# Patient Record
Sex: FEMALE | Race: WHITE | NOT HISPANIC OR LATINO | Employment: FULL TIME | URBAN - METROPOLITAN AREA
[De-identification: names, ages, dates, MRNs, and addresses within clinical notes are randomized per-mention and may not be internally consistent; named-entity substitution may affect disease eponyms.]

---

## 2020-01-01 ENCOUNTER — TELEPHONE (OUTPATIENT)
Dept: ONCOLOGY | Facility: MEDICAL CENTER | Age: 55
End: 2020-01-01

## 2020-01-01 ENCOUNTER — OUTPATIENT INFUSION SERVICES (OUTPATIENT)
Dept: ONCOLOGY | Facility: MEDICAL CENTER | Age: 55
End: 2020-01-01
Attending: INTERNAL MEDICINE
Payer: COMMERCIAL

## 2020-01-01 ENCOUNTER — APPOINTMENT (OUTPATIENT)
Dept: INFECTIOUS DISEASES | Facility: MEDICAL CENTER | Age: 55
End: 2020-01-01
Payer: COMMERCIAL

## 2020-01-01 ENCOUNTER — HOSPITAL ENCOUNTER (INPATIENT)
Facility: MEDICAL CENTER | Age: 55
LOS: 3 days | DRG: 205 | End: 2020-06-21
Attending: EMERGENCY MEDICINE | Admitting: HOSPITALIST
Payer: COMMERCIAL

## 2020-01-01 ENCOUNTER — APPOINTMENT (OUTPATIENT)
Dept: RADIOLOGY | Facility: MEDICAL CENTER | Age: 55
DRG: 841 | End: 2020-01-01
Attending: EMERGENCY MEDICINE
Payer: COMMERCIAL

## 2020-01-01 ENCOUNTER — APPOINTMENT (OUTPATIENT)
Dept: RADIOLOGY | Facility: MEDICAL CENTER | Age: 55
DRG: 871 | End: 2020-01-01
Attending: INTERNAL MEDICINE
Payer: COMMERCIAL

## 2020-01-01 ENCOUNTER — HOSPICE ADMISSION (OUTPATIENT)
Dept: HOSPICE | Facility: HOSPICE | Age: 55
End: 2020-01-01
Payer: COMMERCIAL

## 2020-01-01 ENCOUNTER — PATIENT MESSAGE (OUTPATIENT)
Dept: HEALTH INFORMATION MANAGEMENT | Facility: OTHER | Age: 55
End: 2020-01-01

## 2020-01-01 ENCOUNTER — OUTPATIENT INFUSION SERVICES (OUTPATIENT)
Dept: ONCOLOGY | Facility: MEDICAL CENTER | Age: 55
End: 2020-01-01
Attending: NURSE PRACTITIONER
Payer: COMMERCIAL

## 2020-01-01 ENCOUNTER — APPOINTMENT (OUTPATIENT)
Dept: RADIOLOGY | Facility: MEDICAL CENTER | Age: 55
DRG: 808 | End: 2020-01-01
Attending: EMERGENCY MEDICINE
Payer: COMMERCIAL

## 2020-01-01 ENCOUNTER — HOSPITAL ENCOUNTER (INPATIENT)
Facility: MEDICAL CENTER | Age: 55
LOS: 7 days | DRG: 841 | End: 2020-07-09
Attending: EMERGENCY MEDICINE | Admitting: HOSPITALIST
Payer: COMMERCIAL

## 2020-01-01 ENCOUNTER — PATIENT OUTREACH (OUTPATIENT)
Dept: OTHER | Facility: MEDICAL CENTER | Age: 55
End: 2020-01-01

## 2020-01-01 ENCOUNTER — APPOINTMENT (OUTPATIENT)
Dept: CARDIOLOGY | Facility: MEDICAL CENTER | Age: 55
DRG: 871 | End: 2020-01-01
Attending: INTERNAL MEDICINE
Payer: COMMERCIAL

## 2020-01-01 ENCOUNTER — HOSPITAL ENCOUNTER (INPATIENT)
Facility: MEDICAL CENTER | Age: 55
LOS: 6 days | DRG: 808 | End: 2020-05-23
Attending: EMERGENCY MEDICINE | Admitting: INTERNAL MEDICINE
Payer: COMMERCIAL

## 2020-01-01 ENCOUNTER — HOSPITAL ENCOUNTER (OUTPATIENT)
Dept: RADIOLOGY | Facility: MEDICAL CENTER | Age: 55
End: 2020-08-07
Attending: INTERNAL MEDICINE
Payer: COMMERCIAL

## 2020-01-01 ENCOUNTER — HOSPITAL ENCOUNTER (OUTPATIENT)
Dept: RADIOLOGY | Facility: MEDICAL CENTER | Age: 55
End: 2020-07-27
Attending: INTERNAL MEDICINE
Payer: COMMERCIAL

## 2020-01-01 ENCOUNTER — OFFICE VISIT (OUTPATIENT)
Dept: INFECTIOUS DISEASES | Facility: MEDICAL CENTER | Age: 55
End: 2020-01-01
Payer: COMMERCIAL

## 2020-01-01 ENCOUNTER — HOSPITAL ENCOUNTER (INPATIENT)
Facility: MEDICAL CENTER | Age: 55
LOS: 9 days | DRG: 871 | End: 2020-08-07
Attending: EMERGENCY MEDICINE | Admitting: HOSPITALIST
Payer: COMMERCIAL

## 2020-01-01 ENCOUNTER — DOCUMENTATION (OUTPATIENT)
Dept: NUTRITION | Facility: MEDICAL CENTER | Age: 55
End: 2020-01-01

## 2020-01-01 ENCOUNTER — APPOINTMENT (OUTPATIENT)
Dept: RADIOLOGY | Facility: MEDICAL CENTER | Age: 55
DRG: 205 | End: 2020-01-01
Attending: INTERNAL MEDICINE
Payer: COMMERCIAL

## 2020-01-01 ENCOUNTER — HOME CARE VISIT (OUTPATIENT)
Dept: HOSPICE | Facility: HOSPICE | Age: 55
End: 2020-01-01
Payer: COMMERCIAL

## 2020-01-01 ENCOUNTER — APPOINTMENT (OUTPATIENT)
Dept: RADIOLOGY | Facility: MEDICAL CENTER | Age: 55
DRG: 205 | End: 2020-01-01
Attending: EMERGENCY MEDICINE
Payer: COMMERCIAL

## 2020-01-01 VITALS
OXYGEN SATURATION: 100 % | SYSTOLIC BLOOD PRESSURE: 122 MMHG | HEART RATE: 82 BPM | WEIGHT: 95.9 LBS | BODY MASS INDEX: 19.33 KG/M2 | TEMPERATURE: 98.5 F | DIASTOLIC BLOOD PRESSURE: 54 MMHG | RESPIRATION RATE: 18 BRPM | HEIGHT: 59 IN

## 2020-01-01 VITALS
WEIGHT: 96.78 LBS | TEMPERATURE: 101.8 F | SYSTOLIC BLOOD PRESSURE: 165 MMHG | OXYGEN SATURATION: 100 % | HEIGHT: 59 IN | BODY MASS INDEX: 19.51 KG/M2 | DIASTOLIC BLOOD PRESSURE: 80 MMHG | HEART RATE: 145 BPM | RESPIRATION RATE: 26 BRPM

## 2020-01-01 VITALS
BODY MASS INDEX: 18.89 KG/M2 | RESPIRATION RATE: 17 BRPM | HEIGHT: 59 IN | WEIGHT: 93.7 LBS | DIASTOLIC BLOOD PRESSURE: 61 MMHG | TEMPERATURE: 97.8 F | HEART RATE: 93 BPM | OXYGEN SATURATION: 98 % | SYSTOLIC BLOOD PRESSURE: 132 MMHG

## 2020-01-01 VITALS
BODY MASS INDEX: 19.62 KG/M2 | DIASTOLIC BLOOD PRESSURE: 53 MMHG | OXYGEN SATURATION: 100 % | WEIGHT: 93.47 LBS | SYSTOLIC BLOOD PRESSURE: 135 MMHG | HEART RATE: 86 BPM | RESPIRATION RATE: 16 BRPM | TEMPERATURE: 98.9 F | HEIGHT: 58 IN

## 2020-01-01 VITALS — HEIGHT: 60 IN | WEIGHT: 109.79 LBS | TEMPERATURE: 99.7 F | BODY MASS INDEX: 21.55 KG/M2

## 2020-01-01 VITALS
SYSTOLIC BLOOD PRESSURE: 122 MMHG | WEIGHT: 91.27 LBS | TEMPERATURE: 101 F | HEIGHT: 59 IN | HEART RATE: 95 BPM | RESPIRATION RATE: 18 BRPM | OXYGEN SATURATION: 94 % | DIASTOLIC BLOOD PRESSURE: 40 MMHG | BODY MASS INDEX: 18.4 KG/M2

## 2020-01-01 VITALS
BODY MASS INDEX: 18.93 KG/M2 | WEIGHT: 93.92 LBS | TEMPERATURE: 98.4 F | RESPIRATION RATE: 18 BRPM | DIASTOLIC BLOOD PRESSURE: 44 MMHG | HEIGHT: 59 IN | HEART RATE: 92 BPM | OXYGEN SATURATION: 98 % | SYSTOLIC BLOOD PRESSURE: 131 MMHG

## 2020-01-01 VITALS
HEIGHT: 59 IN | DIASTOLIC BLOOD PRESSURE: 42 MMHG | SYSTOLIC BLOOD PRESSURE: 106 MMHG | RESPIRATION RATE: 18 BRPM | HEART RATE: 85 BPM | TEMPERATURE: 97.8 F | OXYGEN SATURATION: 95 % | WEIGHT: 91.49 LBS | BODY MASS INDEX: 18.44 KG/M2

## 2020-01-01 VITALS
DIASTOLIC BLOOD PRESSURE: 54 MMHG | HEART RATE: 81 BPM | TEMPERATURE: 98 F | BODY MASS INDEX: 19.6 KG/M2 | WEIGHT: 97.22 LBS | HEIGHT: 59 IN | RESPIRATION RATE: 18 BRPM | SYSTOLIC BLOOD PRESSURE: 150 MMHG | OXYGEN SATURATION: 100 %

## 2020-01-01 VITALS
TEMPERATURE: 97.4 F | HEART RATE: 74 BPM | DIASTOLIC BLOOD PRESSURE: 51 MMHG | RESPIRATION RATE: 18 BRPM | WEIGHT: 92.15 LBS | HEIGHT: 59 IN | BODY MASS INDEX: 18.58 KG/M2 | OXYGEN SATURATION: 100 % | SYSTOLIC BLOOD PRESSURE: 111 MMHG

## 2020-01-01 VITALS
BODY MASS INDEX: 19.07 KG/M2 | SYSTOLIC BLOOD PRESSURE: 145 MMHG | DIASTOLIC BLOOD PRESSURE: 70 MMHG | RESPIRATION RATE: 18 BRPM | HEART RATE: 100 BPM | OXYGEN SATURATION: 100 % | HEIGHT: 59 IN | WEIGHT: 94.58 LBS | TEMPERATURE: 97 F

## 2020-01-01 VITALS
OXYGEN SATURATION: 99 % | HEART RATE: 95 BPM | HEIGHT: 59 IN | RESPIRATION RATE: 16 BRPM | WEIGHT: 96.12 LBS | DIASTOLIC BLOOD PRESSURE: 35 MMHG | SYSTOLIC BLOOD PRESSURE: 142 MMHG | TEMPERATURE: 99.2 F | BODY MASS INDEX: 19.38 KG/M2

## 2020-01-01 VITALS
WEIGHT: 95.24 LBS | TEMPERATURE: 98.8 F | HEART RATE: 82 BPM | DIASTOLIC BLOOD PRESSURE: 43 MMHG | RESPIRATION RATE: 18 BRPM | BODY MASS INDEX: 19.2 KG/M2 | OXYGEN SATURATION: 100 % | HEIGHT: 59 IN | SYSTOLIC BLOOD PRESSURE: 113 MMHG

## 2020-01-01 VITALS
RESPIRATION RATE: 18 BRPM | HEART RATE: 90 BPM | HEIGHT: 59 IN | SYSTOLIC BLOOD PRESSURE: 159 MMHG | WEIGHT: 96.12 LBS | TEMPERATURE: 97.6 F | DIASTOLIC BLOOD PRESSURE: 54 MMHG | OXYGEN SATURATION: 100 % | BODY MASS INDEX: 19.38 KG/M2

## 2020-01-01 VITALS
SYSTOLIC BLOOD PRESSURE: 127 MMHG | HEART RATE: 86 BPM | RESPIRATION RATE: 18 BRPM | HEIGHT: 59 IN | TEMPERATURE: 99 F | OXYGEN SATURATION: 100 % | DIASTOLIC BLOOD PRESSURE: 51 MMHG | BODY MASS INDEX: 19.02 KG/M2 | WEIGHT: 94.36 LBS

## 2020-01-01 VITALS
DIASTOLIC BLOOD PRESSURE: 50 MMHG | RESPIRATION RATE: 18 BRPM | SYSTOLIC BLOOD PRESSURE: 119 MMHG | OXYGEN SATURATION: 100 % | HEIGHT: 59 IN | HEART RATE: 80 BPM | WEIGHT: 96.12 LBS | TEMPERATURE: 98.9 F | BODY MASS INDEX: 19.38 KG/M2

## 2020-01-01 VITALS
BODY MASS INDEX: 19.27 KG/M2 | DIASTOLIC BLOOD PRESSURE: 70 MMHG | HEIGHT: 59 IN | OXYGEN SATURATION: 99 % | TEMPERATURE: 99.1 F | WEIGHT: 95.6 LBS | SYSTOLIC BLOOD PRESSURE: 140 MMHG | HEART RATE: 86 BPM

## 2020-01-01 VITALS
RESPIRATION RATE: 18 BRPM | DIASTOLIC BLOOD PRESSURE: 60 MMHG | TEMPERATURE: 98.6 F | SYSTOLIC BLOOD PRESSURE: 139 MMHG | HEIGHT: 59 IN | BODY MASS INDEX: 20 KG/M2 | OXYGEN SATURATION: 100 % | WEIGHT: 99.21 LBS | HEART RATE: 76 BPM

## 2020-01-01 VITALS
DIASTOLIC BLOOD PRESSURE: 60 MMHG | RESPIRATION RATE: 18 BRPM | HEART RATE: 85 BPM | HEIGHT: 59 IN | WEIGHT: 92.59 LBS | TEMPERATURE: 98.5 F | BODY MASS INDEX: 18.67 KG/M2 | OXYGEN SATURATION: 95 % | SYSTOLIC BLOOD PRESSURE: 140 MMHG

## 2020-01-01 VITALS
HEIGHT: 59 IN | DIASTOLIC BLOOD PRESSURE: 67 MMHG | WEIGHT: 92.37 LBS | TEMPERATURE: 98.5 F | HEART RATE: 105 BPM | OXYGEN SATURATION: 100 % | RESPIRATION RATE: 18 BRPM | SYSTOLIC BLOOD PRESSURE: 147 MMHG | BODY MASS INDEX: 18.62 KG/M2

## 2020-01-01 VITALS
OXYGEN SATURATION: 100 % | HEART RATE: 76 BPM | HEIGHT: 59 IN | WEIGHT: 100.31 LBS | RESPIRATION RATE: 18 BRPM | DIASTOLIC BLOOD PRESSURE: 71 MMHG | SYSTOLIC BLOOD PRESSURE: 156 MMHG | TEMPERATURE: 98.7 F | BODY MASS INDEX: 20.22 KG/M2

## 2020-01-01 VITALS
BODY MASS INDEX: 19.42 KG/M2 | HEIGHT: 59 IN | RESPIRATION RATE: 16 BRPM | HEART RATE: 85 BPM | OXYGEN SATURATION: 100 % | TEMPERATURE: 98.9 F | SYSTOLIC BLOOD PRESSURE: 135 MMHG | DIASTOLIC BLOOD PRESSURE: 47 MMHG | WEIGHT: 96.34 LBS

## 2020-01-01 VITALS
WEIGHT: 91.49 LBS | RESPIRATION RATE: 18 BRPM | HEIGHT: 59 IN | BODY MASS INDEX: 18.44 KG/M2 | HEART RATE: 75 BPM | TEMPERATURE: 97.1 F | SYSTOLIC BLOOD PRESSURE: 115 MMHG | OXYGEN SATURATION: 98 % | DIASTOLIC BLOOD PRESSURE: 43 MMHG

## 2020-01-01 VITALS
RESPIRATION RATE: 18 BRPM | OXYGEN SATURATION: 98 % | TEMPERATURE: 97.9 F | HEIGHT: 59 IN | HEART RATE: 88 BPM | BODY MASS INDEX: 18.22 KG/M2 | DIASTOLIC BLOOD PRESSURE: 49 MMHG | SYSTOLIC BLOOD PRESSURE: 126 MMHG | WEIGHT: 90.39 LBS

## 2020-01-01 VITALS
HEART RATE: 92 BPM | DIASTOLIC BLOOD PRESSURE: 61 MMHG | RESPIRATION RATE: 18 BRPM | WEIGHT: 92.59 LBS | OXYGEN SATURATION: 98 % | SYSTOLIC BLOOD PRESSURE: 139 MMHG | TEMPERATURE: 98 F | HEIGHT: 59 IN | BODY MASS INDEX: 18.67 KG/M2

## 2020-01-01 VITALS
DIASTOLIC BLOOD PRESSURE: 53 MMHG | OXYGEN SATURATION: 99 % | WEIGHT: 99.43 LBS | SYSTOLIC BLOOD PRESSURE: 139 MMHG | HEIGHT: 59 IN | HEART RATE: 99 BPM | TEMPERATURE: 101.1 F | RESPIRATION RATE: 20 BRPM | BODY MASS INDEX: 20.04 KG/M2

## 2020-01-01 VITALS
WEIGHT: 91.49 LBS | RESPIRATION RATE: 18 BRPM | HEART RATE: 77 BPM | SYSTOLIC BLOOD PRESSURE: 121 MMHG | DIASTOLIC BLOOD PRESSURE: 56 MMHG | HEIGHT: 59 IN | TEMPERATURE: 98.3 F | BODY MASS INDEX: 18.44 KG/M2 | OXYGEN SATURATION: 100 %

## 2020-01-01 VITALS
RESPIRATION RATE: 16 BRPM | HEART RATE: 85 BPM | HEIGHT: 60 IN | BODY MASS INDEX: 18.18 KG/M2 | OXYGEN SATURATION: 99 % | WEIGHT: 92.59 LBS | SYSTOLIC BLOOD PRESSURE: 127 MMHG | DIASTOLIC BLOOD PRESSURE: 68 MMHG | TEMPERATURE: 98.8 F

## 2020-01-01 VITALS
WEIGHT: 96.12 LBS | TEMPERATURE: 99.4 F | BODY MASS INDEX: 19.38 KG/M2 | SYSTOLIC BLOOD PRESSURE: 161 MMHG | HEIGHT: 59 IN | HEART RATE: 91 BPM | OXYGEN SATURATION: 100 % | RESPIRATION RATE: 18 BRPM | DIASTOLIC BLOOD PRESSURE: 64 MMHG

## 2020-01-01 VITALS
RESPIRATION RATE: 18 BRPM | HEART RATE: 87 BPM | BODY MASS INDEX: 18.58 KG/M2 | SYSTOLIC BLOOD PRESSURE: 138 MMHG | DIASTOLIC BLOOD PRESSURE: 64 MMHG | OXYGEN SATURATION: 100 % | HEIGHT: 59 IN | TEMPERATURE: 98.5 F | WEIGHT: 92.15 LBS

## 2020-01-01 VITALS
HEIGHT: 59 IN | HEART RATE: 77 BPM | SYSTOLIC BLOOD PRESSURE: 173 MMHG | WEIGHT: 97.66 LBS | OXYGEN SATURATION: 100 % | DIASTOLIC BLOOD PRESSURE: 72 MMHG | TEMPERATURE: 98.6 F | RESPIRATION RATE: 18 BRPM | BODY MASS INDEX: 19.69 KG/M2

## 2020-01-01 VITALS
HEART RATE: 87 BPM | BODY MASS INDEX: 19.9 KG/M2 | RESPIRATION RATE: 16 BRPM | DIASTOLIC BLOOD PRESSURE: 45 MMHG | TEMPERATURE: 98.1 F | WEIGHT: 94.8 LBS | OXYGEN SATURATION: 100 % | SYSTOLIC BLOOD PRESSURE: 122 MMHG | HEIGHT: 58 IN

## 2020-01-01 VITALS
HEART RATE: 79 BPM | WEIGHT: 96.12 LBS | DIASTOLIC BLOOD PRESSURE: 50 MMHG | BODY MASS INDEX: 19.38 KG/M2 | TEMPERATURE: 98.3 F | RESPIRATION RATE: 16 BRPM | HEIGHT: 59 IN | SYSTOLIC BLOOD PRESSURE: 131 MMHG | OXYGEN SATURATION: 100 %

## 2020-01-01 VITALS
HEIGHT: 59 IN | HEART RATE: 85 BPM | TEMPERATURE: 98.4 F | BODY MASS INDEX: 20.18 KG/M2 | DIASTOLIC BLOOD PRESSURE: 44 MMHG | OXYGEN SATURATION: 95 % | SYSTOLIC BLOOD PRESSURE: 126 MMHG | WEIGHT: 100.09 LBS | RESPIRATION RATE: 16 BRPM

## 2020-01-01 VITALS
DIASTOLIC BLOOD PRESSURE: 43 MMHG | HEART RATE: 87 BPM | RESPIRATION RATE: 18 BRPM | BODY MASS INDEX: 19.42 KG/M2 | HEIGHT: 59 IN | WEIGHT: 96.34 LBS | OXYGEN SATURATION: 100 % | TEMPERATURE: 99.3 F | SYSTOLIC BLOOD PRESSURE: 135 MMHG

## 2020-01-01 VITALS
WEIGHT: 94.36 LBS | HEIGHT: 59 IN | RESPIRATION RATE: 18 BRPM | BODY MASS INDEX: 19.02 KG/M2 | OXYGEN SATURATION: 99 % | HEART RATE: 113 BPM | TEMPERATURE: 101.5 F | SYSTOLIC BLOOD PRESSURE: 167 MMHG | DIASTOLIC BLOOD PRESSURE: 58 MMHG

## 2020-01-01 DIAGNOSIS — D64.9 ANEMIA, UNSPECIFIED TYPE: ICD-10-CM

## 2020-01-01 DIAGNOSIS — D69.6 THROMBOCYTOPENIA (HCC): ICD-10-CM

## 2020-01-01 DIAGNOSIS — R65.21 SEPTIC SHOCK (HCC): ICD-10-CM

## 2020-01-01 DIAGNOSIS — R50.81 NEUTROPENIC FEVER (HCC): ICD-10-CM

## 2020-01-01 DIAGNOSIS — C91.10 CLL (CHRONIC LYMPHOCYTIC LEUKEMIA) (HCC): ICD-10-CM

## 2020-01-01 DIAGNOSIS — C91.10 CLL (CHRONIC LYMPHOCYTIC LEUKEMIA) (HCC): Chronic | ICD-10-CM

## 2020-01-01 DIAGNOSIS — J95.84 TRALI (TRANSFUSION RELATED ACUTE LUNG INJURY): ICD-10-CM

## 2020-01-01 DIAGNOSIS — D70.9 NEUTROPENIC FEVER (HCC): ICD-10-CM

## 2020-01-01 DIAGNOSIS — E87.6 HYPOKALEMIA: ICD-10-CM

## 2020-01-01 DIAGNOSIS — D70.9 NEUTROPENIA, UNSPECIFIED TYPE (HCC): ICD-10-CM

## 2020-01-01 DIAGNOSIS — I47.10 SVT (SUPRAVENTRICULAR TACHYCARDIA) (HCC): ICD-10-CM

## 2020-01-01 DIAGNOSIS — A41.9 SEPSIS, DUE TO UNSPECIFIED ORGANISM, UNSPECIFIED WHETHER ACUTE ORGAN DYSFUNCTION PRESENT (HCC): ICD-10-CM

## 2020-01-01 DIAGNOSIS — D61.818 PANCYTOPENIA (HCC): ICD-10-CM

## 2020-01-01 DIAGNOSIS — R50.9 FEVER OF UNKNOWN ORIGIN: ICD-10-CM

## 2020-01-01 DIAGNOSIS — K13.70 UNSPECIFIED LESIONS OF ORAL MUCOSA: ICD-10-CM

## 2020-01-01 DIAGNOSIS — E87.1 HYPONATREMIA: ICD-10-CM

## 2020-01-01 DIAGNOSIS — A41.9 SEPTIC SHOCK (HCC): ICD-10-CM

## 2020-01-01 DIAGNOSIS — T80.919A: ICD-10-CM

## 2020-01-01 DIAGNOSIS — J15.211 PNEUMONIA DUE TO METHICILLIN SUSCEPTIBLE STAPHYLOCOCCUS AUREUS, UNSPECIFIED LATERALITY, UNSPECIFIED PART OF LUNG: ICD-10-CM

## 2020-01-01 LAB
1 3 BETA D GLUCAN INTERP Q4483: POSITIVE
1 3 BETA D GLUCAN INTERP Q4483: POSITIVE
1,3 BETA GLUCAN SER-MCNC: 376 PG/ML
1,3 BETA GLUCAN SER-MCNC: >500 PG/ML
ABO + RH BLD: NORMAL
ABO GROUP BLD: ABNORMAL
ALBUMIN SERPL BCP-MCNC: 2.1 G/DL (ref 3.2–4.9)
ALBUMIN SERPL BCP-MCNC: 2.3 G/DL (ref 3.2–4.9)
ALBUMIN SERPL BCP-MCNC: 2.6 G/DL (ref 3.2–4.9)
ALBUMIN SERPL BCP-MCNC: 2.7 G/DL (ref 3.2–4.9)
ALBUMIN SERPL BCP-MCNC: 3.3 G/DL (ref 3.2–4.9)
ALBUMIN SERPL BCP-MCNC: 3.6 G/DL (ref 3.2–4.9)
ALBUMIN SERPL BCP-MCNC: 3.6 G/DL (ref 3.2–4.9)
ALBUMIN SERPL BCP-MCNC: 3.7 G/DL (ref 3.2–4.9)
ALBUMIN SERPL BCP-MCNC: 3.7 G/DL (ref 3.2–4.9)
ALBUMIN SERPL BCP-MCNC: 3.8 G/DL (ref 3.2–4.9)
ALBUMIN SERPL BCP-MCNC: 4.1 G/DL (ref 3.2–4.9)
ALBUMIN SERPL BCP-MCNC: 4.1 G/DL (ref 3.2–4.9)
ALBUMIN SERPL BCP-MCNC: 4.3 G/DL (ref 3.2–4.9)
ALBUMIN SERPL BCP-MCNC: 4.3 G/DL (ref 3.2–4.9)
ALBUMIN SERPL BCP-MCNC: 4.5 G/DL (ref 3.2–4.9)
ALBUMIN/GLOB SERPL: 0.7 G/DL
ALBUMIN/GLOB SERPL: 0.8 G/DL
ALBUMIN/GLOB SERPL: 0.9 G/DL
ALBUMIN/GLOB SERPL: 1.2 G/DL
ALBUMIN/GLOB SERPL: 1.3 G/DL
ALBUMIN/GLOB SERPL: 1.4 G/DL
ALBUMIN/GLOB SERPL: 1.5 G/DL
ALP SERPL-CCNC: 105 U/L (ref 30–99)
ALP SERPL-CCNC: 164 U/L (ref 30–99)
ALP SERPL-CCNC: 180 U/L (ref 30–99)
ALP SERPL-CCNC: 216 U/L (ref 30–99)
ALP SERPL-CCNC: 245 U/L (ref 30–99)
ALP SERPL-CCNC: 71 U/L (ref 30–99)
ALP SERPL-CCNC: 73 U/L (ref 30–99)
ALP SERPL-CCNC: 73 U/L (ref 30–99)
ALP SERPL-CCNC: 76 U/L (ref 30–99)
ALP SERPL-CCNC: 78 U/L (ref 30–99)
ALP SERPL-CCNC: 79 U/L (ref 30–99)
ALP SERPL-CCNC: 83 U/L (ref 30–99)
ALP SERPL-CCNC: 85 U/L (ref 30–99)
ALP SERPL-CCNC: 86 U/L (ref 30–99)
ALP SERPL-CCNC: 89 U/L (ref 30–99)
ALP SERPL-CCNC: 91 U/L (ref 30–99)
ALP SERPL-CCNC: 93 U/L (ref 30–99)
ALT SERPL-CCNC: 12 U/L (ref 2–50)
ALT SERPL-CCNC: 12 U/L (ref 2–50)
ALT SERPL-CCNC: 14 U/L (ref 2–50)
ALT SERPL-CCNC: 14 U/L (ref 2–50)
ALT SERPL-CCNC: 18 U/L (ref 2–50)
ALT SERPL-CCNC: 18 U/L (ref 2–50)
ALT SERPL-CCNC: 197 U/L (ref 2–50)
ALT SERPL-CCNC: 20 U/L (ref 2–50)
ALT SERPL-CCNC: 23 U/L (ref 2–50)
ALT SERPL-CCNC: 25 U/L (ref 2–50)
ALT SERPL-CCNC: 26 U/L (ref 2–50)
ALT SERPL-CCNC: 35 U/L (ref 2–50)
ALT SERPL-CCNC: 38 U/L (ref 2–50)
ALT SERPL-CCNC: 76 U/L (ref 2–50)
ALT SERPL-CCNC: 92 U/L (ref 2–50)
ANION GAP SERPL CALC-SCNC: 10 MMOL/L (ref 7–16)
ANION GAP SERPL CALC-SCNC: 11 MMOL/L (ref 7–16)
ANION GAP SERPL CALC-SCNC: 12 MMOL/L (ref 7–16)
ANION GAP SERPL CALC-SCNC: 13 MMOL/L (ref 7–16)
ANION GAP SERPL CALC-SCNC: 14 MMOL/L (ref 7–16)
ANION GAP SERPL CALC-SCNC: 15 MMOL/L (ref 7–16)
ANION GAP SERPL CALC-SCNC: 16 MMOL/L (ref 7–16)
ANION GAP SERPL CALC-SCNC: 17 MMOL/L (ref 7–16)
ANION GAP SERPL CALC-SCNC: 26 MMOL/L (ref 7–16)
ANION GAP SERPL CALC-SCNC: 7 MMOL/L (ref 7–16)
ANION GAP SERPL CALC-SCNC: 8 MMOL/L (ref 7–16)
ANION GAP SERPL CALC-SCNC: 9 MMOL/L (ref 7–16)
ANISOCYTOSIS BLD QL SMEAR: ABNORMAL
APPEARANCE UR: CLEAR
APTT PPP: 43.4 SEC (ref 24.7–36)
APTT PPP: 45.9 SEC (ref 24.7–36)
ASPERGILLUS AB SER QL ID: NORMAL
ASPERGILLUS AB TITR SER CF: NORMAL {TITER}
AST SERPL-CCNC: 10 U/L (ref 12–45)
AST SERPL-CCNC: 11 U/L (ref 12–45)
AST SERPL-CCNC: 14 U/L (ref 12–45)
AST SERPL-CCNC: 14 U/L (ref 12–45)
AST SERPL-CCNC: 15 U/L (ref 12–45)
AST SERPL-CCNC: 192 U/L (ref 12–45)
AST SERPL-CCNC: 22 U/L (ref 12–45)
AST SERPL-CCNC: 25 U/L (ref 12–45)
AST SERPL-CCNC: 30 U/L (ref 12–45)
AST SERPL-CCNC: 7 U/L (ref 12–45)
AST SERPL-CCNC: 8 U/L (ref 12–45)
AST SERPL-CCNC: 9 U/L (ref 12–45)
AST SERPL-CCNC: 93 U/L (ref 12–45)
BACTERIA #/AREA URNS HPF: ABNORMAL /HPF
BACTERIA #/AREA URNS HPF: ABNORMAL /HPF
BACTERIA #/AREA URNS HPF: NEGATIVE /HPF
BACTERIA #/AREA URNS HPF: NEGATIVE /HPF
BACTERIA BLD CULT: NORMAL
BACTERIA SPEC RESP CULT: ABNORMAL
BACTERIA SPEC RESP CULT: ABNORMAL
BACTERIA SPEC RESP CULT: NORMAL
BACTERIA SPEC RESP CULT: NORMAL
BACTERIA UR CULT: ABNORMAL
BACTERIA UR CULT: ABNORMAL
BACTERIA UR CULT: NORMAL
BACTERIA UR CULT: NORMAL
BARCODED ABORH UBTYP: 1700
BARCODED ABORH UBTYP: 2800
BARCODED ABORH UBTYP: 5100
BARCODED ABORH UBTYP: 600
BARCODED ABORH UBTYP: 6200
BARCODED ABORH UBTYP: 7300
BARCODED ABORH UBTYP: 7300
BARCODED ABORH UBTYP: 9500
BARCODED PRD CODE UBPRD: ABNORMAL
BARCODED PRD CODE UBPRD: NORMAL
BARCODED UNIT NUM UBUNT: ABNORMAL
BARCODED UNIT NUM UBUNT: NORMAL
BASOPHILS # BLD AUTO: 0 % (ref 0–1.8)
BASOPHILS # BLD AUTO: ABNORMAL % (ref 0–1.8)
BASOPHILS # BLD: 0 K/UL (ref 0–0.12)
BASOPHILS # BLD: ABNORMAL K/UL (ref 0–0.12)
BILIRUB CONJ SERPL-MCNC: 0.5 MG/DL (ref 0.1–0.5)
BILIRUB CONJ SERPL-MCNC: 0.9 MG/DL (ref 0.1–0.5)
BILIRUB CONJ SERPL-MCNC: 1.1 MG/DL (ref 0.1–0.5)
BILIRUB INDIRECT SERPL-MCNC: 0.7 MG/DL (ref 0–1)
BILIRUB SERPL-MCNC: 0.8 MG/DL (ref 0.1–1.5)
BILIRUB SERPL-MCNC: 1 MG/DL (ref 0.1–1.5)
BILIRUB SERPL-MCNC: 1.2 MG/DL (ref 0.1–1.5)
BILIRUB SERPL-MCNC: 1.2 MG/DL (ref 0.1–1.5)
BILIRUB SERPL-MCNC: 1.3 MG/DL (ref 0.1–1.5)
BILIRUB SERPL-MCNC: 1.6 MG/DL (ref 0.1–1.5)
BILIRUB SERPL-MCNC: 1.6 MG/DL (ref 0.1–1.5)
BILIRUB SERPL-MCNC: 1.7 MG/DL (ref 0.1–1.5)
BILIRUB SERPL-MCNC: 1.8 MG/DL (ref 0.1–1.5)
BILIRUB SERPL-MCNC: 1.8 MG/DL (ref 0.1–1.5)
BILIRUB SERPL-MCNC: 1.9 MG/DL (ref 0.1–1.5)
BILIRUB SERPL-MCNC: 1.9 MG/DL (ref 0.1–1.5)
BILIRUB SERPL-MCNC: 2.5 MG/DL (ref 0.1–1.5)
BILIRUB SERPL-MCNC: 3 MG/DL (ref 0.1–1.5)
BILIRUB SERPL-MCNC: 3.3 MG/DL (ref 0.1–1.5)
BILIRUB SERPL-MCNC: 5.8 MG/DL (ref 0.1–1.5)
BILIRUB SERPL-MCNC: 6.1 MG/DL (ref 0.1–1.5)
BILIRUB UR QL STRIP.AUTO: NEGATIVE
BLD GP AB SCN SERPL QL: ABNORMAL
BUN SERPL-MCNC: 10 MG/DL (ref 8–22)
BUN SERPL-MCNC: 10 MG/DL (ref 8–22)
BUN SERPL-MCNC: 11 MG/DL (ref 8–22)
BUN SERPL-MCNC: 12 MG/DL (ref 8–22)
BUN SERPL-MCNC: 12 MG/DL (ref 8–22)
BUN SERPL-MCNC: 13 MG/DL (ref 8–22)
BUN SERPL-MCNC: 14 MG/DL (ref 8–22)
BUN SERPL-MCNC: 17 MG/DL (ref 8–22)
BUN SERPL-MCNC: 5 MG/DL (ref 8–22)
BUN SERPL-MCNC: 6 MG/DL (ref 8–22)
BUN SERPL-MCNC: 6 MG/DL (ref 8–22)
BUN SERPL-MCNC: 7 MG/DL (ref 8–22)
BUN SERPL-MCNC: 8 MG/DL (ref 8–22)
BUN SERPL-MCNC: 9 MG/DL (ref 8–22)
C DIFF DNA SPEC QL NAA+PROBE: NEGATIVE
C DIFF TOX GENS STL QL NAA+PROBE: NEGATIVE
C IMMITIS IGM SPEC QL IA: 0.1 IV
C IMMITIS IGM SPEC QL IA: 0.1 IV
CA-I SERPL-SCNC: 1.1 MMOL/L (ref 1.1–1.3)
CALCIUM SERPL-MCNC: 7.9 MG/DL (ref 8.5–10.5)
CALCIUM SERPL-MCNC: 7.9 MG/DL (ref 8.5–10.5)
CALCIUM SERPL-MCNC: 8 MG/DL (ref 8.5–10.5)
CALCIUM SERPL-MCNC: 8 MG/DL (ref 8.5–10.5)
CALCIUM SERPL-MCNC: 8.1 MG/DL (ref 8.5–10.5)
CALCIUM SERPL-MCNC: 8.2 MG/DL (ref 8.5–10.5)
CALCIUM SERPL-MCNC: 8.2 MG/DL (ref 8.5–10.5)
CALCIUM SERPL-MCNC: 8.3 MG/DL (ref 8.5–10.5)
CALCIUM SERPL-MCNC: 8.4 MG/DL (ref 8.5–10.5)
CALCIUM SERPL-MCNC: 8.4 MG/DL (ref 8.5–10.5)
CALCIUM SERPL-MCNC: 8.6 MG/DL (ref 8.5–10.5)
CALCIUM SERPL-MCNC: 8.7 MG/DL (ref 8.5–10.5)
CALCIUM SERPL-MCNC: 8.8 MG/DL (ref 8.5–10.5)
CALCIUM SERPL-MCNC: 9 MG/DL (ref 8.5–10.5)
CALCIUM SERPL-MCNC: 9.1 MG/DL (ref 8.5–10.5)
CALCIUM SERPL-MCNC: 9.2 MG/DL (ref 8.5–10.5)
CALCIUM SERPL-MCNC: 9.3 MG/DL (ref 8.5–10.5)
CALCIUM SERPL-MCNC: 9.3 MG/DL (ref 8.5–10.5)
CALCIUM SERPL-MCNC: 9.4 MG/DL (ref 8.5–10.5)
CALCIUM SERPL-MCNC: 9.5 MG/DL (ref 8.5–10.5)
CALCIUM SERPL-MCNC: 9.7 MG/DL (ref 8.5–10.5)
CHLORIDE SERPL-SCNC: 100 MMOL/L (ref 96–112)
CHLORIDE SERPL-SCNC: 102 MMOL/L (ref 96–112)
CHLORIDE SERPL-SCNC: 103 MMOL/L (ref 96–112)
CHLORIDE SERPL-SCNC: 104 MMOL/L (ref 96–112)
CHLORIDE SERPL-SCNC: 105 MMOL/L (ref 96–112)
CHLORIDE SERPL-SCNC: 105 MMOL/L (ref 96–112)
CHLORIDE SERPL-SCNC: 107 MMOL/L (ref 96–112)
CHLORIDE SERPL-SCNC: 107 MMOL/L (ref 96–112)
CHLORIDE SERPL-SCNC: 108 MMOL/L (ref 96–112)
CHLORIDE SERPL-SCNC: 109 MMOL/L (ref 96–112)
CHLORIDE SERPL-SCNC: 91 MMOL/L (ref 96–112)
CHLORIDE SERPL-SCNC: 94 MMOL/L (ref 96–112)
CHLORIDE SERPL-SCNC: 95 MMOL/L (ref 96–112)
CHLORIDE SERPL-SCNC: 96 MMOL/L (ref 96–112)
CHLORIDE SERPL-SCNC: 97 MMOL/L (ref 96–112)
CHLORIDE SERPL-SCNC: 98 MMOL/L (ref 96–112)
CHLORIDE SERPL-SCNC: 99 MMOL/L (ref 96–112)
CHOLEST SERPL-MCNC: 101 MG/DL (ref 100–199)
CO2 SERPL-SCNC: 16 MMOL/L (ref 20–33)
CO2 SERPL-SCNC: 16 MMOL/L (ref 20–33)
CO2 SERPL-SCNC: 17 MMOL/L (ref 20–33)
CO2 SERPL-SCNC: 19 MMOL/L (ref 20–33)
CO2 SERPL-SCNC: 20 MMOL/L (ref 20–33)
CO2 SERPL-SCNC: 21 MMOL/L (ref 20–33)
CO2 SERPL-SCNC: 22 MMOL/L (ref 20–33)
CO2 SERPL-SCNC: 23 MMOL/L (ref 20–33)
CO2 SERPL-SCNC: 23 MMOL/L (ref 20–33)
CO2 SERPL-SCNC: 24 MMOL/L (ref 20–33)
CO2 SERPL-SCNC: 24 MMOL/L (ref 20–33)
CO2 SERPL-SCNC: 25 MMOL/L (ref 20–33)
COCCIDIOIDES AB SPEC QL ID: NORMAL
COCCIDIOIDES AB SPEC QL ID: NORMAL
COCCIDIOIDES AB TITR SER CF: NORMAL {TITER}
COCCIDIOIDES AB TITR SER CF: NORMAL {TITER}
COCCIDIOIDES IGG SPEC QL IA: 0.2 IV
COCCIDIOIDES IGG SPEC QL IA: 0.3 IV
COLOR UR: YELLOW
COMMENT 1642: NORMAL
COMPONENT P 8504P: NORMAL
COMPONENT R 8504R: ABNORMAL
COMPONENT R 8504R: NORMAL
COVID ORDER STATUS COVID19: NORMAL
CREAT SERPL-MCNC: 0.27 MG/DL (ref 0.5–1.4)
CREAT SERPL-MCNC: 0.32 MG/DL (ref 0.5–1.4)
CREAT SERPL-MCNC: 0.33 MG/DL (ref 0.5–1.4)
CREAT SERPL-MCNC: 0.36 MG/DL (ref 0.5–1.4)
CREAT SERPL-MCNC: 0.4 MG/DL (ref 0.5–1.4)
CREAT SERPL-MCNC: 0.41 MG/DL (ref 0.5–1.4)
CREAT SERPL-MCNC: 0.42 MG/DL (ref 0.5–1.4)
CREAT SERPL-MCNC: 0.44 MG/DL (ref 0.5–1.4)
CREAT SERPL-MCNC: 0.45 MG/DL (ref 0.5–1.4)
CREAT SERPL-MCNC: 0.47 MG/DL (ref 0.5–1.4)
CREAT SERPL-MCNC: 0.47 MG/DL (ref 0.5–1.4)
CREAT SERPL-MCNC: 0.48 MG/DL (ref 0.5–1.4)
CREAT SERPL-MCNC: 0.48 MG/DL (ref 0.5–1.4)
CREAT SERPL-MCNC: 0.49 MG/DL (ref 0.5–1.4)
CREAT SERPL-MCNC: 0.51 MG/DL (ref 0.5–1.4)
CREAT SERPL-MCNC: 0.52 MG/DL (ref 0.5–1.4)
CREAT SERPL-MCNC: 0.53 MG/DL (ref 0.5–1.4)
CREAT SERPL-MCNC: 0.55 MG/DL (ref 0.5–1.4)
CREAT SERPL-MCNC: 0.56 MG/DL (ref 0.5–1.4)
CREAT SERPL-MCNC: 0.56 MG/DL (ref 0.5–1.4)
CREAT SERPL-MCNC: 0.57 MG/DL (ref 0.5–1.4)
CREAT SERPL-MCNC: 0.59 MG/DL (ref 0.5–1.4)
CREAT SERPL-MCNC: 0.64 MG/DL (ref 0.5–1.4)
CREAT SERPL-MCNC: 0.64 MG/DL (ref 0.5–1.4)
CREAT SERPL-MCNC: 0.71 MG/DL (ref 0.5–1.4)
CRP SERPL HS-MCNC: 5.23 MG/DL (ref 0–0.75)
CRP SERPL HS-MCNC: 8.65 MG/DL (ref 0–0.75)
CYTOLOGY REG CYTOL: NORMAL
D DIMER PPP IA.FEU-MCNC: 2.53 UG/ML (FEU) (ref 0–0.5)
DAT C3D-SP REAG RBC QL: ABNORMAL
DAT C3D-SP REAG RBC QL: ABNORMAL
DAT IGG-SP REAG RBC QL: ABNORMAL
DAT IGG-SP REAG RBC QL: ABNORMAL
EKG IMPRESSION: NORMAL
EOSINOPHIL # BLD AUTO: 0 K/UL (ref 0–0.51)
EOSINOPHIL # BLD AUTO: 0.01 K/UL (ref 0–0.51)
EOSINOPHIL # BLD AUTO: ABNORMAL K/UL (ref 0–0.51)
EOSINOPHIL NFR BLD: 0 % (ref 0–6.9)
EOSINOPHIL NFR BLD: 0.4 % (ref 0–6.9)
EOSINOPHIL NFR BLD: 0.9 % (ref 0–6.9)
EOSINOPHIL NFR BLD: 1 % (ref 0–6.9)
EOSINOPHIL NFR BLD: ABNORMAL % (ref 0–6.9)
EPI CELLS #/AREA URNS HPF: ABNORMAL /HPF
EPI CELLS #/AREA URNS HPF: NEGATIVE /HPF
ERYTHROCYTE [DISTWIDTH] IN BLOOD BY AUTOMATED COUNT: 34.7 FL (ref 35.9–50)
ERYTHROCYTE [DISTWIDTH] IN BLOOD BY AUTOMATED COUNT: 34.8 FL (ref 35.9–50)
ERYTHROCYTE [DISTWIDTH] IN BLOOD BY AUTOMATED COUNT: 35.2 FL (ref 35.9–50)
ERYTHROCYTE [DISTWIDTH] IN BLOOD BY AUTOMATED COUNT: 35.5 FL (ref 35.9–50)
ERYTHROCYTE [DISTWIDTH] IN BLOOD BY AUTOMATED COUNT: 35.6 FL (ref 35.9–50)
ERYTHROCYTE [DISTWIDTH] IN BLOOD BY AUTOMATED COUNT: 35.7 FL (ref 35.9–50)
ERYTHROCYTE [DISTWIDTH] IN BLOOD BY AUTOMATED COUNT: 35.8 FL (ref 35.9–50)
ERYTHROCYTE [DISTWIDTH] IN BLOOD BY AUTOMATED COUNT: 35.8 FL (ref 35.9–50)
ERYTHROCYTE [DISTWIDTH] IN BLOOD BY AUTOMATED COUNT: 35.9 FL (ref 35.9–50)
ERYTHROCYTE [DISTWIDTH] IN BLOOD BY AUTOMATED COUNT: 35.9 FL (ref 35.9–50)
ERYTHROCYTE [DISTWIDTH] IN BLOOD BY AUTOMATED COUNT: 36.5 FL (ref 35.9–50)
ERYTHROCYTE [DISTWIDTH] IN BLOOD BY AUTOMATED COUNT: 36.6 FL (ref 35.9–50)
ERYTHROCYTE [DISTWIDTH] IN BLOOD BY AUTOMATED COUNT: 36.8 FL (ref 35.9–50)
ERYTHROCYTE [DISTWIDTH] IN BLOOD BY AUTOMATED COUNT: 36.9 FL (ref 35.9–50)
ERYTHROCYTE [DISTWIDTH] IN BLOOD BY AUTOMATED COUNT: 37 FL (ref 35.9–50)
ERYTHROCYTE [DISTWIDTH] IN BLOOD BY AUTOMATED COUNT: 37.2 FL (ref 35.9–50)
ERYTHROCYTE [DISTWIDTH] IN BLOOD BY AUTOMATED COUNT: 37.2 FL (ref 35.9–50)
ERYTHROCYTE [DISTWIDTH] IN BLOOD BY AUTOMATED COUNT: 37.4 FL (ref 35.9–50)
ERYTHROCYTE [DISTWIDTH] IN BLOOD BY AUTOMATED COUNT: 37.6 FL (ref 35.9–50)
ERYTHROCYTE [DISTWIDTH] IN BLOOD BY AUTOMATED COUNT: 37.8 FL (ref 35.9–50)
ERYTHROCYTE [DISTWIDTH] IN BLOOD BY AUTOMATED COUNT: 38 FL (ref 35.9–50)
ERYTHROCYTE [DISTWIDTH] IN BLOOD BY AUTOMATED COUNT: 38.2 FL (ref 35.9–50)
ERYTHROCYTE [DISTWIDTH] IN BLOOD BY AUTOMATED COUNT: 38.2 FL (ref 35.9–50)
ERYTHROCYTE [DISTWIDTH] IN BLOOD BY AUTOMATED COUNT: 38.3 FL (ref 35.9–50)
ERYTHROCYTE [DISTWIDTH] IN BLOOD BY AUTOMATED COUNT: 38.3 FL (ref 35.9–50)
ERYTHROCYTE [DISTWIDTH] IN BLOOD BY AUTOMATED COUNT: 38.4 FL (ref 35.9–50)
ERYTHROCYTE [DISTWIDTH] IN BLOOD BY AUTOMATED COUNT: 38.5 FL (ref 35.9–50)
ERYTHROCYTE [DISTWIDTH] IN BLOOD BY AUTOMATED COUNT: 38.5 FL (ref 35.9–50)
ERYTHROCYTE [DISTWIDTH] IN BLOOD BY AUTOMATED COUNT: 38.8 FL (ref 35.9–50)
ERYTHROCYTE [DISTWIDTH] IN BLOOD BY AUTOMATED COUNT: 38.9 FL (ref 35.9–50)
ERYTHROCYTE [DISTWIDTH] IN BLOOD BY AUTOMATED COUNT: 39 FL (ref 35.9–50)
ERYTHROCYTE [DISTWIDTH] IN BLOOD BY AUTOMATED COUNT: 39.1 FL (ref 35.9–50)
ERYTHROCYTE [DISTWIDTH] IN BLOOD BY AUTOMATED COUNT: 39.1 FL (ref 35.9–50)
ERYTHROCYTE [DISTWIDTH] IN BLOOD BY AUTOMATED COUNT: 39.5 FL (ref 35.9–50)
ERYTHROCYTE [DISTWIDTH] IN BLOOD BY AUTOMATED COUNT: 39.5 FL (ref 35.9–50)
ERYTHROCYTE [DISTWIDTH] IN BLOOD BY AUTOMATED COUNT: 39.7 FL (ref 35.9–50)
ERYTHROCYTE [DISTWIDTH] IN BLOOD BY AUTOMATED COUNT: 39.8 FL (ref 35.9–50)
ERYTHROCYTE [DISTWIDTH] IN BLOOD BY AUTOMATED COUNT: 39.9 FL (ref 35.9–50)
ERYTHROCYTE [DISTWIDTH] IN BLOOD BY AUTOMATED COUNT: 39.9 FL (ref 35.9–50)
ERYTHROCYTE [DISTWIDTH] IN BLOOD BY AUTOMATED COUNT: 40.5 FL (ref 35.9–50)
ERYTHROCYTE [DISTWIDTH] IN BLOOD BY AUTOMATED COUNT: 40.6 FL (ref 35.9–50)
ERYTHROCYTE [DISTWIDTH] IN BLOOD BY AUTOMATED COUNT: 40.7 FL (ref 35.9–50)
ERYTHROCYTE [DISTWIDTH] IN BLOOD BY AUTOMATED COUNT: 41.3 FL (ref 35.9–50)
ERYTHROCYTE [DISTWIDTH] IN BLOOD BY AUTOMATED COUNT: 41.4 FL (ref 35.9–50)
ERYTHROCYTE [DISTWIDTH] IN BLOOD BY AUTOMATED COUNT: 41.4 FL (ref 35.9–50)
ERYTHROCYTE [DISTWIDTH] IN BLOOD BY AUTOMATED COUNT: 41.5 FL (ref 35.9–50)
ERYTHROCYTE [DISTWIDTH] IN BLOOD BY AUTOMATED COUNT: 41.7 FL (ref 35.9–50)
ERYTHROCYTE [DISTWIDTH] IN BLOOD BY AUTOMATED COUNT: 41.9 FL (ref 35.9–50)
ERYTHROCYTE [DISTWIDTH] IN BLOOD BY AUTOMATED COUNT: 44.8 FL (ref 35.9–50)
FERRITIN SERPL-MCNC: ABNORMAL NG/ML (ref 10–291)
FIBRINOGEN PPP-MCNC: 610 MG/DL (ref 215–460)
FIBRINOGEN PPP-MCNC: 801 MG/DL (ref 215–460)
FOLATE SERPL-MCNC: 15.9 NG/ML
FOLATE SERPL-MCNC: 19.6 NG/ML
FUNGUS SPEC FUNGUS STN: NORMAL
GALACTOMANNAN AG SERPL QL IA: NEGATIVE
GALACTOMANNAN AG SERPL-ACNC: 0.11
GAMMA INTERFERON BACKGROUND BLD IA-ACNC: 0.02 IU/ML
GAMMA INTERFERON BACKGROUND BLD IA-ACNC: 0.04 IU/ML
GLOBULIN SER CALC-MCNC: 2.3 G/DL (ref 1.9–3.5)
GLOBULIN SER CALC-MCNC: 2.4 G/DL (ref 1.9–3.5)
GLOBULIN SER CALC-MCNC: 2.5 G/DL (ref 1.9–3.5)
GLOBULIN SER CALC-MCNC: 2.7 G/DL (ref 1.9–3.5)
GLOBULIN SER CALC-MCNC: 2.7 G/DL (ref 1.9–3.5)
GLOBULIN SER CALC-MCNC: 2.8 G/DL (ref 1.9–3.5)
GLOBULIN SER CALC-MCNC: 2.8 G/DL (ref 1.9–3.5)
GLOBULIN SER CALC-MCNC: 2.9 G/DL (ref 1.9–3.5)
GLOBULIN SER CALC-MCNC: 2.9 G/DL (ref 1.9–3.5)
GLOBULIN SER CALC-MCNC: 3 G/DL (ref 1.9–3.5)
GLOBULIN SER CALC-MCNC: 3.2 G/DL (ref 1.9–3.5)
GLOBULIN SER CALC-MCNC: 3.3 G/DL (ref 1.9–3.5)
GLOBULIN SER CALC-MCNC: 3.4 G/DL (ref 1.9–3.5)
GLUCOSE SERPL-MCNC: 100 MG/DL (ref 65–99)
GLUCOSE SERPL-MCNC: 101 MG/DL (ref 65–99)
GLUCOSE SERPL-MCNC: 101 MG/DL (ref 65–99)
GLUCOSE SERPL-MCNC: 103 MG/DL (ref 65–99)
GLUCOSE SERPL-MCNC: 106 MG/DL (ref 65–99)
GLUCOSE SERPL-MCNC: 106 MG/DL (ref 65–99)
GLUCOSE SERPL-MCNC: 107 MG/DL (ref 65–99)
GLUCOSE SERPL-MCNC: 109 MG/DL (ref 65–99)
GLUCOSE SERPL-MCNC: 111 MG/DL (ref 65–99)
GLUCOSE SERPL-MCNC: 111 MG/DL (ref 65–99)
GLUCOSE SERPL-MCNC: 112 MG/DL (ref 65–99)
GLUCOSE SERPL-MCNC: 113 MG/DL (ref 65–99)
GLUCOSE SERPL-MCNC: 114 MG/DL (ref 65–99)
GLUCOSE SERPL-MCNC: 114 MG/DL (ref 65–99)
GLUCOSE SERPL-MCNC: 118 MG/DL (ref 65–99)
GLUCOSE SERPL-MCNC: 121 MG/DL (ref 65–99)
GLUCOSE SERPL-MCNC: 122 MG/DL (ref 65–99)
GLUCOSE SERPL-MCNC: 122 MG/DL (ref 65–99)
GLUCOSE SERPL-MCNC: 123 MG/DL (ref 65–99)
GLUCOSE SERPL-MCNC: 124 MG/DL (ref 65–99)
GLUCOSE SERPL-MCNC: 126 MG/DL (ref 65–99)
GLUCOSE SERPL-MCNC: 127 MG/DL (ref 65–99)
GLUCOSE SERPL-MCNC: 127 MG/DL (ref 65–99)
GLUCOSE SERPL-MCNC: 140 MG/DL (ref 65–99)
GLUCOSE SERPL-MCNC: 145 MG/DL (ref 65–99)
GLUCOSE SERPL-MCNC: 157 MG/DL (ref 65–99)
GLUCOSE SERPL-MCNC: 169 MG/DL (ref 65–99)
GLUCOSE SERPL-MCNC: 177 MG/DL (ref 65–99)
GLUCOSE SERPL-MCNC: 97 MG/DL (ref 65–99)
GLUCOSE UR STRIP.AUTO-MCNC: NEGATIVE MG/DL
GRAM STN SPEC: ABNORMAL
GRAM STN SPEC: NORMAL
HAPTOGLOB SERPL-MCNC: 162 MG/DL (ref 30–200)
HAPTOGLOB SERPL-MCNC: 189 MG/DL (ref 30–200)
HAPTOGLOB SERPL-MCNC: 249 MG/DL (ref 30–200)
HAV IGM SERPL QL IA: NORMAL
HBV CORE IGM SER QL: NORMAL
HBV SURFACE AG SER QL: NORMAL
HCT VFR BLD AUTO: 16.6 % (ref 37–47)
HCT VFR BLD AUTO: 17.1 % (ref 37–47)
HCT VFR BLD AUTO: 17.2 % (ref 37–47)
HCT VFR BLD AUTO: 18.2 % (ref 37–47)
HCT VFR BLD AUTO: 18.4 % (ref 37–47)
HCT VFR BLD AUTO: 18.7 % (ref 37–47)
HCT VFR BLD AUTO: 18.8 % (ref 37–47)
HCT VFR BLD AUTO: 19.3 % (ref 37–47)
HCT VFR BLD AUTO: 19.6 % (ref 37–47)
HCT VFR BLD AUTO: 19.6 % (ref 37–47)
HCT VFR BLD AUTO: 20.1 % (ref 37–47)
HCT VFR BLD AUTO: 20.3 % (ref 37–47)
HCT VFR BLD AUTO: 20.4 % (ref 37–47)
HCT VFR BLD AUTO: 20.4 % (ref 37–47)
HCT VFR BLD AUTO: 20.5 % (ref 37–47)
HCT VFR BLD AUTO: 20.6 % (ref 37–47)
HCT VFR BLD AUTO: 20.8 % (ref 37–47)
HCT VFR BLD AUTO: 20.9 % (ref 37–47)
HCT VFR BLD AUTO: 21.1 % (ref 37–47)
HCT VFR BLD AUTO: 21.1 % (ref 37–47)
HCT VFR BLD AUTO: 21.3 % (ref 37–47)
HCT VFR BLD AUTO: 21.5 % (ref 37–47)
HCT VFR BLD AUTO: 21.8 % (ref 37–47)
HCT VFR BLD AUTO: 22 % (ref 37–47)
HCT VFR BLD AUTO: 22 % (ref 37–47)
HCT VFR BLD AUTO: 22.1 % (ref 37–47)
HCT VFR BLD AUTO: 22.2 % (ref 37–47)
HCT VFR BLD AUTO: 22.2 % (ref 37–47)
HCT VFR BLD AUTO: 22.4 % (ref 37–47)
HCT VFR BLD AUTO: 22.5 % (ref 37–47)
HCT VFR BLD AUTO: 22.7 % (ref 37–47)
HCT VFR BLD AUTO: 22.8 % (ref 37–47)
HCT VFR BLD AUTO: 23.1 % (ref 37–47)
HCT VFR BLD AUTO: 23.2 % (ref 37–47)
HCT VFR BLD AUTO: 23.4 % (ref 37–47)
HCT VFR BLD AUTO: 23.5 % (ref 37–47)
HCT VFR BLD AUTO: 23.6 % (ref 37–47)
HCT VFR BLD AUTO: 23.7 % (ref 37–47)
HCT VFR BLD AUTO: 23.8 % (ref 37–47)
HCT VFR BLD AUTO: 23.8 % (ref 37–47)
HCT VFR BLD AUTO: 23.9 % (ref 37–47)
HCT VFR BLD AUTO: 23.9 % (ref 37–47)
HCT VFR BLD AUTO: 24.1 % (ref 37–47)
HCT VFR BLD AUTO: 24.7 % (ref 37–47)
HCT VFR BLD AUTO: 24.9 % (ref 37–47)
HCT VFR BLD AUTO: 25.7 % (ref 37–47)
HCT VFR BLD AUTO: 26.2 % (ref 37–47)
HCT VFR BLD AUTO: 27.7 % (ref 37–47)
HCT VFR BLD AUTO: 28.5 % (ref 37–47)
HCT VFR BLD AUTO: 28.6 % (ref 37–47)
HCT VFR BLD AUTO: 28.9 % (ref 37–47)
HCT VFR BLD AUTO: 29.1 % (ref 37–47)
HCV AB SER QL: NORMAL
HDLC SERPL-MCNC: 41 MG/DL
HGB BLD-MCNC: 10 G/DL (ref 12–16)
HGB BLD-MCNC: 10.1 G/DL (ref 12–16)
HGB BLD-MCNC: 10.1 G/DL (ref 12–16)
HGB BLD-MCNC: 10.2 G/DL (ref 12–16)
HGB BLD-MCNC: 5.7 G/DL (ref 12–16)
HGB BLD-MCNC: 5.8 G/DL (ref 12–16)
HGB BLD-MCNC: 5.9 G/DL (ref 12–16)
HGB BLD-MCNC: 6.1 G/DL (ref 12–16)
HGB BLD-MCNC: 6.4 G/DL (ref 12–16)
HGB BLD-MCNC: 6.6 G/DL (ref 12–16)
HGB BLD-MCNC: 6.7 G/DL (ref 12–16)
HGB BLD-MCNC: 6.8 G/DL (ref 12–16)
HGB BLD-MCNC: 6.9 G/DL (ref 12–16)
HGB BLD-MCNC: 7 G/DL (ref 12–16)
HGB BLD-MCNC: 7 G/DL (ref 12–16)
HGB BLD-MCNC: 7.1 G/DL (ref 12–16)
HGB BLD-MCNC: 7.2 G/DL (ref 12–16)
HGB BLD-MCNC: 7.3 G/DL (ref 12–16)
HGB BLD-MCNC: 7.4 G/DL (ref 12–16)
HGB BLD-MCNC: 7.4 G/DL (ref 12–16)
HGB BLD-MCNC: 7.5 G/DL (ref 12–16)
HGB BLD-MCNC: 7.7 G/DL (ref 12–16)
HGB BLD-MCNC: 7.8 G/DL (ref 12–16)
HGB BLD-MCNC: 7.9 G/DL (ref 12–16)
HGB BLD-MCNC: 8 G/DL (ref 12–16)
HGB BLD-MCNC: 8 G/DL (ref 12–16)
HGB BLD-MCNC: 8.1 G/DL (ref 12–16)
HGB BLD-MCNC: 8.1 G/DL (ref 12–16)
HGB BLD-MCNC: 8.2 G/DL (ref 12–16)
HGB BLD-MCNC: 8.3 G/DL (ref 12–16)
HGB BLD-MCNC: 8.3 G/DL (ref 12–16)
HGB BLD-MCNC: 8.4 G/DL (ref 12–16)
HGB BLD-MCNC: 8.4 G/DL (ref 12–16)
HGB BLD-MCNC: 8.5 G/DL (ref 12–16)
HGB BLD-MCNC: 8.7 G/DL (ref 12–16)
HGB BLD-MCNC: 8.9 G/DL (ref 12–16)
HGB BLD-MCNC: 9.8 G/DL (ref 12–16)
HGB RETIC QN AUTO: 34.5 PG/CELL (ref 29–35)
HGB RETIC QN AUTO: 36.1 PG/CELL (ref 29–35)
HIV 1+2 AB+HIV1 P24 AG SERPL QL IA: NORMAL
HYALINE CASTS #/AREA URNS LPF: ABNORMAL /LPF
HYPOCHROMIA BLD QL SMEAR: ABNORMAL
IGG SERPL-MCNC: 875 MG/DL (ref 768–1632)
IMM GRANULOCYTES # BLD AUTO: 0 K/UL (ref 0–0.11)
IMM GRANULOCYTES # BLD AUTO: ABNORMAL K/UL (ref 0–0.11)
IMM GRANULOCYTES NFR BLD AUTO: 0 % (ref 0–0.9)
IMM GRANULOCYTES NFR BLD AUTO: ABNORMAL % (ref 0–0.9)
IMM RETICS NFR: 0 % (ref 9.3–17.4)
IMM RETICS NFR: 1.4 % (ref 9.3–17.4)
INR PPP: 1.42 (ref 0.87–1.13)
INR PPP: 1.54 (ref 0.87–1.13)
INR PPP: 1.56 (ref 0.87–1.13)
INR PPP: 1.76 (ref 0.87–1.13)
IRON SATN MFR SERPL: 85 % (ref 15–55)
IRON SERPL-MCNC: 95 UG/DL (ref 40–170)
KETONES UR STRIP.AUTO-MCNC: NEGATIVE MG/DL
LACTATE BLD-SCNC: 0.8 MMOL/L (ref 0.5–2)
LACTATE BLD-SCNC: 0.8 MMOL/L (ref 0.5–2)
LACTATE BLD-SCNC: 1 MMOL/L (ref 0.5–2)
LACTATE BLD-SCNC: 1 MMOL/L (ref 0.5–2)
LACTATE BLD-SCNC: 1.1 MMOL/L (ref 0.5–2)
LACTATE BLD-SCNC: 1.1 MMOL/L (ref 0.5–2)
LACTATE BLD-SCNC: 1.2 MMOL/L (ref 0.5–2)
LACTATE BLD-SCNC: 1.2 MMOL/L (ref 0.5–2)
LACTATE BLD-SCNC: 1.3 MMOL/L (ref 0.5–2)
LACTATE BLD-SCNC: 1.5 MMOL/L (ref 0.5–2)
LACTATE BLD-SCNC: 1.6 MMOL/L (ref 0.5–2)
LACTATE BLD-SCNC: 1.7 MMOL/L (ref 0.5–2)
LACTATE BLD-SCNC: 1.8 MMOL/L (ref 0.5–2)
LACTATE BLD-SCNC: 2 MMOL/L (ref 0.5–2)
LACTATE BLD-SCNC: 2.7 MMOL/L (ref 0.5–2)
LACTATE BLD-SCNC: 2.8 MMOL/L (ref 0.5–2)
LACTATE BLD-SCNC: 3.9 MMOL/L (ref 0.5–2)
LDH SERPL L TO P-CCNC: 110 U/L (ref 107–266)
LDH SERPL L TO P-CCNC: 152 U/L (ref 107–266)
LDH SERPL L TO P-CCNC: 510 U/L (ref 107–266)
LDLC SERPL CALC-MCNC: 42 MG/DL
LEUKOCYTE ESTERASE UR QL STRIP.AUTO: NEGATIVE
LV EJECT FRACT  99904: 70
LV EJECT FRACT MOD 2C 99903: 65.4
LV EJECT FRACT MOD 4C 99902: 69.66
LV EJECT FRACT MOD BP 99901: 69.18
LYMPHOCYTES # BLD AUTO: 0.02 K/UL (ref 1–4.8)
LYMPHOCYTES # BLD AUTO: 0.1 K/UL (ref 1–4.8)
LYMPHOCYTES # BLD AUTO: 0.2 K/UL (ref 1–4.8)
LYMPHOCYTES # BLD AUTO: 0.28 K/UL (ref 1–4.8)
LYMPHOCYTES # BLD AUTO: 0.3 K/UL (ref 1–4.8)
LYMPHOCYTES # BLD AUTO: 0.39 K/UL (ref 1–4.8)
LYMPHOCYTES # BLD AUTO: 0.4 K/UL (ref 1–4.8)
LYMPHOCYTES # BLD AUTO: 0.48 K/UL (ref 1–4.8)
LYMPHOCYTES # BLD AUTO: 0.49 K/UL (ref 1–4.8)
LYMPHOCYTES # BLD AUTO: 0.5 K/UL (ref 1–4.8)
LYMPHOCYTES # BLD AUTO: 0.5 K/UL (ref 1–4.8)
LYMPHOCYTES # BLD AUTO: 0.55 K/UL (ref 1–4.8)
LYMPHOCYTES # BLD AUTO: 0.57 K/UL (ref 1–4.8)
LYMPHOCYTES # BLD AUTO: 0.58 K/UL (ref 1–4.8)
LYMPHOCYTES # BLD AUTO: 0.59 K/UL (ref 1–4.8)
LYMPHOCYTES # BLD AUTO: 0.63 K/UL (ref 1–4.8)
LYMPHOCYTES # BLD AUTO: 0.69 K/UL (ref 1–4.8)
LYMPHOCYTES # BLD AUTO: 0.7 K/UL (ref 1–4.8)
LYMPHOCYTES # BLD AUTO: 0.75 K/UL (ref 1–4.8)
LYMPHOCYTES # BLD AUTO: 0.75 K/UL (ref 1–4.8)
LYMPHOCYTES # BLD AUTO: 0.77 K/UL (ref 1–4.8)
LYMPHOCYTES # BLD AUTO: 0.79 K/UL (ref 1–4.8)
LYMPHOCYTES # BLD AUTO: 0.87 K/UL (ref 1–4.8)
LYMPHOCYTES # BLD AUTO: 0.88 K/UL (ref 1–4.8)
LYMPHOCYTES # BLD AUTO: 0.94 K/UL (ref 1–4.8)
LYMPHOCYTES # BLD AUTO: 0.94 K/UL (ref 1–4.8)
LYMPHOCYTES # BLD AUTO: 0.97 K/UL (ref 1–4.8)
LYMPHOCYTES # BLD AUTO: 1 K/UL (ref 1–4.8)
LYMPHOCYTES # BLD AUTO: 1.04 K/UL (ref 1–4.8)
LYMPHOCYTES # BLD AUTO: 1.15 K/UL (ref 1–4.8)
LYMPHOCYTES # BLD AUTO: 1.16 K/UL (ref 1–4.8)
LYMPHOCYTES # BLD AUTO: 1.7 K/UL (ref 1–4.8)
LYMPHOCYTES # BLD AUTO: 1.75 K/UL (ref 1–4.8)
LYMPHOCYTES # BLD AUTO: 2.13 K/UL (ref 1–4.8)
LYMPHOCYTES # BLD AUTO: ABNORMAL K/UL (ref 1–4.8)
LYMPHOCYTES NFR BLD: 100 % (ref 22–41)
LYMPHOCYTES NFR BLD: 4 % (ref 22–41)
LYMPHOCYTES NFR BLD: 83.5 % (ref 22–41)
LYMPHOCYTES NFR BLD: 88.1 % (ref 22–41)
LYMPHOCYTES NFR BLD: 88.6 % (ref 22–41)
LYMPHOCYTES NFR BLD: 89.6 % (ref 22–41)
LYMPHOCYTES NFR BLD: 91.7 % (ref 22–41)
LYMPHOCYTES NFR BLD: 91.9 % (ref 22–41)
LYMPHOCYTES NFR BLD: 93.9 % (ref 22–41)
LYMPHOCYTES NFR BLD: 94 % (ref 22–41)
LYMPHOCYTES NFR BLD: 94 % (ref 22–41)
LYMPHOCYTES NFR BLD: 94.3 % (ref 22–41)
LYMPHOCYTES NFR BLD: 94.8 % (ref 22–41)
LYMPHOCYTES NFR BLD: 94.9 % (ref 22–41)
LYMPHOCYTES NFR BLD: 95.5 % (ref 22–41)
LYMPHOCYTES NFR BLD: 95.6 % (ref 22–41)
LYMPHOCYTES NFR BLD: 96.2 % (ref 22–41)
LYMPHOCYTES NFR BLD: 96.2 % (ref 22–41)
LYMPHOCYTES NFR BLD: 96.3 % (ref 22–41)
LYMPHOCYTES NFR BLD: 96.3 % (ref 22–41)
LYMPHOCYTES NFR BLD: 96.5 % (ref 22–41)
LYMPHOCYTES NFR BLD: 96.6 % (ref 22–41)
LYMPHOCYTES NFR BLD: 96.8 % (ref 22–41)
LYMPHOCYTES NFR BLD: 97.4 % (ref 22–41)
LYMPHOCYTES NFR BLD: 97.5 % (ref 22–41)
LYMPHOCYTES NFR BLD: 98 % (ref 22–41)
LYMPHOCYTES NFR BLD: 98.2 % (ref 22–41)
LYMPHOCYTES NFR BLD: 98.3 % (ref 22–41)
LYMPHOCYTES NFR BLD: 98.3 % (ref 22–41)
LYMPHOCYTES NFR BLD: 99 % (ref 22–41)
LYMPHOCYTES NFR BLD: 99.1 % (ref 22–41)
LYMPHOCYTES NFR BLD: ABNORMAL % (ref 22–41)
M TB IFN-G BLD-IMP: NEGATIVE
M TB IFN-G BLD-IMP: NEGATIVE
M TB IFN-G CD4+ BCKGRND COR BLD-ACNC: 0.01 IU/ML
M TB IFN-G CD4+ BCKGRND COR BLD-ACNC: 0.01 IU/ML
MACROCYTES BLD QL SMEAR: ABNORMAL
MACROCYTES BLD QL SMEAR: ABNORMAL
MAGNESIUM SERPL-MCNC: 1.6 MG/DL (ref 1.5–2.5)
MAGNESIUM SERPL-MCNC: 1.7 MG/DL (ref 1.5–2.5)
MAGNESIUM SERPL-MCNC: 1.7 MG/DL (ref 1.5–2.5)
MAGNESIUM SERPL-MCNC: 1.8 MG/DL (ref 1.5–2.5)
MAGNESIUM SERPL-MCNC: 1.9 MG/DL (ref 1.5–2.5)
MAGNESIUM SERPL-MCNC: 2 MG/DL (ref 1.5–2.5)
MAGNESIUM SERPL-MCNC: 2.1 MG/DL (ref 1.5–2.5)
MAGNESIUM SERPL-MCNC: 2.1 MG/DL (ref 1.5–2.5)
MAGNESIUM SERPL-MCNC: 2.2 MG/DL (ref 1.5–2.5)
MAGNESIUM SERPL-MCNC: 2.5 MG/DL (ref 1.5–2.5)
MANUAL DIFF BLD: NORMAL
MCH RBC QN AUTO: 26.7 PG (ref 27–33)
MCH RBC QN AUTO: 27 PG (ref 27–33)
MCH RBC QN AUTO: 27 PG (ref 27–33)
MCH RBC QN AUTO: 27.1 PG (ref 27–33)
MCH RBC QN AUTO: 27.1 PG (ref 27–33)
MCH RBC QN AUTO: 27.2 PG (ref 27–33)
MCH RBC QN AUTO: 27.4 PG (ref 27–33)
MCH RBC QN AUTO: 27.5 PG (ref 27–33)
MCH RBC QN AUTO: 27.5 PG (ref 27–33)
MCH RBC QN AUTO: 27.7 PG (ref 27–33)
MCH RBC QN AUTO: 27.8 PG (ref 27–33)
MCH RBC QN AUTO: 28 PG (ref 27–33)
MCH RBC QN AUTO: 28.1 PG (ref 27–33)
MCH RBC QN AUTO: 28.2 PG (ref 27–33)
MCH RBC QN AUTO: 28.7 PG (ref 27–33)
MCH RBC QN AUTO: 28.8 PG (ref 27–33)
MCH RBC QN AUTO: 28.9 PG (ref 27–33)
MCH RBC QN AUTO: 28.9 PG (ref 27–33)
MCH RBC QN AUTO: 29 PG (ref 27–33)
MCH RBC QN AUTO: 29.1 PG (ref 27–33)
MCH RBC QN AUTO: 29.1 PG (ref 27–33)
MCH RBC QN AUTO: 29.2 PG (ref 27–33)
MCH RBC QN AUTO: 29.3 PG (ref 27–33)
MCH RBC QN AUTO: 29.3 PG (ref 27–33)
MCH RBC QN AUTO: 29.4 PG (ref 27–33)
MCH RBC QN AUTO: 29.5 PG (ref 27–33)
MCH RBC QN AUTO: 29.6 PG (ref 27–33)
MCH RBC QN AUTO: 29.6 PG (ref 27–33)
MCH RBC QN AUTO: 29.7 PG (ref 27–33)
MCH RBC QN AUTO: 29.8 PG (ref 27–33)
MCH RBC QN AUTO: 29.8 PG (ref 27–33)
MCH RBC QN AUTO: 29.9 PG (ref 27–33)
MCH RBC QN AUTO: 29.9 PG (ref 27–33)
MCH RBC QN AUTO: 30 PG (ref 27–33)
MCH RBC QN AUTO: 30.2 PG (ref 27–33)
MCH RBC QN AUTO: 30.3 PG (ref 27–33)
MCH RBC QN AUTO: 30.3 PG (ref 27–33)
MCH RBC QN AUTO: 30.4 PG (ref 27–33)
MCH RBC QN AUTO: 30.5 PG (ref 27–33)
MCH RBC QN AUTO: 30.6 PG (ref 27–33)
MCH RBC QN AUTO: 30.9 PG (ref 27–33)
MCHC RBC AUTO-ENTMCNC: 32.4 G/DL (ref 33.6–35)
MCHC RBC AUTO-ENTMCNC: 33.1 G/DL (ref 33.6–35)
MCHC RBC AUTO-ENTMCNC: 33.2 G/DL (ref 33.6–35)
MCHC RBC AUTO-ENTMCNC: 33.2 G/DL (ref 33.6–35)
MCHC RBC AUTO-ENTMCNC: 33.3 G/DL (ref 33.6–35)
MCHC RBC AUTO-ENTMCNC: 33.3 G/DL (ref 33.6–35)
MCHC RBC AUTO-ENTMCNC: 33.7 G/DL (ref 33.6–35)
MCHC RBC AUTO-ENTMCNC: 33.8 G/DL (ref 33.6–35)
MCHC RBC AUTO-ENTMCNC: 33.8 G/DL (ref 33.6–35)
MCHC RBC AUTO-ENTMCNC: 33.9 G/DL (ref 33.6–35)
MCHC RBC AUTO-ENTMCNC: 34 G/DL (ref 33.6–35)
MCHC RBC AUTO-ENTMCNC: 34.3 G/DL (ref 33.6–35)
MCHC RBC AUTO-ENTMCNC: 34.6 G/DL (ref 33.6–35)
MCHC RBC AUTO-ENTMCNC: 34.7 G/DL (ref 33.6–35)
MCHC RBC AUTO-ENTMCNC: 34.8 G/DL (ref 33.6–35)
MCHC RBC AUTO-ENTMCNC: 34.9 G/DL (ref 33.6–35)
MCHC RBC AUTO-ENTMCNC: 34.9 G/DL (ref 33.6–35)
MCHC RBC AUTO-ENTMCNC: 35 G/DL (ref 33.6–35)
MCHC RBC AUTO-ENTMCNC: 35.1 G/DL (ref 33.6–35)
MCHC RBC AUTO-ENTMCNC: 35.2 G/DL (ref 33.6–35)
MCHC RBC AUTO-ENTMCNC: 35.3 G/DL (ref 33.6–35)
MCHC RBC AUTO-ENTMCNC: 35.4 G/DL (ref 33.6–35)
MCHC RBC AUTO-ENTMCNC: 35.5 G/DL (ref 33.6–35)
MCHC RBC AUTO-ENTMCNC: 35.5 G/DL (ref 33.6–35)
MCHC RBC AUTO-ENTMCNC: 35.6 G/DL (ref 33.6–35)
MCHC RBC AUTO-ENTMCNC: 35.6 G/DL (ref 33.6–35)
MCHC RBC AUTO-ENTMCNC: 35.7 G/DL (ref 33.6–35)
MCHC RBC AUTO-ENTMCNC: 35.8 G/DL (ref 33.6–35)
MCHC RBC AUTO-ENTMCNC: 35.8 G/DL (ref 33.6–35)
MCHC RBC AUTO-ENTMCNC: 35.9 G/DL (ref 33.6–35)
MCHC RBC AUTO-ENTMCNC: 36.1 G/DL (ref 33.6–35)
MCHC RBC AUTO-ENTMCNC: 36.4 G/DL (ref 33.6–35)
MCV RBC AUTO: 79.4 FL (ref 81.4–97.8)
MCV RBC AUTO: 79.6 FL (ref 81.4–97.8)
MCV RBC AUTO: 79.7 FL (ref 81.4–97.8)
MCV RBC AUTO: 80.1 FL (ref 81.4–97.8)
MCV RBC AUTO: 80.2 FL (ref 81.4–97.8)
MCV RBC AUTO: 80.2 FL (ref 81.4–97.8)
MCV RBC AUTO: 80.3 FL (ref 81.4–97.8)
MCV RBC AUTO: 80.4 FL (ref 81.4–97.8)
MCV RBC AUTO: 80.6 FL (ref 81.4–97.8)
MCV RBC AUTO: 81 FL (ref 81.4–97.8)
MCV RBC AUTO: 81 FL (ref 81.4–97.8)
MCV RBC AUTO: 81.1 FL (ref 81.4–97.8)
MCV RBC AUTO: 81.3 FL (ref 81.4–97.8)
MCV RBC AUTO: 81.5 FL (ref 81.4–97.8)
MCV RBC AUTO: 81.6 FL (ref 81.4–97.8)
MCV RBC AUTO: 81.6 FL (ref 81.4–97.8)
MCV RBC AUTO: 81.7 FL (ref 81.4–97.8)
MCV RBC AUTO: 81.8 FL (ref 81.4–97.8)
MCV RBC AUTO: 82.1 FL (ref 81.4–97.8)
MCV RBC AUTO: 82.3 FL (ref 81.4–97.8)
MCV RBC AUTO: 82.5 FL (ref 81.4–97.8)
MCV RBC AUTO: 82.8 FL (ref 81.4–97.8)
MCV RBC AUTO: 82.8 FL (ref 81.4–97.8)
MCV RBC AUTO: 82.9 FL (ref 81.4–97.8)
MCV RBC AUTO: 83.1 FL (ref 81.4–97.8)
MCV RBC AUTO: 83.2 FL (ref 81.4–97.8)
MCV RBC AUTO: 83.7 FL (ref 81.4–97.8)
MCV RBC AUTO: 83.7 FL (ref 81.4–97.8)
MCV RBC AUTO: 84 FL (ref 81.4–97.8)
MCV RBC AUTO: 84 FL (ref 81.4–97.8)
MCV RBC AUTO: 84.1 FL (ref 81.4–97.8)
MCV RBC AUTO: 84.2 FL (ref 81.4–97.8)
MCV RBC AUTO: 84.3 FL (ref 81.4–97.8)
MCV RBC AUTO: 84.3 FL (ref 81.4–97.8)
MCV RBC AUTO: 84.7 FL (ref 81.4–97.8)
MCV RBC AUTO: 84.8 FL (ref 81.4–97.8)
MCV RBC AUTO: 85 FL (ref 81.4–97.8)
MCV RBC AUTO: 85.2 FL (ref 81.4–97.8)
MCV RBC AUTO: 85.3 FL (ref 81.4–97.8)
MCV RBC AUTO: 86 FL (ref 81.4–97.8)
MCV RBC AUTO: 86.4 FL (ref 81.4–97.8)
MCV RBC AUTO: 86.5 FL (ref 81.4–97.8)
MCV RBC AUTO: 86.8 FL (ref 81.4–97.8)
MCV RBC AUTO: 86.9 FL (ref 81.4–97.8)
MCV RBC AUTO: 87 FL (ref 81.4–97.8)
MCV RBC AUTO: 87 FL (ref 81.4–97.8)
MCV RBC AUTO: 87.4 FL (ref 81.4–97.8)
MCV RBC AUTO: 87.8 FL (ref 81.4–97.8)
METAMYELOCYTES NFR BLD MANUAL: 0.9 %
METAMYELOCYTES NFR BLD MANUAL: 0.9 %
MICRO URNS: ABNORMAL
MICROCYTES BLD QL SMEAR: ABNORMAL
MITOGEN IGNF BCKGRD COR BLD-ACNC: 0.73 IU/ML
MITOGEN IGNF BCKGRD COR BLD-ACNC: 6.97 IU/ML
MONOCYTES # BLD AUTO: 0 K/UL (ref 0–0.85)
MONOCYTES # BLD AUTO: 0.01 K/UL (ref 0–0.85)
MONOCYTES # BLD AUTO: ABNORMAL K/UL (ref 0–0.85)
MONOCYTES NFR BLD AUTO: 0 % (ref 0–13.4)
MONOCYTES NFR BLD AUTO: 0.4 % (ref 0–13.4)
MONOCYTES NFR BLD AUTO: 0.7 % (ref 0–13.4)
MONOCYTES NFR BLD AUTO: 0.8 % (ref 0–13.4)
MONOCYTES NFR BLD AUTO: 0.8 % (ref 0–13.4)
MONOCYTES NFR BLD AUTO: 0.9 % (ref 0–13.4)
MONOCYTES NFR BLD AUTO: 1 % (ref 0–13.4)
MONOCYTES NFR BLD AUTO: 1.7 % (ref 0–13.4)
MONOCYTES NFR BLD AUTO: ABNORMAL % (ref 0–13.4)
MORPHOLOGY BLD-IMP: NORMAL
MRSA DNA SPEC QL NAA+PROBE: NORMAL
NEUTROPHILS # BLD AUTO: 0 K/UL (ref 2–7.15)
NEUTROPHILS # BLD AUTO: 0.01 K/UL (ref 2–7.15)
NEUTROPHILS # BLD AUTO: 0.02 K/UL (ref 2–7.15)
NEUTROPHILS # BLD AUTO: 0.03 K/UL (ref 2–7.15)
NEUTROPHILS # BLD AUTO: 0.04 K/UL (ref 2–7.15)
NEUTROPHILS # BLD AUTO: 0.05 K/UL (ref 2–7.15)
NEUTROPHILS # BLD AUTO: 0.06 K/UL (ref 2–7.15)
NEUTROPHILS # BLD AUTO: 0.06 K/UL (ref 2–7.15)
NEUTROPHILS # BLD AUTO: 0.07 K/UL (ref 2–7.15)
NEUTROPHILS # BLD AUTO: 0.1 K/UL (ref 2–7.15)
NEUTROPHILS # BLD AUTO: 0.11 K/UL (ref 2–7.15)
NEUTROPHILS # BLD AUTO: 0.12 K/UL (ref 2–7.15)
NEUTROPHILS # BLD AUTO: 0.58 K/UL (ref 2–7.15)
NEUTROPHILS # BLD AUTO: ABNORMAL K/UL (ref 2–7.15)
NEUTROPHILS NFR BLD: 0 % (ref 44–72)
NEUTROPHILS NFR BLD: 0.9 % (ref 44–72)
NEUTROPHILS NFR BLD: 1 % (ref 44–72)
NEUTROPHILS NFR BLD: 1.3 % (ref 44–72)
NEUTROPHILS NFR BLD: 1.7 % (ref 44–72)
NEUTROPHILS NFR BLD: 1.8 % (ref 44–72)
NEUTROPHILS NFR BLD: 10.5 % (ref 44–72)
NEUTROPHILS NFR BLD: 11 % (ref 44–72)
NEUTROPHILS NFR BLD: 13.9 % (ref 44–72)
NEUTROPHILS NFR BLD: 2.6 % (ref 44–72)
NEUTROPHILS NFR BLD: 2.6 % (ref 44–72)
NEUTROPHILS NFR BLD: 2.8 % (ref 44–72)
NEUTROPHILS NFR BLD: 3 % (ref 44–72)
NEUTROPHILS NFR BLD: 3.2 % (ref 44–72)
NEUTROPHILS NFR BLD: 3.4 % (ref 44–72)
NEUTROPHILS NFR BLD: 3.5 % (ref 44–72)
NEUTROPHILS NFR BLD: 3.6 % (ref 44–72)
NEUTROPHILS NFR BLD: 3.7 % (ref 44–72)
NEUTROPHILS NFR BLD: 3.8 % (ref 44–72)
NEUTROPHILS NFR BLD: 4 % (ref 44–72)
NEUTROPHILS NFR BLD: 4.3 % (ref 44–72)
NEUTROPHILS NFR BLD: 4.3 % (ref 44–72)
NEUTROPHILS NFR BLD: 5.7 % (ref 44–72)
NEUTROPHILS NFR BLD: 6.1 % (ref 44–72)
NEUTROPHILS NFR BLD: 7.2 % (ref 44–72)
NEUTROPHILS NFR BLD: 7.3 % (ref 44–72)
NEUTROPHILS NFR BLD: 9.6 % (ref 44–72)
NEUTROPHILS NFR BLD: 96 % (ref 44–72)
NEUTROPHILS NFR BLD: ABNORMAL % (ref 44–72)
NEUTS BAND NFR BLD MANUAL: 0.9 % (ref 0–10)
NEUTS BAND NFR BLD MANUAL: 1.7 % (ref 0–10)
NITRITE UR QL STRIP.AUTO: NEGATIVE
NITRITE UR QL STRIP.AUTO: POSITIVE
NRBC # BLD AUTO: 0 K/UL
NRBC # BLD AUTO: 0.07 K/UL
NRBC BLD-RTO: 0 /100 WBC
NRBC BLD-RTO: 12.3 /100 WBC
OVALOCYTES BLD QL SMEAR: NORMAL
P JIROVECII AG SPEC QL IF: NEGATIVE
PH UR STRIP.AUTO: 6 [PH] (ref 5–8)
PH UR STRIP.AUTO: 7 [PH] (ref 5–8)
PH UR STRIP.AUTO: 7 [PH] (ref 5–8)
PH UR STRIP.AUTO: 8 [PH] (ref 5–8)
PHOSPHATE SERPL-MCNC: 2.6 MG/DL (ref 2.5–4.5)
PHOSPHATE SERPL-MCNC: 2.7 MG/DL (ref 2.5–4.5)
PHOSPHATE SERPL-MCNC: 2.7 MG/DL (ref 2.5–4.5)
PLATELET # BLD AUTO: 1 K/UL (ref 164–446)
PLATELET # BLD AUTO: 10 K/UL (ref 164–446)
PLATELET # BLD AUTO: 10 K/UL (ref 164–446)
PLATELET # BLD AUTO: 11 K/UL (ref 164–446)
PLATELET # BLD AUTO: 12 K/UL (ref 164–446)
PLATELET # BLD AUTO: 13 K/UL (ref 164–446)
PLATELET # BLD AUTO: 13 K/UL (ref 164–446)
PLATELET # BLD AUTO: 14 K/UL (ref 164–446)
PLATELET # BLD AUTO: 15 K/UL (ref 164–446)
PLATELET # BLD AUTO: 15 K/UL (ref 164–446)
PLATELET # BLD AUTO: 16 K/UL (ref 164–446)
PLATELET # BLD AUTO: 16 K/UL (ref 164–446)
PLATELET # BLD AUTO: 17 K/UL (ref 164–446)
PLATELET # BLD AUTO: 17 K/UL (ref 164–446)
PLATELET # BLD AUTO: 18 K/UL (ref 164–446)
PLATELET # BLD AUTO: 18 K/UL (ref 164–446)
PLATELET # BLD AUTO: 19 K/UL (ref 164–446)
PLATELET # BLD AUTO: 2 K/UL (ref 164–446)
PLATELET # BLD AUTO: 20 K/UL (ref 164–446)
PLATELET # BLD AUTO: 21 K/UL (ref 164–446)
PLATELET # BLD AUTO: 22 K/UL (ref 164–446)
PLATELET # BLD AUTO: 22 K/UL (ref 164–446)
PLATELET # BLD AUTO: 23 K/UL (ref 164–446)
PLATELET # BLD AUTO: 23 K/UL (ref 164–446)
PLATELET # BLD AUTO: 24 K/UL (ref 164–446)
PLATELET # BLD AUTO: 25 K/UL (ref 164–446)
PLATELET # BLD AUTO: 25 K/UL (ref 164–446)
PLATELET # BLD AUTO: 26 K/UL (ref 164–446)
PLATELET # BLD AUTO: 26 K/UL (ref 164–446)
PLATELET # BLD AUTO: 27 K/UL (ref 164–446)
PLATELET # BLD AUTO: 3 K/UL (ref 164–446)
PLATELET # BLD AUTO: 30 K/UL (ref 164–446)
PLATELET # BLD AUTO: 31 K/UL (ref 164–446)
PLATELET # BLD AUTO: 32 K/UL (ref 164–446)
PLATELET # BLD AUTO: 32 K/UL (ref 164–446)
PLATELET # BLD AUTO: 34 K/UL (ref 164–446)
PLATELET # BLD AUTO: 37 K/UL (ref 164–446)
PLATELET # BLD AUTO: 4 K/UL (ref 164–446)
PLATELET # BLD AUTO: 6 K/UL (ref 164–446)
PLATELET # BLD AUTO: 7 K/UL (ref 164–446)
PLATELET # BLD AUTO: 7 K/UL (ref 164–446)
PLATELET # BLD AUTO: 8 K/UL (ref 164–446)
PLATELET # BLD AUTO: 8 K/UL (ref 164–446)
PLATELET # BLD AUTO: 9 K/UL (ref 164–446)
PLATELET # BLD AUTO: 9 K/UL (ref 164–446)
PLATELET BLD QL SMEAR: NORMAL
PLATELETS.RETICULATED NFR BLD AUTO: 0 K/UL (ref 0.6–13.1)
PLATELETS.RETICULATED NFR BLD AUTO: 0 K/UL (ref 0.6–13.1)
PLATELETS.RETICULATED NFR BLD AUTO: 0.1 K/UL (ref 0.6–13.1)
PLATELETS.RETICULATED NFR BLD AUTO: 0.1 K/UL (ref 0.6–13.1)
PLATELETS.RETICULATED NFR BLD AUTO: 0.2 K/UL (ref 0.6–13.1)
PLATELETS.RETICULATED NFR BLD AUTO: 0.3 K/UL (ref 0.6–13.1)
PLATELETS.RETICULATED NFR BLD AUTO: 0.4 K/UL (ref 0.6–13.1)
PLATELETS.RETICULATED NFR BLD AUTO: 0.5 K/UL (ref 0.6–13.1)
PLATELETS.RETICULATED NFR BLD AUTO: 0.6 K/UL (ref 0.6–13.1)
PLATELETS.RETICULATED NFR BLD AUTO: 0.7 K/UL (ref 0.6–13.1)
PLATELETS.RETICULATED NFR BLD AUTO: 0.7 K/UL (ref 0.6–13.1)
PLATELETS.RETICULATED NFR BLD AUTO: 0.8 K/UL (ref 0.6–13.1)
PLATELETS.RETICULATED NFR BLD AUTO: 0.9 K/UL (ref 0.6–13.1)
PLATELETS.RETICULATED NFR BLD AUTO: 1 K/UL (ref 0.6–13.1)
PLATELETS.RETICULATED NFR BLD AUTO: 1.1 K/UL (ref 0.6–13.1)
PLATELETS.RETICULATED NFR BLD AUTO: 1.1 K/UL (ref 0.6–13.1)
PLATELETS.RETICULATED NFR BLD AUTO: 1.2 K/UL (ref 0.6–13.1)
PLATELETS.RETICULATED NFR BLD AUTO: 1.2 K/UL (ref 0.6–13.1)
PLATELETS.RETICULATED NFR BLD AUTO: 1.4 K/UL (ref 0.6–13.1)
PLATELETS.RETICULATED NFR BLD AUTO: 1.5 K/UL (ref 0.6–13.1)
PLATELETS.RETICULATED NFR BLD AUTO: 1.6 K/UL (ref 0.6–13.1)
PLATELETS.RETICULATED NFR BLD AUTO: 1.7 K/UL (ref 0.6–13.1)
PLATELETS.RETICULATED NFR BLD AUTO: 1.8 K/UL (ref 0.6–13.1)
PMV BLD AUTO: 10.1 FL (ref 9–12.9)
PMV BLD AUTO: 10.2 FL (ref 9–12.9)
PMV BLD AUTO: 10.2 FL (ref 9–12.9)
PMV BLD AUTO: 10.3 FL (ref 9–12.9)
PMV BLD AUTO: 10.3 FL (ref 9–12.9)
PMV BLD AUTO: 10.4 FL (ref 9–12.9)
PMV BLD AUTO: 10.7 FL (ref 9–12.9)
PMV BLD AUTO: 10.7 FL (ref 9–12.9)
PMV BLD AUTO: 10.8 FL (ref 9–12.9)
PMV BLD AUTO: 10.8 FL (ref 9–12.9)
PMV BLD AUTO: 10.9 FL (ref 9–12.9)
PMV BLD AUTO: 11 FL (ref 9–12.9)
PMV BLD AUTO: 11.3 FL (ref 9–12.9)
PMV BLD AUTO: 11.4 FL (ref 9–12.9)
PMV BLD AUTO: 11.4 FL (ref 9–12.9)
PMV BLD AUTO: 11.5 FL (ref 9–12.9)
PMV BLD AUTO: 11.7 FL (ref 9–12.9)
PMV BLD AUTO: 8.2 FL (ref 9–12.9)
PMV BLD AUTO: 8.6 FL (ref 9–12.9)
PMV BLD AUTO: 8.6 FL (ref 9–12.9)
PMV BLD AUTO: 8.7 FL (ref 9–12.9)
PMV BLD AUTO: 8.9 FL (ref 9–12.9)
PMV BLD AUTO: 8.9 FL (ref 9–12.9)
PMV BLD AUTO: 9.2 FL (ref 9–12.9)
PMV BLD AUTO: 9.3 FL (ref 9–12.9)
PMV BLD AUTO: 9.5 FL (ref 9–12.9)
PMV BLD AUTO: 9.6 FL (ref 9–12.9)
PMV BLD AUTO: 9.6 FL (ref 9–12.9)
PMV BLD AUTO: 9.7 FL (ref 9–12.9)
PMV BLD AUTO: 9.8 FL (ref 9–12.9)
PMV BLD AUTO: 9.9 FL (ref 9–12.9)
POIKILOCYTOSIS BLD QL SMEAR: NORMAL
POLYCHROMASIA BLD QL SMEAR: NORMAL
POTASSIUM SERPL-SCNC: 2.4 MMOL/L (ref 3.6–5.5)
POTASSIUM SERPL-SCNC: 2.6 MMOL/L (ref 3.6–5.5)
POTASSIUM SERPL-SCNC: 2.8 MMOL/L (ref 3.6–5.5)
POTASSIUM SERPL-SCNC: 2.9 MMOL/L (ref 3.6–5.5)
POTASSIUM SERPL-SCNC: 3 MMOL/L (ref 3.6–5.5)
POTASSIUM SERPL-SCNC: 3.1 MMOL/L (ref 3.6–5.5)
POTASSIUM SERPL-SCNC: 3.1 MMOL/L (ref 3.6–5.5)
POTASSIUM SERPL-SCNC: 3.2 MMOL/L (ref 3.6–5.5)
POTASSIUM SERPL-SCNC: 3.3 MMOL/L (ref 3.6–5.5)
POTASSIUM SERPL-SCNC: 3.4 MMOL/L (ref 3.6–5.5)
POTASSIUM SERPL-SCNC: 3.4 MMOL/L (ref 3.6–5.5)
POTASSIUM SERPL-SCNC: 3.5 MMOL/L (ref 3.6–5.5)
POTASSIUM SERPL-SCNC: 3.6 MMOL/L (ref 3.6–5.5)
POTASSIUM SERPL-SCNC: 3.7 MMOL/L (ref 3.6–5.5)
POTASSIUM SERPL-SCNC: 3.7 MMOL/L (ref 3.6–5.5)
POTASSIUM SERPL-SCNC: 3.8 MMOL/L (ref 3.6–5.5)
POTASSIUM SERPL-SCNC: 3.8 MMOL/L (ref 3.6–5.5)
POTASSIUM SERPL-SCNC: 3.9 MMOL/L (ref 3.6–5.5)
POTASSIUM SERPL-SCNC: 4.1 MMOL/L (ref 3.6–5.5)
POTASSIUM SERPL-SCNC: 4.3 MMOL/L (ref 3.6–5.5)
POTASSIUM SERPL-SCNC: 4.6 MMOL/L (ref 3.6–5.5)
PROCALCITONIN SERPL-MCNC: 0.13 NG/ML
PROCALCITONIN SERPL-MCNC: 0.2 NG/ML
PROCALCITONIN SERPL-MCNC: 6.99 NG/ML
PRODUCT TYPE UPROD: ABNORMAL
PRODUCT TYPE UPROD: NORMAL
PROT SERPL-MCNC: 5.5 G/DL (ref 6–8.2)
PROT SERPL-MCNC: 5.6 G/DL (ref 6–8.2)
PROT SERPL-MCNC: 5.9 G/DL (ref 6–8.2)
PROT SERPL-MCNC: 6 G/DL (ref 6–8.2)
PROT SERPL-MCNC: 6.1 G/DL (ref 6–8.2)
PROT SERPL-MCNC: 6.4 G/DL (ref 6–8.2)
PROT SERPL-MCNC: 6.7 G/DL (ref 6–8.2)
PROT SERPL-MCNC: 6.8 G/DL (ref 6–8.2)
PROT SERPL-MCNC: 6.9 G/DL (ref 6–8.2)
PROT SERPL-MCNC: 7.3 G/DL (ref 6–8.2)
PROT SERPL-MCNC: 7.5 G/DL (ref 6–8.2)
PROT SERPL-MCNC: 7.5 G/DL (ref 6–8.2)
PROT SERPL-MCNC: 7.7 G/DL (ref 6–8.2)
PROT UR QL STRIP: 300 MG/DL
PROT UR QL STRIP: NEGATIVE MG/DL
PROTHROMBIN TIME: 17.7 SEC (ref 12–14.6)
PROTHROMBIN TIME: 19 SEC (ref 12–14.6)
PROTHROMBIN TIME: 19.2 SEC (ref 12–14.6)
PROTHROMBIN TIME: 21 SEC (ref 12–14.6)
QFT TB2 - NIL TBQ2: 0 IU/ML
QFT TB2 - NIL TBQ2: 0 IU/ML
RBC # BLD AUTO: 1.91 M/UL (ref 4.2–5.4)
RBC # BLD AUTO: 2.12 M/UL (ref 4.2–5.4)
RBC # BLD AUTO: 2.16 M/UL (ref 4.2–5.4)
RBC # BLD AUTO: 2.17 M/UL (ref 4.2–5.4)
RBC # BLD AUTO: 2.22 M/UL (ref 4.2–5.4)
RBC # BLD AUTO: 2.24 M/UL (ref 4.2–5.4)
RBC # BLD AUTO: 2.28 M/UL (ref 4.2–5.4)
RBC # BLD AUTO: 2.3 M/UL (ref 4.2–5.4)
RBC # BLD AUTO: 2.31 M/UL (ref 4.2–5.4)
RBC # BLD AUTO: 2.32 M/UL (ref 4.2–5.4)
RBC # BLD AUTO: 2.33 M/UL (ref 4.2–5.4)
RBC # BLD AUTO: 2.36 M/UL (ref 4.2–5.4)
RBC # BLD AUTO: 2.36 M/UL (ref 4.2–5.4)
RBC # BLD AUTO: 2.43 M/UL (ref 4.2–5.4)
RBC # BLD AUTO: 2.44 M/UL (ref 4.2–5.4)
RBC # BLD AUTO: 2.44 M/UL (ref 4.2–5.4)
RBC # BLD AUTO: 2.45 M/UL (ref 4.2–5.4)
RBC # BLD AUTO: 2.51 M/UL (ref 4.2–5.4)
RBC # BLD AUTO: 2.52 M/UL (ref 4.2–5.4)
RBC # BLD AUTO: 2.53 M/UL (ref 4.2–5.4)
RBC # BLD AUTO: 2.55 M/UL (ref 4.2–5.4)
RBC # BLD AUTO: 2.57 M/UL (ref 4.2–5.4)
RBC # BLD AUTO: 2.58 M/UL (ref 4.2–5.4)
RBC # BLD AUTO: 2.64 M/UL (ref 4.2–5.4)
RBC # BLD AUTO: 2.65 M/UL (ref 4.2–5.4)
RBC # BLD AUTO: 2.65 M/UL (ref 4.2–5.4)
RBC # BLD AUTO: 2.68 M/UL (ref 4.2–5.4)
RBC # BLD AUTO: 2.7 M/UL (ref 4.2–5.4)
RBC # BLD AUTO: 2.7 M/UL (ref 4.2–5.4)
RBC # BLD AUTO: 2.74 M/UL (ref 4.2–5.4)
RBC # BLD AUTO: 2.75 M/UL (ref 4.2–5.4)
RBC # BLD AUTO: 2.77 M/UL (ref 4.2–5.4)
RBC # BLD AUTO: 2.78 M/UL (ref 4.2–5.4)
RBC # BLD AUTO: 2.78 M/UL (ref 4.2–5.4)
RBC # BLD AUTO: 2.8 M/UL (ref 4.2–5.4)
RBC # BLD AUTO: 2.81 M/UL (ref 4.2–5.4)
RBC # BLD AUTO: 2.83 M/UL (ref 4.2–5.4)
RBC # BLD AUTO: 2.83 M/UL (ref 4.2–5.4)
RBC # BLD AUTO: 2.87 M/UL (ref 4.2–5.4)
RBC # BLD AUTO: 2.89 M/UL (ref 4.2–5.4)
RBC # BLD AUTO: 2.89 M/UL (ref 4.2–5.4)
RBC # BLD AUTO: 2.91 M/UL (ref 4.2–5.4)
RBC # BLD AUTO: 2.94 M/UL (ref 4.2–5.4)
RBC # BLD AUTO: 2.98 M/UL (ref 4.2–5.4)
RBC # BLD AUTO: 3.1 M/UL (ref 4.2–5.4)
RBC # BLD AUTO: 3.2 M/UL (ref 4.2–5.4)
RBC # BLD AUTO: 3.29 M/UL (ref 4.2–5.4)
RBC # BLD AUTO: 3.29 M/UL (ref 4.2–5.4)
RBC # BLD AUTO: 3.31 M/UL (ref 4.2–5.4)
RBC # BLD AUTO: 3.4 M/UL (ref 4.2–5.4)
RBC # BLD AUTO: 3.46 M/UL (ref 4.2–5.4)
RBC # BLD AUTO: 3.59 M/UL (ref 4.2–5.4)
RBC # URNS HPF: ABNORMAL /HPF
RBC BLD AUTO: NORMAL
RBC BLD AUTO: PRESENT
RBC UR QL AUTO: ABNORMAL
RETICS # AUTO: 0 M/UL (ref 0.04–0.06)
RETICS # AUTO: 0.01 M/UL (ref 0.04–0.06)
RETICS/RBC NFR: 0.1 % (ref 0.8–2.1)
RETICS/RBC NFR: 0.4 % (ref 0.8–2.1)
RH BLD: ABNORMAL
RHODAMINE-AURAMINE STN SPEC: NORMAL
ROULEAUX BLD QL SMEAR: SLIGHT
SARS-COV-2 RNA RESP QL NAA+PROBE: NOTDETECTED
SIGNIFICANT IND 70042: ABNORMAL
SIGNIFICANT IND 70042: ABNORMAL
SIGNIFICANT IND 70042: NORMAL
SITE SITE: ABNORMAL
SITE SITE: ABNORMAL
SITE SITE: NORMAL
SODIUM SERPL-SCNC: 126 MMOL/L (ref 135–145)
SODIUM SERPL-SCNC: 128 MMOL/L (ref 135–145)
SODIUM SERPL-SCNC: 129 MMOL/L (ref 135–145)
SODIUM SERPL-SCNC: 129 MMOL/L (ref 135–145)
SODIUM SERPL-SCNC: 130 MMOL/L (ref 135–145)
SODIUM SERPL-SCNC: 131 MMOL/L (ref 135–145)
SODIUM SERPL-SCNC: 132 MMOL/L (ref 135–145)
SODIUM SERPL-SCNC: 133 MMOL/L (ref 135–145)
SODIUM SERPL-SCNC: 134 MMOL/L (ref 135–145)
SODIUM SERPL-SCNC: 135 MMOL/L (ref 135–145)
SODIUM SERPL-SCNC: 136 MMOL/L (ref 135–145)
SODIUM SERPL-SCNC: 137 MMOL/L (ref 135–145)
SODIUM SERPL-SCNC: 138 MMOL/L (ref 135–145)
SODIUM SERPL-SCNC: 138 MMOL/L (ref 135–145)
SODIUM SERPL-SCNC: 139 MMOL/L (ref 135–145)
SODIUM SERPL-SCNC: 139 MMOL/L (ref 135–145)
SODIUM SERPL-SCNC: 141 MMOL/L (ref 135–145)
SOURCE SOURCE: ABNORMAL
SOURCE SOURCE: ABNORMAL
SOURCE SOURCE: NORMAL
SP GR UR STRIP.AUTO: 1
SP GR UR STRIP.AUTO: 1.01
SP GR UR STRIP.AUTO: 1.01
SP GR UR STRIP.AUTO: <=1.005
SPECIMEN SOURCE: NORMAL
TEST NAME 95000: NORMAL
TIBC SERPL-MCNC: 112 UG/DL (ref 250–450)
TRANSFERRIN SERPL-MCNC: 72 MG/DL (ref 200–370)
TRIGL SERPL-MCNC: 92 MG/DL (ref 0–149)
TROPONIN T SERPL-MCNC: 20 NG/L (ref 6–19)
UIBC SERPL-MCNC: <17 UG/DL (ref 110–370)
UNIT STATUS USTAT: ABNORMAL
UNIT STATUS USTAT: NORMAL
UROBILINOGEN UR STRIP.AUTO-MCNC: 0.2 MG/DL
UROBILINOGEN UR STRIP.AUTO-MCNC: 1 MG/DL
VANCOMYCIN TROUGH SERPL-MCNC: 10.3 UG/ML (ref 10–20)
VANCOMYCIN TROUGH SERPL-MCNC: 4.4 UG/ML (ref 10–20)
VANCOMYCIN TROUGH SERPL-MCNC: 5.8 UG/ML (ref 10–20)
VANCOMYCIN TROUGH SERPL-MCNC: 6.5 UG/ML (ref 10–20)
VANCOMYCIN TROUGH SERPL-MCNC: 6.8 UG/ML (ref 10–20)
VANCOMYCIN TROUGH SERPL-MCNC: 9.1 UG/ML (ref 10–20)
VANCOMYCIN TROUGH SERPL-MCNC: 9.3 UG/ML (ref 10–20)
VARIANT LYMPHS BLD QL SMEAR: NORMAL
VARIANT LYMPHS BLD QL SMEAR: NORMAL
VIT B12 SERPL-MCNC: 1818 PG/ML (ref 211–911)
VIT B12 SERPL-MCNC: 416 PG/ML (ref 211–911)
WBC # BLD AUTO: 0.1 K/UL (ref 4.8–10.8)
WBC # BLD AUTO: 0.1 K/UL (ref 4.8–10.8)
WBC # BLD AUTO: 0.2 K/UL (ref 4.8–10.8)
WBC # BLD AUTO: 0.3 K/UL (ref 4.8–10.8)
WBC # BLD AUTO: 0.4 K/UL (ref 4.8–10.8)
WBC # BLD AUTO: 0.5 K/UL (ref 4.8–10.8)
WBC # BLD AUTO: 0.6 K/UL (ref 4.8–10.8)
WBC # BLD AUTO: 0.7 K/UL (ref 4.8–10.8)
WBC # BLD AUTO: 0.8 K/UL (ref 4.8–10.8)
WBC # BLD AUTO: 0.9 K/UL (ref 4.8–10.8)
WBC # BLD AUTO: 1 K/UL (ref 4.8–10.8)
WBC # BLD AUTO: 1.1 K/UL (ref 4.8–10.8)
WBC # BLD AUTO: 1.2 K/UL (ref 4.8–10.8)
WBC # BLD AUTO: 1.2 K/UL (ref 4.8–10.8)
WBC # BLD AUTO: 1.7 K/UL (ref 4.8–10.8)
WBC # BLD AUTO: 1.8 K/UL (ref 4.8–10.8)
WBC # BLD AUTO: 2.2 K/UL (ref 4.8–10.8)
WBC #/AREA URNS HPF: ABNORMAL /HPF
WBC STL QL MICRO: NORMAL
YEAST BUDDING URNS QL: PRESENT /HPF

## 2020-01-01 PROCEDURE — 85007 BL SMEAR W/DIFF WBC COUNT: CPT

## 2020-01-01 PROCEDURE — 770022 HCHG ROOM/CARE - ICU (200)

## 2020-01-01 PROCEDURE — 770020 HCHG ROOM/CARE - TELE (206)

## 2020-01-01 PROCEDURE — 700111 HCHG RX REV CODE 636 W/ 250 OVERRIDE (IP): Performed by: INTERNAL MEDICINE

## 2020-01-01 PROCEDURE — 36415 COLL VENOUS BLD VENIPUNCTURE: CPT

## 2020-01-01 PROCEDURE — 700102 HCHG RX REV CODE 250 W/ 637 OVERRIDE(OP): Performed by: INTERNAL MEDICINE

## 2020-01-01 PROCEDURE — 85055 RETICULATED PLATELET ASSAY: CPT

## 2020-01-01 PROCEDURE — A9270 NON-COVERED ITEM OR SERVICE: HCPCS | Performed by: HOSPITALIST

## 2020-01-01 PROCEDURE — 96375 TX/PRO/DX INJ NEW DRUG ADDON: CPT

## 2020-01-01 PROCEDURE — 86850 RBC ANTIBODY SCREEN: CPT

## 2020-01-01 PROCEDURE — 700111 HCHG RX REV CODE 636 W/ 250 OVERRIDE (IP): Performed by: EMERGENCY MEDICINE

## 2020-01-01 PROCEDURE — 96374 THER/PROPH/DIAG INJ IV PUSH: CPT

## 2020-01-01 PROCEDURE — 86945 BLOOD PRODUCT/IRRADIATION: CPT

## 2020-01-01 PROCEDURE — 83540 ASSAY OF IRON: CPT

## 2020-01-01 PROCEDURE — 700105 HCHG RX REV CODE 258: Performed by: HOSPITALIST

## 2020-01-01 PROCEDURE — 700105 HCHG RX REV CODE 258: Performed by: INTERNAL MEDICINE

## 2020-01-01 PROCEDURE — 85730 THROMBOPLASTIN TIME PARTIAL: CPT

## 2020-01-01 PROCEDURE — 700111 HCHG RX REV CODE 636 W/ 250 OVERRIDE (IP): Performed by: HOSPITALIST

## 2020-01-01 PROCEDURE — 99285 EMERGENCY DEPT VISIT HI MDM: CPT

## 2020-01-01 PROCEDURE — 85027 COMPLETE CBC AUTOMATED: CPT

## 2020-01-01 PROCEDURE — 99291 CRITICAL CARE FIRST HOUR: CPT | Performed by: PSYCHIATRY & NEUROLOGY

## 2020-01-01 PROCEDURE — 86644 CMV ANTIBODY: CPT

## 2020-01-01 PROCEDURE — 99291 CRITICAL CARE FIRST HOUR: CPT | Mod: 25 | Performed by: INTERNAL MEDICINE

## 2020-01-01 PROCEDURE — 87086 URINE CULTURE/COLONY COUNT: CPT

## 2020-01-01 PROCEDURE — P9034 PLATELETS, PHERESIS: HCPCS

## 2020-01-01 PROCEDURE — A9270 NON-COVERED ITEM OR SERVICE: HCPCS | Performed by: INTERNAL MEDICINE

## 2020-01-01 PROCEDURE — 87040 BLOOD CULTURE FOR BACTERIA: CPT

## 2020-01-01 PROCEDURE — 770004 HCHG ROOM/CARE - ONCOLOGY PRIVATE *

## 2020-01-01 PROCEDURE — 80202 ASSAY OF VANCOMYCIN: CPT

## 2020-01-01 PROCEDURE — 86880 COOMBS TEST DIRECT: CPT | Mod: 91

## 2020-01-01 PROCEDURE — 99232 SBSQ HOSP IP/OBS MODERATE 35: CPT | Performed by: HOSPITALIST

## 2020-01-01 PROCEDURE — 700102 HCHG RX REV CODE 250 W/ 637 OVERRIDE(OP): Performed by: HOSPITALIST

## 2020-01-01 PROCEDURE — 36430 TRANSFUSION BLD/BLD COMPNT: CPT

## 2020-01-01 PROCEDURE — 80053 COMPREHEN METABOLIC PANEL: CPT

## 2020-01-01 PROCEDURE — 87116 MYCOBACTERIA CULTURE: CPT

## 2020-01-01 PROCEDURE — 306780 HCHG STAT FOR TRANSFUSION PER CASE

## 2020-01-01 PROCEDURE — 87205 SMEAR GRAM STAIN: CPT

## 2020-01-01 PROCEDURE — 700105 HCHG RX REV CODE 258: Performed by: EMERGENCY MEDICINE

## 2020-01-01 PROCEDURE — 99233 SBSQ HOSP IP/OBS HIGH 50: CPT | Performed by: INTERNAL MEDICINE

## 2020-01-01 PROCEDURE — 99233 SBSQ HOSP IP/OBS HIGH 50: CPT | Mod: CS | Performed by: INTERNAL MEDICINE

## 2020-01-01 PROCEDURE — 700102 HCHG RX REV CODE 250 W/ 637 OVERRIDE(OP): Performed by: NURSE PRACTITIONER

## 2020-01-01 PROCEDURE — 80048 BASIC METABOLIC PNL TOTAL CA: CPT

## 2020-01-01 PROCEDURE — 83615 LACTATE (LD) (LDH) ENZYME: CPT

## 2020-01-01 PROCEDURE — 86900 BLOOD TYPING SEROLOGIC ABO: CPT

## 2020-01-01 PROCEDURE — 87305 ASPERGILLUS AG IA: CPT

## 2020-01-01 PROCEDURE — 85055 RETICULATED PLATELET ASSAY: CPT | Mod: 91

## 2020-01-01 PROCEDURE — 87186 SC STD MICRODIL/AGAR DIL: CPT

## 2020-01-01 PROCEDURE — 87449 NOS EACH ORGANISM AG IA: CPT

## 2020-01-01 PROCEDURE — 83605 ASSAY OF LACTIC ACID: CPT

## 2020-01-01 PROCEDURE — 86644 CMV ANTIBODY: CPT | Mod: 91

## 2020-01-01 PROCEDURE — 87040 BLOOD CULTURE FOR BACTERIA: CPT | Mod: 91

## 2020-01-01 PROCEDURE — 71260 CT THORAX DX C+: CPT

## 2020-01-01 PROCEDURE — 85018 HEMOGLOBIN: CPT

## 2020-01-01 PROCEDURE — 83605 ASSAY OF LACTIC ACID: CPT | Mod: 91

## 2020-01-01 PROCEDURE — 700111 HCHG RX REV CODE 636 W/ 250 OVERRIDE (IP): Performed by: NURSE PRACTITIONER

## 2020-01-01 PROCEDURE — 82784 ASSAY IGA/IGD/IGG/IGM EACH: CPT

## 2020-01-01 PROCEDURE — 86920 COMPATIBILITY TEST SPIN: CPT

## 2020-01-01 PROCEDURE — 99291 CRITICAL CARE FIRST HOUR: CPT | Performed by: INTERNAL MEDICINE

## 2020-01-01 PROCEDURE — 83735 ASSAY OF MAGNESIUM: CPT

## 2020-01-01 PROCEDURE — 86480 TB TEST CELL IMMUN MEASURE: CPT

## 2020-01-01 PROCEDURE — 76705 ECHO EXAM OF ABDOMEN: CPT

## 2020-01-01 PROCEDURE — 82746 ASSAY OF FOLIC ACID SERUM: CPT

## 2020-01-01 PROCEDURE — 85046 RETICYTE/HGB CONCENTRATE: CPT

## 2020-01-01 PROCEDURE — 700117 HCHG RX CONTRAST REV CODE 255: Performed by: INTERNAL MEDICINE

## 2020-01-01 PROCEDURE — 81001 URINALYSIS AUTO W/SCOPE: CPT

## 2020-01-01 PROCEDURE — 700105 HCHG RX REV CODE 258

## 2020-01-01 PROCEDURE — 94640 AIRWAY INHALATION TREATMENT: CPT

## 2020-01-01 PROCEDURE — 99233 SBSQ HOSP IP/OBS HIGH 50: CPT | Performed by: HOSPITALIST

## 2020-01-01 PROCEDURE — 82728 ASSAY OF FERRITIN: CPT

## 2020-01-01 PROCEDURE — 84484 ASSAY OF TROPONIN QUANT: CPT

## 2020-01-01 PROCEDURE — 86901 BLOOD TYPING SEROLOGIC RH(D): CPT

## 2020-01-01 PROCEDURE — 85610 PROTHROMBIN TIME: CPT

## 2020-01-01 PROCEDURE — G0475 HIV COMBINATION ASSAY: HCPCS

## 2020-01-01 PROCEDURE — 71045 X-RAY EXAM CHEST 1 VIEW: CPT

## 2020-01-01 PROCEDURE — 99232 SBSQ HOSP IP/OBS MODERATE 35: CPT | Performed by: INTERNAL MEDICINE

## 2020-01-01 PROCEDURE — 88112 CYTOPATH CELL ENHANCE TECH: CPT

## 2020-01-01 PROCEDURE — 86923 COMPATIBILITY TEST ELECTRIC: CPT | Mod: 91

## 2020-01-01 PROCEDURE — 85007 BL SMEAR W/DIFF WBC COUNT: CPT | Mod: 91

## 2020-01-01 PROCEDURE — A9270 NON-COVERED ITEM OR SERVICE: HCPCS | Performed by: EMERGENCY MEDICINE

## 2020-01-01 PROCEDURE — 80074 ACUTE HEPATITIS PANEL: CPT

## 2020-01-01 PROCEDURE — 93005 ELECTROCARDIOGRAM TRACING: CPT | Performed by: INTERNAL MEDICINE

## 2020-01-01 PROCEDURE — 99223 1ST HOSP IP/OBS HIGH 75: CPT | Performed by: INTERNAL MEDICINE

## 2020-01-01 PROCEDURE — 85025 COMPLETE CBC W/AUTO DIFF WBC: CPT

## 2020-01-01 PROCEDURE — 85379 FIBRIN DEGRADATION QUANT: CPT

## 2020-01-01 PROCEDURE — P9016 RBC LEUKOCYTES REDUCED: HCPCS

## 2020-01-01 PROCEDURE — 99223 1ST HOSP IP/OBS HIGH 75: CPT | Performed by: HOSPITALIST

## 2020-01-01 PROCEDURE — 86923 COMPATIBILITY TEST ELECTRIC: CPT

## 2020-01-01 PROCEDURE — C9803 HOPD COVID-19 SPEC COLLECT: HCPCS | Performed by: EMERGENCY MEDICINE

## 2020-01-01 PROCEDURE — 93306 TTE W/DOPPLER COMPLETE: CPT

## 2020-01-01 PROCEDURE — 94760 N-INVAS EAR/PLS OXIMETRY 1: CPT

## 2020-01-01 PROCEDURE — C1751 CATH, INF, PER/CENT/MIDLINE: HCPCS

## 2020-01-01 PROCEDURE — 86606 ASPERGILLUS ANTIBODY: CPT | Mod: 91

## 2020-01-01 PROCEDURE — 86880 COOMBS TEST DIRECT: CPT

## 2020-01-01 PROCEDURE — 700101 HCHG RX REV CODE 250

## 2020-01-01 PROCEDURE — 87493 C DIFF AMPLIFIED PROBE: CPT

## 2020-01-01 PROCEDURE — 96366 THER/PROPH/DIAG IV INF ADDON: CPT

## 2020-01-01 PROCEDURE — 96365 THER/PROPH/DIAG IV INF INIT: CPT

## 2020-01-01 PROCEDURE — A9270 NON-COVERED ITEM OR SERVICE: HCPCS | Performed by: NURSE PRACTITIONER

## 2020-01-01 PROCEDURE — 85384 FIBRINOGEN ACTIVITY: CPT

## 2020-01-01 PROCEDURE — 700102 HCHG RX REV CODE 250 W/ 637 OVERRIDE(OP): Performed by: EMERGENCY MEDICINE

## 2020-01-01 PROCEDURE — 93010 ELECTROCARDIOGRAM REPORT: CPT | Performed by: INTERNAL MEDICINE

## 2020-01-01 PROCEDURE — 51798 US URINE CAPACITY MEASURE: CPT

## 2020-01-01 PROCEDURE — 93005 ELECTROCARDIOGRAM TRACING: CPT

## 2020-01-01 PROCEDURE — 700101 HCHG RX REV CODE 250: Performed by: INTERNAL MEDICINE

## 2020-01-01 PROCEDURE — 87015 SPECIMEN INFECT AGNT CONCNTJ: CPT

## 2020-01-01 PROCEDURE — 02HV33Z INSERTION OF INFUSION DEVICE INTO SUPERIOR VENA CAVA, PERCUTANEOUS APPROACH: ICD-10-PCS | Performed by: INTERNAL MEDICINE

## 2020-01-01 PROCEDURE — 99239 HOSP IP/OBS DSCHRG MGMT >30: CPT | Performed by: INTERNAL MEDICINE

## 2020-01-01 PROCEDURE — U0004 COV-19 TEST NON-CDC HGH THRU: HCPCS

## 2020-01-01 PROCEDURE — 700105 HCHG RX REV CODE 258: Performed by: NURSE PRACTITIONER

## 2020-01-01 PROCEDURE — 87077 CULTURE AEROBIC IDENTIFY: CPT

## 2020-01-01 PROCEDURE — 84466 ASSAY OF TRANSFERRIN: CPT

## 2020-01-01 PROCEDURE — 87070 CULTURE OTHR SPECIMN AEROBIC: CPT

## 2020-01-01 PROCEDURE — 80076 HEPATIC FUNCTION PANEL: CPT

## 2020-01-01 PROCEDURE — 306780 HCHG STAT FOR TRANSFUSION PER CASE: Performed by: CLINICAL NURSE SPECIALIST

## 2020-01-01 PROCEDURE — 84132 ASSAY OF SERUM POTASSIUM: CPT

## 2020-01-01 PROCEDURE — 85014 HEMATOCRIT: CPT

## 2020-01-01 PROCEDURE — 84100 ASSAY OF PHOSPHORUS: CPT

## 2020-01-01 PROCEDURE — 770001 HCHG ROOM/CARE - MED/SURG/GYN PRIV*

## 2020-01-01 PROCEDURE — 99152 MOD SED SAME PHYS/QHP 5/>YRS: CPT

## 2020-01-01 PROCEDURE — 87205 SMEAR GRAM STAIN: CPT | Mod: 91

## 2020-01-01 PROCEDURE — 80048 BASIC METABOLIC PNL TOTAL CA: CPT | Mod: 91

## 2020-01-01 PROCEDURE — 99231 SBSQ HOSP IP/OBS SF/LOW 25: CPT | Performed by: INTERNAL MEDICINE

## 2020-01-01 PROCEDURE — 3E033XZ INTRODUCTION OF VASOPRESSOR INTO PERIPHERAL VEIN, PERCUTANEOUS APPROACH: ICD-10-PCS | Performed by: PSYCHIATRY & NEUROLOGY

## 2020-01-01 PROCEDURE — C9803 HOPD COVID-19 SPEC COLLECT: HCPCS | Performed by: HOSPITALIST

## 2020-01-01 PROCEDURE — 86920 COMPATIBILITY TEST SPIN: CPT | Mod: 91

## 2020-01-01 PROCEDURE — 36556 INSERT NON-TUNNEL CV CATH: CPT | Mod: LT | Performed by: INTERNAL MEDICINE

## 2020-01-01 PROCEDURE — 82607 VITAMIN B-12: CPT

## 2020-01-01 PROCEDURE — 302151 K-PAD 14X20: Performed by: EMERGENCY MEDICINE

## 2020-01-01 PROCEDURE — P9016 RBC LEUKOCYTES REDUCED: HCPCS | Mod: 91

## 2020-01-01 PROCEDURE — U0003 INFECTIOUS AGENT DETECTION BY NUCLEIC ACID (DNA OR RNA); SEVERE ACUTE RESPIRATORY SYNDROME CORONAVIRUS 2 (SARS-COV-2) (CORONAVIRUS DISEASE [COVID-19]), AMPLIFIED PROBE TECHNIQUE, MAKING USE OF HIGH THROUGHPUT TECHNOLOGIES AS DESCRIBED BY CMS-2020-01-R: HCPCS

## 2020-01-01 PROCEDURE — 99214 OFFICE O/P EST MOD 30 MIN: CPT | Performed by: NURSE PRACTITIONER

## 2020-01-01 PROCEDURE — 86945 BLOOD PRODUCT/IRRADIATION: CPT | Mod: 91

## 2020-01-01 PROCEDURE — 96367 TX/PROPH/DG ADDL SEQ IV INF: CPT

## 2020-01-01 PROCEDURE — 82248 BILIRUBIN DIRECT: CPT

## 2020-01-01 PROCEDURE — 87102 FUNGUS ISOLATION CULTURE: CPT

## 2020-01-01 PROCEDURE — 87206 SMEAR FLUORESCENT/ACID STAI: CPT

## 2020-01-01 PROCEDURE — 700101 HCHG RX REV CODE 250: Performed by: PSYCHIATRY & NEUROLOGY

## 2020-01-01 PROCEDURE — 99291 CRITICAL CARE FIRST HOUR: CPT

## 2020-01-01 PROCEDURE — 87327 CRYPTOCOCCUS NEOFORM AG IA: CPT

## 2020-01-01 PROCEDURE — 83010 ASSAY OF HAPTOGLOBIN QUANT: CPT

## 2020-01-01 PROCEDURE — 30233N1 TRANSFUSION OF NONAUTOLOGOUS RED BLOOD CELLS INTO PERIPHERAL VEIN, PERCUTANEOUS APPROACH: ICD-10-PCS | Performed by: NURSE PRACTITIONER

## 2020-01-01 PROCEDURE — 36415 COLL VENOUS BLD VENIPUNCTURE: CPT | Performed by: CLINICAL NURSE SPECIALIST

## 2020-01-01 PROCEDURE — 89055 LEUKOCYTE ASSESSMENT FECAL: CPT

## 2020-01-01 PROCEDURE — 86140 C-REACTIVE PROTEIN: CPT

## 2020-01-01 PROCEDURE — 85027 COMPLETE CBC AUTOMATED: CPT | Mod: 91

## 2020-01-01 PROCEDURE — 30233R1 TRANSFUSION OF NONAUTOLOGOUS PLATELETS INTO PERIPHERAL VEIN, PERCUTANEOUS APPROACH: ICD-10-PCS | Performed by: INTERNAL MEDICINE

## 2020-01-01 PROCEDURE — 82330 ASSAY OF CALCIUM: CPT

## 2020-01-01 PROCEDURE — 88305 TISSUE EXAM BY PATHOLOGIST: CPT

## 2020-01-01 PROCEDURE — 87281 PNEUMOCYSTIS CARINII AG IF: CPT

## 2020-01-01 PROCEDURE — 83550 IRON BINDING TEST: CPT

## 2020-01-01 PROCEDURE — 85049 AUTOMATED PLATELET COUNT: CPT

## 2020-01-01 PROCEDURE — 99239 HOSP IP/OBS DSCHRG MGMT >30: CPT | Performed by: HOSPITALIST

## 2020-01-01 PROCEDURE — 302154 KIT COOLING VEST BARIATRIC BLANKET: Performed by: INTERNAL MEDICINE

## 2020-01-01 PROCEDURE — 86635 COCCIDIOIDES ANTIBODY: CPT | Mod: 91

## 2020-01-01 PROCEDURE — 84145 PROCALCITONIN (PCT): CPT

## 2020-01-01 PROCEDURE — 700105 HCHG RX REV CODE 258: Performed by: PSYCHIATRY & NEUROLOGY

## 2020-01-01 PROCEDURE — 0B9J8ZX DRAINAGE OF LEFT LOWER LUNG LOBE, VIA NATURAL OR ARTIFICIAL OPENING ENDOSCOPIC, DIAGNOSTIC: ICD-10-PCS | Performed by: INTERNAL MEDICINE

## 2020-01-01 PROCEDURE — 96372 THER/PROPH/DIAG INJ SC/IM: CPT

## 2020-01-01 PROCEDURE — 96375 TX/PRO/DX INJ NEW DRUG ADDON: CPT | Performed by: CLINICAL NURSE SPECIALIST

## 2020-01-01 PROCEDURE — 87798 DETECT AGENT NOS DNA AMP: CPT

## 2020-01-01 PROCEDURE — B548ZZA ULTRASONOGRAPHY OF SUPERIOR VENA CAVA, GUIDANCE: ICD-10-PCS | Performed by: INTERNAL MEDICINE

## 2020-01-01 PROCEDURE — 700111 HCHG RX REV CODE 636 W/ 250 OVERRIDE (IP)

## 2020-01-01 PROCEDURE — 36556 INSERT NON-TUNNEL CV CATH: CPT

## 2020-01-01 PROCEDURE — 302131 K PAD MOTOR: Performed by: INTERNAL MEDICINE

## 2020-01-01 PROCEDURE — 93005 ELECTROCARDIOGRAM TRACING: CPT | Performed by: EMERGENCY MEDICINE

## 2020-01-01 PROCEDURE — 30243R1 TRANSFUSION OF NONAUTOLOGOUS PLATELETS INTO CENTRAL VEIN, PERCUTANEOUS APPROACH: ICD-10-PCS | Performed by: INTERNAL MEDICINE

## 2020-01-01 PROCEDURE — 303105 HCHG CATHETER EXTRA

## 2020-01-01 PROCEDURE — 80061 LIPID PANEL: CPT

## 2020-01-01 PROCEDURE — 302978 HCHG BRONCHOSCOPY-DIAGNOSTIC

## 2020-01-01 PROCEDURE — 302131 K PAD MOTOR: Performed by: EMERGENCY MEDICINE

## 2020-01-01 PROCEDURE — 87641 MR-STAPH DNA AMP PROBE: CPT

## 2020-01-01 PROCEDURE — 51702 INSERT TEMP BLADDER CATH: CPT

## 2020-01-01 PROCEDURE — 31624 DX BRONCHOSCOPE/LAVAGE: CPT | Performed by: INTERNAL MEDICINE

## 2020-01-01 PROCEDURE — 85025 COMPLETE CBC W/AUTO DIFF WBC: CPT | Mod: 91

## 2020-01-01 PROCEDURE — 93306 TTE W/DOPPLER COMPLETE: CPT | Mod: 26 | Performed by: INTERNAL MEDICINE

## 2020-01-01 RX ORDER — SODIUM CHLORIDE 9 MG/ML
500 INJECTION, SOLUTION INTRAVENOUS ONCE
Status: CANCELLED | OUTPATIENT
Start: 2020-01-01

## 2020-01-01 RX ORDER — SODIUM CHLORIDE 9 MG/ML
INJECTION, SOLUTION INTRAVENOUS
Status: ACTIVE
Start: 2020-01-01 | End: 2020-01-01

## 2020-01-01 RX ORDER — SODIUM CHLORIDE 9 MG/ML
10 INJECTION, SOLUTION INTRAMUSCULAR; INTRAVENOUS; SUBCUTANEOUS PRN
Status: CANCELLED | OUTPATIENT
Start: 2020-01-01

## 2020-01-01 RX ORDER — DIPHENHYDRAMINE HYDROCHLORIDE 50 MG/ML
25 INJECTION INTRAMUSCULAR; INTRAVENOUS PRN
Status: CANCELLED | OUTPATIENT
Start: 2020-01-01

## 2020-01-01 RX ORDER — PROMETHAZINE HYDROCHLORIDE 25 MG/1
12.5-25 SUPPOSITORY RECTAL EVERY 4 HOURS PRN
Status: DISCONTINUED | OUTPATIENT
Start: 2020-01-01 | End: 2020-01-01

## 2020-01-01 RX ORDER — ONDANSETRON 2 MG/ML
4 INJECTION INTRAMUSCULAR; INTRAVENOUS EVERY 4 HOURS PRN
Status: DISCONTINUED | OUTPATIENT
Start: 2020-01-01 | End: 2020-01-01 | Stop reason: HOSPADM

## 2020-01-01 RX ORDER — SODIUM CHLORIDE, SODIUM LACTATE, POTASSIUM CHLORIDE, AND CALCIUM CHLORIDE .6; .31; .03; .02 G/100ML; G/100ML; G/100ML; G/100ML
500 INJECTION, SOLUTION INTRAVENOUS ONCE
Status: COMPLETED | OUTPATIENT
Start: 2020-01-01 | End: 2020-01-01

## 2020-01-01 RX ORDER — HEPARIN SODIUM (PORCINE) LOCK FLUSH IV SOLN 100 UNIT/ML 100 UNIT/ML
500 SOLUTION INTRAVENOUS PRN
Status: CANCELLED | OUTPATIENT
Start: 2020-01-01

## 2020-01-01 RX ORDER — PROMETHAZINE HYDROCHLORIDE 25 MG/1
12.5-25 TABLET ORAL EVERY 4 HOURS PRN
Status: DISCONTINUED | OUTPATIENT
Start: 2020-01-01 | End: 2020-01-01

## 2020-01-01 RX ORDER — DIPHENHYDRAMINE HCL 25 MG
25 TABLET ORAL EVERY 6 HOURS PRN
Status: DISCONTINUED | OUTPATIENT
Start: 2020-01-01 | End: 2020-01-01

## 2020-01-01 RX ORDER — ACETAMINOPHEN 325 MG/1
650 TABLET ORAL ONCE
Status: CANCELLED | OUTPATIENT
Start: 2020-01-01

## 2020-01-01 RX ORDER — LIDOCAINE HYDROCHLORIDE 10 MG/ML
20 INJECTION, SOLUTION INFILTRATION; PERINEURAL
Status: CANCELLED | OUTPATIENT
Start: 2020-01-01

## 2020-01-01 RX ORDER — PROCHLORPERAZINE EDISYLATE 5 MG/ML
5-10 INJECTION INTRAMUSCULAR; INTRAVENOUS EVERY 4 HOURS PRN
Status: DISCONTINUED | OUTPATIENT
Start: 2020-01-01 | End: 2020-01-01 | Stop reason: HOSPADM

## 2020-01-01 RX ORDER — CEFDINIR 300 MG/1
300 CAPSULE ORAL 2 TIMES DAILY
Status: ON HOLD | COMMUNITY
Start: 2020-01-01 | End: 2020-01-01

## 2020-01-01 RX ORDER — AMLODIPINE BESYLATE 5 MG/1
5 TABLET ORAL DAILY
Status: DISCONTINUED | OUTPATIENT
Start: 2020-01-01 | End: 2020-01-01 | Stop reason: HOSPADM

## 2020-01-01 RX ORDER — ONDANSETRON 8 MG/1
TABLET, ORALLY DISINTEGRATING ORAL PRN
COMMUNITY
Start: 2020-01-01 | End: 2020-01-01

## 2020-01-01 RX ORDER — FLUCONAZOLE 100 MG/1
100 TABLET ORAL DAILY
Qty: 30 TAB | Refills: 0 | Status: SHIPPED | OUTPATIENT
Start: 2020-01-01

## 2020-01-01 RX ORDER — ACYCLOVIR 800 MG/1
400 TABLET ORAL 2 TIMES DAILY
Status: DISCONTINUED | OUTPATIENT
Start: 2020-01-01 | End: 2020-01-01 | Stop reason: HOSPADM

## 2020-01-01 RX ORDER — ACETAMINOPHEN 325 MG/1
650 TABLET ORAL ONCE
Status: CANCELLED
Start: 2020-01-01

## 2020-01-01 RX ORDER — PHENYLEPHRINE HCL IN 0.9% NACL 0.5 MG/5ML
200 SYRINGE (ML) INTRAVENOUS ONCE
Status: COMPLETED | OUTPATIENT
Start: 2020-01-01 | End: 2020-01-01

## 2020-01-01 RX ORDER — TRAMADOL HYDROCHLORIDE 50 MG/1
50 TABLET ORAL EVERY 6 HOURS PRN
COMMUNITY
End: 2020-01-01

## 2020-01-01 RX ORDER — FOLIC ACID 1 MG/1
1 TABLET ORAL DAILY
Status: DISCONTINUED | OUTPATIENT
Start: 2020-01-01 | End: 2020-01-01 | Stop reason: HOSPADM

## 2020-01-01 RX ORDER — ACETAMINOPHEN 325 MG/1
650 TABLET ORAL ONCE
Status: COMPLETED | OUTPATIENT
Start: 2020-01-01 | End: 2020-01-01

## 2020-01-01 RX ORDER — ACETAMINOPHEN 325 MG/1
650 TABLET ORAL ONCE
Status: DISCONTINUED | OUTPATIENT
Start: 2020-01-01 | End: 2020-01-01 | Stop reason: HOSPADM

## 2020-01-01 RX ORDER — ACETAMINOPHEN 650 MG/1
650 SUPPOSITORY RECTAL EVERY 4 HOURS PRN
Status: DISCONTINUED | OUTPATIENT
Start: 2020-01-01 | End: 2020-01-01 | Stop reason: HOSPADM

## 2020-01-01 RX ORDER — MAGNESIUM SULFATE HEPTAHYDRATE 40 MG/ML
2 INJECTION, SOLUTION INTRAVENOUS ONCE
Status: COMPLETED | OUTPATIENT
Start: 2020-01-01 | End: 2020-01-01

## 2020-01-01 RX ORDER — POTASSIUM CHLORIDE 20 MEQ/1
40 TABLET, EXTENDED RELEASE ORAL 2 TIMES DAILY
Status: DISCONTINUED | OUTPATIENT
Start: 2020-01-01 | End: 2020-01-01 | Stop reason: HOSPADM

## 2020-01-01 RX ORDER — CEFDINIR 300 MG/1
300 CAPSULE ORAL 2 TIMES DAILY
Qty: 8 CAP | Refills: 0 | Status: SHIPPED | OUTPATIENT
Start: 2020-01-01 | End: 2020-01-01

## 2020-01-01 RX ORDER — SODIUM CHLORIDE 9 MG/ML
500 INJECTION, SOLUTION INTRAVENOUS EVERY 24 HOURS
Status: DISCONTINUED | OUTPATIENT
Start: 2020-01-01 | End: 2020-01-01

## 2020-01-01 RX ORDER — POTASSIUM CHLORIDE 20 MEQ/1
40 TABLET, EXTENDED RELEASE ORAL ONCE
Status: COMPLETED | OUTPATIENT
Start: 2020-01-01 | End: 2020-01-01

## 2020-01-01 RX ORDER — ACETAMINOPHEN 325 MG/1
650 TABLET ORAL PRN
Status: CANCELLED | OUTPATIENT
Start: 2020-01-01

## 2020-01-01 RX ORDER — ACYCLOVIR 400 MG/1
400 TABLET ORAL 2 TIMES DAILY
COMMUNITY
Start: 2020-01-01

## 2020-01-01 RX ORDER — POTASSIUM CHLORIDE 20 MEQ/1
20 TABLET, EXTENDED RELEASE ORAL 2 TIMES DAILY
Status: DISCONTINUED | OUTPATIENT
Start: 2020-01-01 | End: 2020-01-01

## 2020-01-01 RX ORDER — PROMETHAZINE HYDROCHLORIDE 25 MG/1
12.5-25 SUPPOSITORY RECTAL EVERY 4 HOURS PRN
Status: DISCONTINUED | OUTPATIENT
Start: 2020-01-01 | End: 2020-01-01 | Stop reason: HOSPADM

## 2020-01-01 RX ORDER — AMOXICILLIN 250 MG
2 CAPSULE ORAL 2 TIMES DAILY
Status: DISCONTINUED | OUTPATIENT
Start: 2020-01-01 | End: 2020-01-01

## 2020-01-01 RX ORDER — BISACODYL 10 MG
10 SUPPOSITORY, RECTAL RECTAL
Status: DISCONTINUED | OUTPATIENT
Start: 2020-01-01 | End: 2020-01-01

## 2020-01-01 RX ORDER — FLUCONAZOLE 2 MG/ML
200 INJECTION, SOLUTION INTRAVENOUS EVERY 24 HOURS
Status: DISCONTINUED | OUTPATIENT
Start: 2020-01-01 | End: 2020-01-01

## 2020-01-01 RX ORDER — AZITHROMYCIN 250 MG/1
500 TABLET, FILM COATED ORAL DAILY
Status: DISCONTINUED | OUTPATIENT
Start: 2020-01-01 | End: 2020-01-01

## 2020-01-01 RX ORDER — POTASSIUM CHLORIDE 20 MEQ/1
40 TABLET, EXTENDED RELEASE ORAL EVERY 6 HOURS
Status: COMPLETED | OUTPATIENT
Start: 2020-01-01 | End: 2020-01-01

## 2020-01-01 RX ORDER — LORAZEPAM 2 MG/ML
1 CONCENTRATE ORAL
Status: DISCONTINUED | OUTPATIENT
Start: 2020-01-01 | End: 2020-01-01 | Stop reason: HOSPADM

## 2020-01-01 RX ORDER — TRIAMCINOLONE ACETONIDE 1 MG/G
CREAM TOPICAL
COMMUNITY
Start: 2020-01-01 | End: 2020-01-01

## 2020-01-01 RX ORDER — BISACODYL 10 MG
10 SUPPOSITORY, RECTAL RECTAL
Status: DISCONTINUED | OUTPATIENT
Start: 2020-01-01 | End: 2020-01-01 | Stop reason: HOSPADM

## 2020-01-01 RX ORDER — POTASSIUM CHLORIDE 20 MEQ/1
TABLET, EXTENDED RELEASE ORAL
COMMUNITY
Start: 2020-01-01 | End: 2020-01-01

## 2020-01-01 RX ORDER — SODIUM CHLORIDE 9 MG/ML
INJECTION, SOLUTION INTRAVENOUS
Status: COMPLETED
Start: 2020-01-01 | End: 2020-01-01

## 2020-01-01 RX ORDER — ACETAMINOPHEN 325 MG/1
650 TABLET ORAL EVERY 24 HOURS
Status: DISCONTINUED | OUTPATIENT
Start: 2020-01-01 | End: 2020-01-01

## 2020-01-01 RX ORDER — SODIUM CHLORIDE, SODIUM LACTATE, POTASSIUM CHLORIDE, CALCIUM CHLORIDE 600; 310; 30; 20 MG/100ML; MG/100ML; MG/100ML; MG/100ML
INJECTION, SOLUTION INTRAVENOUS
Status: COMPLETED
Start: 2020-01-01 | End: 2020-01-01

## 2020-01-01 RX ORDER — DIPHENHYDRAMINE HCL 25 MG
25 TABLET ORAL ONCE
Status: CANCELLED | OUTPATIENT
Start: 2020-01-01

## 2020-01-01 RX ORDER — DIPHENHYDRAMINE HCL 25 MG
25 TABLET ORAL ONCE
Status: DISCONTINUED | OUTPATIENT
Start: 2020-01-01 | End: 2020-01-01 | Stop reason: HOSPADM

## 2020-01-01 RX ORDER — METOPROLOL SUCCINATE 25 MG/1
25 TABLET, EXTENDED RELEASE ORAL
Status: DISCONTINUED | OUTPATIENT
Start: 2020-01-01 | End: 2020-01-01 | Stop reason: HOSPADM

## 2020-01-01 RX ORDER — SODIUM CHLORIDE 9 MG/ML
1000 INJECTION, SOLUTION INTRAVENOUS ONCE
Status: COMPLETED | OUTPATIENT
Start: 2020-01-01 | End: 2020-01-01

## 2020-01-01 RX ORDER — PROCHLORPERAZINE EDISYLATE 5 MG/ML
5-10 INJECTION INTRAMUSCULAR; INTRAVENOUS EVERY 4 HOURS PRN
Status: DISCONTINUED | OUTPATIENT
Start: 2020-01-01 | End: 2020-01-01

## 2020-01-01 RX ORDER — MIDODRINE HYDROCHLORIDE 5 MG/1
5 TABLET ORAL
Status: DISCONTINUED | OUTPATIENT
Start: 2020-01-01 | End: 2020-01-01 | Stop reason: HOSPADM

## 2020-01-01 RX ORDER — LEVOFLOXACIN 500 MG/1
500 TABLET, FILM COATED ORAL DAILY
Qty: 30 TAB | Refills: 0 | Status: SHIPPED | OUTPATIENT
Start: 2020-01-01 | End: 2020-01-01 | Stop reason: SDUPTHER

## 2020-01-01 RX ORDER — SODIUM CHLORIDE 9 MG/ML
INJECTION, SOLUTION INTRAVENOUS CONTINUOUS
Status: DISCONTINUED | OUTPATIENT
Start: 2020-01-01 | End: 2020-01-01

## 2020-01-01 RX ORDER — FLUCONAZOLE 100 MG/1
200 TABLET ORAL DAILY
Status: DISCONTINUED | OUTPATIENT
Start: 2020-01-01 | End: 2020-01-01

## 2020-01-01 RX ORDER — ONDANSETRON 4 MG/1
4 TABLET, ORALLY DISINTEGRATING ORAL EVERY 4 HOURS PRN
Status: DISCONTINUED | OUTPATIENT
Start: 2020-01-01 | End: 2020-01-01 | Stop reason: HOSPADM

## 2020-01-01 RX ORDER — ACETAMINOPHEN 325 MG/1
650 TABLET ORAL EVERY 6 HOURS PRN
Status: DISCONTINUED | OUTPATIENT
Start: 2020-01-01 | End: 2020-01-01

## 2020-01-01 RX ORDER — DIPHENHYDRAMINE HCL 25 MG
25 TABLET ORAL ONCE
Status: DISCONTINUED | OUTPATIENT
Start: 2020-01-01 | End: 2020-01-01

## 2020-01-01 RX ORDER — DEXTROSE MONOHYDRATE 50 MG/ML
30 INJECTION, SOLUTION INTRAVENOUS EVERY 24 HOURS
Status: DISCONTINUED | OUTPATIENT
Start: 2020-01-01 | End: 2020-01-01

## 2020-01-01 RX ORDER — FLUCONAZOLE 200 MG/1
400 TABLET ORAL DAILY
Qty: 28 TAB | Refills: 0 | Status: SHIPPED | OUTPATIENT
Start: 2020-01-01 | End: 2020-01-01

## 2020-01-01 RX ORDER — POLYETHYLENE GLYCOL 3350 17 G/17G
1 POWDER, FOR SOLUTION ORAL
Status: DISCONTINUED | OUTPATIENT
Start: 2020-01-01 | End: 2020-01-01 | Stop reason: HOSPADM

## 2020-01-01 RX ORDER — LEVOFLOXACIN 500 MG/1
500 TABLET, FILM COATED ORAL EVERY 24 HOURS
Qty: 9 TAB | Refills: 0 | Status: SHIPPED | OUTPATIENT
Start: 2020-01-01 | End: 2020-01-01

## 2020-01-01 RX ORDER — DOXYCYCLINE 100 MG/1
100 TABLET ORAL EVERY 12 HOURS
Status: DISCONTINUED | OUTPATIENT
Start: 2020-01-01 | End: 2020-01-01

## 2020-01-01 RX ORDER — FLUCONAZOLE 100 MG/1
100 TABLET ORAL DAILY
Status: DISCONTINUED | OUTPATIENT
Start: 2020-01-01 | End: 2020-01-01

## 2020-01-01 RX ORDER — SODIUM CHLORIDE FOR INHALATION 7 %
4 VIAL, NEBULIZER (ML) INHALATION
Status: DISPENSED | OUTPATIENT
Start: 2020-01-01 | End: 2020-01-01

## 2020-01-01 RX ORDER — AMLODIPINE BESYLATE 10 MG/1
5 TABLET ORAL EVERY EVENING
Status: DISCONTINUED | OUTPATIENT
Start: 2020-01-01 | End: 2020-01-01

## 2020-01-01 RX ORDER — PROPRANOLOL HYDROCHLORIDE 20 MG/1
20 TABLET ORAL EVERY EVENING
Status: ON HOLD | COMMUNITY
End: 2020-01-01

## 2020-01-01 RX ORDER — POTASSIUM CHLORIDE 1500 MG/1
20 TABLET, EXTENDED RELEASE ORAL DAILY
COMMUNITY
End: 2020-01-01

## 2020-01-01 RX ORDER — PHENYLEPHRINE HCL IN 0.9% NACL 0.5 MG/5ML
SYRINGE (ML) INTRAVENOUS
Status: COMPLETED
Start: 2020-01-01 | End: 2020-01-01

## 2020-01-01 RX ORDER — OMEPRAZOLE 20 MG/1
20 CAPSULE, DELAYED RELEASE ORAL ONCE
Status: DISCONTINUED | OUTPATIENT
Start: 2020-01-01 | End: 2020-01-01 | Stop reason: HOSPADM

## 2020-01-01 RX ORDER — OXYCODONE HYDROCHLORIDE 5 MG/1
5 TABLET ORAL
Status: DISCONTINUED | OUTPATIENT
Start: 2020-01-01 | End: 2020-01-01

## 2020-01-01 RX ORDER — SODIUM CHLORIDE 9 MG/ML
500 INJECTION, SOLUTION INTRAVENOUS ONCE
Status: COMPLETED | OUTPATIENT
Start: 2020-01-01 | End: 2020-01-01

## 2020-01-01 RX ORDER — PROMETHAZINE HYDROCHLORIDE 25 MG/1
12.5-25 TABLET ORAL EVERY 4 HOURS PRN
Status: DISCONTINUED | OUTPATIENT
Start: 2020-01-01 | End: 2020-01-01 | Stop reason: HOSPADM

## 2020-01-01 RX ORDER — POTASSIUM CHLORIDE 20 MEQ/1
60 TABLET, EXTENDED RELEASE ORAL ONCE
Status: COMPLETED | OUTPATIENT
Start: 2020-01-01 | End: 2020-01-01

## 2020-01-01 RX ORDER — IBUPROFEN 600 MG/1
600 TABLET ORAL ONCE
Status: COMPLETED | OUTPATIENT
Start: 2020-01-01 | End: 2020-01-01

## 2020-01-01 RX ORDER — POTASSIUM CHLORIDE 20 MEQ/1
40 TABLET, EXTENDED RELEASE ORAL ONCE
Status: DISCONTINUED | OUTPATIENT
Start: 2020-01-01 | End: 2020-01-01

## 2020-01-01 RX ORDER — DOXYCYCLINE 100 MG/1
100 TABLET ORAL EVERY 12 HOURS
Qty: 28 TAB | Refills: 0 | Status: SHIPPED | OUTPATIENT
Start: 2020-01-01 | End: 2020-01-01

## 2020-01-01 RX ORDER — NOREPINEPHRINE BITARTRATE 0.03 MG/ML
0-30 INJECTION, SOLUTION INTRAVENOUS CONTINUOUS
Status: DISCONTINUED | OUTPATIENT
Start: 2020-01-01 | End: 2020-01-01

## 2020-01-01 RX ORDER — AZITHROMYCIN 250 MG/1
250 TABLET, FILM COATED ORAL DAILY
COMMUNITY
Start: 2020-01-01 | End: 2020-01-01

## 2020-01-01 RX ORDER — FLUCONAZOLE 200 MG/1
200 TABLET ORAL 2 TIMES DAILY
COMMUNITY
End: 2020-01-01

## 2020-01-01 RX ORDER — CELECOXIB 200 MG/1
200 CAPSULE ORAL
Status: DISCONTINUED | OUTPATIENT
Start: 2020-01-01 | End: 2020-01-01

## 2020-01-01 RX ORDER — FLUCONAZOLE 200 MG/1
200 TABLET ORAL 2 TIMES DAILY
Status: ON HOLD | COMMUNITY
Start: 2020-01-01 | End: 2020-01-01

## 2020-01-01 RX ORDER — DOXYCYCLINE 100 MG/1
100 TABLET ORAL EVERY 12 HOURS
Status: DISCONTINUED | OUTPATIENT
Start: 2020-01-01 | End: 2020-01-01 | Stop reason: HOSPADM

## 2020-01-01 RX ORDER — IBUPROFEN 800 MG/1
400 TABLET ORAL EVERY 6 HOURS PRN
Status: DISCONTINUED | OUTPATIENT
Start: 2020-01-01 | End: 2020-01-01

## 2020-01-01 RX ORDER — LABETALOL HYDROCHLORIDE 5 MG/ML
10 INJECTION, SOLUTION INTRAVENOUS EVERY 4 HOURS PRN
Status: DISCONTINUED | OUTPATIENT
Start: 2020-01-01 | End: 2020-01-01 | Stop reason: HOSPADM

## 2020-01-01 RX ORDER — SODIUM CHLORIDE 9 MG/ML
INJECTION, SOLUTION INTRAVENOUS CONTINUOUS
Status: ACTIVE | OUTPATIENT
Start: 2020-01-01 | End: 2020-01-01

## 2020-01-01 RX ORDER — ACETAMINOPHEN 325 MG/1
650 TABLET ORAL EVERY 6 HOURS PRN
Status: DISCONTINUED | OUTPATIENT
Start: 2020-01-01 | End: 2020-01-01 | Stop reason: HOSPADM

## 2020-01-01 RX ORDER — SODIUM CHLORIDE 9 MG/ML
250 INJECTION, SOLUTION INTRAVENOUS EVERY 24 HOURS
Status: DISCONTINUED | OUTPATIENT
Start: 2020-01-01 | End: 2020-01-01

## 2020-01-01 RX ORDER — LOSARTAN POTASSIUM 100 MG/1
100 TABLET ORAL DAILY
Status: ON HOLD | COMMUNITY
End: 2020-01-01

## 2020-01-01 RX ORDER — GUAIFENESIN/DEXTROMETHORPHAN 100-10MG/5
10 SYRUP ORAL EVERY 6 HOURS PRN
Status: DISCONTINUED | OUTPATIENT
Start: 2020-01-01 | End: 2020-01-01 | Stop reason: HOSPADM

## 2020-01-01 RX ORDER — PROCHLORPERAZINE MALEATE 10 MG
TABLET ORAL
COMMUNITY
Start: 2020-01-01 | End: 2020-01-01

## 2020-01-01 RX ORDER — FUROSEMIDE 10 MG/ML
20 INJECTION INTRAMUSCULAR; INTRAVENOUS ONCE
Status: COMPLETED | OUTPATIENT
Start: 2020-01-01 | End: 2020-01-01

## 2020-01-01 RX ORDER — IPRATROPIUM BROMIDE AND ALBUTEROL SULFATE 2.5; .5 MG/3ML; MG/3ML
3 SOLUTION RESPIRATORY (INHALATION)
Status: DISCONTINUED | OUTPATIENT
Start: 2020-01-01 | End: 2020-01-01

## 2020-01-01 RX ORDER — ATROPINE SULFATE 10 MG/ML
2 SOLUTION/ DROPS OPHTHALMIC EVERY 4 HOURS PRN
Status: DISCONTINUED | OUTPATIENT
Start: 2020-01-01 | End: 2020-01-01 | Stop reason: HOSPADM

## 2020-01-01 RX ORDER — AMLODIPINE BESYLATE 5 MG/1
5 TABLET ORAL EVERY EVENING
Status: ON HOLD | COMMUNITY
End: 2020-01-01

## 2020-01-01 RX ORDER — DIPHENHYDRAMINE HCL 25 MG
25 TABLET ORAL EVERY 24 HOURS
Status: DISCONTINUED | OUTPATIENT
Start: 2020-01-01 | End: 2020-01-01

## 2020-01-01 RX ORDER — FUROSEMIDE 10 MG/ML
40 INJECTION INTRAMUSCULAR; INTRAVENOUS ONCE
Status: COMPLETED | OUTPATIENT
Start: 2020-01-01 | End: 2020-01-01

## 2020-01-01 RX ORDER — AMOXICILLIN 250 MG
2 CAPSULE ORAL 2 TIMES DAILY
Status: DISCONTINUED | OUTPATIENT
Start: 2020-01-01 | End: 2020-01-01 | Stop reason: HOSPADM

## 2020-01-01 RX ORDER — SODIUM CHLORIDE 9 MG/ML
INJECTION, SOLUTION INTRAVENOUS CONTINUOUS
Status: DISPENSED | OUTPATIENT
Start: 2020-01-01 | End: 2020-01-01

## 2020-01-01 RX ORDER — OMEPRAZOLE 20 MG/1
20 CAPSULE, DELAYED RELEASE ORAL DAILY
Status: DISCONTINUED | OUTPATIENT
Start: 2020-01-01 | End: 2020-01-01

## 2020-01-01 RX ORDER — POTASSIUM CHLORIDE 14.9 MG/ML
20 INJECTION INTRAVENOUS ONCE
Status: COMPLETED | OUTPATIENT
Start: 2020-01-01 | End: 2020-01-01

## 2020-01-01 RX ORDER — MORPHINE SULFATE 10 MG/ML
5 INJECTION, SOLUTION INTRAMUSCULAR; INTRAVENOUS
Status: DISCONTINUED | OUTPATIENT
Start: 2020-01-01 | End: 2020-01-01 | Stop reason: HOSPADM

## 2020-01-01 RX ORDER — MORPHINE SULFATE 10 MG/ML
10 INJECTION, SOLUTION INTRAMUSCULAR; INTRAVENOUS
Status: DISCONTINUED | OUTPATIENT
Start: 2020-01-01 | End: 2020-01-01 | Stop reason: HOSPADM

## 2020-01-01 RX ORDER — FOLIC ACID 1 MG/1
1 TABLET ORAL DAILY
COMMUNITY

## 2020-01-01 RX ORDER — FLUCONAZOLE 100 MG/1
100 TABLET ORAL DAILY
Status: DISCONTINUED | OUTPATIENT
Start: 2020-01-01 | End: 2020-01-01 | Stop reason: HOSPADM

## 2020-01-01 RX ORDER — SODIUM CHLORIDE, SODIUM LACTATE, POTASSIUM CHLORIDE, CALCIUM CHLORIDE 600; 310; 30; 20 MG/100ML; MG/100ML; MG/100ML; MG/100ML
500 INJECTION, SOLUTION INTRAVENOUS ONCE
Status: COMPLETED | OUTPATIENT
Start: 2020-01-01 | End: 2020-01-01

## 2020-01-01 RX ORDER — DILTIAZEM HYDROCHLORIDE 5 MG/ML
20 INJECTION INTRAVENOUS ONCE
Status: COMPLETED | OUTPATIENT
Start: 2020-01-01 | End: 2020-01-01

## 2020-01-01 RX ORDER — FOLIC ACID 1 MG/1
1 TABLET ORAL DAILY
Status: DISCONTINUED | OUTPATIENT
Start: 2020-01-01 | End: 2020-01-01

## 2020-01-01 RX ORDER — AMLODIPINE BESYLATE 10 MG/1
10 TABLET ORAL DAILY
Status: DISCONTINUED | OUTPATIENT
Start: 2020-01-01 | End: 2020-01-01

## 2020-01-01 RX ORDER — ADENOSINE 3 MG/ML
6 INJECTION, SOLUTION INTRAVENOUS ONCE
Status: COMPLETED | OUTPATIENT
Start: 2020-01-01 | End: 2020-01-01

## 2020-01-01 RX ORDER — ENALAPRILAT 1.25 MG/ML
1.25 INJECTION INTRAVENOUS EVERY 6 HOURS PRN
Status: DISCONTINUED | OUTPATIENT
Start: 2020-01-01 | End: 2020-01-01

## 2020-01-01 RX ORDER — SODIUM CHLORIDE, SODIUM LACTATE, POTASSIUM CHLORIDE, AND CALCIUM CHLORIDE .6; .31; .03; .02 G/100ML; G/100ML; G/100ML; G/100ML
30 INJECTION, SOLUTION INTRAVENOUS
Status: DISCONTINUED | OUTPATIENT
Start: 2020-01-01 | End: 2020-01-01

## 2020-01-01 RX ORDER — SODIUM CHLORIDE AND POTASSIUM CHLORIDE 300; 900 MG/100ML; MG/100ML
INJECTION, SOLUTION INTRAVENOUS CONTINUOUS
Status: DISCONTINUED | OUTPATIENT
Start: 2020-01-01 | End: 2020-01-01

## 2020-01-01 RX ORDER — FOLIC ACID 1 MG/1
1 TABLET ORAL DAILY
COMMUNITY
End: 2020-01-01

## 2020-01-01 RX ORDER — LORAZEPAM 2 MG/ML
2 INJECTION INTRAMUSCULAR
Status: DISCONTINUED | OUTPATIENT
Start: 2020-01-01 | End: 2020-01-01 | Stop reason: HOSPADM

## 2020-01-01 RX ORDER — IBUPROFEN 400 MG/1
600 TABLET ORAL EVERY 6 HOURS PRN
Status: DISCONTINUED | OUTPATIENT
Start: 2020-01-01 | End: 2020-01-01 | Stop reason: HOSPADM

## 2020-01-01 RX ORDER — ACETAMINOPHEN 325 MG/1
650 TABLET ORAL EVERY 4 HOURS PRN
Status: DISCONTINUED | OUTPATIENT
Start: 2020-01-01 | End: 2020-01-01 | Stop reason: HOSPADM

## 2020-01-01 RX ORDER — FLUCONAZOLE 100 MG/1
400 TABLET ORAL DAILY
Status: DISCONTINUED | OUTPATIENT
Start: 2020-01-01 | End: 2020-01-01 | Stop reason: HOSPADM

## 2020-01-01 RX ORDER — POTASSIUM CHLORIDE 20 MEQ/1
20 TABLET, EXTENDED RELEASE ORAL DAILY
Status: DISCONTINUED | OUTPATIENT
Start: 2020-01-01 | End: 2020-01-01

## 2020-01-01 RX ORDER — KETOROLAC TROMETHAMINE 30 MG/ML
30 INJECTION, SOLUTION INTRAMUSCULAR; INTRAVENOUS ONCE
Status: COMPLETED | OUTPATIENT
Start: 2020-01-01 | End: 2020-01-01

## 2020-01-01 RX ORDER — ACYCLOVIR 400 MG/1
400 TABLET ORAL 2 TIMES DAILY
Qty: 30 TAB | Refills: 0 | Status: SHIPPED | OUTPATIENT
Start: 2020-01-01 | End: 2020-01-01

## 2020-01-01 RX ORDER — SODIUM CHLORIDE, SODIUM LACTATE, POTASSIUM CHLORIDE, CALCIUM CHLORIDE 600; 310; 30; 20 MG/100ML; MG/100ML; MG/100ML; MG/100ML
INJECTION, SOLUTION INTRAVENOUS CONTINUOUS
Status: DISCONTINUED | OUTPATIENT
Start: 2020-01-01 | End: 2020-01-01

## 2020-01-01 RX ORDER — POTASSIUM CHLORIDE 29.8 MG/ML
40 INJECTION INTRAVENOUS ONCE
Status: COMPLETED | OUTPATIENT
Start: 2020-01-01 | End: 2020-01-01

## 2020-01-01 RX ORDER — NOREPINEPHRINE BITARTRATE 0.03 MG/ML
INJECTION, SOLUTION INTRAVENOUS
Status: COMPLETED
Start: 2020-01-01 | End: 2020-01-01

## 2020-01-01 RX ORDER — ONDANSETRON 4 MG/1
4 TABLET, ORALLY DISINTEGRATING ORAL EVERY 4 HOURS PRN
Status: DISCONTINUED | OUTPATIENT
Start: 2020-01-01 | End: 2020-01-01

## 2020-01-01 RX ORDER — ONDANSETRON 2 MG/ML
4 INJECTION INTRAMUSCULAR; INTRAVENOUS EVERY 4 HOURS PRN
Status: DISCONTINUED | OUTPATIENT
Start: 2020-01-01 | End: 2020-01-01

## 2020-01-01 RX ORDER — OXYCODONE HYDROCHLORIDE 5 MG/1
2.5 TABLET ORAL
Status: DISCONTINUED | OUTPATIENT
Start: 2020-01-01 | End: 2020-01-01

## 2020-01-01 RX ORDER — OMEPRAZOLE 20 MG/1
20 CAPSULE, DELAYED RELEASE ORAL 2 TIMES DAILY
Status: DISCONTINUED | OUTPATIENT
Start: 2020-01-01 | End: 2020-01-01

## 2020-01-01 RX ORDER — MIDAZOLAM HYDROCHLORIDE 1 MG/ML
0-10 INJECTION INTRAMUSCULAR; INTRAVENOUS
Status: COMPLETED | OUTPATIENT
Start: 2020-01-01 | End: 2020-01-01

## 2020-01-01 RX ORDER — MORPHINE SULFATE 4 MG/ML
2 INJECTION, SOLUTION INTRAMUSCULAR; INTRAVENOUS
Status: DISCONTINUED | OUTPATIENT
Start: 2020-01-01 | End: 2020-01-01

## 2020-01-01 RX ORDER — METOPROLOL TARTRATE 1 MG/ML
10 INJECTION, SOLUTION INTRAVENOUS ONCE
Status: COMPLETED | OUTPATIENT
Start: 2020-01-01 | End: 2020-01-01

## 2020-01-01 RX ORDER — LEVOFLOXACIN 500 MG/1
500 TABLET, FILM COATED ORAL DAILY
Status: ON HOLD | COMMUNITY
Start: 2020-01-01 | End: 2020-01-01

## 2020-01-01 RX ORDER — ACYCLOVIR 800 MG/1
400 TABLET ORAL 2 TIMES DAILY
Status: DISCONTINUED | OUTPATIENT
Start: 2020-01-01 | End: 2020-01-01

## 2020-01-01 RX ORDER — POTASSIUM CHLORIDE 20 MEQ/1
20 TABLET, EXTENDED RELEASE ORAL ONCE
Status: COMPLETED | OUTPATIENT
Start: 2020-01-01 | End: 2020-01-01

## 2020-01-01 RX ORDER — SODIUM CHLORIDE, SODIUM LACTATE, POTASSIUM CHLORIDE, CALCIUM CHLORIDE 600; 310; 30; 20 MG/100ML; MG/100ML; MG/100ML; MG/100ML
2000 INJECTION, SOLUTION INTRAVENOUS CONTINUOUS
Status: DISCONTINUED | OUTPATIENT
Start: 2020-01-01 | End: 2020-01-01

## 2020-01-01 RX ORDER — LEVOFLOXACIN 500 MG/1
500 TABLET, FILM COATED ORAL DAILY
Qty: 14 TAB | Refills: 0 | Status: SHIPPED | OUTPATIENT
Start: 2020-01-01

## 2020-01-01 RX ORDER — LEVOFLOXACIN 500 MG/1
500 TABLET, FILM COATED ORAL DAILY
Status: DISCONTINUED | OUTPATIENT
Start: 2020-01-01 | End: 2020-01-01

## 2020-01-01 RX ORDER — ACYCLOVIR 400 MG/1
400 TABLET ORAL 2 TIMES DAILY
Status: ON HOLD | COMMUNITY
End: 2020-01-01

## 2020-01-01 RX ORDER — SODIUM CHLORIDE, SODIUM LACTATE, POTASSIUM CHLORIDE, AND CALCIUM CHLORIDE .6; .31; .03; .02 G/100ML; G/100ML; G/100ML; G/100ML
1000 INJECTION, SOLUTION INTRAVENOUS ONCE
Status: COMPLETED | OUTPATIENT
Start: 2020-01-01 | End: 2020-01-01

## 2020-01-01 RX ORDER — LOPERAMIDE HYDROCHLORIDE 2 MG/1
2 CAPSULE ORAL 4 TIMES DAILY PRN
Status: DISCONTINUED | OUTPATIENT
Start: 2020-01-01 | End: 2020-01-01 | Stop reason: HOSPADM

## 2020-01-01 RX ORDER — LOSARTAN POTASSIUM AND HYDROCHLOROTHIAZIDE 12.5; 1 MG/1; MG/1
1 TABLET ORAL DAILY
COMMUNITY
End: 2020-01-01

## 2020-01-01 RX ORDER — LEVOFLOXACIN 500 MG/1
500 TABLET, FILM COATED ORAL EVERY 24 HOURS
Status: DISCONTINUED | OUTPATIENT
Start: 2020-01-01 | End: 2020-01-01 | Stop reason: HOSPADM

## 2020-01-01 RX ORDER — FLUCONAZOLE 100 MG/1
200 TABLET ORAL 2 TIMES DAILY
Status: DISCONTINUED | OUTPATIENT
Start: 2020-01-01 | End: 2020-01-01

## 2020-01-01 RX ORDER — SODIUM CHLORIDE, SODIUM LACTATE, POTASSIUM CHLORIDE, AND CALCIUM CHLORIDE .6; .31; .03; .02 G/100ML; G/100ML; G/100ML; G/100ML
30 INJECTION, SOLUTION INTRAVENOUS
Status: DISCONTINUED | OUTPATIENT
Start: 2020-01-01 | End: 2020-01-01 | Stop reason: HOSPADM

## 2020-01-01 RX ORDER — POTASSIUM CHLORIDE 7.45 MG/ML
10 INJECTION INTRAVENOUS
Status: COMPLETED | OUTPATIENT
Start: 2020-01-01 | End: 2020-01-01

## 2020-01-01 RX ORDER — OMEPRAZOLE 20 MG/1
20 CAPSULE, DELAYED RELEASE ORAL DAILY
Status: DISCONTINUED | OUTPATIENT
Start: 2020-01-01 | End: 2020-01-01 | Stop reason: HOSPADM

## 2020-01-01 RX ORDER — HYDROXYZINE HYDROCHLORIDE 25 MG/ML
25 INJECTION, SOLUTION INTRAMUSCULAR ONCE
Status: COMPLETED | OUTPATIENT
Start: 2020-01-01 | End: 2020-01-01

## 2020-01-01 RX ORDER — POLYETHYLENE GLYCOL 3350 17 G/17G
1 POWDER, FOR SOLUTION ORAL
Status: DISCONTINUED | OUTPATIENT
Start: 2020-01-01 | End: 2020-01-01

## 2020-01-01 RX ORDER — POSACONAZOLE 40 MG/ML
400 SUSPENSION ORAL 2 TIMES DAILY WITH MEALS
Status: DISCONTINUED | OUTPATIENT
Start: 2020-01-01 | End: 2020-01-01

## 2020-01-01 RX ORDER — DEXTROSE MONOHYDRATE 50 MG/ML
INJECTION, SOLUTION INTRAVENOUS
Status: COMPLETED
Start: 2020-01-01 | End: 2020-01-01

## 2020-01-01 RX ORDER — GUAIFENESIN 600 MG/1
600 TABLET, EXTENDED RELEASE ORAL EVERY 12 HOURS
Status: DISCONTINUED | OUTPATIENT
Start: 2020-01-01 | End: 2020-01-01

## 2020-01-01 RX ORDER — ACETAMINOPHEN 325 MG/1
325 TABLET ORAL ONCE
Status: COMPLETED | OUTPATIENT
Start: 2020-01-01 | End: 2020-01-01

## 2020-01-01 RX ORDER — POTASSIUM CHLORIDE 20 MEQ/1
40 TABLET, EXTENDED RELEASE ORAL DAILY
Qty: 30 TAB | Refills: 0 | Status: SHIPPED | OUTPATIENT
Start: 2020-01-01 | End: 2020-01-01

## 2020-01-01 RX ORDER — AMLODIPINE BESYLATE 5 MG/1
5 TABLET ORAL DAILY
COMMUNITY
End: 2020-01-01

## 2020-01-01 RX ORDER — POTASSIUM CHLORIDE 1500 MG/1
20 TABLET, EXTENDED RELEASE ORAL 2 TIMES DAILY
Status: DISCONTINUED | OUTPATIENT
Start: 2020-01-01 | End: 2020-01-01

## 2020-01-01 RX ORDER — ACETAMINOPHEN 325 MG/1
650 TABLET ORAL ONCE
Status: DISCONTINUED | OUTPATIENT
Start: 2020-01-01 | End: 2020-01-01

## 2020-01-01 RX ORDER — POTASSIUM CHLORIDE 20 MEQ/1
40 TABLET, EXTENDED RELEASE ORAL DAILY
Status: DISCONTINUED | OUTPATIENT
Start: 2020-01-01 | End: 2020-01-01

## 2020-01-01 RX ORDER — METRONIDAZOLE 500 MG/1
500 TABLET ORAL EVERY 8 HOURS
Status: DISCONTINUED | OUTPATIENT
Start: 2020-01-01 | End: 2020-01-01

## 2020-01-01 RX ORDER — SODIUM CHLORIDE, SODIUM LACTATE, POTASSIUM CHLORIDE, CALCIUM CHLORIDE 600; 310; 30; 20 MG/100ML; MG/100ML; MG/100ML; MG/100ML
1000 INJECTION, SOLUTION INTRAVENOUS CONTINUOUS
Status: DISCONTINUED | OUTPATIENT
Start: 2020-01-01 | End: 2020-01-01

## 2020-01-01 RX ORDER — AMLODIPINE BESYLATE 10 MG/1
10 TABLET ORAL DAILY
COMMUNITY

## 2020-01-01 RX ORDER — LORAZEPAM 2 MG/ML
1-2 INJECTION INTRAMUSCULAR EVERY 4 HOURS PRN
Status: DISCONTINUED | OUTPATIENT
Start: 2020-01-01 | End: 2020-01-01

## 2020-01-01 RX ORDER — ADENOSINE 3 MG/ML
INJECTION, SOLUTION INTRAVENOUS
Status: COMPLETED
Start: 2020-01-01 | End: 2020-01-01

## 2020-01-01 RX ADMIN — ACYCLOVIR 400 MG: 800 TABLET ORAL at 18:08

## 2020-01-01 RX ADMIN — FOLIC ACID 1 MG: 1 TABLET ORAL at 06:50

## 2020-01-01 RX ADMIN — METRONIDAZOLE 500 MG: 500 TABLET ORAL at 22:55

## 2020-01-01 RX ADMIN — HYDROCORTISONE SODIUM SUCCINATE 100 MG: 100 INJECTION, POWDER, FOR SOLUTION INTRAMUSCULAR; INTRAVENOUS at 09:59

## 2020-01-01 RX ADMIN — SODIUM CHLORIDE 500 ML: 9 INJECTION, SOLUTION INTRAVENOUS at 16:17

## 2020-01-01 RX ADMIN — POTASSIUM CHLORIDE AND SODIUM CHLORIDE: 900; 300 INJECTION, SOLUTION INTRAVENOUS at 02:32

## 2020-01-01 RX ADMIN — CEFEPIME 2 G: 2 INJECTION, POWDER, FOR SOLUTION INTRAVENOUS at 17:47

## 2020-01-01 RX ADMIN — POTASSIUM CHLORIDE 40 MEQ: 29.8 INJECTION, SOLUTION INTRAVENOUS at 06:09

## 2020-01-01 RX ADMIN — AMLODIPINE BESYLATE 5 MG: 5 TABLET ORAL at 04:52

## 2020-01-01 RX ADMIN — NOREPINEPHRINE BITARTRATE 3 MCG/MIN: 0.03 INJECTION, SOLUTION INTRAVENOUS at 01:42

## 2020-01-01 RX ADMIN — POSACONAZOLE 400 MG: 40 SUSPENSION ORAL at 10:53

## 2020-01-01 RX ADMIN — SODIUM CHLORIDE, POTASSIUM CHLORIDE, SODIUM LACTATE AND CALCIUM CHLORIDE 500 ML: 600; 310; 30; 20 INJECTION, SOLUTION INTRAVENOUS at 20:40

## 2020-01-01 RX ADMIN — ACYCLOVIR 400 MG: 800 TABLET ORAL at 05:34

## 2020-01-01 RX ADMIN — OMEPRAZOLE 20 MG: 20 CAPSULE, DELAYED RELEASE ORAL at 05:43

## 2020-01-01 RX ADMIN — MAGNESIUM GLUCONATE 500 MG ORAL TABLET 400 MG: 500 TABLET ORAL at 05:05

## 2020-01-01 RX ADMIN — AMLODIPINE BESYLATE 5 MG: 5 TABLET ORAL at 05:41

## 2020-01-01 RX ADMIN — ACYCLOVIR 400 MG: 800 TABLET ORAL at 16:46

## 2020-01-01 RX ADMIN — POTASSIUM CHLORIDE 10 MEQ: 7.46 INJECTION, SOLUTION INTRAVENOUS at 04:30

## 2020-01-01 RX ADMIN — POTASSIUM CHLORIDE 40 MEQ: 29.8 INJECTION, SOLUTION INTRAVENOUS at 07:35

## 2020-01-01 RX ADMIN — ACETAMINOPHEN 650 MG: 325 TABLET, FILM COATED ORAL at 08:16

## 2020-01-01 RX ADMIN — AMLODIPINE BESYLATE 5 MG: 5 TABLET ORAL at 05:40

## 2020-01-01 RX ADMIN — HYDROXYZINE HYDROCHLORIDE 25 MG: 25 INJECTION, SOLUTION INTRAMUSCULAR at 17:46

## 2020-01-01 RX ADMIN — POTASSIUM CHLORIDE 40 MEQ: 1500 TABLET, EXTENDED RELEASE ORAL at 17:29

## 2020-01-01 RX ADMIN — ACYCLOVIR 400 MG: 800 TABLET ORAL at 04:00

## 2020-01-01 RX ADMIN — FOLIC ACID 1 MG: 1 TABLET ORAL at 04:52

## 2020-01-01 RX ADMIN — HYDROCORTISONE SODIUM SUCCINATE 100 MG: 100 INJECTION, POWDER, FOR SOLUTION INTRAMUSCULAR; INTRAVENOUS at 12:48

## 2020-01-01 RX ADMIN — OMEPRAZOLE 20 MG: 20 CAPSULE, DELAYED RELEASE ORAL at 17:28

## 2020-01-01 RX ADMIN — LORAZEPAM 2 MG: 2 INJECTION INTRAMUSCULAR; INTRAVENOUS at 19:04

## 2020-01-01 RX ADMIN — POTASSIUM CHLORIDE AND SODIUM CHLORIDE: 900; 300 INJECTION, SOLUTION INTRAVENOUS at 08:10

## 2020-01-01 RX ADMIN — OXYCODONE 2.5 MG: 5 TABLET ORAL at 19:45

## 2020-01-01 RX ADMIN — ACYCLOVIR 400 MG: 800 TABLET ORAL at 20:04

## 2020-01-01 RX ADMIN — DOCUSATE SODIUM 50 MG AND SENNOSIDES 8.6 MG 2 TABLET: 8.6; 5 TABLET, FILM COATED ORAL at 18:10

## 2020-01-01 RX ADMIN — POTASSIUM CHLORIDE 40 MEQ: 1500 TABLET, EXTENDED RELEASE ORAL at 12:53

## 2020-01-01 RX ADMIN — GUAIFENESIN 200 MG: 100 SOLUTION ORAL at 09:45

## 2020-01-01 RX ADMIN — OMEPRAZOLE 20 MG: 20 CAPSULE, DELAYED RELEASE ORAL at 06:04

## 2020-01-01 RX ADMIN — ACYCLOVIR 400 MG: 800 TABLET ORAL at 05:41

## 2020-01-01 RX ADMIN — FLUCONAZOLE 400 MG: 100 TABLET ORAL at 05:42

## 2020-01-01 RX ADMIN — ACYCLOVIR 400 MG: 800 TABLET ORAL at 18:22

## 2020-01-01 RX ADMIN — ACYCLOVIR 400 MG: 800 TABLET ORAL at 16:52

## 2020-01-01 RX ADMIN — POTASSIUM CHLORIDE 40 MEQ: 1500 TABLET, EXTENDED RELEASE ORAL at 06:04

## 2020-01-01 RX ADMIN — OXYCODONE 2.5 MG: 5 TABLET ORAL at 16:59

## 2020-01-01 RX ADMIN — VANCOMYCIN HYDROCHLORIDE 750 MG: 500 INJECTION, POWDER, LYOPHILIZED, FOR SOLUTION INTRAVENOUS at 12:08

## 2020-01-01 RX ADMIN — FUROSEMIDE 20 MG: 10 INJECTION, SOLUTION INTRAMUSCULAR; INTRAVENOUS at 08:56

## 2020-01-01 RX ADMIN — DOCUSATE SODIUM 50 MG AND SENNOSIDES 8.6 MG 2 TABLET: 8.6; 5 TABLET, FILM COATED ORAL at 18:21

## 2020-01-01 RX ADMIN — POTASSIUM CHLORIDE 20 MEQ: 14.9 INJECTION, SOLUTION INTRAVENOUS at 11:00

## 2020-01-01 RX ADMIN — ACYCLOVIR 400 MG: 800 TABLET ORAL at 17:28

## 2020-01-01 RX ADMIN — ACETAMINOPHEN 650 MG: 325 TABLET, FILM COATED ORAL at 13:09

## 2020-01-01 RX ADMIN — CEFEPIME 2 G: 2 INJECTION, POWDER, FOR SOLUTION INTRAVENOUS at 13:45

## 2020-01-01 RX ADMIN — CEFEPIME 2 G: 2 INJECTION, POWDER, FOR SOLUTION INTRAVENOUS at 09:54

## 2020-01-01 RX ADMIN — FENTANYL CITRATE 100 MCG: 50 INJECTION INTRAMUSCULAR; INTRAVENOUS at 10:52

## 2020-01-01 RX ADMIN — ACYCLOVIR 400 MG: 800 TABLET ORAL at 05:42

## 2020-01-01 RX ADMIN — ACYCLOVIR 400 MG: 800 TABLET ORAL at 16:58

## 2020-01-01 RX ADMIN — HYDROCORTISONE SODIUM SUCCINATE 100 MG: 100 INJECTION, POWDER, FOR SOLUTION INTRAMUSCULAR; INTRAVENOUS at 12:11

## 2020-01-01 RX ADMIN — POTASSIUM CHLORIDE 60 MEQ: 1500 TABLET, EXTENDED RELEASE ORAL at 09:55

## 2020-01-01 RX ADMIN — FLUCONAZOLE 200 MG: 100 TABLET ORAL at 18:05

## 2020-01-01 RX ADMIN — MIDODRINE HYDROCHLORIDE 5 MG: 5 TABLET ORAL at 10:40

## 2020-01-01 RX ADMIN — CEFEPIME 2 G: 2 INJECTION, POWDER, FOR SOLUTION INTRAVENOUS at 08:31

## 2020-01-01 RX ADMIN — FLUCONAZOLE 100 MG: 100 TABLET ORAL at 05:01

## 2020-01-01 RX ADMIN — ACETAMINOPHEN 650 MG: 325 TABLET, FILM COATED ORAL at 22:12

## 2020-01-01 RX ADMIN — POTASSIUM CHLORIDE 20 MEQ: 1500 TABLET, EXTENDED RELEASE ORAL at 05:36

## 2020-01-01 RX ADMIN — CEFEPIME 2 G: 2 INJECTION, POWDER, FOR SOLUTION INTRAVENOUS at 17:13

## 2020-01-01 RX ADMIN — HYDROCORTISONE SODIUM SUCCINATE 100 MG: 100 INJECTION, POWDER, FOR SOLUTION INTRAMUSCULAR; INTRAVENOUS at 11:10

## 2020-01-01 RX ADMIN — LORAZEPAM 1 MG: 2 INJECTION INTRAMUSCULAR; INTRAVENOUS at 03:59

## 2020-01-01 RX ADMIN — CEFEPIME 2 G: 2 INJECTION, POWDER, FOR SOLUTION INTRAVENOUS at 05:01

## 2020-01-01 RX ADMIN — FLUCONAZOLE 100 MG: 100 TABLET ORAL at 06:51

## 2020-01-01 RX ADMIN — FOLIC ACID 1 MG: 1 TABLET ORAL at 05:41

## 2020-01-01 RX ADMIN — HYDROCORTISONE SODIUM SUCCINATE 100 MG: 100 INJECTION, POWDER, FOR SOLUTION INTRAMUSCULAR; INTRAVENOUS at 11:12

## 2020-01-01 RX ADMIN — DOCUSATE SODIUM 50 MG AND SENNOSIDES 8.6 MG 1 TABLET: 8.6; 5 TABLET, FILM COATED ORAL at 06:10

## 2020-01-01 RX ADMIN — OMEPRAZOLE 20 MG: 20 CAPSULE, DELAYED RELEASE ORAL at 05:05

## 2020-01-01 RX ADMIN — ACETAMINOPHEN 650 MG: 325 TABLET, FILM COATED ORAL at 21:05

## 2020-01-01 RX ADMIN — AMLODIPINE BESYLATE 5 MG: 5 TABLET ORAL at 05:49

## 2020-01-01 RX ADMIN — ACETAMINOPHEN 650 MG: 325 TABLET ORAL at 12:31

## 2020-01-01 RX ADMIN — CEFEPIME 2 G: 2 INJECTION, POWDER, FOR SOLUTION INTRAVENOUS at 04:45

## 2020-01-01 RX ADMIN — ACYCLOVIR 400 MG: 800 TABLET ORAL at 05:14

## 2020-01-01 RX ADMIN — POTASSIUM CHLORIDE 40 MEQ: 1500 TABLET, EXTENDED RELEASE ORAL at 08:16

## 2020-01-01 RX ADMIN — SODIUM CHLORIDE 500 ML: 9 INJECTION, SOLUTION INTRAVENOUS at 04:54

## 2020-01-01 RX ADMIN — SODIUM CHLORIDE: 9 INJECTION, SOLUTION INTRAVENOUS at 18:26

## 2020-01-01 RX ADMIN — VANCOMYCIN HYDROCHLORIDE 500 MG: 500 INJECTION, POWDER, LYOPHILIZED, FOR SOLUTION INTRAVENOUS at 17:16

## 2020-01-01 RX ADMIN — POTASSIUM CHLORIDE 40 MEQ: 1500 TABLET, EXTENDED RELEASE ORAL at 17:58

## 2020-01-01 RX ADMIN — HYDROCORTISONE SODIUM SUCCINATE 100 MG: 100 INJECTION, POWDER, FOR SOLUTION INTRAMUSCULAR; INTRAVENOUS at 15:25

## 2020-01-01 RX ADMIN — SODIUM CHLORIDE: 9 INJECTION, SOLUTION INTRAVENOUS at 20:25

## 2020-01-01 RX ADMIN — METRONIDAZOLE 500 MG: 500 TABLET ORAL at 05:00

## 2020-01-01 RX ADMIN — ACETAMINOPHEN 650 MG: 325 TABLET, FILM COATED ORAL at 03:54

## 2020-01-01 RX ADMIN — AMLODIPINE BESYLATE 5 MG: 5 TABLET ORAL at 04:48

## 2020-01-01 RX ADMIN — DEXTROSE MONOHYDRATE 30 ML: 50 INJECTION, SOLUTION INTRAVENOUS at 05:20

## 2020-01-01 RX ADMIN — FLUCONAZOLE 100 MG: 100 TABLET ORAL at 17:29

## 2020-01-01 RX ADMIN — CEFEPIME 2 G: 2 INJECTION, POWDER, FOR SOLUTION INTRAVENOUS at 15:38

## 2020-01-01 RX ADMIN — MIDODRINE HYDROCHLORIDE 5 MG: 5 TABLET ORAL at 12:26

## 2020-01-01 RX ADMIN — ACETAMINOPHEN 650 MG: 325 TABLET, FILM COATED ORAL at 18:28

## 2020-01-01 RX ADMIN — CEFEPIME 2 G: 2 INJECTION, POWDER, FOR SOLUTION INTRAVENOUS at 13:59

## 2020-01-01 RX ADMIN — POTASSIUM CHLORIDE 20 MEQ: 1500 TABLET, EXTENDED RELEASE ORAL at 10:53

## 2020-01-01 RX ADMIN — SODIUM CHLORIDE, POTASSIUM CHLORIDE, SODIUM LACTATE AND CALCIUM CHLORIDE 2000 ML: 600; 310; 30; 20 INJECTION, SOLUTION INTRAVENOUS at 20:05

## 2020-01-01 RX ADMIN — AMLODIPINE BESYLATE 10 MG: 10 TABLET ORAL at 05:05

## 2020-01-01 RX ADMIN — ACETAMINOPHEN 650 MG: 325 TABLET ORAL at 11:35

## 2020-01-01 RX ADMIN — POTASSIUM CHLORIDE 40 MEQ: 1500 TABLET, EXTENDED RELEASE ORAL at 16:46

## 2020-01-01 RX ADMIN — ACYCLOVIR 400 MG: 800 TABLET ORAL at 04:48

## 2020-01-01 RX ADMIN — FLUCONAZOLE 100 MG: 100 TABLET ORAL at 17:50

## 2020-01-01 RX ADMIN — VANCOMYCIN HYDROCHLORIDE 750 MG: 500 INJECTION, POWDER, LYOPHILIZED, FOR SOLUTION INTRAVENOUS at 04:49

## 2020-01-01 RX ADMIN — HYDROCORTISONE SODIUM SUCCINATE 100 MG: 100 INJECTION, POWDER, FOR SOLUTION INTRAMUSCULAR; INTRAVENOUS at 13:13

## 2020-01-01 RX ADMIN — POTASSIUM CHLORIDE 40 MEQ: 1500 TABLET, EXTENDED RELEASE ORAL at 04:44

## 2020-01-01 RX ADMIN — DOXYCYCLINE 100 MG: 100 TABLET, FILM COATED ORAL at 17:38

## 2020-01-01 RX ADMIN — CEFEPIME 2 G: 2 INJECTION, POWDER, FOR SOLUTION INTRAVENOUS at 08:39

## 2020-01-01 RX ADMIN — DOCUSATE SODIUM 50 MG AND SENNOSIDES 8.6 MG 2 TABLET: 8.6; 5 TABLET, FILM COATED ORAL at 06:35

## 2020-01-01 RX ADMIN — VANCOMYCIN HYDROCHLORIDE 750 MG: 500 INJECTION, POWDER, LYOPHILIZED, FOR SOLUTION INTRAVENOUS at 20:33

## 2020-01-01 RX ADMIN — VANCOMYCIN HYDROCHLORIDE 1100 MG: 500 INJECTION, POWDER, LYOPHILIZED, FOR SOLUTION INTRAVENOUS at 13:55

## 2020-01-01 RX ADMIN — HYDROCORTISONE SODIUM SUCCINATE 100 MG: 100 INJECTION, POWDER, FOR SOLUTION INTRAMUSCULAR; INTRAVENOUS at 11:27

## 2020-01-01 RX ADMIN — ACYCLOVIR 400 MG: 800 TABLET ORAL at 23:58

## 2020-01-01 RX ADMIN — POTASSIUM CHLORIDE AND SODIUM CHLORIDE: 900; 300 INJECTION, SOLUTION INTRAVENOUS at 11:34

## 2020-01-01 RX ADMIN — ACYCLOVIR 400 MG: 800 TABLET ORAL at 04:07

## 2020-01-01 RX ADMIN — HYDROCORTISONE SODIUM SUCCINATE 100 MG: 100 INJECTION, POWDER, FOR SOLUTION INTRAMUSCULAR; INTRAVENOUS at 10:59

## 2020-01-01 RX ADMIN — POTASSIUM CHLORIDE 40 MEQ: 1500 TABLET, EXTENDED RELEASE ORAL at 16:58

## 2020-01-01 RX ADMIN — CEFEPIME 2 G: 2 INJECTION, POWDER, FOR SOLUTION INTRAVENOUS at 09:55

## 2020-01-01 RX ADMIN — IPRATROPIUM BROMIDE 0.5 MG: 0.5 SOLUTION RESPIRATORY (INHALATION) at 08:52

## 2020-01-01 RX ADMIN — CEFEPIME 2 G: 2 INJECTION, POWDER, FOR SOLUTION INTRAVENOUS at 06:03

## 2020-01-01 RX ADMIN — IPRATROPIUM BROMIDE 0.5 MG: 0.5 SOLUTION RESPIRATORY (INHALATION) at 21:05

## 2020-01-01 RX ADMIN — SODIUM CHLORIDE: 9 INJECTION, SOLUTION INTRAVENOUS at 05:52

## 2020-01-01 RX ADMIN — ACETAMINOPHEN 650 MG: 325 TABLET, FILM COATED ORAL at 13:27

## 2020-01-01 RX ADMIN — POTASSIUM CHLORIDE 40 MEQ: 1500 TABLET, EXTENDED RELEASE ORAL at 05:14

## 2020-01-01 RX ADMIN — FOLIC ACID 1 MG: 1 TABLET ORAL at 05:42

## 2020-01-01 RX ADMIN — OXYCODONE 5 MG: 5 TABLET ORAL at 17:54

## 2020-01-01 RX ADMIN — FOLIC ACID 1 MG: 1 TABLET ORAL at 05:05

## 2020-01-01 RX ADMIN — FUROSEMIDE 20 MG: 10 INJECTION, SOLUTION INTRAMUSCULAR; INTRAVENOUS at 10:46

## 2020-01-01 RX ADMIN — ACYCLOVIR 400 MG: 800 TABLET ORAL at 16:32

## 2020-01-01 RX ADMIN — POTASSIUM CHLORIDE 20 MEQ: 1500 TABLET, EXTENDED RELEASE ORAL at 04:52

## 2020-01-01 RX ADMIN — METRONIDAZOLE 500 MG: 500 TABLET ORAL at 14:43

## 2020-01-01 RX ADMIN — ACYCLOVIR 400 MG: 800 TABLET ORAL at 17:51

## 2020-01-01 RX ADMIN — ACETAMINOPHEN 650 MG: 325 TABLET, FILM COATED ORAL at 22:11

## 2020-01-01 RX ADMIN — CEFEPIME 2 G: 2 INJECTION, POWDER, FOR SOLUTION INTRAVENOUS at 14:08

## 2020-01-01 RX ADMIN — DOXYCYCLINE 100 MG: 100 TABLET ORAL at 18:08

## 2020-01-01 RX ADMIN — MORPHINE SULFATE 10 MG: 10 INJECTION INTRAVENOUS at 11:12

## 2020-01-01 RX ADMIN — ACYCLOVIR 400 MG: 800 TABLET ORAL at 17:00

## 2020-01-01 RX ADMIN — FOLIC ACID 1 MG: 1 TABLET ORAL at 04:48

## 2020-01-01 RX ADMIN — ACYCLOVIR 400 MG: 800 TABLET ORAL at 04:51

## 2020-01-01 RX ADMIN — DOCUSATE SODIUM 50 MG AND SENNOSIDES 8.6 MG 2 TABLET: 8.6; 5 TABLET, FILM COATED ORAL at 16:52

## 2020-01-01 RX ADMIN — OMEPRAZOLE 20 MG: 20 CAPSULE, DELAYED RELEASE ORAL at 05:41

## 2020-01-01 RX ADMIN — CEFEPIME 2 G: 2 INJECTION, POWDER, FOR SOLUTION INTRAVENOUS at 04:10

## 2020-01-01 RX ADMIN — OMEPRAZOLE 20 MG: 20 CAPSULE, DELAYED RELEASE ORAL at 17:00

## 2020-01-01 RX ADMIN — ACETAMINOPHEN 650 MG: 325 TABLET, FILM COATED ORAL at 01:50

## 2020-01-01 RX ADMIN — CEFEPIME 2 G: 2 INJECTION, POWDER, FOR SOLUTION INTRAVENOUS at 05:18

## 2020-01-01 RX ADMIN — FOLIC ACID 1 MG: 1 TABLET ORAL at 05:01

## 2020-01-01 RX ADMIN — CEFEPIME 2 G: 2 INJECTION, POWDER, FOR SOLUTION INTRAVENOUS at 08:30

## 2020-01-01 RX ADMIN — CEFEPIME 2 G: 2 INJECTION, POWDER, FOR SOLUTION INTRAVENOUS at 08:57

## 2020-01-01 RX ADMIN — ACYCLOVIR 400 MG: 800 TABLET ORAL at 16:47

## 2020-01-01 RX ADMIN — FLUCONAZOLE 100 MG: 100 TABLET ORAL at 06:04

## 2020-01-01 RX ADMIN — DOCUSATE SODIUM 50 MG AND SENNOSIDES 8.6 MG 1 TABLET: 8.6; 5 TABLET, FILM COATED ORAL at 17:28

## 2020-01-01 RX ADMIN — AMLODIPINE BESYLATE 5 MG: 5 TABLET ORAL at 06:03

## 2020-01-01 RX ADMIN — ACETAMINOPHEN 650 MG: 325 TABLET, FILM COATED ORAL at 00:14

## 2020-01-01 RX ADMIN — CEFEPIME 2 G: 2 INJECTION, POWDER, FOR SOLUTION INTRAVENOUS at 05:41

## 2020-01-01 RX ADMIN — ACYCLOVIR 400 MG: 800 TABLET ORAL at 07:22

## 2020-01-01 RX ADMIN — SODIUM CHLORIDE 500 ML: 9 INJECTION, SOLUTION INTRAVENOUS at 13:09

## 2020-01-01 RX ADMIN — VANCOMYCIN HYDROCHLORIDE 1000 MG: 500 INJECTION, POWDER, LYOPHILIZED, FOR SOLUTION INTRAVENOUS at 17:39

## 2020-01-01 RX ADMIN — CEFEPIME 2 G: 2 INJECTION, POWDER, FOR SOLUTION INTRAVENOUS at 14:45

## 2020-01-01 RX ADMIN — CEFTRIAXONE SODIUM 2 G: 2 INJECTION, POWDER, FOR SOLUTION INTRAMUSCULAR; INTRAVENOUS at 05:43

## 2020-01-01 RX ADMIN — POTASSIUM CHLORIDE 40 MEQ: 1500 TABLET, EXTENDED RELEASE ORAL at 18:05

## 2020-01-01 RX ADMIN — POTASSIUM CHLORIDE 40 MEQ: 29.8 INJECTION, SOLUTION INTRAVENOUS at 12:29

## 2020-01-01 RX ADMIN — DOCUSATE SODIUM 50 MG AND SENNOSIDES 8.6 MG 1 TABLET: 8.6; 5 TABLET, FILM COATED ORAL at 06:23

## 2020-01-01 RX ADMIN — CEFEPIME 2 G: 2 INJECTION, POWDER, FOR SOLUTION INTRAVENOUS at 14:54

## 2020-01-01 RX ADMIN — VANCOMYCIN HYDROCHLORIDE 800 MG: 500 INJECTION, POWDER, LYOPHILIZED, FOR SOLUTION INTRAVENOUS at 04:52

## 2020-01-01 RX ADMIN — POTASSIUM CHLORIDE 40 MEQ: 1500 TABLET, EXTENDED RELEASE ORAL at 09:06

## 2020-01-01 RX ADMIN — CEFEPIME 2 G: 2 INJECTION, POWDER, FOR SOLUTION INTRAVENOUS at 16:31

## 2020-01-01 RX ADMIN — ACETAMINOPHEN 325 MG: 325 TABLET, FILM COATED ORAL at 09:10

## 2020-01-01 RX ADMIN — SODIUM CHLORIDE 500 ML: 9 INJECTION, SOLUTION INTRAVENOUS at 04:25

## 2020-01-01 RX ADMIN — SODIUM CHLORIDE 250 ML: 9 INJECTION, SOLUTION INTRAVENOUS at 15:00

## 2020-01-01 RX ADMIN — AMLODIPINE BESYLATE 10 MG: 10 TABLET ORAL at 05:41

## 2020-01-01 RX ADMIN — POTASSIUM CHLORIDE 20 MEQ: 1500 TABLET, EXTENDED RELEASE ORAL at 20:04

## 2020-01-01 RX ADMIN — VANCOMYCIN HYDROCHLORIDE 800 MG: 500 INJECTION, POWDER, LYOPHILIZED, FOR SOLUTION INTRAVENOUS at 05:49

## 2020-01-01 RX ADMIN — METRONIDAZOLE 500 MG: 500 TABLET ORAL at 22:11

## 2020-01-01 RX ADMIN — METRONIDAZOLE 500 MG: 500 TABLET ORAL at 22:12

## 2020-01-01 RX ADMIN — CEFEPIME 2 G: 2 INJECTION, POWDER, FOR SOLUTION INTRAVENOUS at 21:12

## 2020-01-01 RX ADMIN — DOCUSATE SODIUM 50 MG AND SENNOSIDES 8.6 MG 1 TABLET: 8.6; 5 TABLET, FILM COATED ORAL at 05:14

## 2020-01-01 RX ADMIN — OMEPRAZOLE 20 MG: 20 CAPSULE, DELAYED RELEASE ORAL at 16:47

## 2020-01-01 RX ADMIN — KETOROLAC TROMETHAMINE 30 MG: 30 INJECTION, SOLUTION INTRAMUSCULAR at 03:37

## 2020-01-01 RX ADMIN — ACETAMINOPHEN 650 MG: 325 TABLET ORAL at 15:14

## 2020-01-01 RX ADMIN — ACYCLOVIR 400 MG: 800 TABLET ORAL at 18:25

## 2020-01-01 RX ADMIN — ACETAMINOPHEN 650 MG: 325 TABLET, FILM COATED ORAL at 12:19

## 2020-01-01 RX ADMIN — ACETAMINOPHEN 650 MG: 325 TABLET ORAL at 10:59

## 2020-01-01 RX ADMIN — HYDROCORTISONE SODIUM SUCCINATE 100 MG: 100 INJECTION, POWDER, FOR SOLUTION INTRAMUSCULAR; INTRAVENOUS at 11:48

## 2020-01-01 RX ADMIN — SODIUM CHLORIDE, POTASSIUM CHLORIDE, SODIUM LACTATE AND CALCIUM CHLORIDE 1000 ML: 600; 310; 30; 20 INJECTION, SOLUTION INTRAVENOUS at 23:06

## 2020-01-01 RX ADMIN — CEFEPIME 2 G: 2 INJECTION, POWDER, FOR SOLUTION INTRAVENOUS at 21:50

## 2020-01-01 RX ADMIN — FOLIC ACID 1 MG: 1 TABLET ORAL at 05:40

## 2020-01-01 RX ADMIN — MORPHINE SULFATE 2 MG: 4 INJECTION INTRAVENOUS at 17:52

## 2020-01-01 RX ADMIN — ACETAMINOPHEN 650 MG: 325 TABLET, FILM COATED ORAL at 22:03

## 2020-01-01 RX ADMIN — CEFEPIME 2 G: 2 INJECTION, POWDER, FOR SOLUTION INTRAVENOUS at 13:07

## 2020-01-01 RX ADMIN — MAGNESIUM GLUCONATE 500 MG ORAL TABLET 400 MG: 500 TABLET ORAL at 16:57

## 2020-01-01 RX ADMIN — VANCOMYCIN HYDROCHLORIDE 750 MG: 500 INJECTION, POWDER, LYOPHILIZED, FOR SOLUTION INTRAVENOUS at 00:02

## 2020-01-01 RX ADMIN — POTASSIUM CHLORIDE AND SODIUM CHLORIDE: 900; 300 INJECTION, SOLUTION INTRAVENOUS at 00:52

## 2020-01-01 RX ADMIN — CEFTRIAXONE SODIUM 2 G: 2 INJECTION, POWDER, FOR SOLUTION INTRAMUSCULAR; INTRAVENOUS at 15:14

## 2020-01-01 RX ADMIN — ACYCLOVIR 400 MG: 800 TABLET ORAL at 18:10

## 2020-01-01 RX ADMIN — FOLIC ACID 1 MG: 1 TABLET ORAL at 05:49

## 2020-01-01 RX ADMIN — VANCOMYCIN HYDROCHLORIDE 750 MG: 500 INJECTION, POWDER, LYOPHILIZED, FOR SOLUTION INTRAVENOUS at 03:27

## 2020-01-01 RX ADMIN — POTASSIUM CHLORIDE 10 MEQ: 7.46 INJECTION, SOLUTION INTRAVENOUS at 10:32

## 2020-01-01 RX ADMIN — MIDAZOLAM HYDROCHLORIDE 3 MG: 1 INJECTION, SOLUTION INTRAMUSCULAR; INTRAVENOUS at 10:52

## 2020-01-01 RX ADMIN — SODIUM CHLORIDE, POTASSIUM CHLORIDE, SODIUM LACTATE AND CALCIUM CHLORIDE: 600; 310; 30; 20 INJECTION, SOLUTION INTRAVENOUS at 22:30

## 2020-01-01 RX ADMIN — ACYCLOVIR 400 MG: 800 TABLET ORAL at 18:31

## 2020-01-01 RX ADMIN — CEFEPIME 2 G: 2 INJECTION, POWDER, FOR SOLUTION INTRAVENOUS at 00:34

## 2020-01-01 RX ADMIN — POTASSIUM CHLORIDE 20 MEQ: 1500 TABLET, EXTENDED RELEASE ORAL at 18:21

## 2020-01-01 RX ADMIN — POTASSIUM CHLORIDE 40 MEQ: 1500 TABLET, EXTENDED RELEASE ORAL at 18:02

## 2020-01-01 RX ADMIN — POSACONAZOLE 400 MG: 40 SUSPENSION ORAL at 17:01

## 2020-01-01 RX ADMIN — POTASSIUM CHLORIDE 40 MEQ: 1500 TABLET, EXTENDED RELEASE ORAL at 12:28

## 2020-01-01 RX ADMIN — VANCOMYCIN HYDROCHLORIDE 800 MG: 500 INJECTION, POWDER, LYOPHILIZED, FOR SOLUTION INTRAVENOUS at 17:59

## 2020-01-01 RX ADMIN — ACYCLOVIR 400 MG: 800 TABLET ORAL at 05:05

## 2020-01-01 RX ADMIN — AMPHOTERICIN B 235 MG: 50 INJECTION, POWDER, LYOPHILIZED, FOR SOLUTION INTRAVENOUS at 05:36

## 2020-01-01 RX ADMIN — MIDODRINE HYDROCHLORIDE 5 MG: 5 TABLET ORAL at 07:42

## 2020-01-01 RX ADMIN — Medication 200 MCG: at 23:49

## 2020-01-01 RX ADMIN — DOCUSATE SODIUM 50 MG AND SENNOSIDES 8.6 MG 1 TABLET: 8.6; 5 TABLET, FILM COATED ORAL at 16:56

## 2020-01-01 RX ADMIN — POLYETHYLENE GLYCOL 3350 1 PACKET: 17 POWDER, FOR SOLUTION ORAL at 13:46

## 2020-01-01 RX ADMIN — ACETAMINOPHEN 650 MG: 325 TABLET, FILM COATED ORAL at 17:35

## 2020-01-01 RX ADMIN — IPRATROPIUM BROMIDE 0.5 MG: 0.5 SOLUTION RESPIRATORY (INHALATION) at 07:55

## 2020-01-01 RX ADMIN — ACYCLOVIR 400 MG: 800 TABLET ORAL at 16:43

## 2020-01-01 RX ADMIN — POTASSIUM CHLORIDE 10 MEQ: 7.46 INJECTION, SOLUTION INTRAVENOUS at 11:28

## 2020-01-01 RX ADMIN — POTASSIUM CHLORIDE 40 MEQ: 1500 TABLET, EXTENDED RELEASE ORAL at 08:56

## 2020-01-01 RX ADMIN — LORAZEPAM 2 MG: 2 INJECTION INTRAMUSCULAR; INTRAVENOUS at 10:35

## 2020-01-01 RX ADMIN — CEFEPIME 2 G: 2 INJECTION, POWDER, FOR SOLUTION INTRAVENOUS at 05:35

## 2020-01-01 RX ADMIN — ACYCLOVIR 400 MG: 800 TABLET ORAL at 06:04

## 2020-01-01 RX ADMIN — OMEPRAZOLE 20 MG: 20 CAPSULE, DELAYED RELEASE ORAL at 17:38

## 2020-01-01 RX ADMIN — VANCOMYCIN HYDROCHLORIDE 500 MG: 500 INJECTION, POWDER, LYOPHILIZED, FOR SOLUTION INTRAVENOUS at 01:36

## 2020-01-01 RX ADMIN — POTASSIUM CHLORIDE 40 MEQ: 1500 TABLET, EXTENDED RELEASE ORAL at 18:09

## 2020-01-01 RX ADMIN — NOREPINEPHRINE BITARTRATE 3 MCG/MIN: 1 INJECTION, SOLUTION, CONCENTRATE INTRAVENOUS at 01:42

## 2020-01-01 RX ADMIN — CEFEPIME 2 G: 2 INJECTION, POWDER, FOR SOLUTION INTRAVENOUS at 21:16

## 2020-01-01 RX ADMIN — FLUCONAZOLE, SODIUM CHLORIDE 200 MG: 2 INJECTION INTRAVENOUS at 20:10

## 2020-01-01 RX ADMIN — CEFEPIME 2 G: 2 INJECTION, POWDER, FOR SOLUTION INTRAVENOUS at 05:33

## 2020-01-01 RX ADMIN — FLUCONAZOLE 200 MG: 100 TABLET ORAL at 18:02

## 2020-01-01 RX ADMIN — OMEPRAZOLE 20 MG: 20 CAPSULE, DELAYED RELEASE ORAL at 16:57

## 2020-01-01 RX ADMIN — ACYCLOVIR 400 MG: 800 TABLET ORAL at 18:06

## 2020-01-01 RX ADMIN — DOCUSATE SODIUM 50 MG AND SENNOSIDES 8.6 MG 2 TABLET: 8.6; 5 TABLET, FILM COATED ORAL at 05:10

## 2020-01-01 RX ADMIN — ACETAMINOPHEN 650 MG: 325 TABLET, FILM COATED ORAL at 01:44

## 2020-01-01 RX ADMIN — SODIUM CHLORIDE, POTASSIUM CHLORIDE, SODIUM LACTATE AND CALCIUM CHLORIDE 500 ML: 600; 310; 30; 20 INJECTION, SOLUTION INTRAVENOUS at 23:49

## 2020-01-01 RX ADMIN — ACETAMINOPHEN 650 MG: 325 TABLET, FILM COATED ORAL at 09:06

## 2020-01-01 RX ADMIN — POTASSIUM CHLORIDE 10 MEQ: 7.46 INJECTION, SOLUTION INTRAVENOUS at 06:36

## 2020-01-01 RX ADMIN — METOPROLOL SUCCINATE 25 MG: 25 TABLET, EXTENDED RELEASE ORAL at 06:11

## 2020-01-01 RX ADMIN — ACETAMINOPHEN 650 MG: 325 TABLET, FILM COATED ORAL at 03:26

## 2020-01-01 RX ADMIN — ACETAMINOPHEN 650 MG: 325 TABLET, FILM COATED ORAL at 10:40

## 2020-01-01 RX ADMIN — OMEPRAZOLE 20 MG: 20 CAPSULE, DELAYED RELEASE ORAL at 06:23

## 2020-01-01 RX ADMIN — ACYCLOVIR 400 MG: 800 TABLET ORAL at 17:58

## 2020-01-01 RX ADMIN — CEFEPIME 2 G: 2 INJECTION, POWDER, FOR SOLUTION INTRAVENOUS at 04:55

## 2020-01-01 RX ADMIN — HYDROCORTISONE SODIUM SUCCINATE 100 MG: 100 INJECTION, POWDER, FOR SOLUTION INTRAMUSCULAR; INTRAVENOUS at 11:30

## 2020-01-01 RX ADMIN — CEFEPIME 2 G: 2 INJECTION, POWDER, FOR SOLUTION INTRAVENOUS at 16:33

## 2020-01-01 RX ADMIN — METOPROLOL TARTRATE 10 MG: 5 INJECTION, SOLUTION INTRAVENOUS at 16:52

## 2020-01-01 RX ADMIN — ACYCLOVIR 400 MG: 800 TABLET ORAL at 06:02

## 2020-01-01 RX ADMIN — FLUCONAZOLE 200 MG: 100 TABLET ORAL at 18:11

## 2020-01-01 RX ADMIN — DEXTROSE MONOHYDRATE 30 ML: 50 INJECTION, SOLUTION INTRAVENOUS at 21:46

## 2020-01-01 RX ADMIN — AMLODIPINE BESYLATE 5 MG: 5 TABLET ORAL at 07:22

## 2020-01-01 RX ADMIN — FOLIC ACID 1 MG: 1 TABLET ORAL at 06:23

## 2020-01-01 RX ADMIN — METOPROLOL SUCCINATE 25 MG: 25 TABLET, EXTENDED RELEASE ORAL at 09:53

## 2020-01-01 RX ADMIN — GUAIFENESIN 200 MG: 100 SOLUTION ORAL at 19:42

## 2020-01-01 RX ADMIN — IPRATROPIUM BROMIDE 0.5 MG: 0.5 SOLUTION RESPIRATORY (INHALATION) at 16:34

## 2020-01-01 RX ADMIN — DEXTROSE MONOHYDRATE 30 ML: 50 INJECTION, SOLUTION INTRAVENOUS at 00:09

## 2020-01-01 RX ADMIN — CEFEPIME 2 G: 2 INJECTION, POWDER, FOR SOLUTION INTRAVENOUS at 01:29

## 2020-01-01 RX ADMIN — ACETAMINOPHEN 650 MG: 325 TABLET, FILM COATED ORAL at 04:55

## 2020-01-01 RX ADMIN — CEFTRIAXONE SODIUM 2 G: 2 INJECTION, POWDER, FOR SOLUTION INTRAMUSCULAR; INTRAVENOUS at 06:12

## 2020-01-01 RX ADMIN — AMLODIPINE BESYLATE 5 MG: 5 TABLET ORAL at 05:36

## 2020-01-01 RX ADMIN — ACETAMINOPHEN 650 MG: 325 TABLET, FILM COATED ORAL at 01:04

## 2020-01-01 RX ADMIN — ACYCLOVIR 400 MG: 800 TABLET ORAL at 06:23

## 2020-01-01 RX ADMIN — CEFEPIME 2 G: 2 INJECTION, POWDER, FOR SOLUTION INTRAVENOUS at 01:37

## 2020-01-01 RX ADMIN — NOREPINEPHRINE BITARTRATE 5 MCG/MIN: 1 INJECTION INTRAVENOUS at 21:00

## 2020-01-01 RX ADMIN — IPRATROPIUM BROMIDE 0.5 MG: 0.5 SOLUTION RESPIRATORY (INHALATION) at 19:08

## 2020-01-01 RX ADMIN — AMLODIPINE BESYLATE 10 MG: 10 TABLET ORAL at 06:00

## 2020-01-01 RX ADMIN — OXYCODONE 2.5 MG: 5 TABLET ORAL at 11:02

## 2020-01-01 RX ADMIN — POTASSIUM CHLORIDE 40 MEQ: 1500 TABLET, EXTENDED RELEASE ORAL at 13:09

## 2020-01-01 RX ADMIN — GUAIFENESIN 200 MG: 100 SOLUTION ORAL at 19:56

## 2020-01-01 RX ADMIN — POTASSIUM CHLORIDE 40 MEQ: 1500 TABLET, EXTENDED RELEASE ORAL at 04:48

## 2020-01-01 RX ADMIN — POTASSIUM CHLORIDE 40 MEQ: 1500 TABLET, EXTENDED RELEASE ORAL at 09:41

## 2020-01-01 RX ADMIN — CEFEPIME 2 G: 2 INJECTION, POWDER, FOR SOLUTION INTRAVENOUS at 16:34

## 2020-01-01 RX ADMIN — OXYCODONE 2.5 MG: 5 TABLET ORAL at 23:44

## 2020-01-01 RX ADMIN — DOXYCYCLINE 100 MG: 100 TABLET, FILM COATED ORAL at 06:04

## 2020-01-01 RX ADMIN — IPRATROPIUM BROMIDE 0.5 MG: 0.5 SOLUTION RESPIRATORY (INHALATION) at 20:33

## 2020-01-01 RX ADMIN — CEFEPIME 2 G: 2 INJECTION, POWDER, FOR SOLUTION INTRAVENOUS at 17:24

## 2020-01-01 RX ADMIN — HYDROCORTISONE SODIUM SUCCINATE 100 MG: 100 INJECTION, POWDER, FOR SOLUTION INTRAMUSCULAR; INTRAVENOUS at 13:37

## 2020-01-01 RX ADMIN — HYDROCORTISONE SODIUM SUCCINATE 100 MG: 100 INJECTION, POWDER, FOR SOLUTION INTRAMUSCULAR; INTRAVENOUS at 13:46

## 2020-01-01 RX ADMIN — SODIUM CHLORIDE 500 ML: 9 INJECTION, SOLUTION INTRAVENOUS at 12:00

## 2020-01-01 RX ADMIN — OXYCODONE 2.5 MG: 5 TABLET ORAL at 09:45

## 2020-01-01 RX ADMIN — ACYCLOVIR 400 MG: 800 TABLET ORAL at 06:17

## 2020-01-01 RX ADMIN — IOHEXOL 50 ML: 350 INJECTION, SOLUTION INTRAVENOUS at 20:54

## 2020-01-01 RX ADMIN — ACYCLOVIR 400 MG: 800 TABLET ORAL at 05:49

## 2020-01-01 RX ADMIN — SODIUM CHLORIDE 500 ML: 9 INJECTION, SOLUTION INTRAVENOUS at 20:35

## 2020-01-01 RX ADMIN — OMEPRAZOLE 20 MG: 20 CAPSULE, DELAYED RELEASE ORAL at 17:16

## 2020-01-01 RX ADMIN — METRONIDAZOLE 500 MG: 500 TABLET ORAL at 13:09

## 2020-01-01 RX ADMIN — AMLODIPINE BESYLATE 10 MG: 10 TABLET ORAL at 06:04

## 2020-01-01 RX ADMIN — FLUCONAZOLE 400 MG: 100 TABLET ORAL at 05:33

## 2020-01-01 RX ADMIN — AMLODIPINE BESYLATE 5 MG: 5 TABLET ORAL at 05:13

## 2020-01-01 RX ADMIN — ACYCLOVIR 400 MG: 800 TABLET ORAL at 05:33

## 2020-01-01 RX ADMIN — AMPHOTERICIN B 235 MG: 50 INJECTION, POWDER, LYOPHILIZED, FOR SOLUTION INTRAVENOUS at 14:14

## 2020-01-01 RX ADMIN — ACYCLOVIR 400 MG: 800 TABLET ORAL at 06:35

## 2020-01-01 RX ADMIN — ACETAMINOPHEN 650 MG: 325 TABLET ORAL at 11:12

## 2020-01-01 RX ADMIN — FOLIC ACID 1 MG: 1 TABLET ORAL at 06:02

## 2020-01-01 RX ADMIN — FLUCONAZOLE, SODIUM CHLORIDE 200 MG: 2 INJECTION INTRAVENOUS at 23:58

## 2020-01-01 RX ADMIN — ACETAMINOPHEN 650 MG: 325 TABLET, FILM COATED ORAL at 06:17

## 2020-01-01 RX ADMIN — ACETAMINOPHEN 650 MG: 325 TABLET, FILM COATED ORAL at 07:58

## 2020-01-01 RX ADMIN — SODIUM CHLORIDE 500 ML: 9 INJECTION, SOLUTION INTRAVENOUS at 00:09

## 2020-01-01 RX ADMIN — ACETAMINOPHEN 650 MG: 325 TABLET, FILM COATED ORAL at 04:53

## 2020-01-01 RX ADMIN — FOLIC ACID 1 MG: 1 TABLET ORAL at 06:04

## 2020-01-01 RX ADMIN — ACYCLOVIR 400 MG: 800 TABLET ORAL at 05:43

## 2020-01-01 RX ADMIN — CEFEPIME 2 G: 2 INJECTION, POWDER, FOR SOLUTION INTRAVENOUS at 04:20

## 2020-01-01 RX ADMIN — DOCUSATE SODIUM 50 MG AND SENNOSIDES 8.6 MG 1 TABLET: 8.6; 5 TABLET, FILM COATED ORAL at 22:18

## 2020-01-01 RX ADMIN — IBUPROFEN 400 MG: 800 TABLET, FILM COATED ORAL at 14:34

## 2020-01-01 RX ADMIN — ACETAMINOPHEN 650 MG: 325 TABLET, FILM COATED ORAL at 15:22

## 2020-01-01 RX ADMIN — SODIUM CHLORIDE 1000 ML: 9 INJECTION, SOLUTION INTRAVENOUS at 11:45

## 2020-01-01 RX ADMIN — CEFEPIME 2 G: 2 INJECTION, POWDER, FOR SOLUTION INTRAVENOUS at 20:47

## 2020-01-01 RX ADMIN — CEFEPIME 2 G: 2 INJECTION, POWDER, FOR SOLUTION INTRAVENOUS at 10:14

## 2020-01-01 RX ADMIN — OMEPRAZOLE 20 MG: 20 CAPSULE, DELAYED RELEASE ORAL at 05:34

## 2020-01-01 RX ADMIN — CEFEPIME 2 G: 2 INJECTION, POWDER, FOR SOLUTION INTRAVENOUS at 23:58

## 2020-01-01 RX ADMIN — ACYCLOVIR 400 MG: 800 TABLET ORAL at 04:44

## 2020-01-01 RX ADMIN — CEFEPIME 2 G: 2 INJECTION, POWDER, FOR SOLUTION INTRAVENOUS at 01:30

## 2020-01-01 RX ADMIN — HYDROCORTISONE SODIUM SUCCINATE 100 MG: 100 INJECTION, POWDER, FOR SOLUTION INTRAMUSCULAR; INTRAVENOUS at 11:43

## 2020-01-01 RX ADMIN — POTASSIUM CHLORIDE 40 MEQ: 29.8 INJECTION, SOLUTION INTRAVENOUS at 07:42

## 2020-01-01 RX ADMIN — ACETAMINOPHEN 650 MG: 325 TABLET, FILM COATED ORAL at 21:12

## 2020-01-01 RX ADMIN — CEFEPIME 2 G: 2 INJECTION, POWDER, FOR SOLUTION INTRAVENOUS at 21:11

## 2020-01-01 RX ADMIN — IBUPROFEN 600 MG: 400 TABLET, FILM COATED ORAL at 00:42

## 2020-01-01 RX ADMIN — POTASSIUM CHLORIDE 20 MEQ: 1500 TABLET, EXTENDED RELEASE ORAL at 10:14

## 2020-01-01 RX ADMIN — GUAIFENESIN 200 MG: 100 SOLUTION ORAL at 03:26

## 2020-01-01 RX ADMIN — ACETAMINOPHEN 650 MG: 325 TABLET ORAL at 11:30

## 2020-01-01 RX ADMIN — MAGNESIUM SULFATE 2 G: 2 INJECTION INTRAVENOUS at 04:05

## 2020-01-01 RX ADMIN — ACYCLOVIR 400 MG: 800 TABLET ORAL at 18:02

## 2020-01-01 RX ADMIN — HYDROCORTISONE SODIUM SUCCINATE 100 MG: 100 INJECTION, POWDER, FOR SOLUTION INTRAMUSCULAR; INTRAVENOUS at 10:53

## 2020-01-01 RX ADMIN — ACYCLOVIR 400 MG: 800 TABLET ORAL at 06:10

## 2020-01-01 RX ADMIN — ACETAMINOPHEN 650 MG: 325 TABLET, FILM COATED ORAL at 19:42

## 2020-01-01 RX ADMIN — ACYCLOVIR 400 MG: 800 TABLET ORAL at 17:06

## 2020-01-01 RX ADMIN — IPRATROPIUM BROMIDE 0.5 MG: 0.5 SOLUTION RESPIRATORY (INHALATION) at 03:06

## 2020-01-01 RX ADMIN — POTASSIUM CHLORIDE AND SODIUM CHLORIDE: 900; 300 INJECTION, SOLUTION INTRAVENOUS at 16:32

## 2020-01-01 RX ADMIN — GUAIFENESIN 200 MG: 100 SOLUTION ORAL at 11:01

## 2020-01-01 RX ADMIN — ACETAMINOPHEN 650 MG: 325 TABLET, FILM COATED ORAL at 15:43

## 2020-01-01 RX ADMIN — LEVOFLOXACIN 500 MG: 500 TABLET, FILM COATED ORAL at 13:46

## 2020-01-01 RX ADMIN — ACETAMINOPHEN 650 MG: 325 TABLET ORAL at 13:05

## 2020-01-01 RX ADMIN — POTASSIUM CHLORIDE 40 MEQ: 1500 TABLET, EXTENDED RELEASE ORAL at 13:17

## 2020-01-01 RX ADMIN — VANCOMYCIN HYDROCHLORIDE 500 MG: 500 INJECTION, POWDER, LYOPHILIZED, FOR SOLUTION INTRAVENOUS at 02:28

## 2020-01-01 RX ADMIN — FOLIC ACID 1 MG: 1 TABLET ORAL at 05:33

## 2020-01-01 RX ADMIN — ACYCLOVIR 400 MG: 800 TABLET ORAL at 06:50

## 2020-01-01 RX ADMIN — GUAIFENESIN 600 MG: 600 TABLET, EXTENDED RELEASE ORAL at 17:16

## 2020-01-01 RX ADMIN — GUAIFENESIN 600 MG: 600 TABLET, EXTENDED RELEASE ORAL at 06:03

## 2020-01-01 RX ADMIN — ACETAMINOPHEN 650 MG: 325 TABLET, FILM COATED ORAL at 13:56

## 2020-01-01 RX ADMIN — AMLODIPINE BESYLATE 5 MG: 5 TABLET ORAL at 05:33

## 2020-01-01 RX ADMIN — CEFEPIME 2 G: 2 INJECTION, POWDER, FOR SOLUTION INTRAVENOUS at 00:51

## 2020-01-01 RX ADMIN — GUAIFENESIN 200 MG: 100 SOLUTION ORAL at 00:34

## 2020-01-01 RX ADMIN — FOLIC ACID 1 MG: 1 TABLET ORAL at 04:11

## 2020-01-01 RX ADMIN — POTASSIUM CHLORIDE 40 MEQ: 1500 TABLET, EXTENDED RELEASE ORAL at 14:57

## 2020-01-01 RX ADMIN — ACETAMINOPHEN 650 MG: 325 TABLET, FILM COATED ORAL at 18:06

## 2020-01-01 RX ADMIN — ACETAMINOPHEN 650 MG: 325 TABLET, FILM COATED ORAL at 18:25

## 2020-01-01 RX ADMIN — FOLIC ACID 1 MG: 1 TABLET ORAL at 04:45

## 2020-01-01 RX ADMIN — HYDROCORTISONE SODIUM SUCCINATE 100 MG: 100 INJECTION, POWDER, FOR SOLUTION INTRAMUSCULAR; INTRAVENOUS at 10:57

## 2020-01-01 RX ADMIN — SODIUM CHLORIDE 1000 ML: 9 INJECTION, SOLUTION INTRAVENOUS at 14:29

## 2020-01-01 RX ADMIN — POTASSIUM CHLORIDE AND SODIUM CHLORIDE: 900; 300 INJECTION, SOLUTION INTRAVENOUS at 03:15

## 2020-01-01 RX ADMIN — SODIUM CHLORIDE 250 ML: 9 INJECTION, SOLUTION INTRAVENOUS at 00:07

## 2020-01-01 RX ADMIN — VANCOMYCIN HYDROCHLORIDE 750 MG: 500 INJECTION, POWDER, LYOPHILIZED, FOR SOLUTION INTRAVENOUS at 00:53

## 2020-01-01 RX ADMIN — VANCOMYCIN HYDROCHLORIDE 1100 MG: 500 INJECTION, POWDER, LYOPHILIZED, FOR SOLUTION INTRAVENOUS at 03:14

## 2020-01-01 RX ADMIN — MAGNESIUM SULFATE 2 G: 2 INJECTION INTRAVENOUS at 12:30

## 2020-01-01 RX ADMIN — HYDROCORTISONE SODIUM SUCCINATE 100 MG: 100 INJECTION, POWDER, FOR SOLUTION INTRAMUSCULAR; INTRAVENOUS at 15:39

## 2020-01-01 RX ADMIN — CEFEPIME 2 G: 2 INJECTION, POWDER, FOR SOLUTION INTRAVENOUS at 23:41

## 2020-01-01 RX ADMIN — CEFEPIME 2 G: 2 INJECTION, POWDER, FOR SOLUTION INTRAVENOUS at 14:25

## 2020-01-01 RX ADMIN — MAGNESIUM GLUCONATE 500 MG ORAL TABLET 400 MG: 500 TABLET ORAL at 05:34

## 2020-01-01 RX ADMIN — OMEPRAZOLE 20 MG: 20 CAPSULE, DELAYED RELEASE ORAL at 16:33

## 2020-01-01 RX ADMIN — LORAZEPAM 2 MG: 2 INJECTION INTRAMUSCULAR; INTRAVENOUS at 11:21

## 2020-01-01 RX ADMIN — ADENOSINE 6 MG: 3 INJECTION, SOLUTION INTRAVENOUS at 18:40

## 2020-01-01 RX ADMIN — SODIUM CHLORIDE: 9 INJECTION, SOLUTION INTRAVENOUS at 06:45

## 2020-01-01 RX ADMIN — HYDROCORTISONE SODIUM SUCCINATE 100 MG: 100 INJECTION, POWDER, FOR SOLUTION INTRAMUSCULAR; INTRAVENOUS at 12:31

## 2020-01-01 RX ADMIN — CEFEPIME 2 G: 2 INJECTION, POWDER, FOR SOLUTION INTRAVENOUS at 17:00

## 2020-01-01 RX ADMIN — POTASSIUM CHLORIDE AND SODIUM CHLORIDE: 900; 300 INJECTION, SOLUTION INTRAVENOUS at 00:35

## 2020-01-01 RX ADMIN — POTASSIUM CHLORIDE 60 MEQ: 1500 TABLET, EXTENDED RELEASE ORAL at 04:04

## 2020-01-01 RX ADMIN — FOLIC ACID 1 MG: 1 TABLET ORAL at 05:34

## 2020-01-01 RX ADMIN — AMLODIPINE BESYLATE 5 MG: 5 TABLET ORAL at 04:11

## 2020-01-01 RX ADMIN — SODIUM CHLORIDE, SODIUM LACTATE, POTASSIUM CHLORIDE, AND CALCIUM CHLORIDE 1000 ML: .6; .31; .03; .02 INJECTION, SOLUTION INTRAVENOUS at 01:41

## 2020-01-01 RX ADMIN — ACETAMINOPHEN 650 MG: 325 TABLET, FILM COATED ORAL at 00:47

## 2020-01-01 RX ADMIN — VANCOMYCIN HYDROCHLORIDE 750 MG: 500 INJECTION, POWDER, LYOPHILIZED, FOR SOLUTION INTRAVENOUS at 12:31

## 2020-01-01 RX ADMIN — GUAIFENESIN 200 MG: 100 SOLUTION ORAL at 05:40

## 2020-01-01 RX ADMIN — AMLODIPINE BESYLATE 10 MG: 10 TABLET ORAL at 06:35

## 2020-01-01 RX ADMIN — DOXYCYCLINE 100 MG: 100 TABLET, FILM COATED ORAL at 16:56

## 2020-01-01 RX ADMIN — VANCOMYCIN HYDROCHLORIDE 500 MG: 500 INJECTION, POWDER, LYOPHILIZED, FOR SOLUTION INTRAVENOUS at 09:05

## 2020-01-01 RX ADMIN — GUAIFENESIN 600 MG: 600 TABLET, EXTENDED RELEASE ORAL at 17:38

## 2020-01-01 RX ADMIN — OMEPRAZOLE 20 MG: 20 CAPSULE, DELAYED RELEASE ORAL at 06:50

## 2020-01-01 RX ADMIN — DOCUSATE SODIUM 50 MG AND SENNOSIDES 8.6 MG 2 TABLET: 8.6; 5 TABLET, FILM COATED ORAL at 04:51

## 2020-01-01 RX ADMIN — IPRATROPIUM BROMIDE 0.5 MG: 0.5 SOLUTION RESPIRATORY (INHALATION) at 07:21

## 2020-01-01 RX ADMIN — IPRATROPIUM BROMIDE 0.5 MG: 0.5 SOLUTION RESPIRATORY (INHALATION) at 10:13

## 2020-01-01 RX ADMIN — IBUPROFEN 600 MG: 600 TABLET ORAL at 20:07

## 2020-01-01 RX ADMIN — METRONIDAZOLE 500 MG: 500 TABLET ORAL at 12:53

## 2020-01-01 RX ADMIN — MAGNESIUM SULFATE 2 G: 2 INJECTION INTRAVENOUS at 16:57

## 2020-01-01 RX ADMIN — HYDROCORTISONE SODIUM SUCCINATE 50 MG: 100 INJECTION, POWDER, FOR SOLUTION INTRAMUSCULAR; INTRAVENOUS at 21:05

## 2020-01-01 RX ADMIN — FLUCONAZOLE 100 MG: 100 TABLET ORAL at 06:35

## 2020-01-01 RX ADMIN — ACYCLOVIR 400 MG: 800 TABLET ORAL at 18:21

## 2020-01-01 RX ADMIN — MAGNESIUM GLUCONATE 500 MG ORAL TABLET 400 MG: 500 TABLET ORAL at 06:23

## 2020-01-01 RX ADMIN — CEFEPIME 2 G: 2 INJECTION, POWDER, FOR SOLUTION INTRAVENOUS at 22:02

## 2020-01-01 RX ADMIN — AMLODIPINE BESYLATE 5 MG: 5 TABLET ORAL at 04:44

## 2020-01-01 RX ADMIN — FOLIC ACID 1 MG: 1 TABLET ORAL at 05:14

## 2020-01-01 RX ADMIN — ACYCLOVIR 400 MG: 800 TABLET ORAL at 18:11

## 2020-01-01 RX ADMIN — FLUCONAZOLE 400 MG: 100 TABLET ORAL at 13:13

## 2020-01-01 RX ADMIN — VANCOMYCIN HYDROCHLORIDE 800 MG: 500 INJECTION, POWDER, LYOPHILIZED, FOR SOLUTION INTRAVENOUS at 18:29

## 2020-01-01 RX ADMIN — ACETAMINOPHEN 650 MG: 325 TABLET ORAL at 11:09

## 2020-01-01 RX ADMIN — DOCUSATE SODIUM 50 MG AND SENNOSIDES 8.6 MG 2 TABLET: 8.6; 5 TABLET, FILM COATED ORAL at 05:01

## 2020-01-01 RX ADMIN — MAGNESIUM SULFATE 2 G: 2 INJECTION INTRAVENOUS at 07:42

## 2020-01-01 RX ADMIN — VANCOMYCIN HYDROCHLORIDE 1000 MG: 500 INJECTION, POWDER, LYOPHILIZED, FOR SOLUTION INTRAVENOUS at 11:59

## 2020-01-01 RX ADMIN — POTASSIUM CHLORIDE 10 MEQ: 7.46 INJECTION, SOLUTION INTRAVENOUS at 06:37

## 2020-01-01 RX ADMIN — FUROSEMIDE 40 MG: 10 INJECTION, SOLUTION INTRAMUSCULAR; INTRAVENOUS at 19:06

## 2020-01-01 RX ADMIN — CEFEPIME 2 G: 2 INJECTION, POWDER, FOR SOLUTION INTRAVENOUS at 21:31

## 2020-01-01 RX ADMIN — GUAIFENESIN 600 MG: 600 TABLET, EXTENDED RELEASE ORAL at 06:50

## 2020-01-01 RX ADMIN — FOLIC ACID 1 MG: 1 TABLET ORAL at 07:22

## 2020-01-01 RX ADMIN — POTASSIUM CHLORIDE 40 MEQ: 1500 TABLET, EXTENDED RELEASE ORAL at 09:01

## 2020-01-01 RX ADMIN — VANCOMYCIN HYDROCHLORIDE 750 MG: 500 INJECTION, POWDER, LYOPHILIZED, FOR SOLUTION INTRAVENOUS at 19:49

## 2020-01-01 RX ADMIN — POTASSIUM CHLORIDE 40 MEQ: 1500 TABLET, EXTENDED RELEASE ORAL at 18:10

## 2020-01-01 RX ADMIN — ACETAMINOPHEN 650 MG: 325 TABLET, FILM COATED ORAL at 08:21

## 2020-01-01 RX ADMIN — MIDODRINE HYDROCHLORIDE 5 MG: 5 TABLET ORAL at 17:06

## 2020-01-01 RX ADMIN — DOXYCYCLINE 100 MG: 100 TABLET, FILM COATED ORAL at 17:16

## 2020-01-01 RX ADMIN — IPRATROPIUM BROMIDE 0.5 MG: 0.5 SOLUTION RESPIRATORY (INHALATION) at 20:56

## 2020-01-01 RX ADMIN — ACYCLOVIR 400 MG: 800 TABLET ORAL at 05:10

## 2020-01-01 RX ADMIN — ACYCLOVIR 400 MG: 800 TABLET ORAL at 17:23

## 2020-01-01 RX ADMIN — SODIUM CHLORIDE 1000 ML: 9 INJECTION, SOLUTION INTRAVENOUS at 17:58

## 2020-01-01 RX ADMIN — FOLIC ACID 1 MG: 1 TABLET ORAL at 05:36

## 2020-01-01 RX ADMIN — CEFEPIME 2 G: 2 INJECTION, POWDER, FOR SOLUTION INTRAVENOUS at 02:28

## 2020-01-01 RX ADMIN — HYDROCORTISONE SODIUM SUCCINATE 100 MG: 100 INJECTION, POWDER, FOR SOLUTION INTRAMUSCULAR; INTRAVENOUS at 08:29

## 2020-01-01 RX ADMIN — OMEPRAZOLE 20 MG: 20 CAPSULE, DELAYED RELEASE ORAL at 10:25

## 2020-01-01 RX ADMIN — VANCOMYCIN HYDROCHLORIDE 1000 MG: 500 INJECTION, POWDER, LYOPHILIZED, FOR SOLUTION INTRAVENOUS at 09:36

## 2020-01-01 RX ADMIN — CEFEPIME 2 G: 2 INJECTION, POWDER, FOR SOLUTION INTRAVENOUS at 06:43

## 2020-01-01 RX ADMIN — AMLODIPINE BESYLATE 10 MG: 10 TABLET ORAL at 05:34

## 2020-01-01 RX ADMIN — IPRATROPIUM BROMIDE 0.5 MG: 0.5 SOLUTION RESPIRATORY (INHALATION) at 20:00

## 2020-01-01 RX ADMIN — MAGNESIUM SULFATE 2 G: 2 INJECTION INTRAVENOUS at 08:26

## 2020-01-01 RX ADMIN — CEFEPIME 2 G: 2 INJECTION, POWDER, FOR SOLUTION INTRAVENOUS at 07:22

## 2020-01-01 RX ADMIN — MAGNESIUM SULFATE 2 G: 2 INJECTION INTRAVENOUS at 09:33

## 2020-01-01 RX ADMIN — VANCOMYCIN HYDROCHLORIDE 500 MG: 500 INJECTION, POWDER, LYOPHILIZED, FOR SOLUTION INTRAVENOUS at 09:25

## 2020-01-01 RX ADMIN — ACYCLOVIR 400 MG: 800 TABLET ORAL at 18:37

## 2020-01-01 RX ADMIN — MORPHINE SULFATE 2 MG: 4 INJECTION INTRAVENOUS at 15:36

## 2020-01-01 RX ADMIN — CEFEPIME 2 G: 2 INJECTION, POWDER, FOR SOLUTION INTRAVENOUS at 21:43

## 2020-01-01 RX ADMIN — ACETAMINOPHEN 650 MG: 325 TABLET, FILM COATED ORAL at 00:57

## 2020-01-01 RX ADMIN — CEFEPIME 2 G: 2 INJECTION, POWDER, FOR SOLUTION INTRAVENOUS at 20:59

## 2020-01-01 RX ADMIN — SODIUM CHLORIDE, POTASSIUM CHLORIDE, SODIUM LACTATE AND CALCIUM CHLORIDE 1000 ML: 600; 310; 30; 20 INJECTION, SOLUTION INTRAVENOUS at 01:41

## 2020-01-01 RX ADMIN — POTASSIUM CHLORIDE 40 MEQ: 1500 TABLET, EXTENDED RELEASE ORAL at 11:20

## 2020-01-01 RX ADMIN — POTASSIUM CHLORIDE 40 MEQ: 1500 TABLET, EXTENDED RELEASE ORAL at 05:40

## 2020-01-01 RX ADMIN — MIDODRINE HYDROCHLORIDE 5 MG: 5 TABLET ORAL at 10:25

## 2020-01-01 RX ADMIN — SODIUM CHLORIDE: 9 INJECTION, SOLUTION INTRAVENOUS at 23:28

## 2020-01-01 RX ADMIN — CEFEPIME 2 G: 2 INJECTION, POWDER, FOR SOLUTION INTRAVENOUS at 16:48

## 2020-01-01 RX ADMIN — CEFEPIME 2 G: 2 INJECTION, POWDER, FOR SOLUTION INTRAVENOUS at 16:43

## 2020-01-01 RX ADMIN — IPRATROPIUM BROMIDE 0.5 MG: 0.5 SOLUTION RESPIRATORY (INHALATION) at 06:34

## 2020-01-01 RX ADMIN — POTASSIUM CHLORIDE AND SODIUM CHLORIDE: 900; 300 INJECTION, SOLUTION INTRAVENOUS at 11:09

## 2020-01-01 RX ADMIN — METRONIDAZOLE 500 MG: 500 TABLET ORAL at 05:05

## 2020-01-01 RX ADMIN — ACETAMINOPHEN 650 MG: 325 TABLET, FILM COATED ORAL at 07:42

## 2020-01-01 RX ADMIN — CEFEPIME 2 G: 2 INJECTION, POWDER, FOR SOLUTION INTRAVENOUS at 07:35

## 2020-01-01 RX ADMIN — MAGNESIUM GLUCONATE 500 MG ORAL TABLET 400 MG: 500 TABLET ORAL at 05:40

## 2020-01-01 RX ADMIN — POTASSIUM CHLORIDE 20 MEQ: 1500 TABLET, EXTENDED RELEASE ORAL at 05:41

## 2020-01-01 RX ADMIN — IPRATROPIUM BROMIDE 0.5 MG: 0.5 SOLUTION RESPIRATORY (INHALATION) at 20:42

## 2020-01-01 RX ADMIN — POTASSIUM CHLORIDE 40 MEQ: 1500 TABLET, EXTENDED RELEASE ORAL at 06:03

## 2020-01-01 RX ADMIN — POTASSIUM CHLORIDE 40 MEQ: 1500 TABLET, EXTENDED RELEASE ORAL at 17:50

## 2020-01-01 RX ADMIN — HYDROCORTISONE SODIUM SUCCINATE 100 MG: 100 INJECTION, POWDER, FOR SOLUTION INTRAMUSCULAR; INTRAVENOUS at 11:14

## 2020-01-01 RX ADMIN — CEFEPIME 2 G: 2 INJECTION, POWDER, FOR SOLUTION INTRAVENOUS at 14:36

## 2020-01-01 RX ADMIN — POTASSIUM CHLORIDE 40 MEQ: 1500 TABLET, EXTENDED RELEASE ORAL at 16:34

## 2020-01-01 RX ADMIN — HYDROCORTISONE SODIUM SUCCINATE 100 MG: 100 INJECTION, POWDER, FOR SOLUTION INTRAMUSCULAR; INTRAVENOUS at 10:35

## 2020-01-01 RX ADMIN — HYDROCORTISONE SODIUM SUCCINATE 100 MG: 100 INJECTION, POWDER, FOR SOLUTION INTRAMUSCULAR; INTRAVENOUS at 11:36

## 2020-01-01 RX ADMIN — POTASSIUM CHLORIDE 40 MEQ: 1500 TABLET, EXTENDED RELEASE ORAL at 05:33

## 2020-01-01 RX ADMIN — DOXYCYCLINE 100 MG: 100 TABLET, FILM COATED ORAL at 06:50

## 2020-01-01 RX ADMIN — CEFEPIME 2 G: 2 INJECTION, POWDER, FOR SOLUTION INTRAVENOUS at 20:23

## 2020-01-01 RX ADMIN — ACYCLOVIR 400 MG: 800 TABLET ORAL at 05:36

## 2020-01-01 RX ADMIN — CEFEPIME 2 G: 2 INJECTION, POWDER, FOR SOLUTION INTRAVENOUS at 13:15

## 2020-01-01 RX ADMIN — POSACONAZOLE 400 MG: 40 SUSPENSION ORAL at 07:59

## 2020-01-01 RX ADMIN — AMLODIPINE BESYLATE 10 MG: 10 TABLET ORAL at 06:50

## 2020-01-01 RX ADMIN — DOCUSATE SODIUM 50 MG AND SENNOSIDES 8.6 MG 2 TABLET: 8.6; 5 TABLET, FILM COATED ORAL at 06:51

## 2020-01-01 RX ADMIN — POTASSIUM CHLORIDE 40 MEQ: 1500 TABLET, EXTENDED RELEASE ORAL at 07:23

## 2020-01-01 RX ADMIN — POSACONAZOLE 400 MG: 40 SUSPENSION ORAL at 17:30

## 2020-01-01 RX ADMIN — ACETAMINOPHEN 650 MG: 325 TABLET, FILM COATED ORAL at 06:35

## 2020-01-01 RX ADMIN — CEFEPIME 2 G: 2 INJECTION, POWDER, FOR SOLUTION INTRAVENOUS at 01:41

## 2020-01-01 RX ADMIN — IPRATROPIUM BROMIDE 0.5 MG: 0.5 SOLUTION RESPIRATORY (INHALATION) at 06:27

## 2020-01-01 RX ADMIN — MIDODRINE HYDROCHLORIDE 5 MG: 5 TABLET ORAL at 09:24

## 2020-01-01 RX ADMIN — POTASSIUM CHLORIDE 10 MEQ: 7.46 INJECTION, SOLUTION INTRAVENOUS at 05:04

## 2020-01-01 RX ADMIN — CEFEPIME 2 G: 2 INJECTION, POWDER, FOR SOLUTION INTRAVENOUS at 21:10

## 2020-01-01 RX ADMIN — AMPHOTERICIN B 170.8 MG: 50 INJECTION, POWDER, LYOPHILIZED, FOR SOLUTION INTRAVENOUS at 21:46

## 2020-01-01 RX ADMIN — VANCOMYCIN HYDROCHLORIDE 500 MG: 500 INJECTION, POWDER, LYOPHILIZED, FOR SOLUTION INTRAVENOUS at 01:38

## 2020-01-01 RX ADMIN — IBUPROFEN 400 MG: 800 TABLET, FILM COATED ORAL at 23:31

## 2020-01-01 RX ADMIN — SODIUM CHLORIDE, SODIUM LACTATE, POTASSIUM CHLORIDE, CALCIUM CHLORIDE 1000 ML: 600; 310; 30; 20 INJECTION, SOLUTION INTRAVENOUS at 23:06

## 2020-01-01 RX ADMIN — CEFEPIME 2 G: 2 INJECTION, POWDER, FOR SOLUTION INTRAVENOUS at 23:06

## 2020-01-01 RX ADMIN — METRONIDAZOLE 500 MG: 500 TABLET ORAL at 05:34

## 2020-01-01 RX ADMIN — FOLIC ACID 1 MG: 1 TABLET ORAL at 06:35

## 2020-01-01 RX ADMIN — GUAIFENESIN 200 MG: 100 SOLUTION ORAL at 13:33

## 2020-01-01 RX ADMIN — SODIUM CHLORIDE 500 ML: 9 INJECTION, SOLUTION INTRAVENOUS at 09:55

## 2020-01-01 RX ADMIN — VANCOMYCIN HYDROCHLORIDE 800 MG: 500 INJECTION, POWDER, LYOPHILIZED, FOR SOLUTION INTRAVENOUS at 05:56

## 2020-01-01 RX ADMIN — POTASSIUM CHLORIDE 40 MEQ: 1500 TABLET, EXTENDED RELEASE ORAL at 05:41

## 2020-01-01 RX ADMIN — DILTIAZEM HYDROCHLORIDE 10 MG: 5 INJECTION INTRAVENOUS at 18:58

## 2020-01-01 RX ADMIN — IPRATROPIUM BROMIDE 0.5 MG: 0.5 SOLUTION RESPIRATORY (INHALATION) at 19:22

## 2020-01-01 SDOH — HEALTH STABILITY: MENTAL HEALTH: HOW OFTEN DO YOU HAVE A DRINK CONTAINING ALCOHOL?: NEVER

## 2020-01-01 ASSESSMENT — PATIENT HEALTH QUESTIONNAIRE - PHQ9
1. LITTLE INTEREST OR PLEASURE IN DOING THINGS: NOT AT ALL
2. FEELING DOWN, DEPRESSED, IRRITABLE, OR HOPELESS: NOT AT ALL
SUM OF ALL RESPONSES TO PHQ9 QUESTIONS 1 AND 2: 0
1. LITTLE INTEREST OR PLEASURE IN DOING THINGS: NOT AT ALL
2. FEELING DOWN, DEPRESSED, IRRITABLE, OR HOPELESS: NOT AT ALL
SUM OF ALL RESPONSES TO PHQ9 QUESTIONS 1 AND 2: 0
SUM OF ALL RESPONSES TO PHQ9 QUESTIONS 1 AND 2: 0
1. LITTLE INTEREST OR PLEASURE IN DOING THINGS: NOT AT ALL
SUM OF ALL RESPONSES TO PHQ9 QUESTIONS 1 AND 2: 0
2. FEELING DOWN, DEPRESSED, IRRITABLE, OR HOPELESS: NOT AT ALL
1. LITTLE INTEREST OR PLEASURE IN DOING THINGS: NOT AT ALL
2. FEELING DOWN, DEPRESSED, IRRITABLE, OR HOPELESS: NOT AT ALL
2. FEELING DOWN, DEPRESSED, IRRITABLE, OR HOPELESS: NOT AT ALL
1. LITTLE INTEREST OR PLEASURE IN DOING THINGS: NOT AT ALL
1. LITTLE INTEREST OR PLEASURE IN DOING THINGS: NOT AT ALL
2. FEELING DOWN, DEPRESSED, IRRITABLE, OR HOPELESS: NOT AT ALL
SUM OF ALL RESPONSES TO PHQ9 QUESTIONS 1 AND 2: 0
SUM OF ALL RESPONSES TO PHQ9 QUESTIONS 1 AND 2: 0
1. LITTLE INTEREST OR PLEASURE IN DOING THINGS: NOT AT ALL
2. FEELING DOWN, DEPRESSED, IRRITABLE, OR HOPELESS: NOT AT ALL
SUM OF ALL RESPONSES TO PHQ9 QUESTIONS 1 AND 2: 0
1. LITTLE INTEREST OR PLEASURE IN DOING THINGS: NOT AT ALL
SUM OF ALL RESPONSES TO PHQ9 QUESTIONS 1 AND 2: 0
2. FEELING DOWN, DEPRESSED, IRRITABLE, OR HOPELESS: NOT AT ALL
SUM OF ALL RESPONSES TO PHQ9 QUESTIONS 1 AND 2: 0
1. LITTLE INTEREST OR PLEASURE IN DOING THINGS: NOT AT ALL

## 2020-01-01 ASSESSMENT — LIFESTYLE VARIABLES
EVER HAD A DRINK FIRST THING IN THE MORNING TO STEADY YOUR NERVES TO GET RID OF A HANGOVER: NO
TOTAL SCORE: 0
EVER FELT BAD OR GUILTY ABOUT YOUR DRINKING: NO
HAVE YOU EVER FELT YOU SHOULD CUT DOWN ON YOUR DRINKING: NO
ALCOHOL_USE: NO
TOTAL SCORE: 0
DOES PATIENT WANT TO STOP DRINKING: NO
HAVE PEOPLE ANNOYED YOU BY CRITICIZING YOUR DRINKING: NO
TOTAL SCORE: 0
HAVE PEOPLE ANNOYED YOU BY CRITICIZING YOUR DRINKING: NO
HAVE YOU EVER FELT YOU SHOULD CUT DOWN ON YOUR DRINKING: NO
TOTAL SCORE: 0
AVERAGE NUMBER OF DAYS PER WEEK YOU HAVE A DRINK CONTAINING ALCOHOL: 0
EVER_SMOKED: NEVER
HAVE YOU EVER FELT YOU SHOULD CUT DOWN ON YOUR DRINKING: NO
ON A TYPICAL DAY WHEN YOU DRINK ALCOHOL HOW MANY DRINKS DO YOU HAVE: 0
EVER HAD A DRINK FIRST THING IN THE MORNING TO STEADY YOUR NERVES TO GET RID OF A HANGOVER: NO
EVER_SMOKED: NEVER
SUBSTANCE_ABUSE: 0
EVER FELT BAD OR GUILTY ABOUT YOUR DRINKING: NO
ALCOHOL_USE: NO
CONSUMPTION TOTAL: NEGATIVE
TOTAL SCORE: 0
EVER_SMOKED: NEVER
DO YOU DRINK ALCOHOL: NO
EVER FELT BAD OR GUILTY ABOUT YOUR DRINKING: NO
HAVE PEOPLE ANNOYED YOU BY CRITICIZING YOUR DRINKING: NO
HOW MANY TIMES IN THE PAST YEAR HAVE YOU HAD 5 OR MORE DRINKS IN A DAY: 0
HAVE PEOPLE ANNOYED YOU BY CRITICIZING YOUR DRINKING: NO
HOW MANY TIMES IN THE PAST YEAR HAVE YOU HAD 5 OR MORE DRINKS IN A DAY: 0
HAVE PEOPLE ANNOYED YOU BY CRITICIZING YOUR DRINKING: NO
TOTAL SCORE: 0
TOTAL SCORE: 0
HAVE YOU EVER FELT YOU SHOULD CUT DOWN ON YOUR DRINKING: NO
CONSUMPTION TOTAL: NEGATIVE
CONSUMPTION TOTAL: NEGATIVE
ALCOHOL_USE: NO
EVER HAD A DRINK FIRST THING IN THE MORNING TO STEADY YOUR NERVES TO GET RID OF A HANGOVER: NO
HOW MANY TIMES IN THE PAST YEAR HAVE YOU HAD 5 OR MORE DRINKS IN A DAY: 0
AVERAGE NUMBER OF DAYS PER WEEK YOU HAVE A DRINK CONTAINING ALCOHOL: 0
CONSUMPTION TOTAL: NEGATIVE
DO YOU DRINK ALCOHOL: NO
SUBSTANCE_ABUSE: 0
ON A TYPICAL DAY WHEN YOU DRINK ALCOHOL HOW MANY DRINKS DO YOU HAVE: 0
TOTAL SCORE: 0
DO YOU DRINK ALCOHOL: NO
TOTAL SCORE: 0
SUBSTANCE_ABUSE: 0
SUBSTANCE_ABUSE: 0
HOW MANY TIMES IN THE PAST YEAR HAVE YOU HAD 5 OR MORE DRINKS IN A DAY: 0
HAVE YOU EVER FELT YOU SHOULD CUT DOWN ON YOUR DRINKING: NO
TOTAL SCORE: 0
AVERAGE NUMBER OF DAYS PER WEEK YOU HAVE A DRINK CONTAINING ALCOHOL: 0
EVER FELT BAD OR GUILTY ABOUT YOUR DRINKING: NO
AVERAGE NUMBER OF DAYS PER WEEK YOU HAVE A DRINK CONTAINING ALCOHOL: 0
ON A TYPICAL DAY WHEN YOU DRINK ALCOHOL HOW MANY DRINKS DO YOU HAVE: 0
ALCOHOL_USE: NO
TOTAL SCORE: 0
SUBSTANCE_ABUSE: 0
EVER HAD A DRINK FIRST THING IN THE MORNING TO STEADY YOUR NERVES TO GET RID OF A HANGOVER: NO
ON A TYPICAL DAY WHEN YOU DRINK ALCOHOL HOW MANY DRINKS DO YOU HAVE: 0
ALCOHOL_USE: NO
TOTAL SCORE: 0
DOES PATIENT WANT TO STOP DRINKING: NO
EVER FELT BAD OR GUILTY ABOUT YOUR DRINKING: NO
EVER HAD A DRINK FIRST THING IN THE MORNING TO STEADY YOUR NERVES TO GET RID OF A HANGOVER: NO
CONSUMPTION TOTAL: INCOMPLETE
TOTAL SCORE: 0

## 2020-01-01 ASSESSMENT — FIBROSIS 4 INDEX
FIB4 SCORE: 21.9
FIB4 SCORE: 7.04
FIB4 SCORE: 4.55
FIB4 SCORE: 21.9
FIB4 SCORE: 13
FIB4 SCORE: 14.08
FIB4 SCORE: 13
FIB4 SCORE: 7.04
FIB4 SCORE: 8.76
FIB4 SCORE: 12.07
FIB4 SCORE: 32.85
FIB4 SCORE: 7.04
FIB4 SCORE: 7.04
FIB4 SCORE: 11.27
FIB4 SCORE: 9.39
FIB4 SCORE: 8.9
FIB4 SCORE: 7.37
FIB4 SCORE: 12.07
FIB4 SCORE: 7.04
FIB4 SCORE: 18.77
FIB4 SCORE: 56.3
FIB4 SCORE: 7.04
FIB4 SCORE: 9.94
FIB4 SCORE: 32.85
FIB4 SCORE: 32.85
FIB4 SCORE: 12.9
FIB4 SCORE: 43.8
FIB4 SCORE: 12.55
FIB4 SCORE: 9.39
FIB4 SCORE: 13.14
FIB4 SCORE: 5.14
FIB4 SCORE: 11.95
FIB4 SCORE: 29.2
FIB4 SCORE: 7.04
FIB4 SCORE: 10.57
FIB4 SCORE: 8.03
FIB4 SCORE: 738.69
FIB4 SCORE: 14.08
FIB4 SCORE: 18.26
FIB4 SCORE: 10.32
FIB4 SCORE: 32.85
FIB4 SCORE: 12.07
FIB4 SCORE: 5.45

## 2020-01-01 ASSESSMENT — ENCOUNTER SYMPTOMS
CHILLS: 1
VOMITING: 0
TINGLING: 0
BACK PAIN: 0
CHILLS: 0
PALPITATIONS: 0
PALPITATIONS: 0
COUGH: 0
NAUSEA: 0
DIZZINESS: 0
HEADACHES: 1
PALPITATIONS: 0
HEMOPTYSIS: 1
VOMITING: 0
PALPITATIONS: 0
EYE DISCHARGE: 0
TINGLING: 0
NECK PAIN: 0
COUGH: 1
DIAPHORESIS: 1
NECK PAIN: 0
DOUBLE VISION: 0
WEIGHT LOSS: 0
COUGH: 0
SHORTNESS OF BREATH: 1
SENSORY CHANGE: 0
NERVOUS/ANXIOUS: 0
BLOOD IN STOOL: 0
MYALGIAS: 0
STRIDOR: 0
TINGLING: 0
WEIGHT LOSS: 0
HEMOPTYSIS: 0
PND: 0
NAUSEA: 0
TREMORS: 0
SPUTUM PRODUCTION: 0
NECK PAIN: 0
DIARRHEA: 0
PND: 0
VOMITING: 0
DIZZINESS: 0
EYE DISCHARGE: 0
DEPRESSION: 0
SHORTNESS OF BREATH: 0
BLURRED VISION: 0
SORE THROAT: 0
DIAPHORESIS: 0
FEVER: 1
WEIGHT LOSS: 0
MYALGIAS: 0
STRIDOR: 0
DIARRHEA: 0
PALPITATIONS: 0
DIARRHEA: 0
DEPRESSION: 0
FEVER: 1
SHORTNESS OF BREATH: 0
SHORTNESS OF BREATH: 0
HEARTBURN: 0
DEPRESSION: 0
COUGH: 0
STRIDOR: 0
PND: 0
WEIGHT LOSS: 0
FEVER: 1
TINGLING: 0
HEADACHES: 0
TINGLING: 0
NAUSEA: 0
HEADACHES: 0
SPUTUM PRODUCTION: 1
PALPITATIONS: 0
DIARRHEA: 0
COUGH: 1
COUGH: 1
FLANK PAIN: 0
BRUISES/BLEEDS EASILY: 1
BLOOD IN STOOL: 0
FOCAL WEAKNESS: 0
TREMORS: 0
NAUSEA: 1
MYALGIAS: 0
COUGH: 0
EYES NEGATIVE: 1
BACK PAIN: 0
HEADACHES: 0
CHILLS: 0
NAUSEA: 0
WEIGHT LOSS: 0
NAUSEA: 0
SHORTNESS OF BREATH: 1
WHEEZING: 0
PALPITATIONS: 0
COUGH: 1
DEPRESSION: 0
HEARTBURN: 0
FEVER: 1
DIARRHEA: 1
CLAUDICATION: 0
COUGH: 1
SPUTUM PRODUCTION: 0
DIZZINESS: 0
MYALGIAS: 0
STRIDOR: 0
DOUBLE VISION: 0
CHILLS: 0
SINUS PAIN: 0
DOUBLE VISION: 0
VOMITING: 0
CHILLS: 0
COUGH: 0
MYALGIAS: 0
HEMOPTYSIS: 1
COUGH: 1
MYALGIAS: 0
NERVOUS/ANXIOUS: 0
DEPRESSION: 0
COUGH: 1
DEPRESSION: 0
SINUS PAIN: 0
DIARRHEA: 0
FOCAL WEAKNESS: 0
DOUBLE VISION: 0
NERVOUS/ANXIOUS: 0
BRUISES/BLEEDS EASILY: 1
ABDOMINAL PAIN: 0
HEARTBURN: 0
PALPITATIONS: 0
HEADACHES: 0
MYALGIAS: 0
TREMORS: 0
ABDOMINAL PAIN: 0
COUGH: 1
DIARRHEA: 0
DEPRESSION: 0
BLOOD IN STOOL: 0
VOMITING: 0
DIZZINESS: 0
HEADACHES: 0
DOUBLE VISION: 0
CHILLS: 0
NECK PAIN: 0
NAUSEA: 0
WEIGHT LOSS: 0
DEPRESSION: 0
HEADACHES: 0
COUGH: 0
MYALGIAS: 0
DIZZINESS: 0
FEVER: 1
FEVER: 0
FEVER: 0
DIARRHEA: 0
SORE THROAT: 0
NECK PAIN: 0
FEVER: 0
NEUROLOGICAL NEGATIVE: 1
SHORTNESS OF BREATH: 0
SINUS PAIN: 0
BLURRED VISION: 0
TINGLING: 0
EYE DISCHARGE: 0
WHEEZING: 0
SPUTUM PRODUCTION: 1
ABDOMINAL PAIN: 0
DEPRESSION: 0
HEMOPTYSIS: 1
TREMORS: 0
DOUBLE VISION: 0
VOMITING: 0
NAUSEA: 0
MYALGIAS: 0
FEVER: 0
SPUTUM PRODUCTION: 0
WHEEZING: 1
BRUISES/BLEEDS EASILY: 1
BACK PAIN: 0
SORE THROAT: 0
BACK PAIN: 0
BLURRED VISION: 0
DIARRHEA: 0
PALPITATIONS: 0
HEADACHES: 0
SHORTNESS OF BREATH: 0
NERVOUS/ANXIOUS: 1
CHILLS: 0
SHORTNESS OF BREATH: 0
MYALGIAS: 0
BRUISES/BLEEDS EASILY: 1
ABDOMINAL PAIN: 0
SHORTNESS OF BREATH: 1
WHEEZING: 0
WEAKNESS: 1
FEVER: 1
SHORTNESS OF BREATH: 1
ABDOMINAL PAIN: 0
EYE DISCHARGE: 0
VOMITING: 0
NERVOUS/ANXIOUS: 1
SHORTNESS OF BREATH: 0
CHILLS: 0
DOUBLE VISION: 0
NERVOUS/ANXIOUS: 0
WHEEZING: 0
ORTHOPNEA: 0
SPEECH CHANGE: 0
SPUTUM PRODUCTION: 0
BACK PAIN: 0
TINGLING: 0
CLAUDICATION: 0
TINGLING: 0
SHORTNESS OF BREATH: 0
FEVER: 0
COUGH: 0
FEVER: 0
DIZZINESS: 0
PALPITATIONS: 0
PALPITATIONS: 0
NERVOUS/ANXIOUS: 0
VOMITING: 0
BACK PAIN: 0
PALPITATIONS: 0
HEARTBURN: 0
HEADACHES: 0
ABDOMINAL PAIN: 0
VOMITING: 0
ABDOMINAL PAIN: 0
WHEEZING: 0
VOMITING: 0
COUGH: 1
SPUTUM PRODUCTION: 1
DIZZINESS: 0
DEPRESSION: 0
DOUBLE VISION: 0
NAUSEA: 1
HEMOPTYSIS: 0
TINGLING: 0
TINGLING: 0
NECK PAIN: 0
MYALGIAS: 0
HEARTBURN: 0
NAUSEA: 0
PHOTOPHOBIA: 0
DIARRHEA: 0
HEADACHES: 0
NAUSEA: 0
MYALGIAS: 0
CONSTIPATION: 0
BACK PAIN: 0
TREMORS: 0
HEARTBURN: 0
DIARRHEA: 0
ORTHOPNEA: 0
HEARTBURN: 0
BLURRED VISION: 0
FEVER: 1
BLURRED VISION: 0
CHILLS: 0
PND: 0
TREMORS: 0
SINUS PAIN: 0
DIZZINESS: 0
NAUSEA: 0
BRUISES/BLEEDS EASILY: 1
CARDIOVASCULAR NEGATIVE: 1
NERVOUS/ANXIOUS: 0
CHILLS: 1
HEMOPTYSIS: 1
DIZZINESS: 0
DOUBLE VISION: 0
VOMITING: 0
HEMOPTYSIS: 1
COUGH: 1
SHORTNESS OF BREATH: 0
VOMITING: 0
NERVOUS/ANXIOUS: 0
SHORTNESS OF BREATH: 0
PALPITATIONS: 0
ABDOMINAL PAIN: 0
DEPRESSION: 0
DIARRHEA: 0
BLOOD IN STOOL: 0
BLURRED VISION: 0
SHORTNESS OF BREATH: 1
HEARTBURN: 0
HEADACHES: 0
DOUBLE VISION: 0
DIZZINESS: 0
CHILLS: 0
HEMOPTYSIS: 1
DIZZINESS: 0
FEVER: 1
FEVER: 1
BLURRED VISION: 0
FEVER: 1
CHILLS: 1
MYALGIAS: 0
SPUTUM PRODUCTION: 1
SHORTNESS OF BREATH: 1
HEADACHES: 1
PND: 0
ABDOMINAL PAIN: 0
DEPRESSION: 0
HEMOPTYSIS: 0
VOMITING: 0
MYALGIAS: 0
ABDOMINAL PAIN: 0
DIARRHEA: 0
BRUISES/BLEEDS EASILY: 1
HEARTBURN: 0
BLURRED VISION: 0
BRUISES/BLEEDS EASILY: 1
COUGH: 1
DIZZINESS: 0
DIAPHORESIS: 1
DIZZINESS: 0
PND: 0
DEPRESSION: 0
BACK PAIN: 0
MYALGIAS: 0
TINGLING: 0
FLANK PAIN: 0
FEVER: 0
NAUSEA: 0
COUGH: 1
PALPITATIONS: 0
BRUISES/BLEEDS EASILY: 1
COUGH: 1
CLAUDICATION: 0
FEVER: 1
FEVER: 1
NECK PAIN: 0
NAUSEA: 0
BLURRED VISION: 0
ABDOMINAL PAIN: 0
CLAUDICATION: 0
VOMITING: 0
BRUISES/BLEEDS EASILY: 1
WEIGHT LOSS: 0
HEADACHES: 0
TINGLING: 0
NAUSEA: 0
SINUS PAIN: 0
BLURRED VISION: 0
PALPITATIONS: 0
HEADACHES: 0
SPUTUM PRODUCTION: 0
FEVER: 0
SPEECH CHANGE: 0
BACK PAIN: 1
PALPITATIONS: 0
ABDOMINAL PAIN: 0
DEPRESSION: 0
SPUTUM PRODUCTION: 1
CHILLS: 0
FEVER: 1
VOMITING: 0
DIZZINESS: 0
PALPITATIONS: 0
SORE THROAT: 0
COUGH: 1
DOUBLE VISION: 0
WHEEZING: 0
BLURRED VISION: 0
MYALGIAS: 0
VOMITING: 0
FEVER: 1
HEADACHES: 0
ABDOMINAL PAIN: 0
HEADACHES: 0
COUGH: 0
BLOOD IN STOOL: 0
CHILLS: 0
BACK PAIN: 0
WHEEZING: 0
PSYCHIATRIC NEGATIVE: 1
SHORTNESS OF BREATH: 0
HEADACHES: 0
HEADACHES: 0
NAUSEA: 0
SPUTUM PRODUCTION: 0
TREMORS: 0
BRUISES/BLEEDS EASILY: 0
CHILLS: 0
NECK PAIN: 0
SHORTNESS OF BREATH: 0
CHILLS: 0
CONSTIPATION: 0
SHORTNESS OF BREATH: 0
GASTROINTESTINAL NEGATIVE: 1
HEMOPTYSIS: 0
NAUSEA: 0
FEVER: 1
BLOOD IN STOOL: 0
NAUSEA: 0
DIARRHEA: 0
FEVER: 1
ABDOMINAL PAIN: 1
WEAKNESS: 0
HEADACHES: 0
FEVER: 1
DEPRESSION: 0
TREMORS: 0
BLOOD IN STOOL: 0
HEADACHES: 0
PALPITATIONS: 0
FEVER: 0
BACK PAIN: 0
NERVOUS/ANXIOUS: 1
BACK PAIN: 0
CHILLS: 0
DIAPHORESIS: 0
SHORTNESS OF BREATH: 0
DEPRESSION: 0
STRIDOR: 0
SORE THROAT: 0
TINGLING: 0
DIARRHEA: 0
DIZZINESS: 0
COUGH: 1
HEMOPTYSIS: 1
BLURRED VISION: 0
SPUTUM PRODUCTION: 1
CHILLS: 0
FEVER: 1
COUGH: 1
CONSTIPATION: 0
FEVER: 1
NERVOUS/ANXIOUS: 0
COUGH: 0
CLAUDICATION: 0
ABDOMINAL PAIN: 0
VOMITING: 0
MUSCULOSKELETAL NEGATIVE: 1
SINUS PAIN: 0
HEARTBURN: 0
BLURRED VISION: 0
FEVER: 0
FEVER: 0
HEMOPTYSIS: 0
DIZZINESS: 0
SPUTUM PRODUCTION: 0
BACK PAIN: 0
NAUSEA: 0
SPUTUM PRODUCTION: 1
CHILLS: 0
NAUSEA: 1
BLURRED VISION: 0
NERVOUS/ANXIOUS: 0
CHILLS: 0
SPUTUM PRODUCTION: 0
HEADACHES: 0
BLURRED VISION: 0
EYE PAIN: 0
CHILLS: 0
SENSORY CHANGE: 0
FEVER: 0
FEVER: 0
NERVOUS/ANXIOUS: 0
HEARTBURN: 0
HEMOPTYSIS: 1
BRUISES/BLEEDS EASILY: 1
TINGLING: 0
FOCAL WEAKNESS: 0
SPUTUM PRODUCTION: 0
BACK PAIN: 0
DIZZINESS: 0
DOUBLE VISION: 0
CHILLS: 1
ABDOMINAL PAIN: 0
SPUTUM PRODUCTION: 1
BRUISES/BLEEDS EASILY: 0
NERVOUS/ANXIOUS: 0
CHILLS: 0
DIZZINESS: 0
EYE DISCHARGE: 0

## 2020-01-01 ASSESSMENT — COPD QUESTIONNAIRES
DURING THE PAST 4 WEEKS HOW MUCH DID YOU FEEL SHORT OF BREATH: NONE/LITTLE OF THE TIME
COPD SCREENING SCORE: 1
DO YOU EVER COUGH UP ANY MUCUS OR PHLEGM?: NO/ONLY WITH OCCASIONAL COLDS OR INFECTIONS
IN THE PAST 12 MONTHS DO YOU DO LESS THAN YOU USED TO BECAUSE OF YOUR BREATHING PROBLEMS: DISAGREE/UNSURE
HAVE YOU SMOKED AT LEAST 100 CIGARETTES IN YOUR ENTIRE LIFE: NO/DON'T KNOW
DO YOU EVER COUGH UP ANY MUCUS OR PHLEGM?: NO/ONLY WITH OCCASIONAL COLDS OR INFECTIONS
HAVE YOU SMOKED AT LEAST 100 CIGARETTES IN YOUR ENTIRE LIFE: NO/DON'T KNOW
DURING THE PAST 4 WEEKS HOW MUCH DID YOU FEEL SHORT OF BREATH: NONE/LITTLE OF THE TIME
DURING THE PAST 4 WEEKS HOW MUCH DID YOU FEEL SHORT OF BREATH: NONE/LITTLE OF THE TIME
COPD SCREENING SCORE: 1
COPD SCREENING SCORE: 3
HAVE YOU SMOKED AT LEAST 100 CIGARETTES IN YOUR ENTIRE LIFE: YES
DO YOU EVER COUGH UP ANY MUCUS OR PHLEGM?: NO/ONLY WITH OCCASIONAL COLDS OR INFECTIONS

## 2020-01-01 ASSESSMENT — COGNITIVE AND FUNCTIONAL STATUS - GENERAL
HELP NEEDED FOR BATHING: A LITTLE
SUGGESTED CMS G CODE MODIFIER DAILY ACTIVITY: CH
DAILY ACTIVITIY SCORE: 21
MOBILITY SCORE: 18
DAILY ACTIVITIY SCORE: 24
SUGGESTED CMS G CODE MODIFIER MOBILITY: CK
SUGGESTED CMS G CODE MODIFIER MOBILITY: CJ
MOVING TO AND FROM BED TO CHAIR: A LITTLE
SUGGESTED CMS G CODE MODIFIER MOBILITY: CI
STANDING UP FROM CHAIR USING ARMS: A LITTLE
SUGGESTED CMS G CODE MODIFIER DAILY ACTIVITY: CH
DRESSING REGULAR LOWER BODY CLOTHING: A LITTLE
MOBILITY SCORE: 24
TURNING FROM BACK TO SIDE WHILE IN FLAT BAD: A LITTLE
MOVING FROM LYING ON BACK TO SITTING ON SIDE OF FLAT BED: A LITTLE
CLIMB 3 TO 5 STEPS WITH RAILING: A LITTLE
STANDING UP FROM CHAIR USING ARMS: A LITTLE
CLIMB 3 TO 5 STEPS WITH RAILING: A LITTLE
TOILETING: A LITTLE
DRESSING REGULAR LOWER BODY CLOTHING: A LITTLE
HELP NEEDED FOR BATHING: A LITTLE
DAILY ACTIVITIY SCORE: 24
MOBILITY SCORE: 23
CLIMB 3 TO 5 STEPS WITH RAILING: A LITTLE
CLIMB 3 TO 5 STEPS WITH RAILING: A LITTLE
PERSONAL GROOMING: A LITTLE
TOILETING: A LITTLE
SUGGESTED CMS G CODE MODIFIER DAILY ACTIVITY: CJ
WALKING IN HOSPITAL ROOM: A LITTLE
MOBILITY SCORE: 23
SUGGESTED CMS G CODE MODIFIER DAILY ACTIVITY: CH
MOVING FROM LYING ON BACK TO SITTING ON SIDE OF FLAT BED: A LITTLE
DRESSING REGULAR UPPER BODY CLOTHING: A LITTLE
MOBILITY SCORE: 20
WALKING IN HOSPITAL ROOM: A LITTLE
SUGGESTED CMS G CODE MODIFIER DAILY ACTIVITY: CK
DAILY ACTIVITIY SCORE: 18
SUGGESTED CMS G CODE MODIFIER MOBILITY: CI
DAILY ACTIVITIY SCORE: 24
EATING MEALS: A LITTLE
SUGGESTED CMS G CODE MODIFIER MOBILITY: CH

## 2020-05-15 PROBLEM — D69.6 THROMBOCYTOPENIA (HCC): Status: ACTIVE | Noted: 2020-01-01

## 2020-05-15 PROBLEM — D64.9 ANEMIA: Status: ACTIVE | Noted: 2020-01-01

## 2020-05-16 NOTE — PROGRESS NOTES
"Pt ambulatory to [facility] for PRBC/Platelet transfusion.  Pt denies any current illness, infection or fever.  Labs reviewed from [facility] drawn on 05/15/2020.  Hgb 6, platelets 7.  Pt verbalized she had a reaction to previous platelet transfusion at another facility which was treated with a \"steroid\".  Reaction included itching and hives.  Pt verbalized she is allergic to benadryl and tylenol.  PIV placed, brisk blood return observed.  Pt premedicated with hydrocortisone per MD order.  Transfused 2 units prbc and 1 unit platelets per MD order, no s/s adverse reaction noted or verbalized. Pt tolerated well.  PIV flushed and removed, gauze and coban applied to site.  Pt discharged to self care in [facility], next appointment confirmed for tomorrow.   "

## 2020-05-17 PROBLEM — D70.9 NEUTROPENIC FEVER (HCC): Status: ACTIVE | Noted: 2020-01-01

## 2020-05-17 PROBLEM — R50.81 NEUTROPENIC FEVER (HCC): Status: ACTIVE | Noted: 2020-01-01

## 2020-05-17 NOTE — PROGRESS NOTES
"Pharmacy Kinetics 54 y.o. female on vancomycin day # 1 2020    Received Vancomycin 1,100 mg iv loading dose at 13:55PM  Provider specified end date: TBD    Indication for Treatment: Neutropenic fever (r/o PNA)    Pertinent history per medical record: Admitted on 2020 for neutropenic fever.    Mrs. Johnston is a 53 y/o presented to the ER for evaluation of a fever. She was seen at the infusion center for a transfusion earlier today, and reportedly had a fever of 101.1F. The patient is neutropenic. Her chest x-ray showed \"Minimal atelectasis or pneumonia in the left lower lobe.\" Broad spectrum abx were initiated for a neutropenic fever (potential infection source = PNA).     Other antibiotics: cefepime 2g IV Q12h     Allergies: Amoxicillin; Benadryl allergy; Excedrin migraine; Sulfamethoxazole; Tylenol; and Famotidine     List concerns for renal function: age      Pertinent cultures to date:   : Blood culture - in process   : Blood culture - in process   : Urine culture - in process     MRSA nares swab if pneumonia is a concern (ordered/positive/negative/n-a): ordered    Recent Labs     20  0515 20  1024   WBC 1.0* 1.0*   NEUTSPOLYS 6.10* 5.70*      Ref. Range 2020 11:10   Procalcitonin Latest Ref Range: <0.25 ng/mL 0.20     Recent Labs     20  1024   BUN 9   CREATININE 0.55   ALBUMIN 4.1     /60   Pulse 97   Temp 37.3 °C (99.1 °F) (Oral)   Resp (!) 22   Ht 1.495 m (4' 10.86\")   Wt 45.1 kg (99 lb 6.8 oz)   SpO2 99%  Temp (24hrs), Av.3 °C (99.1 °F), Min:37.3 °C (99.1 °F), Max:37.3 °C (99.1 °F)    A/P   1. Vancomycin dose change: 800 mg IV Q12h (02:00, 14:00)  2. Next vancomycin level: Trough prior to the 3rd or 4th total dose (not yet ordered)  3. Goal trough: 16-20 mcg/mL  4. Comments: Broad spectrum abx empirically initiated for neutropenic fever with concerns for pneumonia. Vancomycin ~17 mg/kg IV Q12h ordered to begin tomorrow AM. MRSA nares ordered. " Concerns for renal accumulation of vancomycin listed above. A trough will be obtained at steady state. Pharmacy will continue to follow and recommend de-escalation of antibiotics as appropriate.     Adia Almonte, PharmD, BCPS

## 2020-05-17 NOTE — ED TRIAGE NOTES
"Lana Johnston  Chief Complaint   Patient presents with   • Sent by MD     Pt ambulatory to [facility] with RN from infusion center. Per Lala RN, pt presented to infusion center today with fever, 101.1F reported during report. Pt denies other s/s and states \"it's just this fever is all.\" Pt was diagnosed with CML in April. Per infusion RN, pt was in the infusion center yesterday and received 2 units of RBC and platelets.  Pt scores 3 on sepsis screening and 3 on neutropenic risk score.  Neutropenic precautions in place, gowns placed outside pt's room and isolation sign placed on door.    /67   Pulse (!) 101   Resp 17   Ht 1.495 m (4' 10.86\")   Wt 45.1 kg (99 lb 6.8 oz)   SpO2 99%   BMI 20.18 kg/m²     Chart up and ready for ERP now.    "

## 2020-05-17 NOTE — PROGRESS NOTES
Patient arrived to the infusion center for CBC with possible transfusion. Patient states she is feeling okay, just warm. Vital signs reveal elevated temperature temporal and tachycardic, no other symptoms noted. PIV was left in place overnight and flushed with positive blood return. Labs drawn. WBC 1.0, Platelets 25, Hgb 8.2, ANC 0.07. Dr. Summers notified of temperature by Lesley Craft RN and he wanted patient to go to the emergency room. Report given to the charge nurse Mel COOPER over the phone. Walked patient down to the ER and gave Billy COOPER report.

## 2020-05-17 NOTE — ED NOTES
Med Rec complete per phone interview with Pt   Allergies Reviewed    Pt started a 7 day course of DIFLUCAN on 5/10    Pt has no known stop date for ACYCLOVIR

## 2020-05-17 NOTE — ED NOTES
Segun from Lab called with critical result of WBC at 1.0 and platelets at 26. Critical lab result read back to Segun.   Dr. Jones notified of critical lab result at 1108.  Critical lab result read back by Dr. Jones.

## 2020-05-17 NOTE — ED PROVIDER NOTES
ED Provider Note    CHIEF COMPLAINT  Chief Complaint   Patient presents with   • Sent by MD HUSSEIN  Lana Johnston is a 54 y.o. female who presents evaluation of possible neutropenic fever.  The patient has a history of CLL, she was at the transfusion center today and she was scheduled to get a platelet transfusion but when I checked her vital signs she was noted to be febrile.  The patient states that other than feeling warm she has no other acute complaints whatsoever.  She was fine last night upon going to bed, today when she woke up she felt warm but does not have a thermometer at home.  Received a transfusion of platelets and 2 units of packed red blood cells yesterday at the same transfusion center.  She has no new coughing or shortness of breath or chest pain, no abdominal pain or vomiting, no abnormal rash.  He states that this point she has no other significant complaints whatsoever.    REVIEW OF SYSTEMS  Negative for rash, chest pain, dyspnea, abdominal pain, nausea, vomiting, diarrhea, headache, focal weakness, focal numbness, focal tingling, back pain. All other systems are negative.     PAST MEDICAL HISTORY  Past Medical History:   Diagnosis Date   • Anxiety disorder    • CLL (chronic lymphocytic leukemia) (HCC)    • Hypertension        FAMILY HISTORY  History reviewed. No pertinent family history.    SOCIAL HISTORY  Social History     Tobacco Use   • Smoking status: Never Smoker   • Smokeless tobacco: Never Used   Substance Use Topics   • Alcohol use: Never     Frequency: Never   • Drug use: Never       SURGICAL HISTORY  Past Surgical History:   Procedure Laterality Date   • CHOLECYSTECTOMY  2005       CURRENT MEDICATIONS  I personally reviewed the medication list in the charting documentation.     ALLERGIES  Allergies   Allergen Reactions   • Amoxicillin Hives   • Benadryl Allergy Hives   • Excedrin Migraine Swelling   • Sulfamethoxazole Hives   • Tylenol Hives   • Famotidine Itching       MEDICAL  "RECORD  I have reviewed patient's medical record and pertinent results are listed above.      PHYSICAL EXAM  VITAL SIGNS: /67   Pulse (!) 101   Temp 37.3 °C (99.1 °F) (Oral)   Resp 17   Ht 1.495 m (4' 10.86\")   Wt 45.1 kg (99 lb 6.8 oz)   SpO2 99%   BMI 20.18 kg/m²    Constitutional: Well appearing patient in no acute distress.  Not toxic, nor ill in appearance.  HENT: Mucus membranes moist.    Eyes: No scleral icterus. Normal conjunctiva   Neck: Supple, comfortable, nonpainful range of motion.   Cardiovascular: Regular heart rate and rhythm.   Thorax & Lungs: Chest is nontender.  Lungs are clear to auscultation with good air movement bilaterally.  No wheeze, rhonchi, nor rales.   Abdomen: Soft, with no tenderness, rebound nor guarding.  No mass or pulsatile mass appreciated.  Skin: Warm, dry. No rash appreciated  Extremities/Musculoskeletal: No sign of trauma. No asymmetric calf tenderness, erythema or edema. Normal range of motion   Neurologic: Alert & oriented. No focal deficits observed.   Psychiatric: Normal affect appropriate for the clinical situation.    DIAGNOSTIC STUDIES / PROCEDURES    LABS/EKGs  Results for orders placed or performed during the hospital encounter of 05/17/20   CBC WITH DIFFERENTIAL   Result Value Ref Range    WBC 1.0 (LL) 4.8 - 10.8 K/uL    RBC 2.94 (L) 4.20 - 5.40 M/uL    Hemoglobin 8.9 (L) 12.0 - 16.0 g/dL    Hematocrit 24.9 (L) 37.0 - 47.0 %    MCV 84.7 81.4 - 97.8 fL    MCH 30.3 27.0 - 33.0 pg    MCHC 35.7 (H) 33.6 - 35.0 g/dL    RDW 39.5 35.9 - 50.0 fL    Platelet Count 26 (LL) 164 - 446 K/uL    MPV 9.9 9.0 - 12.9 fL    Neutrophils-Polys 5.70 (L) 44.00 - 72.00 %    Lymphocytes 94.30 (H) 22.00 - 41.00 %    Monocytes 0.00 0.00 - 13.40 %    Eosinophils 0.00 0.00 - 6.90 %    Basophils 0.00 0.00 - 1.80 %    Nucleated RBC 0.00 /100 WBC    Neutrophils (Absolute) 0.06 (LL) 2.00 - 7.15 K/uL    Lymphs (Absolute) 0.94 (L) 1.00 - 4.80 K/uL    Monos (Absolute) 0.00 0.00 - 0.85 K/uL "    Eos (Absolute) 0.00 0.00 - 0.51 K/uL    Baso (Absolute) 0.00 0.00 - 0.12 K/uL    NRBC (Absolute) 0.00 K/uL    Anisocytosis 1+     Microcytosis 1+    COMP METABOLIC PANEL   Result Value Ref Range    Sodium 129 (L) 135 - 145 mmol/L    Potassium 2.8 (L) 3.6 - 5.5 mmol/L    Chloride 95 (L) 96 - 112 mmol/L    Co2 21 20 - 33 mmol/L    Anion Gap 13.0 7.0 - 16.0    Glucose 121 (H) 65 - 99 mg/dL    Bun 9 8 - 22 mg/dL    Creatinine 0.55 0.50 - 1.40 mg/dL    Calcium 9.4 8.5 - 10.5 mg/dL    AST(SGOT) 15 12 - 45 U/L    ALT(SGPT) 18 2 - 50 U/L    Alkaline Phosphatase 91 30 - 99 U/L    Total Bilirubin 3.3 (H) 0.1 - 1.5 mg/dL    Albumin 4.1 3.2 - 4.9 g/dL    Total Protein 6.8 6.0 - 8.2 g/dL    Globulin 2.7 1.9 - 3.5 g/dL    A-G Ratio 1.5 g/dL   LACTIC ACID   Result Value Ref Range    Lactic Acid 1.8 0.5 - 2.0 mmol/L   LACTIC ACID   Result Value Ref Range    Lactic Acid 1.2 0.5 - 2.0 mmol/L   URINALYSIS    Specimen: Urine   Result Value Ref Range    Color Yellow     Character Clear     Specific Gravity 1.003 <1.035    Ph 7.0 5.0 - 8.0    Glucose Negative Negative mg/dL    Ketones Negative Negative mg/dL    Protein Negative Negative mg/dL    Bilirubin Negative Negative    Urobilinogen, Urine 1.0 Negative    Nitrite Negative Negative    Leukocyte Esterase Negative Negative    Occult Blood Moderate (A) Negative    Micro Urine Req Microscopic    COVID/SARS CoV-2    Specimen: Nasopharyngeal; Respirate   Result Value Ref Range    COVID Order Status Received    URINE MICROSCOPIC (W/UA)   Result Value Ref Range    WBC 0-2 /hpf    RBC 5-10 (A) /hpf    Bacteria Negative None /hpf    Epithelial Cells Negative /hpf    Hyaline Cast 0-2 /lpf   DIFFERENTIAL MANUAL   Result Value Ref Range    Manual Diff Status PERFORMED    PERIPHERAL SMEAR REVIEW   Result Value Ref Range    Peripheral Smear Review see below    PLATELET ESTIMATE   Result Value Ref Range    Plt Estimation Marked Decrease    MORPHOLOGY   Result Value Ref Range    RBC Morphology  Present     Poikilocytosis 1+     Ovalocytes 1+    IMMATURE PLT FRACTION   Result Value Ref Range    Imm. Plt Fraction 0.6 0.6 - 13.1 K/uL   ESTIMATED GFR   Result Value Ref Range    GFR If African American >60 >60 mL/min/1.73 m 2    GFR If Non African American >60 >60 mL/min/1.73 m 2   SARS-CoV-2, PCR (In-House)   Result Value Ref Range    SARS-CoV-2 Source NP Swab     SARS-CoV-2 by PCR NotDetected NotDetected       RADIOLOGY  DX-CHEST-PORTABLE (1 VIEW)   Final Result      Minimal atelectasis or pneumonia in the left lower lobe.            COURSE & MEDICAL DECISION MAKING  I have reviewed any medical record information, laboratory studies and radiographic results as noted above.  Differential diagnoses includes: Neutropenia, anemia, dehydration, electrolyte abnormalities, pneumonia, UTI, bacteremia    Encounter Summary: This is a 54 y.o. female with a fever detected at the infusion center but not here in the emergency department, she has no acute complaints other than feeling warm, was not treated with any antipyretics today.  No other obvious source of infection on exam but her history is concerning with leukemia and she is known to be neutropenic so blood work including lactic acid and blood cultures will be obtained, will infuse some IV fluids, will also check COVID and then ultimately reevaluate ------ blood work reveals the already known neutropenia and leukocytopenia as well as anemia and thrombocytopenia.  Otherwise the x-ray is concerning for a minimal area of atelectasis versus pneumonia, given the patient's neutropenia she will be treated with broad-spectrum antibiotics, I discussed the case with Dr. Summers the patient's oncologist who recommends admission the hospital for further evaluation.  The patient has been admitted to the hospital under the care of the hospitalist.      DISPOSITION: Admit in guarded condition      FINAL IMPRESSION  1. Neutropenic fever (HCC)    2. Hypokalemia           This  dictation was created using voice recognition software. The accuracy of the dictation is limited to the abilities of the software. I expect there may be some errors of grammar and possibly content. The nursing notes were reviewed and certain aspects of this information were incorporated into this note.    Electronically signed by: Sae Jones M.D., 5/17/2020 10:49 AM

## 2020-05-17 NOTE — PROGRESS NOTES
53yo female with hx of CML presented to infusion center for transfusion complained of  fever 101.1neutropenia. Dr Summers consulted recommended admission and antibiotics for neutropenic fever. covid pending    Dr Manning will admit patient

## 2020-05-17 NOTE — ED NOTES
Magdalena from Lab called with critical result of absolute neutrophils at 0.06. Critical lab result read back to Magdalena.   Dr. Jones notified of critical lab result at 1130.  Critical lab result read back by Dr. Jones.

## 2020-05-17 NOTE — H&P
Hospital Medicine History & Physical Note    Date of Service  5/17/2020    Primary Care Physician  Ara Duncan M.D.    Code Status  Full Code    Chief Complaint  Chief Complaint   Patient presents with   • Sent by MD       History of Presenting Illness    54-year-old female with history of CLL on chemotherapy was transferred from [facility] today 5/17 for fever, recently the patient was diagnosed with CLL and chemotherapy has been initiated, during the infusion they noticed fever, the patient denied any symptoms except some coughing with hemoptysis, denied rash, no urinary or bowel symptoms no abdominal pain and no chest pain, the patient received 2 units of packed red cell yesterday, on admission her neutrophil was less than 6, COVID-19 was negative, chest x-ray showed possible infiltration on the left side, cefepime with vancomycin was a started.       Review of Systems  Review of Systems   Constitutional: Positive for fever. Negative for chills, diaphoresis, malaise/fatigue and weight loss.   HENT: Negative.    Eyes: Negative.    Respiratory: Positive for cough and hemoptysis. Negative for sputum production, shortness of breath and wheezing.    Cardiovascular: Negative.    Gastrointestinal: Negative.    Genitourinary: Negative.    Musculoskeletal: Negative.    Skin: Negative.    Neurological: Negative.    Endo/Heme/Allergies: Negative.    Psychiatric/Behavioral: Negative.        Past Medical History   has a past medical history of Anxiety disorder, CLL (chronic lymphocytic leukemia) (HCC), and Hypertension.    Surgical History   has a past surgical history that includes cholecystectomy (2005).     Family History  Leukemia with her brother and her family.     Social History   reports that she has never smoked. She has never used smokeless tobacco. She reports that she does not drink alcohol or use drugs.    Allergies  Allergies   Allergen Reactions   • Amoxicillin Hives   • Benadryl Allergy Hives   •  Excedrin Migraine Swelling   • Sulfamethoxazole Hives   • Tylenol Hives   • Famotidine Itching       Medications  Prior to Admission Medications   Prescriptions Last Dose Informant Patient Reported? Taking?   Ibrutinib 420 MG Tab 5/16/2020 at PM Patient Yes No   Sig: Take 420 mg by mouth every evening.   acyclovir (ZOVIRAX) 400 MG tablet 5/17/2020 at AM Patient Yes No   Sig: Take 400 mg by mouth 2 times a day.   amLODIPine (NORVASC) 5 MG Tab 5/17/2020 at AM Patient Yes No   Sig: Take 5 mg by mouth every evening.   fluconazole (DIFLUCAN) 200 MG Tab 5/17/2020 at AM Patient Yes No   Sig: Take 200 mg by mouth 2 Times a Day. Pt started a 7 day course of DIFLUCAN on 5/10   folic acid (FOLVITE) 1 MG Tab 5/15/2020 at AM Patient Yes No   Sig: Take 1 mg by mouth every day.   losartan (COZAAR) 100 MG Tab 5/16/2020 at 1200 Patient Yes Yes   Sig: Take 100 mg by mouth every day.   propranolol (INDERAL) 20 MG Tab 5/16/2020 at PM Patient Yes No   Sig: Take 20 mg by mouth every evening. ONCE, every evening      Facility-Administered Medications: None       Physical Exam  Temp:  [37.3 °C (99.1 °F)] 37.3 °C (99.1 °F)  Pulse:  [] 128  Resp:  [17-22] 18  BP: (112-147)/(56-74) 143/74  SpO2:  [96 %-99 %] 97 %    Physical Exam  Constitutional:       Appearance: She is not ill-appearing.   HENT:      Head: Normocephalic and atraumatic.      Mouth/Throat:      Mouth: Mucous membranes are moist.   Eyes:      General: Scleral icterus present.   Neck:      Musculoskeletal: No neck rigidity.   Cardiovascular:      Rate and Rhythm: Normal rate.      Heart sounds: No murmur. No gallop.    Pulmonary:      Effort: Pulmonary effort is normal. No respiratory distress.      Breath sounds: No wheezing.   Abdominal:      General: Abdomen is flat. There is no distension.      Tenderness: There is no abdominal tenderness. There is no guarding.   Musculoskeletal:         General: No swelling or deformity.      Right lower leg: No edema.      Left  lower leg: No edema.   Lymphadenopathy:      Cervical: No cervical adenopathy.   Skin:     General: Skin is warm.      Coloration: Skin is not jaundiced.      Findings: No lesion.   Neurological:      General: No focal deficit present.      Mental Status: She is alert and oriented to person, place, and time. Mental status is at baseline.      Cranial Nerves: No cranial nerve deficit.      Motor: No weakness.      Gait: Gait normal.   Psychiatric:         Mood and Affect: Mood normal.         Laboratory:  Recent Labs     05/17/20  0515 05/17/20  1024   WBC 1.0* 1.0*   RBC 2.70* 2.94*   HEMOGLOBIN 8.2* 8.9*   HEMATOCRIT 22.7* 24.9*   MCV 84.1 84.7   MCH 30.4 30.3   MCHC 36.1* 35.7*   RDW 38.9 39.5   PLATELETCT 25* 26*   MPV 11.4 9.9     Recent Labs     05/17/20  1024   SODIUM 129*   POTASSIUM 2.8*   CHLORIDE 95*   CO2 21   GLUCOSE 121*   BUN 9   CREATININE 0.55   CALCIUM 9.4     Recent Labs     05/17/20  1024   ALTSGPT 18   ASTSGOT 15   ALKPHOSPHAT 91   TBILIRUBIN 3.3*   GLUCOSE 121*         No results for input(s): NTPROBNP in the last 72 hours.      No results for input(s): TROPONINT in the last 72 hours.    Imaging:  DX-CHEST-PORTABLE (1 VIEW)   Final Result      Minimal atelectasis or pneumonia in the left lower lobe.            Assessment/Plan:      * Neutropenic fever (HCC)- (present on admission)  Assessment & Plan  Patient is on chemotherapy and her neutrophil less than 500(6 on admission)  One episode of fever 101.  No urinary or bowel symptoms however she has some dry coughing with hemoptysis.  Chest x-ray showed possible infiltration on the left side and on exam showed some crackles.   Procalcitonin is normal and lactic acid is normal.  We will start with cefepime and vancomycin for possible pneumonia.  Labs daily  Follow blood and sputum culture.   Consider ID consult if needed.     CLL (chronic lymphocytic leukemia) (HCC)- (present on admission)  Assessment & Plan  Recently diagnosis and on  chemotherapy.  Complicated with neutropenia fever  Hold on chemotherapy at this time and continue antibiotics  Continue acyclovir.     Hemoptysis- (present on admission)  Assessment & Plan  Likely related to coughing and upper respiratory infection with low platelets  Continue monitoring with hemoglobin daily  Follow-up closely and consider CT scan of her chest if needed.     Thrombocytopenia (HCC)- (present on admission)  Assessment & Plan  26, due to chemotherapy  No signs of bleeding  Continue monitoring and transfusion if needed    Anemia- (present on admission)  Assessment & Plan  Due to chemotherapy  Received 2 units of red blood cell a day before admission  No signs of bleeding  Continue monitoring with labs daily and transfusion if hemoglobin less than 7    SCDs only for DVT prophylaxis due to thrombocytopenia.  All images and labs are reviewed personally

## 2020-05-17 NOTE — ED NOTES
Pt provided with bedside commode by RN.  Pt able to transfer from [facility] to [facility] independently.

## 2020-05-18 PROBLEM — D61.818 PANCYTOPENIA (HCC): Status: ACTIVE | Noted: 2020-01-01

## 2020-05-18 PROBLEM — E87.1 HYPONATREMIA: Status: ACTIVE | Noted: 2020-01-01

## 2020-05-18 PROBLEM — E87.6 HYPOKALEMIA: Status: ACTIVE | Noted: 2020-01-01

## 2020-05-18 NOTE — ASSESSMENT & PLAN NOTE
H/o CLL on chemotherapy, ANC 0.04, continue having fever, continue on vanco and cefepime  blood culture NGT   pro calcitonin negative 5/17   Bronchoscopy not possible  covid test negative   Sputum culture MSSA. Continue cefepime per ID . Possible source remain pneumonia, but pt still spiking fever besides being on proper antibiotic.

## 2020-05-18 NOTE — PROGRESS NOTES
[facility] Daily Progress Note    Date of Service  5/18/2020    Chief Complaint  54 y.o. female admitted 5/17/2020 with neutropenic fever.      Interval Problem Update  Patient is in bed, alert and oriented, follows commands denies n/v/d/c, no urinary symptoms, no neck pain no abdominal pain.   Continue having fever, no sob, dry cough, no wheezing.    Consultants/Specialty  ID  onco    Code Status  Full code    Disposition  tbd    Review of Systems  Review of Systems   Constitutional: Negative for chills and fever.   HENT: Negative for congestion, nosebleeds and sinus pain.    Eyes: Negative for blurred vision and double vision.   Respiratory: Positive for cough and hemoptysis. Negative for shortness of breath and wheezing.    Cardiovascular: Negative for chest pain, palpitations, claudication, leg swelling and PND.   Gastrointestinal: Negative for heartburn, nausea and vomiting.   Genitourinary: Negative for dysuria, frequency, hematuria and urgency.   Musculoskeletal: Negative for back pain, myalgias and neck pain.   Skin: Negative for rash.   Neurological: Negative for dizziness, tingling, tremors and headaches.   Endo/Heme/Allergies: Bruises/bleeds easily.   Psychiatric/Behavioral: Negative for depression, substance abuse and suicidal ideas.        Physical Exam  Temp:  [36.8 °C (98.3 °F)-38.9 °C (102 °F)] 37.8 °C (100.1 °F)  Pulse:  [] 101  Resp:  [17-22] 20  BP: (116-151)/(47-74) 133/47  SpO2:  [95 %-100 %] 100 %    Physical Exam  Vitals signs and nursing note reviewed.   Constitutional:       Appearance: Normal appearance.   HENT:      Head: Normocephalic and atraumatic.      Nose: Nose normal.      Mouth/Throat:      Mouth: Mucous membranes are moist.      Pharynx: Oropharynx is clear.   Eyes:      General: No scleral icterus.        Right eye: No discharge.         Left eye: No discharge.      Extraocular Movements: Extraocular movements intact.      Conjunctiva/sclera: Conjunctivae normal.       Pupils: Pupils are equal, round, and reactive to light.   Neck:      Musculoskeletal: Normal range of motion and neck supple. No neck rigidity or muscular tenderness.   Cardiovascular:      Rate and Rhythm: Normal rate.      Pulses: Normal pulses.      Heart sounds: Normal heart sounds. No murmur.   Pulmonary:      Effort: Pulmonary effort is normal. No respiratory distress.      Breath sounds: No stridor. Rales present. No wheezing.   Abdominal:      General: Bowel sounds are normal. There is no distension.      Palpations: Abdomen is soft.      Tenderness: There is no abdominal tenderness. There is no guarding.   Musculoskeletal: Normal range of motion.         General: No swelling or deformity.   Skin:     General: Skin is warm and dry.      Capillary Refill: Capillary refill takes less than 2 seconds.      Coloration: Skin is not jaundiced.   Neurological:      General: No focal deficit present.      Mental Status: She is alert and oriented to person, place, and time.      Cranial Nerves: No cranial nerve deficit.      Motor: No weakness.   Psychiatric:         Mood and Affect: Mood normal.         Behavior: Behavior normal.         Fluids    Intake/Output Summary (Last 24 hours) at 5/18/2020 1534  Last data filed at 5/17/2020 1655  Gross per 24 hour   Intake 250 ml   Output --   Net 250 ml       Laboratory  Recent Labs     05/17/20  0515 05/17/20  1024 05/18/20  0040   WBC 1.0* 1.0* 0.6*   RBC 2.70* 2.94* 2.58*   HEMOGLOBIN 8.2* 8.9* 7.8*   HEMATOCRIT 22.7* 24.9* 22.4*   MCV 84.1 84.7 86.8   MCH 30.4 30.3 30.2   MCHC 36.1* 35.7* 34.8   RDW 38.9 39.5 41.5   PLATELETCT 25* 26* 18*   MPV 11.4 9.9 9.9     Recent Labs     05/17/20  1024 05/18/20  0040   SODIUM 129* 133*   POTASSIUM 2.8* 3.4*   CHLORIDE 95* 100   CO2 21 20   GLUCOSE 121* 121*   BUN 9 9   CREATININE 0.55 0.64   CALCIUM 9.4 8.3*                   Imaging  DX-CHEST-PORTABLE (1 VIEW)   Final Result      Minimal atelectasis or pneumonia in the left lower  lobe.           Assessment/Plan  * Neutropenic fever (HCC)- (present on admission)  Assessment & Plan  H/o CLL on chemotherapy, ANC 0.04, continue having fever, continue on vanco and cefepime, f/u blood and sputum cx. ID consulted.   CXR showed possible pneumonia will check procalcitonin in AM again.     CLL (chronic lymphocytic leukemia) (HCC)- (present on admission)  Assessment & Plan  Recently diagnosis and on chemotherapy.  Complicated with neutropenia fever  Holding chemo for now  Oncology consulted.   Continue monitoring cell count.   Keep hb > and platelet count >10,000 as per oncology.     Hemoptysis- (present on admission)  Assessment & Plan  Likely related to coughing and upper respiratory infection with low platelets  Will consider CT chest if continue having hemoptysis so far she is feeling better and cough is improved.     Pancytopenia (HCC)  Assessment & Plan  Likely due to CLL and chemotherapy.     Hypokalemia  Assessment & Plan  Replacing with PO KDUR, will check Mg level.     Hyponatremia  Assessment & Plan  Continue monitoring gently hydration.     Thrombocytopenia (HCC)- (present on admission)  Assessment & Plan  Platelet today 18.     Anemia- (present on admission)  Assessment & Plan  Due to chemotherapy  Received 2 units of red blood cell a day before admission  Continue close monitoring  She is transfusion dependent and need to keep hb >7.0 and platelet >10,000 as per oncology.          VTE prophylaxis: scd's    Case was discussed with oncology  Case was discussed with ID    High complexity case, high risk for complications.

## 2020-05-18 NOTE — PROGRESS NOTES
2 RN skin check complete with Karo COOPER.  Ears: intact  Elbows:  N/a  Knees:  N/a  Heels: N/a  Coccyx: N/a  2 small bruises upper thighs, small dark biopsy site lower left back.  Small scattered bruises on both arms.

## 2020-05-18 NOTE — ED NOTES
Pt updated on poc and admit plans. PIV remains CDI and patent.   Room checked and all pt belongings transported with pt. Pt to [id number] now with RN via adams.

## 2020-05-18 NOTE — PROGRESS NOTES
Unable to have pharmacy verify patient's home chemo medication overnight. Medication is locked in patient med drawer on unit.

## 2020-05-18 NOTE — ASSESSMENT & PLAN NOTE
Due to chemotherapy  Received 2 units of red blood cell a day before admission  Continue close monitoring  She is transfusion dependent and need to keep hb >7.0 and platelet >10,000 as per oncology.

## 2020-05-18 NOTE — ASSESSMENT & PLAN NOTE
Very minimal. Hgb stable   Likely related to coughing and upper respiratory infection with low platelets  Will consider CT chest if continue having hemoptysis

## 2020-05-18 NOTE — PROGRESS NOTES
Macie from Lab called with critical result of WBC of 0.6, Platelet of 18, and preliminary ANC of 0.04 at 0056. Critical lab result read back to Macie.   This critical lab result is within parameters established by Dr. Manning for this patient

## 2020-05-18 NOTE — PROGRESS NOTES
Pt A&Ox4, resting in bed. Discussed POC, pt verbalized agreement. Nightly medications and assessment completed. PIV infusing with fluids per MAR. Pt most current temp is 100.6 F, pt with Tylenol allergy, so managing low grade fever with cool wash cloth to forehead, ice packs, and removal of heavy blankets. Up with steady gait, tolerates well. Safety and fall precautions in place. Rounding in place. All needs met at this moment.

## 2020-05-18 NOTE — PROGRESS NOTES
Dr. Summers returned day shift RN phone call. He said patient will need her PO chemotherapy (Imbruvica) but oncology team will round in the morning and restart her on her on it. Will send her home med, Imbruvica, to pharmacy to verify in anticipation of restarting PO chemotherapy tomorrow.

## 2020-05-18 NOTE — CARE PLAN
Problem: Communication  Goal: The ability to communicate needs accurately and effectively will improve  Outcome: PROGRESSING AS EXPECTED  Note: Patient able to express needs appropriately. Uses call light correctly.     Problem: Safety  Goal: Will remain free from falls  Outcome: PROGRESSING AS EXPECTED  Note: Patient has remained free from falls when ambulating to the restroom. Fall precautions in place.

## 2020-05-18 NOTE — ASSESSMENT & PLAN NOTE
Recently diagnosis and on chemotherapy.  Complicated with neutropenia fever  Holding chemo for now  Oncology consulted.   Continue monitoring cell count.   Keep hb > and platelet count >10,000 as per oncology.

## 2020-05-18 NOTE — CONSULTS
DATE OF SERVICE:  05/18/2020    HEMATOLOGY AND ONCOLOGY CONSULTATION    ADMITTING PHYSICIAN:  Clare Manning MD    REQUESTING PHYSICIAN:  Clare Manning MD    CONSULTING PHYSICIAN:  Karissa Moore MD    REASON FOR CONSULTATION:  Patient with a newly diagnosed chronic lymphocytic   leukemia, now being admitted with neutropenic fevers.    HISTORY OF PRESENT ILLNESS:  The patient is a 54-year-old female who has been   followed by Dr. Summers for nonautoimmune hemolytic anemia and had extensive   workup including vitamin B12, folic acid, TSH, rheumatoid factor, LAKIA,   hepatitis B and hepatitis C antibodies, PNH, G6PD level, ceruloplasmin levels   all negative.  Patient was monitored by him since 01/2019.  The patient   recently had a prolonged hospitalization at [facility], was admitted on   04/20/2020 with severe pancytopenia and further workup including a bone marrow   biopsy showed 70% CLL cells, positive for CD23 dim, positive for CD20 and   CD5, negative for BCL1, normal cytogenetics, negative FISH panel for MDS and   CLL.  The patient has been discharged from [facility] and she has been   receiving at [facility] infusion center, daily labs, and recently, she   was seen in my office by Dr. Summers's nurse practitioner, Arabella, on 05/15/2020   and had teaching class for ibrutinib, which the patient did receive last   week.  She just took 1 pill on Saturday and Sunday.  She presented with high   fevers and also has noticed some streaky blood with cough since yesterday.    She presented to the hospital, and then from the infusion room, she was sent   to the ER, she had a temperature of about 102.0.  She had a chest x-ray that   showed minimal atelectasis or pneumonia in the left lower lobe.  Her counts   are at baseline, WBC count 0.6, hemoglobin 7.8 and a platelet count of 18,000.    She denies of any black colored stools or bright red blood per rectum.  No   diarrhea.  She does have intermittent  cough, but no gross hemoptysis.  No   change in sputum color production.    PAST MEDICAL HISTORY:  1.  Newly diagnosed CLL as mentioned above.  2.  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  3.  Alpha thalassemia, possible minor disease versus hemoglobin H.  4.  Chronic elevation in bilirubin levels, anxiety, migraines, CLL,   hypertension.    PAST SURGICAL HISTORY:  Cholecystectomy in , bone marrow biopsy in   2020.    ALLERGIES:  AMOXICILLIN, BENADRYL, EXCEDRIN, SULFAMETHOXAZOLE, TYLENOL, AND   FAMOTIDINE.    SOCIAL HISTORY:  She is .  She was born in [facility].  She lives in   [place].  She has no children.  She denies of any alcohol intake, no drug abuse.    She used to be a .    FAMILY HISTORY:  Brother  of pancreatic cancer at age 47.  Paternal   aunt  at age 60 due to leukemia.    REVIEW OF SYSTEMS:  I did do a 10-point system review, which is negative   except as mentioned above.  CONSTITUTIONAL:  Positive fatigue.  Positive fevers.  Positive malaise.  No   recent weight loss.  RESPIRATORY:  She has chronic intermittent cough with streaky blood that she   noticed from last 2 days.  No gross hemoptysis.  No worsening shortness of   breath.  CARDIAC:  No chest pain or palpitations.  GASTROINTESTINAL:  No abdominal pain, nausea, vomiting or diarrhea.  No black   color stools or bright red blood per rectum.  PSYCHIATRIC:  Positive situational anxiety.  NEUROLOGY:  No headaches or vision changes.    PHYSICAL EXAMINATION:  GENERAL:  She is alert, oriented x3.  VITAL SIGNS:  Temperature was 37.5, pulse was 102, respiratory rate was 20,   blood pressure 116/54, on room air saturating about 95%.  HEENT:  Pupils are equal, reactive to light.  Extraocular muscles are intact.    Anicteric.  CHEST:  Clear to auscultation bilaterally symmetrical.  CARDIOVASCULAR:  S1, S2 heard, regular rate and rhythm, sinus tachy.  ABDOMEN:  Soft, nontender, nondistended.   Positive bowel sounds.  No hernia.  EXTREMITIES:  No edema bilaterally.  No cyanosis or clubbing.  PSYCHIATRIC:  Mood appropriate and normal affect.    LABORATORY DATA:  WBC count is 0.6, hemoglobin 7.8, platelet count is 18,000.    ANC is 400.  Sodium 133, potassium 3.4, BUN is 9, creatinine 0.6, calcium is   8.3, AST 15, ALT 18, alkaline phosphatase is 79, total bilirubin is 1.6,   albumin is 3.7, total protein was 6.4, magnesium was 1.6.    ASSESSMENT AND PLAN:  The patient is a 54-year-old female who has a history of   nonimmune hemolytic anemia and was being followed by Dr. Summers.  Recently,   she had a prolonged hospitalization for cytopenias at [facility].  On   04/20/2020, she had a bone marrow biopsy that showed consistent with chronic   lymphocytic leukemia with 70% of the marrow was involved.  The patient was   seen as an outpatient by Dr. Summers and he prescribed ibrutinib, which she   just started over the weekend and now presented with neutropenic fevers and   also cough with streaks of blood for the last 2 days.  Currently, she has been   treated for neutropenic fevers with broad-spectrum antibiotics.  I did   recommend primary team to get infectious disease consultation since the   patient is severely immunocompromised with underlying chronic lymphocytic   leukemia.  I will check an IgG level.  If low, we will give intravenous   immunoglobulin.  She has been transfusion dependent since April.  Goal is to   keep her hemoglobin above 7.0 and a platelet count above 10,000 if no   bleeding.  Continue supportive measures with transfusions.  All blood products   should be CMV negative, irradiated.  At the present time, I did recommend to   hold ibrutinib until infection has been ruled out.  Also, with ibrutinib,   there is a concern for bleeding, but the patient has not been taking any   nonsteroidal anti-inflammatory drugs or she is not on any anticoagulation.    Also, her symptoms seem to be very mild.   We will continue to follow during   hospitalization.       ____________________________________     Karissa Moore MD SR / BRITT    DD:  05/18/2020 11:10:10  DT:  05/18/2020 12:09:50    D#:  [id number]  Job#:  [id number]

## 2020-05-18 NOTE — ED NOTES
Pt report called in to Yesenia COOPER, questions and comments addressed. Pt now awaiting transport to [facility].

## 2020-05-18 NOTE — PROGRESS NOTES
Patient on floor. Report given by ED nurse. Patient tucked in bed, PIV running 83ml/hr of NS. 2 RN skin check done, med rec done. Dr. Summers called to inquire about patient home chemo med Imbruvica. Will be in patient drawer to verify tomorrw.

## 2020-05-19 PROBLEM — E87.6 HYPOKALEMIA: Status: RESOLVED | Noted: 2020-01-01 | Resolved: 2020-01-01

## 2020-05-19 NOTE — PROGRESS NOTES
Infectious Disease Progress Note    Author: Lori Boone M.D. Date & Time of service: 2020  12:51 PM    Chief Complaint:  Neutropenic fever    Interval History:  54-year-old [place] female with nonautoimmune hemolytic anemia and CLL recently started on chemo admitted with neutropenic fever and cough   Temp 103, WBC 0.6, decreased cough Low grade temps yesterday then spiked this morning-no new pain or sxs  Labs Reviewed, Medications Reviewed and Radiology Reviewed.    Review of Systems:  Review of Systems   Constitutional: Positive for fever. Negative for chills.   Respiratory: Positive for cough and hemoptysis. Negative for shortness of breath and wheezing.    Cardiovascular: Negative for chest pain.   Gastrointestinal: Negative for abdominal pain, diarrhea, nausea and vomiting.   Skin: Negative for rash.   All other systems reviewed and are negative.      Hemodynamics:  Temp (24hrs), Av.7 °C (99.8 °F), Min:36.4 °C (97.5 °F), Max:39.4 °C (103 °F)  Temperature: 36.9 °C (98.5 °F)  Pulse  Av.9  Min: 77  Max: 129   Blood Pressure: 125/52       Physical Exam:  Physical Exam  Vitals signs and nursing note reviewed.   Constitutional:       General: She is not in acute distress.     Appearance: She is not ill-appearing, toxic-appearing or diaphoretic.   HENT:      Nose: No congestion or rhinorrhea.   Eyes:      General: Scleral icterus present.      Extraocular Movements: Extraocular movements intact.      Pupils: Pupils are equal, round, and reactive to light.   Cardiovascular:      Rate and Rhythm: Tachycardia present.   Pulmonary:      Effort: Pulmonary effort is normal. No respiratory distress.      Breath sounds: No stridor. No wheezing or rhonchi.      Comments: Decreased BS  Abdominal:      General: There is no distension.      Palpations: Abdomen is soft.      Tenderness: There is no abdominal tenderness. There is no guarding.   Skin:     Coloration: Skin is jaundiced.      Findings:  Bruising present.   Neurological:      General: No focal deficit present.      Mental Status: She is alert and oriented to person, place, and time.      Cranial Nerves: No cranial nerve deficit.   Psychiatric:         Mood and Affect: Mood normal.         Behavior: Behavior normal.         Meds:    Current Facility-Administered Medications:   •  NS  •  potassium chloride SA  •  cefepime  •  MD Alert...Vancomycin per Pharmacy  •  acyclovir  •  amLODIPine  •  folic acid  •  senna-docusate **AND** polyethylene glycol/lytes **AND** magnesium hydroxide **AND** bisacodyl  •  Respiratory Therapy Consult  •  NS  •  acetaminophen  •  labetalol  •  ondansetron  •  ondansetron  •  promethazine  •  promethazine  •  prochlorperazine  •  guaiFENesin dextromethorphan  •  vancomycin    Labs:  Recent Labs     05/17/20  1024 05/18/20  0040 05/19/20  0033   WBC 1.0* 0.6* 0.6*   RBC 2.94* 2.58* 2.30*   HEMOGLOBIN 8.9* 7.8* 6.9*   HEMATOCRIT 24.9* 22.4* 20.1*   MCV 84.7 86.8 87.4   MCH 30.3 30.2 30.0   RDW 39.5 41.5 41.4   PLATELETCT 26* 18* 10*   MPV 9.9 9.9 10.8   NEUTSPOLYS 5.70* 7.30* 4.30*   LYMPHOCYTES 94.30* 91.70* 94.90*   MONOCYTES 0.00 0.90 0.00   EOSINOPHILS 0.00 0.00 0.90   BASOPHILS 0.00 0.00 0.00   RBCMORPHOLO Present Present Present     Recent Labs     05/17/20  1024 05/18/20  0040 05/19/20  0033   SODIUM 129* 133* 134*   POTASSIUM 2.8* 3.4* 3.3*   CHLORIDE 95* 100 103   CO2 21 20 20   GLUCOSE 121* 121* 126*   BUN 9 9 7*     Recent Labs     05/17/20  1024 05/18/20  0040 05/19/20  0033   ALBUMIN 4.1 3.7 3.3   TBILIRUBIN 3.3* 1.6* 1.0   ALKPHOSPHAT 91 79 71   TOTPROTEIN 6.8 6.4 6.1   ALTSGPT 18 18 14   ASTSGOT 15 15 9*   CREATININE 0.55 0.64 0.49*       Imaging:  Dx-chest-portable (1 View)    Result Date: 5/17/2020 5/17/2020 10:52 AM HISTORY/REASON FOR EXAM:  Sepsis. Shortness of breath. TECHNIQUE/EXAM DESCRIPTION AND NUMBER OF VIEWS: Single portable view of the chest. COMPARISON:  None FINDINGS: The cardiac silhouette is  "within normal limits. There is minimal opacity in the retrocardiac region in the left lower lobe which represent minimal atelectasis or pneumonia. The right lung is clear. No pleural effusion is noted.     Minimal atelectasis or pneumonia in the left lower lobe.      Micro:  Results     Procedure Component Value Units Date/Time    URINE CULTURE(NEW) [[id number]] Collected:  05/17/20 1024    Order Status:  Completed Specimen:  Urine Updated:  05/19/20 0722     Significant Indicator NEG     Source UR     Site -     Culture Result No growth at 48 hours.    Narrative:       Indication for culture:->Septic Shock: Persistent  hypotension,  Lactic acid > 4, vasopressors/inotropes started  Indication for culture:->Septic Shock: Persistent    GRAM STAIN [[id number]] Collected:  05/18/20 0906    Order Status:  Completed Specimen:  Respirate Updated:  05/18/20 1608     Significant Indicator .     Source RESP     Site SPUTUM     Gram Stain Result Moderate WBCs.  Few epithelial cells.  Few mixed bacteria, no predominant organism seen.  Specimen Quality Score: 2+      Narrative:       Collected By:[id number] WANDY KHANNA  Collected By:[id number] WANDY KHANNA    CULTURE RESPIRATORY W/ GRM STN [[id number]] Collected:  05/18/20 0906    Order Status:  Completed Specimen:  Respirate from Sputum Updated:  05/18/20 0933    Narrative:       Collected By:[id number] WANDY KHANNA    BLOOD CULTURE [[id number]] Collected:  05/17/20 1110    Order Status:  Completed Specimen:  Blood from Peripheral Updated:  05/18/20 0732     Significant Indicator NEG     Source BLD     Site PERIPHERAL     Culture Result No Growth  Note: Blood cultures are incubated for 5 days and  are monitored continuously.Positive blood cultures  are called to the RN and reported as soon as  they are identified.      Narrative:       2 of 2 blood culture x2  Sites order. Per Hospital Policy:  Only change Specimen Src: to \"Line\" if specified by physician  order.  No site indicated " "   BLOOD CULTURE [[id number]] Collected:  05/17/20 1024    Order Status:  Completed Specimen:  Blood from Peripheral Updated:  05/18/20 0732     Significant Indicator NEG     Source BLD     Site PERIPHERAL     Culture Result No Growth  Note: Blood cultures are incubated for 5 days and  are monitored continuously.Positive blood cultures  are called to the RN and reported as soon as  they are identified.      Narrative:       1 of 2 for Blood Culture x 2 sites order. Per Hospital  Policy: Only change Specimen Src: to \"Line\" if specified by  physician order.  No site indicated    MRSA By PCR (Amp) [[id number]]     Order Status:  Sent Specimen:  Respirate from Nares     Blood Culture [[id number]]     Order Status:  Canceled Specimen:  Blood from Peripheral     SARS-CoV-2, PCR (In-House) [[id number]] Collected:  05/17/20 1145    Order Status:  Completed Updated:  05/17/20 1302     SARS-CoV-2 Source NP Swab     SARS-CoV-2 by PCR NotDetected     Comment: [facility] providers: PLEASE REFER TO DE-ESCALATION AND RETESTING PROTOCOL  on [email or web address]  **The gripNote GeneXpert Xpress SARS-CoV-2 Test has been made available for  use under the Emergency Use Authorization (EUA) only.         Narrative:       Rapid    COVID/SARS CoV-2 [[id number]] Collected:  05/17/20 1145    Order Status:  Completed Specimen:  Respirate from Nasopharyngeal Updated:  05/17/20 1159     COVID Order Status Received    Narrative:       Rapid    URINALYSIS [[id number]]  (Abnormal) Collected:  05/17/20 1024    Order Status:  Completed Specimen:  Urine Updated:  05/17/20 1105     Color Yellow     Character Clear     Specific Gravity 1.003     Ph 7.0     Glucose Negative mg/dL      Ketones Negative mg/dL      Protein Negative mg/dL      Bilirubin Negative     Urobilinogen, Urine 1.0     Nitrite Negative     Leukocyte Esterase Negative     Occult Blood Moderate     Micro Urine Req Microscopic    Narrative:       Indication for culture:->Septic Shock: " Persistent  hypotension,  Lactic acid > 4, vasopressors/inotropes started          Assessment:  Active Hospital Problems    Diagnosis   • *Neutropenic fever (HCC) [D70.9, R50.81]   • CLL (chronic lymphocytic leukemia) (HCC) [C91.10]   • Hemoptysis [R04.2]   • Hyponatremia [E87.1]   • Hypokalemia [E87.6]   • Pancytopenia (HCC) [D61.818]   • Anemia [D64.9]   • Thrombocytopenia (HCC) [D69.6]       Plan:  Neutropenic fever  Hemoptysis   Remains febrile    Remains profoundly neutropenic   COVID neg  Bcxs neg  Sputum Neg  CXR neg  Serologies pending  Continue vanco and cefepime  Monitor renal function while on vanco  Recommend pulmonary evaluation and bronchoscopy if feasible.  However, given her   significant thrombocytopenia, may be too high risk    CLL  Pancytopenic  Immunosuppressed  Holding chemotherapy as long as feasible    Prognosis guarded

## 2020-05-19 NOTE — PROGRESS NOTES
Plan of care discussed with patient all questions and concerns addressed. Bed in lowest position, bed wheels locked, calls answered promptly, patient steady on feet, SBA due to continuous fluids.    Pt febrile throughout the shift, t max 103.5, patient has tylenol allergy ice packs placed in patient groin and armpits. MD Swenson notified of patient tempeture and critical labs. 1 unit of blood ordered but could not be administered due to high tempeture. One dose of Toradol IV ordered that brought patient down to low grade fever of 100.5f. Iv antibiotics running, transfuse blood once vancomycin complete if tempeture remains low. Blood consent in patinet chart patient type and screened, will continue to monitor and support

## 2020-05-19 NOTE — PROGRESS NOTES
lab from Lab called with critical result of wbc 0.6. platelets 10, ANC 0.01 at 0125. Critical lab result read back to lab.   Dr. swenson notified of critical lab result at 0130  Critical lab result read back by Dr. Swenson.    CBC with differential added for 8 am,continue to monitor

## 2020-05-19 NOTE — PROGRESS NOTES
Est 0930 patient checked, assessment complete, pt stated before transfusion can be started pre meds need given due to reaction in past.  Est 1000 ONC MD notified during rounds, orders received.   Est 1030 RN noticed premeds included Tylenol and Benedryl which patient has allergy to. Clarified with Dr Becerril and she stated only give solu-medrol. Pt aware of new medication.   1100 Premeds given for transfusion.  1200 No reaction to premed.   1223 RBC's arrived and vitals checked, VSS.  1230 Two RN sign off and blood began at 75ml/hr, VSS  1245 15min check for vitals, VSS, blood moved to 100ml/hr  1315 vitals rechecked, VSS, blood moved to 150ml/hr  No sign or symptoms of reaction.  Platelets to begin after RBC transfusion and when blood bank has them available.     Est 1545 blood transfusion completed. No s/s of reaction. Platelets ordered via Dr Becerril and started with 2 RN sign off at 1640. VSS.   1655 Vitals rechecked, VSS  1710 Vitals rechecked, VSS, platelets moved to 150ml/hr. No sign or symptoms of reaction. Est end time 7048-8521. Will pass on in report.

## 2020-05-19 NOTE — PROGRESS NOTES
"Pharmacy Kinetics 54 y.o. female on Vancomycin Day # 2 2020    Currently on Vancomycin 800 mg iv q12hr (0600, 1800)     Provider specified end date: BRENNAN FORRESTER consulted.      Indication for Treatment: Neutropenic fever      Pertinent history per medical record: Admitted on 2020 for neutropenic fever.     Mrs. Johnston is a 53 y/o presented to the ER for evaluation of a fever. She was seen at the infusion center for a transfusion earlier today, and reportedly had a fever of 101.1F. The patient is neutropenic. Her chest x-ray showed \"Minimal atelectasis or pneumonia in the left lower lobe.\" Broad spectrum abx were initiated for a neutropenic fever (potential infection source = PNA).      Other antibiotics: cefepime 2g IV q8h       Allergies: Amoxicillin; Benadryl allergy; Excedrin migraine; Sulfamethoxazole; Tylenol; and Famotidine      List concerns for renal function: age       Pertinent cultures to date:   : Sputum culture - in process   Moderate WBCs.   Few epithelial cells.   Few mixed bacteria, no predominant organism seen.   Specimen Quality Score: 2+   : Blood culture - NGTD  : Urine culture - No growth at 24 hours      MRSA nares swab if pneumonia is a concern (ordered/positive/negative/n-a): ordered    Recent Labs     20  0515 20  1024 20  0040   WBC 1.0* 1.0* 0.6*   NEUTSPOLYS 6.10* 5.70* 7.30*     Recent Labs     20  1024 20  0040   BUN 9 9   CREATININE 0.55 0.64   ALBUMIN 4.1 3.7     Recent Labs     20  1614   VANCOTROUGH 6.5*   No intake or output data in the 24 hours ending 20 1721   /48   Pulse 95   Temp 36.9 °C (98.4 °F) (Oral)   Resp 20   Ht 1.495 m (4' 10.86\")   Wt 45.1 kg (99 lb 6.8 oz)   SpO2 100%  Temp (24hrs), Av.7 °C (99.9 °F), Min:36.8 °C (98.3 °F), Max:38.9 °C (102 °F)      A/P   1. Vancomycin dose change: No.   2. Next vancomycin level: TBD   3. Goal trough: 16 - 20 mcg/mL   4. Comments: 15 % increase in SCr " overnight, obtained Vancomycin level early d/t concern for renal function. Level resulted at 6.5 mcg/mL prior to drug achieving steady state concentrations. Dose 2 was also given 4 hours late, due at 0200 and administered at ~ 0600. Continue current dosing regimen and repeat trough level in a day or two.   5. Cultures in process, VS stabilizing. Remains leukopenic, neutropenic and thrombocytopenic. Tmax for today 100.1 F. Tmax within last 24 hours 100.6 F.     Racquel Chang, PharmD, BCPS

## 2020-05-19 NOTE — CONSULTS
DATE OF SERVICE:  05/18/2020    INFECTIOUS DISEASES CONSULTATION    REASON FOR CONSULT:  Neutropenic fever.    CONSULTING PHYSICIAN:  Cory Hicks MD    HISTORY OF PRESENT ILLNESS:  This is a 54-year-old [place] female who was   recently diagnosed with CLL who is on chemotherapy, who when she went for   infusion, was found to be febrile.  She has been following up with Dr. Summers   for a nonautoimmune hemolytic anemia, thought to be due to alpha thalassemia.    She has had prior normal thyroid function, normal rheumatoid factor, normal   LAKIA and has tested negative for hepatitis B and C, was negative for Markus's   disease and was negative for G6PD deficiency.  She has recently apparently had   a prolonged hospitalization at [facility] on 04/20/2020 with severe   pancytopenia.  She underwent bone marrow biopsy, which showed 70% CLL cells.    She was discharged from [facility] and has been going to the infusion center   at least since 05/16.  She has received platelet transfusion and red blood   cell transfusion there as she had a hemoglobin of 6 and platelets of 7.  When   she was seen yesterday on 05/17, she felt warm, but not febrile; however, she   was neutropenic, thrombocytopenic.  Temperature was not documented at the   infusion center.  In the ED, her temperature is 99.1; however, after   admission, her temperature increased to 102.6 and was associated with   tachycardia, mild tachypnea.  Patient states she has been having problems with   cough productive of yellow sputum, streaked with blood for 4 days prior to   her admission.  Prior to that, she states she has not had any respiratory   symptoms.  She denies any sick contacts.  She just lives at home with her   .  She has no new animal exposures, no recent travel.  She is   originally from the [facility].  She moved to [place] in 2001.  She   denies ever being treated or exposed to tuberculosis.    On review of the records,  the patient had been started on ibrutinib; however,   she has only received a few doses.  She has no diarrhea or GI complaints.    REVIEW OF SYSTEMS:  Positive for the fever, productive cough, hemoptysis.    Otherwise, all other systems are reviewed and are negative.    ALLERGIES:  SHE HAS MULTIPLE ALLERGIES INCLUDING HIVES WITH AMOXICILLIN,   BENADRYL, BACTRIM, TYLENOL.    PAST MEDICAL HISTORY:  Recently diagnosed CLL, autoimmune hemolytic anemia,   alpha thalassemia, chronic elevation in her bilirubin, anxiety, migraines,   hypertension.    PAST SURGICAL HISTORY:  Cholecystectomy.    FAMILY HISTORY:  Pancreatic cancer and leukemia.    SOCIAL HISTORY:  She is , and as stated above, she lives with her   .  She was born in the [facility], moved to the  in 2001.  No   current pets.  She is not currently working.  No known sick contacts.  No   alcohol or drug abuse.  Does not smoke.    PHYSICAL EXAMINATION:  GENERAL:  She is a chronically ill-appearing female, but in no acute distress.  VITAL SIGNS:  T-max 102.6, current temperature 99.3, blood pressure 133/47,   pulse 101, respiratory rate 20, oxygen saturation 100% on room air.  She   weighs 45 kilos.  HEENT:  Head is normocephalic, atraumatic.  Pupils are equal, round, reactive   to light.  Extraocular movements intact.  Oropharynx is clear.  She has   icteric sclerae.  There are no oral ulcerations.  She has fair dentition.  NECK:  Supple.  There is no JVD or stridor.  CARDIOVASCULAR:  Tachycardic.  Regular rate and rhythm with a 2/6 systolic   murmur.  CHEST:  Grossly clear to auscultation bilaterally and unlabored.  There is no   wheezing or rhonchi.  She has decreased breath sounds diffusely.  ABDOMEN:  Soft, nontender, nondistended.  There is no rebound or guarding.  EXTREMITIES:  Show no cyanosis, clubbing, or edema.  NEUROLOGIC:  She is awake, alert, oriented.  Speech is fluent without   dysarthria.  Cranial nerves are intact.  PSYCHIATRIC:   Mood is normal.  Affect is normal.  Behavior is normal.    LABORATORY DATA:  Current labs show white blood cell count of 0.6, H and H of   7.8 and 22.4, platelets of 18, absolute neutrophil count of 40.  Sodium 133,   potassium 3.4, chloride 100, bicarbonate 20, glucose 121, BUN 9, creatinine   0.4, calcium 8.3.  AST 15, ALT 18, alkaline phosphatase 79, total bilirubin   was 3.3, now 1.6.  Lactic acid 1.2.  Urinalysis was negative.  Procalcitonin   was normal at 0.2.  COVID was negative.  C-reactive protein is elevated at   8.65.  Blood cultures x2 were negative.    DIAGNOSTIC DATA:  Chest x-ray shows atelectasis versus pneumonia in the left   lower lobe.    ASSESSMENT AND PLAN:  A 54-year-old female recently with nonautoimmune   hemolytic anemia, recently diagnosed with chronic lymphocytic leukemia who was   started on chemotherapy.  She did receive a platelet and blood transfusions   on the day prior to admission.  She has since developed neutropenic fever.    She was febrile all of yesterday, so far today, her fevers have decreased.    She remains profoundly neutropenic and pancytopenic, so do agree with current   broad-spectrum antimicrobial therapy with cefepime, dose every 8 hours due to   her neutropenia.  We will need to get records from [place] as she likely   had other workup done there, other than that has been described including EKG,   prior chest x-rays, etc.  It would be helpful to know if she had been   previously tested for exposure to endemic fungal infection such as   coccidioidomycosis.  As she is from the [facility], which is endemic for   tuberculosis, she is certainly at risk for having been exposed previously.    The patient denies ever being tested for latent tuberculosis infection or   being treated.  We will check QuantiFERON Gold.  Chest x-ray is unrevealing;   however, she is again profoundly neutropenic, so chest x-ray may be falsely   negative.  So she is also being treated for  pneumonia.  Again, we will need to   get EKG to know what her QT interval is for possible oral deescalation once   her fevers and/or neutropenia resolves.  Blood cultures have been done.  They   are preliminarily negative.  We will continue to follow up culture results.    She has been seen and evaluated by oncology.  Agree with holding chemotherapy   at this time.  In addition, if her symptoms do not improve, recommend   pulmonary evaluation and bronchoscopy if feasible.  However, given her   significant thrombocytopenia, she may be too high risk.  Further   recommendations per culture results and clinical course.    Thank you and we will follow with you.       ____________________________________     MD SUDARSHAN SANTILLAN / BRITT    DD:  05/18/2020 12:41:23  DT:  05/18/2020 18:38:05    D#:  [id number]  Job#:  [id number]

## 2020-05-19 NOTE — PROGRESS NOTES
Patient has extreme frequency to urinate, needing to use the toilet to void approximately ever 20-30 minutes.

## 2020-05-19 NOTE — PROGRESS NOTES
"Pharmacy Kinetics 54 y.o. female on Vancomycin Day # 3 2020    Currently on Vancomycin 800 mg iv q12hr (0600, 1800)      Provider specified end date: BRENNAN FORRESTER consulted.      Indication for Treatment: Neutropenic fever      Pertinent history per medical record: Admitted on 2020 for neutropenic fever.     Mrs. Johnston is a 53 y/o presented to the ER for evaluation of a fever. She was seen at the infusion center for a transfusion earlier today, and reportedly had a fever of 101.1F. The patient is neutropenic. Her chest x-ray showed \"Minimal atelectasis or pneumonia in the left lower lobe.\" Broad spectrum abx were initiated for a neutropenic fever (potential infection source = PNA).      Other antibiotics: cefepime 2g IV q8h       Allergies: Amoxicillin; Benadryl allergy; Excedrin migraine; Sulfamethoxazole; Tylenol; and Famotidine      List concerns for renal function: No major concerns identified.      Pertinent cultures to date:   : Sputum culture - Light growth usual upper respiratory deborah  : Blood culture - NGTD  : Urine culture - No growth at 24 hours      MRSA nares swab if pneumonia is a concern (ordered/positive/negative/n-a): ordered       Recent Labs     20  0515 20  1024 20  0040 20  0033   WBC 1.0* 1.0* 0.6* 0.6*   NEUTSPOLYS 6.10* 5.70* 7.30* 4.30*     Recent Labs     20  1024 20  0040 20  0033   BUN 9 9 7*   CREATININE 0.55 0.64 0.49*   ALBUMIN 4.1 3.7 3.3     Recent Labs     20  1614   VANCOTROUGH 6.5*       Intake/Output Summary (Last 24 hours) at 2020 1505  Last data filed at 2020 0908  Gross per 24 hour   Intake 240 ml   Output --   Net 240 ml      /50   Pulse 81   Temp 36.7 °C (98.1 °F) (Temporal)   Resp 18   Ht 1.495 m (4' 10.86\")   Wt 45.1 kg (99 lb 6.8 oz)   SpO2 100%  Temp (24hrs), Av.6 °C (99.6 °F), Min:36.4 °C (97.5 °F), Max:39.4 °C (103 °F)      A/P   1. Vancomycin dose change: No.   2. Next " vancomycin level: Tomorrow, 5/20/20 at 0530  3. Goal trough: 16 - 20 mcg/mL   Comments:   · 15 % increase in SCr yesterday now trending back downward. Obtained Vancomycin level early yesterday afternoon d/t concern for renal function. Level resulted at 6.5 mcg/mL prior to drug achieving steady state concentrations + Vancomycin dose # 2 was given 4 hours late yesterday - Therefore did not make any dose modifications.   · Plan for repeat level tomorrow morning and clinical pharmacist will adjust dosing as indicated to target trough level as outlined above.   · All cultures remain no growth to date. ID consulting, per note - recommend pulmonary consult and bronchoscopy if feasible.   · Remains pancytopenic with ANC and plts continuing to trend downward. Tmax 103. VS WNL on room air.     Racquel Chang, GarcíaD, BCPS

## 2020-05-19 NOTE — CARE PLAN
Problem: Safety  Goal: Will remain free from injury  Outcome: PROGRESSING AS EXPECTED    Bed alarm on, call light within reach, calls answered promptly, pt calls appropriately, pt free from falls this admission       Problem: Infection  Goal: Will remain free from infection  Outcome: PROGRESSING AS EXPECTED   Pt on antibiotics, VS monitoring for infection, symptom control

## 2020-05-20 NOTE — CARE PLAN
Problem: Communication  Goal: The ability to communicate needs accurately and effectively will improve  Outcome: PROGRESSING AS EXPECTED   Patient informed on plan or care, all questions and concerns addressed  Problem: Infection  Goal: Will remain free from infection  Outcome: PROGRESSING AS EXPECTED   Pt on antibiotic regimen

## 2020-05-20 NOTE — CARE PLAN
Problem: Safety  Goal: Will remain free from falls  Outcome: PROGRESSING AS EXPECTED  Note: Call light within reach, bed in the low and locked position, patient wearing non-slip socks, and rounded on hourly.       Problem: Knowledge Deficit  Goal: Knowledge of the prescribed therapeutic regimen will improve  Outcome: PROGRESSING AS EXPECTED  Note: Patient educated on medications, nursing interventions, and their diagnosis.  Patient demonstrates desire to be involved in their care.

## 2020-05-20 NOTE — PROGRESS NOTES
Assumed patient care at 0700. Pt is A&Ox4 and a standby assist. Bed alarm is on and in the low and locked position. Call light within reach, wearing non-slip socks, and rounded on hourly. IV antibiotics infusing through PIV. Patient denies pain at this time and is resting comfortably.

## 2020-05-20 NOTE — PROGRESS NOTES
Plan of care discussed with kari, bed in lowest locked position, call light with reach, patient ambulating as a stand by assist, calling appropriately    Pt platelets completed at beginning of this RN's shift, no signs or symptoms of transfusion reaction, afebrile this shift, labs taken platelets 32. Hemoglobin stable patient did not require transfusion this shift, continue cefepime, patient remains on neutropenic precautions, will continue to monitor and support

## 2020-05-20 NOTE — PROGRESS NOTES
[facility] Medicine Daily Progress Note    Date of Service  5/19/2020    Chief Complaint  54 y.o. female admitted 5/17/2020 with neutropenic fever.      Interval Problem Updates  Still spiking fever last night. No clear source still. Requiring transfusion. Pt allergic reaction to tylenol and benadryl. Ok premedicated with steroid     Consultants/Specialty  ID  onco    Code Status  Full code    Disposition  tbd    Review of Systems  Review of Systems   Constitutional: Negative for chills and fever.   HENT: Negative for congestion, nosebleeds and sinus pain.    Eyes: Negative for blurred vision and double vision.   Respiratory: Positive for cough and hemoptysis. Negative for shortness of breath and wheezing.    Cardiovascular: Negative for chest pain, palpitations, claudication, leg swelling and PND.   Gastrointestinal: Negative for heartburn, nausea and vomiting.   Genitourinary: Negative for dysuria, frequency, hematuria and urgency.   Musculoskeletal: Negative for back pain, myalgias and neck pain.   Skin: Negative for rash.   Neurological: Negative for dizziness, tingling, tremors and headaches.   Endo/Heme/Allergies: Bruises/bleeds easily.   Psychiatric/Behavioral: Negative for depression, substance abuse and suicidal ideas.        Physical Exam  Temp:  [36.4 °C (97.5 °F)-39.4 °C (103 °F)] 36.7 °C (98 °F)  Pulse:  [] 82  Resp:  [16-18] 18  BP: (108-139)/(42-81) 115/47  SpO2:  [97 %-100 %] 99 %    Physical Exam  Vitals signs and nursing note reviewed.   Constitutional:       Appearance: Normal appearance.   HENT:      Head: Normocephalic and atraumatic.      Nose: Nose normal.      Mouth/Throat:      Mouth: Mucous membranes are moist.      Pharynx: Oropharynx is clear.   Eyes:      General: No scleral icterus.        Right eye: No discharge.         Left eye: No discharge.      Extraocular Movements: Extraocular movements intact.      Conjunctiva/sclera: Conjunctivae normal.      Pupils: Pupils are equal,  round, and reactive to light.   Neck:      Musculoskeletal: Normal range of motion and neck supple. No neck rigidity or muscular tenderness.   Cardiovascular:      Rate and Rhythm: Normal rate.      Pulses: Normal pulses.      Heart sounds: Normal heart sounds. No murmur.   Pulmonary:      Effort: Pulmonary effort is normal. No respiratory distress.      Breath sounds: No stridor. Rales present. No wheezing.   Abdominal:      General: Bowel sounds are normal. There is no distension.      Palpations: Abdomen is soft.      Tenderness: There is no abdominal tenderness. There is no guarding.   Musculoskeletal: Normal range of motion.         General: No swelling or deformity.   Skin:     General: Skin is warm and dry.      Capillary Refill: Capillary refill takes less than 2 seconds.      Coloration: Skin is not jaundiced.   Neurological:      General: No focal deficit present.      Mental Status: She is alert and oriented to person, place, and time.      Cranial Nerves: No cranial nerve deficit.      Motor: No weakness.   Psychiatric:         Mood and Affect: Mood normal.         Behavior: Behavior normal.         Fluids    Intake/Output Summary (Last 24 hours) at 5/19/2020 1847  Last data filed at 5/19/2020 0908  Gross per 24 hour   Intake 240 ml   Output --   Net 240 ml       Laboratory  Recent Labs     05/17/20  1024 05/18/20  0040 05/19/20  0033   WBC 1.0* 0.6* 0.6*   RBC 2.94* 2.58* 2.30*   HEMOGLOBIN 8.9* 7.8* 6.9*   HEMATOCRIT 24.9* 22.4* 20.1*   MCV 84.7 86.8 87.4   MCH 30.3 30.2 30.0   MCHC 35.7* 34.8 34.3   RDW 39.5 41.5 41.4   PLATELETCT 26* 18* 10*   MPV 9.9 9.9 10.8     Recent Labs     05/17/20  1024 05/18/20  0040 05/19/20  0033   SODIUM 129* 133* 134*   POTASSIUM 2.8* 3.4* 3.3*   CHLORIDE 95* 100 103   CO2 21 20 20   GLUCOSE 121* 121* 126*   BUN 9 9 7*   CREATININE 0.55 0.64 0.49*   CALCIUM 9.4 8.3* 8.6                   Imaging  DX-CHEST-PORTABLE (1 VIEW)   Final Result      Minimal atelectasis or  pneumonia in the left lower lobe.           Assessment/Plan  * Neutropenic fever (HCC)- (present on admission)  Assessment & Plan  H/o CLL on chemotherapy, ANC 0.04, continue having fever, continue on vanco and cefepime  Culture NGT   pro calcitonin negative 5/17   No clear source  Pt continues to cough   Bronchoscopy not possible  TB AFB ?? If ID recs ??   Atypical respiratory infection   covid test negative       CLL (chronic lymphocytic leukemia) (HCC)- (present on admission)  Assessment & Plan  Recently diagnosis and on chemotherapy.  Complicated with neutropenia fever  Holding chemo for now  Oncology consulted.   Continue monitoring cell count.   Keep hb > and platelet count >10,000 as per oncology.     Hemoptysis- (present on admission)  Assessment & Plan  Likely related to coughing and upper respiratory infection with low platelets  Will consider CT chest if continue having hemoptysis so far she is feeling better and cough is improved.     Pancytopenia (HCC)  Assessment & Plan  Likely due to CLL and chemotherapy.     Hyponatremia  Assessment & Plan  Asymptomatic  SIADH . Continue to monitor   Will consider fluid restriction 1500 cc/daily     Thrombocytopenia (HCC)- (present on admission)  Assessment & Plan  S/p transfusion 5/19     Anemia- (present on admission)  Assessment & Plan  Due to chemotherapy  Received 2 units of red blood cell a day before admission  Continue close monitoring  She is transfusion dependent and need to keep hb >7.0 and platelet >10,000 as per oncology.        VTE prophylaxis: scd's    Case was discussed with oncology  Case was discussed with ID    High complexity case, high risk for complications.

## 2020-05-20 NOTE — PROGRESS NOTES
Wilbert from Lab called with critical result of WBC 0.3 plt 32 at 0050. Critical lab result read back to wilbert.   This critical lab result is within parameters established by  for this patient

## 2020-05-20 NOTE — PROGRESS NOTES
.  HEMATOLOGY-ONCOLOGY PROGRESS NOTE    CLL newly diagnosed placed on Ibrutinib started on 5/16/20. Stopped on 5/18/20 complaining streaky blood mixed with sputum  - Neutropenic fevers hospitalized on 5/17/20     Subjective:  No fevers, She feels better.   She denies any cough , no bloody streaks anymore.  She denies any bleeding   Objective:  Medications reviewed and notable for:  Current Facility-Administered Medications   Medication Dose   • potassium chloride SA (Kdur) tablet 40 mEq  40 mEq   • [START ON 5/21/2020] potassium chloride SA (Kdur) tablet 40 mEq  40 mEq   • cefepime (MAXIPIME) 2 g in  mL IVPB  2 g   • MD Alert...Vancomycin per Pharmacy     • acyclovir (ZOVIRAX) tablet 400 mg  400 mg   • amLODIPine (NORVASC) tablet 5 mg  5 mg   • folic acid (FOLVITE) tablet 1 mg  1 mg   • senna-docusate (PERICOLACE or SENOKOT S) 8.6-50 MG per tablet 2 Tab  2 Tab    And   • polyethylene glycol/lytes (MIRALAX) PACKET 1 Packet  1 Packet    And   • magnesium hydroxide (MILK OF MAGNESIA) suspension 30 mL  30 mL    And   • bisacodyl (DULCOLAX) suppository 10 mg  10 mg   • Respiratory Therapy Consult     • NS infusion     • acetaminophen (TYLENOL) tablet 650 mg  650 mg   • labetalol (NORMODYNE/TRANDATE) injection 10 mg  10 mg   • ondansetron (ZOFRAN) syringe/vial injection 4 mg  4 mg   • ondansetron (ZOFRAN ODT) dispertab 4 mg  4 mg   • promethazine (PHENERGAN) tablet 12.5-25 mg  12.5-25 mg   • promethazine (PHENERGAN) suppository 12.5-25 mg  12.5-25 mg   • prochlorperazine (COMPAZINE) injection 5-10 mg  5-10 mg   • guaiFENesin dextromethorphan (ROBITUSSIN DM) 100-10 MG/5ML syrup 10 mL  10 mL   • vancomycin (VANCOCIN) 800 mg in  mL IVPB  17 mg/kg       ROS:   Constitutional: +  fatigue, no fevers or chills, no night sweats  Resp: No cough or SOB  Cardio:No chest pain or palpitations  Pschy: No depression or anxiety   Neuro: No headaches, no seizure, no vision changes  GI: no abdominal pain, nausea or vomiting. No  "diarrhea or constipation   All other ROS negative    /52   Pulse 85   Temp 36.6 °C (97.9 °F) (Oral)   Resp 16   Ht 1.495 m (4' 10.86\")   Wt 45.1 kg (99 lb 6.8 oz)   SpO2 99%     General:  comfortable, NAD  HEENT:  sclera anicteric, pupils equal, round, reactive to light, oral cavity and oropharynx clear, mucous membranes moist  Neck:   supple, no lymphadenopathy  Cor:   regular rate and rhythm, no murmurs, rubs, or gallops  Pulm:   clear to auscultation bilaterally  Abd:   bowel sounds present, soft, nontender, nondistended,   Extremities:  warm, no lower extremity edema  Neurologic:  A&O x 3  Pyschiatric:  Appropriate mood and affect    Labs reviewed and notable for:  Recent Labs     05/18/20  0040 05/19/20  0033 05/20/20  0024   WBC 0.6* 0.6* 0.3*   RBC 2.58* 2.30* 2.43*   HEMOGLOBIN 7.8* 6.9* 7.5*   HEMATOCRIT 22.4* 20.1* 20.6*   MCV 86.8 87.4 84.8   MCH 30.2 30.0 30.9   MCHC 34.8 34.3 36.4*   RDW 41.5 41.4 38.5   PLATELETCT 18* 10* 32*   MPV 9.9 10.8 9.5         .@CMP  Recent Results (from the past 24 hour(s))   PLATELETS REQUEST    Collection Time: 05/19/20 12:05 PM   Result Value Ref Range    Component P       PII                 Plts,PheresisIRR    [id number]   transfused   05/19/20   16:28      Product Type Platelets  Pheresis IRR LR     Dispense Status transfused     Unit Number (Barcoded) [id number]     Product Code (Barcoded) F6528N74     Blood Type (Barcoded) 5100    CBC WITH DIFFERENTIAL    Collection Time: 05/20/20 12:24 AM   Result Value Ref Range    WBC 0.3 (LL) 4.8 - 10.8 K/uL    RBC 2.43 (L) 4.20 - 5.40 M/uL    Hemoglobin 7.5 (L) 12.0 - 16.0 g/dL    Hematocrit 20.6 (L) 37.0 - 47.0 %    MCV 84.8 81.4 - 97.8 fL    MCH 30.9 27.0 - 33.0 pg    MCHC 36.4 (H) 33.6 - 35.0 g/dL    RDW 38.5 35.9 - 50.0 fL    Platelet Count 32 (LL) 164 - 446 K/uL    MPV 9.5 9.0 - 12.9 fL    Neutrophils-Polys 7.20 (L) 44.00 - 72.00 %    Lymphocytes 91.90 (H) 22.00 - 41.00 %    Monocytes 0.00 0.00 - 13.40 %    " Eosinophils 0.90 0.00 - 6.90 %    Basophils 0.00 0.00 - 1.80 %    Nucleated RBC 0.00 /100 WBC    Neutrophils (Absolute) 0.02 (LL) 2.00 - 7.15 K/uL    Lymphs (Absolute) 0.28 (L) 1.00 - 4.80 K/uL    Monos (Absolute) 0.00 0.00 - 0.85 K/uL    Eos (Absolute) 0.00 0.00 - 0.51 K/uL    Baso (Absolute) 0.00 0.00 - 0.12 K/uL    NRBC (Absolute) 0.00 K/uL    Anisocytosis 2+     Microcytosis 2+    Comp Metabolic Panel    Collection Time: 05/20/20 12:24 AM   Result Value Ref Range    Sodium 139 135 - 145 mmol/L    Potassium 3.1 (L) 3.6 - 5.5 mmol/L    Chloride 109 96 - 112 mmol/L    Co2 21 20 - 33 mmol/L    Anion Gap 9.0 7.0 - 16.0    Glucose 112 (H) 65 - 99 mg/dL    Bun 9 8 - 22 mg/dL    Creatinine 0.27 (L) 0.50 - 1.40 mg/dL    Calcium 8.7 8.5 - 10.5 mg/dL    AST(SGOT) 8 (L) 12 - 45 U/L    ALT(SGPT) 12 2 - 50 U/L    Alkaline Phosphatase 73 30 - 99 U/L    Total Bilirubin 2.5 (H) 0.1 - 1.5 mg/dL    Albumin 3.6 3.2 - 4.9 g/dL    Total Protein 6.1 6.0 - 8.2 g/dL    Globulin 2.5 1.9 - 3.5 g/dL    A-G Ratio 1.4 g/dL   ESTIMATED GFR    Collection Time: 05/20/20 12:24 AM   Result Value Ref Range    GFR If African American >60 >60 mL/min/1.73 m 2    GFR If Non African American >60 >60 mL/min/1.73 m 2   DIFFERENTIAL MANUAL    Collection Time: 05/20/20 12:24 AM   Result Value Ref Range    Manual Diff Status PERFORMED    PERIPHERAL SMEAR REVIEW    Collection Time: 05/20/20 12:24 AM   Result Value Ref Range    Peripheral Smear Review see below    PLATELET ESTIMATE    Collection Time: 05/20/20 12:24 AM   Result Value Ref Range    Plt Estimation Marked Decrease    MORPHOLOGY    Collection Time: 05/20/20 12:24 AM   Result Value Ref Range    RBC Morphology Present     Polychromia 1+     Poikilocytosis 1+     Ovalocytes 1+    IMMATURE PLT FRACTION    Collection Time: 05/20/20 12:24 AM   Result Value Ref Range    Imm. Plt Fraction 0.4 (L) 0.6 - 13.1 K/uL   VANCOMYCIN TROUGH    Collection Time: 05/20/20  5:48 AM   Result Value Ref Range     Vancomycin Trough 6.8 (L) 10.0 - 20.0 ug/mL       Diagnostic imaging:      Assessment and Recommendations:  - Neutropenic fevers - on broad spectrum antibiotics Cefepime and vanco, continues to spike. ID following him.  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 Ordered IVIG level pending   - Prolonged cytopenias due to # 2, transfuse to keep HGB > 7.0 and PLT > 10,000 and all blood products CMV neg and irradiated   - History of PLT tranfusion reaction - premedicate tylenol, benadryl and Solucortef 100 mg as premedications   - History of non immune hemolytic anemia   - ECOG PS 0     Plan:   - She is afebrile   - Cultures uptodate negative   - Continue antibiotic coverage   - Likely the blood streaks could be related to profound thrombocytopenia rather than Ibrutinib   - Replace electrolytes - K   - Labs reviewed isolate bilirubinemia - monitor could be immune mediated, medications , ordered labs to do hemolysis, ret count, LDH, Hapto, LYNSEY.  - IGG level pending - will benefit IVIG     High complexicity/Drug monitoring     We will continue to follow with you; please call with any questions, [phone].      This patient was seen under COVID 19 pandemic disaster response conditions.  During a disaster, the provisions of care is subject to the Crisis Standard of Care     Please note that this dictation was created using voice recognition software.  I have made every reasonable attempt to correct obvious error, but I expected that there are errors of grammar and possibly context  that I did not discover before finalizing the note     Quality-Core Measures        Karissa Moore MD  [facility]   [phone]

## 2020-05-20 NOTE — PROGRESS NOTES
Hospital Medicine Daily Progress Note    Date of Service  5/20/2020    Chief Complaint  54 y.o. female admitted 5/17/2020 with neutropenic fever.      Interval Problem Updates  Still spiking fever last night. No clear source still. Requiring transfusion. Pt allergic reaction to tylenol and benadryl. Ok premedicated with steroid   5/20-patient is feeling better today.  Cough is improving.  Afebrile.  We will continue antibiotic per ID.  If patient afebrile for 3 days we will discharge her home.  Appreciate recommendations from oncology and infectious disease    Consultants/Specialty  ID  onco    Code Status  Full code    Disposition  tbd    Review of Systems  Review of Systems   Constitutional: Negative for chills and fever.   HENT: Negative for congestion, nosebleeds and sinus pain.    Eyes: Negative for blurred vision and double vision.   Respiratory: Positive for cough and hemoptysis. Negative for shortness of breath and wheezing.    Cardiovascular: Negative for chest pain, palpitations, claudication, leg swelling and PND.   Gastrointestinal: Negative for heartburn, nausea and vomiting.   Genitourinary: Negative for dysuria, frequency, hematuria and urgency.   Musculoskeletal: Negative for back pain, myalgias and neck pain.   Skin: Negative for rash.   Neurological: Negative for dizziness, tingling, tremors and headaches.   Endo/Heme/Allergies: Bruises/bleeds easily.   Psychiatric/Behavioral: Negative for depression, substance abuse and suicidal ideas.        Physical Exam  Temp:  [36.6 °C (97.9 °F)-36.9 °C (98.4 °F)] 36.8 °C (98.3 °F)  Pulse:  [77-94] 80  Resp:  [16-18] 16  BP: (115-137)/(47-62) 126/56  SpO2:  [98 %-100 %] 100 %    Physical Exam  Vitals signs and nursing note reviewed.   Constitutional:       Appearance: Normal appearance.   HENT:      Head: Normocephalic and atraumatic.      Nose: Nose normal.      Mouth/Throat:      Mouth: Mucous membranes are moist.      Pharynx: Oropharynx is clear.   Eyes:       General: No scleral icterus.        Right eye: No discharge.         Left eye: No discharge.      Extraocular Movements: Extraocular movements intact.      Conjunctiva/sclera: Conjunctivae normal.      Pupils: Pupils are equal, round, and reactive to light.   Neck:      Musculoskeletal: Normal range of motion and neck supple. No neck rigidity or muscular tenderness.   Cardiovascular:      Rate and Rhythm: Normal rate.      Pulses: Normal pulses.      Heart sounds: Normal heart sounds. No murmur.   Pulmonary:      Effort: Pulmonary effort is normal. No respiratory distress.      Breath sounds: No stridor. Rales present. No wheezing.   Abdominal:      General: Bowel sounds are normal. There is no distension.      Palpations: Abdomen is soft.      Tenderness: There is no abdominal tenderness. There is no guarding.   Musculoskeletal: Normal range of motion.         General: No swelling or deformity.   Skin:     General: Skin is warm and dry.      Capillary Refill: Capillary refill takes less than 2 seconds.      Coloration: Skin is not jaundiced.   Neurological:      General: No focal deficit present.      Mental Status: She is alert and oriented to person, place, and time.      Cranial Nerves: No cranial nerve deficit.      Motor: No weakness.   Psychiatric:         Mood and Affect: Mood normal.         Behavior: Behavior normal.         Fluids  No intake or output data in the 24 hours ending 05/20/20 1550    Laboratory  Recent Labs     05/18/20  0040 05/19/20  0033 05/20/20  0024   WBC 0.6* 0.6* 0.3*   RBC 2.58* 2.30* 2.43*   HEMOGLOBIN 7.8* 6.9* 7.5*   HEMATOCRIT 22.4* 20.1* 20.6*   MCV 86.8 87.4 84.8   MCH 30.2 30.0 30.9   MCHC 34.8 34.3 36.4*   RDW 41.5 41.4 38.5   PLATELETCT 18* 10* 32*   MPV 9.9 10.8 9.5     Recent Labs     05/18/20  0040 05/19/20  0033 05/20/20  0024   SODIUM 133* 134* 139   POTASSIUM 3.4* 3.3* 3.1*   CHLORIDE 100 103 109   CO2 20 20 21   GLUCOSE 121* 126* 112*   BUN 9 7* 9   CREATININE  0.64 0.49* 0.27*   CALCIUM 8.3* 8.6 8.7                   Imaging  DX-CHEST-PORTABLE (1 VIEW)   Final Result      Minimal atelectasis or pneumonia in the left lower lobe.           Assessment/Plan  * Neutropenic fever (HCC)- (present on admission)  Assessment & Plan  H/o CLL on chemotherapy, ANC 0.04, continue having fever, continue on vanco and cefepime  Culture NGT   pro calcitonin negative 5/17   Bronchoscopy not possible  Sputum culture MSSA. Continue cefepime per ID   covid test negative       CLL (chronic lymphocytic leukemia) (HCC)- (present on admission)  Assessment & Plan  Recently diagnosis and on chemotherapy.  Complicated with neutropenia fever  Holding chemo for now  Oncology consulted.   Continue monitoring cell count.   Keep hb > and platelet count >10,000 as per oncology.     Hemoptysis- (present on admission)  Assessment & Plan  Likely related to coughing and upper respiratory infection with low platelets  Will consider CT chest if continue having hemoptysis    Pancytopenia (HCC)- (present on admission)  Assessment & Plan  Likely due to CLL and chemotherapy.     Hyponatremia- (present on admission)  Assessment & Plan  Asymptomatic  SIADH . Continue to monitor   Will consider fluid restriction 1500 cc/daily     Thrombocytopenia (HCC)- (present on admission)  Assessment & Plan  S/p transfusion 5/19     Anemia- (present on admission)  Assessment & Plan  Due to chemotherapy  Received 2 units of red blood cell a day before admission  Continue close monitoring  She is transfusion dependent and need to keep hb >7.0 and platelet >10,000 as per oncology.        VTE prophylaxis: scd's

## 2020-05-20 NOTE — PROGRESS NOTES
Assumed care est time 0715. Bedside shift report performed and new staff introduced, pt resting in bed, calm and pleasant demeanor. Fall and safety precautions in place, bed alarm in place, pt uses call light appropriately. Pt has no needs at this time, hourly rounds ongoing, call light and belongings within reach.

## 2020-05-20 NOTE — PROGRESS NOTES
Infectious Disease Progress Note    Author: Lori Boone M.D. Date & Time of service: 2020  12:47 PM    Chief Complaint:  Neutropenic fever    Interval History:  54-year-old [place] female with nonautoimmune hemolytic anemia and CLL recently started on chemo admitted with neutropenic fever and cough   Temp 103, WBC 0.6, decreased cough Low grade temps yesterday then spiked this morning-no new pain or sxs   AF WBC 0.3 decreased cough-no hemoptysis. Feeling better today. No new complaints except wants to go home  Labs Reviewed, Medications Reviewed and Radiology Reviewed.    Review of Systems:  Review of Systems   Constitutional: Negative for chills and fever.   Respiratory: Positive for cough. Negative for hemoptysis, shortness of breath and wheezing.         Decreased   Cardiovascular: Negative for chest pain.   Gastrointestinal: Negative for abdominal pain, diarrhea, nausea and vomiting.   Skin: Negative for rash.   All other systems reviewed and are negative.      Hemodynamics:  Temp (24hrs), Av.8 °C (98.2 °F), Min:36.6 °C (97.9 °F), Max:36.9 °C (98.4 °F)  Temperature: 36.8 °C (98.3 °F)  Pulse  Av.8  Min: 77  Max: 129   Blood Pressure: 126/56       Physical Exam:  Physical Exam  Vitals signs and nursing note reviewed.   Constitutional:       General: She is not in acute distress.     Appearance: She is not ill-appearing, toxic-appearing or diaphoretic.   HENT:      Nose: No rhinorrhea.      Mouth/Throat:      Pharynx: No oropharyngeal exudate.   Eyes:      General: Scleral icterus present.         Right eye: No discharge.         Left eye: No discharge.      Extraocular Movements: Extraocular movements intact.      Pupils: Pupils are equal, round, and reactive to light.   Cardiovascular:      Rate and Rhythm: Tachycardia present.   Pulmonary:      Effort: Pulmonary effort is normal. No respiratory distress.      Breath sounds: No stridor. No wheezing or rhonchi.      Comments:  Decreased BS  Abdominal:      General: There is no distension.      Palpations: Abdomen is soft. There is no mass.      Tenderness: There is no abdominal tenderness. There is no guarding.   Skin:     Coloration: Skin is jaundiced.      Findings: Bruising present.   Neurological:      General: No focal deficit present.      Mental Status: She is alert and oriented to person, place, and time.      Cranial Nerves: No cranial nerve deficit.   Psychiatric:         Mood and Affect: Mood normal.         Behavior: Behavior normal.         Meds:    Current Facility-Administered Medications:   •  potassium chloride SA  •  cefepime  •  acyclovir  •  amLODIPine  •  folic acid  •  senna-docusate **AND** polyethylene glycol/lytes **AND** magnesium hydroxide **AND** bisacodyl  •  Respiratory Therapy Consult  •  NS  •  acetaminophen  •  labetalol  •  ondansetron  •  ondansetron  •  promethazine  •  promethazine  •  prochlorperazine  •  guaiFENesin dextromethorphan    Labs:  Recent Labs     05/18/20  0040 05/19/20  0033 05/20/20  0024   WBC 0.6* 0.6* 0.3*   RBC 2.58* 2.30* 2.43*   HEMOGLOBIN 7.8* 6.9* 7.5*   HEMATOCRIT 22.4* 20.1* 20.6*   MCV 86.8 87.4 84.8   MCH 30.2 30.0 30.9   RDW 41.5 41.4 38.5   PLATELETCT 18* 10* 32*   MPV 9.9 10.8 9.5   NEUTSPOLYS 7.30* 4.30* 7.20*   LYMPHOCYTES 91.70* 94.90* 91.90*   MONOCYTES 0.90 0.00 0.00   EOSINOPHILS 0.00 0.90 0.90   BASOPHILS 0.00 0.00 0.00   RBCMORPHOLO Present Present Present     Recent Labs     05/18/20  0040 05/19/20  0033 05/20/20  0024   SODIUM 133* 134* 139   POTASSIUM 3.4* 3.3* 3.1*   CHLORIDE 100 103 109   CO2 20 20 21   GLUCOSE 121* 126* 112*   BUN 9 7* 9     Recent Labs     05/18/20  0040 05/19/20  0033 05/20/20  0024   ALBUMIN 3.7 3.3 3.6   TBILIRUBIN 1.6* 1.0 2.5*   ALKPHOSPHAT 79 71 73   TOTPROTEIN 6.4 6.1 6.1   ALTSGPT 18 14 12   ASTSGOT 15 9* 8*   CREATININE 0.64 0.49* 0.27*       Imaging:  Dx-chest-portable (1 View)    Result Date: 5/17/2020 5/17/2020 10:52 AM  HISTORY/REASON FOR EXAM:  Sepsis. Shortness of breath. TECHNIQUE/EXAM DESCRIPTION AND NUMBER OF VIEWS: Single portable view of the chest. COMPARISON:  None FINDINGS: The cardiac silhouette is within normal limits. There is minimal opacity in the retrocardiac region in the left lower lobe which represent minimal atelectasis or pneumonia. The right lung is clear. No pleural effusion is noted.     Minimal atelectasis or pneumonia in the left lower lobe.      Micro:  Results     Procedure Component Value Units Date/Time    CULTURE RESPIRATORY W/ GRM STN [[id number]]  (Abnormal)  (Susceptibility) Collected:  05/18/20 0906    Order Status:  Completed Specimen:  Respirate from Sputum Updated:  05/20/20 0931     Significant Indicator POS     Source RESP     Site SPUTUM     Culture Result Light growth usual upper respiratory deborah     Gram Stain Result Moderate WBCs.  Few epithelial cells.  Few mixed bacteria, no predominant organism seen.  Specimen Quality Score: 2+       Culture Result Staphylococcus aureus  Light growth      Narrative:       Collected By:[id number] WANDY KHANNA  Collected By:[id number] WANDY KHANNA    Susceptibility     Staphylococcus aureus (1)     Antibiotic Interpretation Microscan Method Status    Azithromycin Sensitive <=2 mcg/mL TYRELL Final    Clindamycin Sensitive <=0.5 mcg/mL TYRELL Final    Cefazolin Sensitive <=8 mcg/mL TYRELL Final    Ceftaroline Sensitive <=0.5 mcg/mL TYRELL Final    Erythromycin Sensitive <=0.25 mcg/mL TYRELL Final    Ampicillin/sulbactam Sensitive <=8/4 mcg/mL TYRELL Final    Oxacillin Sensitive <=0.25 mcg/mL TYRELL Final    Vancomycin Sensitive 1 mcg/mL TYRELL Final    Penicillin Resistant >8 mcg/mL TYRELL Final    Pip/Tazobactam Sensitive <=4 mcg/mL TYRELL Final    Trimeth/Sulfa Sensitive <=0.5/9.5 mcg/mL TYRELL Final    Tetracycline Sensitive <=4 mcg/mL TYRELL Final                   URINE CULTURE(NEW) [[id number]] Collected:  05/17/20 1024    Order Status:  Completed Specimen:  Urine Updated:  05/19/20 0761  "    Significant Indicator NEG     Source UR     Site -     Culture Result No growth at 48 hours.    Narrative:       Indication for culture:->Septic Shock: Persistent  hypotension,  Lactic acid > 4, vasopressors/inotropes started  Indication for culture:->Septic Shock: Persistent    GRAM STAIN [[id number]] Collected:  05/18/20 0906    Order Status:  Completed Specimen:  Respirate Updated:  05/18/20 1608     Significant Indicator .     Source RESP     Site SPUTUM     Gram Stain Result Moderate WBCs.  Few epithelial cells.  Few mixed bacteria, no predominant organism seen.  Specimen Quality Score: 2+      Narrative:       Collected By:[id number] [employer].  Collected By:[id number] [employer] JEY    BLOOD CULTURE [[id number]] Collected:  05/17/20 1110    Order Status:  Completed Specimen:  Blood from Peripheral Updated:  05/18/20 0732     Significant Indicator NEG     Source BLD     Site PERIPHERAL     Culture Result No Growth  Note: Blood cultures are incubated for 5 days and  are monitored continuously.Positive blood cultures  are called to the RN and reported as soon as  they are identified.      Narrative:       2 of 2 blood culture x2  Sites order. Per Hospital Policy:  Only change Specimen Src: to \"Line\" if specified by physician  order.  No site indicated    BLOOD CULTURE [[id number]] Collected:  05/17/20 1024    Order Status:  Completed Specimen:  Blood from Peripheral Updated:  05/18/20 0732     Significant Indicator NEG     Source BLD     Site PERIPHERAL     Culture Result No Growth  Note: Blood cultures are incubated for 5 days and  are monitored continuously.Positive blood cultures  are called to the RN and reported as soon as  they are identified.      Narrative:       1 of 2 for Blood Culture x 2 sites order. Per Hospital  Policy: Only change Specimen Src: to \"Line\" if specified by  physician order.  No site indicated    MRSA By PCR (Amp) [[id number]]     Order Status:  Sent Specimen:  Respirate from Nares     " Blood Culture [[id number]]     Order Status:  Canceled Specimen:  Blood from Peripheral     SARS-CoV-2, PCR (In-House) [[id number]] Collected:  05/17/20 1145    Order Status:  Completed Updated:  05/17/20 1302     SARS-CoV-2 Source NP Swab     SARS-CoV-2 by PCR NotDetected     Comment: [facility] providers: PLEASE REFER TO DE-ESCALATION AND RETESTING PROTOCOL  on [email or web address]  **The [email or web address] GeneXpert Xpress SARS-CoV-2 Test has been made available for  use under the Emergency Use Authorization (EUA) only.         Narrative:       Rapid    COVID/SARS CoV-2 [[id number]] Collected:  05/17/20 1145    Order Status:  Completed Specimen:  Respirate from Nasopharyngeal Updated:  05/17/20 1159     COVID Order Status Received    Narrative:       Rapid    URINALYSIS [[id number]]  (Abnormal) Collected:  05/17/20 1024    Order Status:  Completed Specimen:  Urine Updated:  05/17/20 1105     Color Yellow     Character Clear     Specific Gravity 1.003     Ph 7.0     Glucose Negative mg/dL      Ketones Negative mg/dL      Protein Negative mg/dL      Bilirubin Negative     Urobilinogen, Urine 1.0     Nitrite Negative     Leukocyte Esterase Negative     Occult Blood Moderate     Micro Urine Req Microscopic    Narrative:       Indication for culture:->Septic Shock: Persistent  hypotension,  Lactic acid > 4, vasopressors/inotropes started          Assessment:  Active Hospital Problems    Diagnosis   • *Neutropenic fever (HCC) [D70.9, R50.81]   • CLL (chronic lymphocytic leukemia) (HCC) [C91.10]   • Hemoptysis [R04.2]   • Hyponatremia [E87.1]   • Hypokalemia [E87.6]   • Pancytopenia (HCC) [D61.818]   • Anemia [D64.9]   • Thrombocytopenia (HCC) [D69.6]       Plan:  Neutropenic fever  Hemoptysis   Afebrile    Remains profoundly neutropenic   COVID neg  Bcxs neg  Sputum +MSSA  CXR neg  Serologies pending  DC vanco and continue cefepime  Anticipate change to PO once fever resolved at least more 3 days and if blood cxs remain  neg    CLL  Pancytopenic  Immunosuppressed  Holding chemotherapy as long as feasible    Prognosis guarded    DW Pharm

## 2020-05-21 NOTE — PROGRESS NOTES
Infectious Disease Progress Note    Author: Lori Boone M.D. Date & Time of service: 2020  12:09 PM    Chief Complaint:  Neutropenic fever  PNA    Interval History:  54-year-old [place] female with nonautoimmune hemolytic anemia and CLL recently started on chemo admitted with neutropenic fever and cough   Temp 103, WBC 0.6, decreased cough Low grade temps yesterday then spiked this morning-no new pain or sxs   AF WBC 0.3 decreased cough-no hemoptysis. Feeling better today. No new complaints except wants to go home   Tmax 101.1 WBC 0.6 slight cough-using suction. Some in sputum No new complaints  Labs Reviewed, Medications Reviewed and Radiology Reviewed.    Review of Systems:  Review of Systems   Constitutional: Positive for fever. Negative for chills.   Respiratory: Positive for cough. Negative for hemoptysis, shortness of breath and wheezing.         Decreased   Cardiovascular: Negative for chest pain and palpitations.   Gastrointestinal: Negative for abdominal pain, diarrhea, nausea and vomiting.   Skin: Negative for rash.   All other systems reviewed and are negative.      Hemodynamics:  Temp (24hrs), Av.3 °C (99.2 °F), Min:36.6 °C (97.9 °F), Max:38.4 °C (101.1 °F)  Temperature: 36.6 °C (97.9 °F)  Pulse  Av.1  Min: 77  Max: 129   Blood Pressure: 126/45       Physical Exam:  Physical Exam  Vitals signs and nursing note reviewed.   Constitutional:       General: She is not in acute distress.     Appearance: She is not ill-appearing, toxic-appearing or diaphoretic.   HENT:      Nose: No rhinorrhea.      Mouth/Throat:      Pharynx: No oropharyngeal exudate or posterior oropharyngeal erythema.   Eyes:      General: Scleral icterus present.         Right eye: No discharge.         Left eye: No discharge.      Extraocular Movements: Extraocular movements intact.      Pupils: Pupils are equal, round, and reactive to light.   Cardiovascular:      Rate and Rhythm: Tachycardia present.       Heart sounds: No murmur.   Pulmonary:      Effort: Pulmonary effort is normal. No respiratory distress.      Breath sounds: No stridor. No wheezing or rhonchi.      Comments: Decreased BS  Abdominal:      General: There is no distension.      Palpations: Abdomen is soft. There is no mass.      Tenderness: There is no abdominal tenderness. There is no guarding.   Skin:     Coloration: Skin is jaundiced.      Findings: Bruising present.   Neurological:      General: No focal deficit present.      Mental Status: She is alert and oriented to person, place, and time.      Cranial Nerves: No cranial nerve deficit.      Motor: No weakness.   Psychiatric:         Mood and Affect: Mood normal.         Behavior: Behavior normal.         Meds:    Current Facility-Administered Medications:   •  ibuprofen  •  hydrocortisone sodium succinate PF  •  potassium chloride SA  •  cefepime  •  acyclovir  •  amLODIPine  •  folic acid  •  senna-docusate **AND** polyethylene glycol/lytes **AND** magnesium hydroxide **AND** bisacodyl  •  Respiratory Therapy Consult  •  NS  •  acetaminophen  •  labetalol  •  ondansetron  •  ondansetron  •  promethazine  •  promethazine  •  prochlorperazine  •  guaiFENesin dextromethorphan    Labs:  Recent Labs     05/19/20  0033 05/20/20  0024 05/21/20  0022   WBC 0.6* 0.3* 0.6*   RBC 2.30* 2.43* 2.44*   HEMOGLOBIN 6.9* 7.5* 7.3*   HEMATOCRIT 20.1* 20.6* 20.5*   MCV 87.4 84.8 84.0   MCH 30.0 30.9 29.9   RDW 41.4 38.5 38.5   PLATELETCT 10* 32* 23*   MPV 10.8 9.5 9.7   NEUTSPOLYS 4.30* 7.20* 3.60*   LYMPHOCYTES 94.90* 91.90* 95.50*   MONOCYTES 0.00 0.00 0.40   EOSINOPHILS 0.90 0.90 0.40   BASOPHILS 0.00 0.00 0.00   RBCMORPHOLO Present Present Present     Recent Labs     05/19/20  0033 05/20/20  0024 05/21/20  0022   SODIUM 134* 139 138   POTASSIUM 3.3* 3.1* 3.7   CHLORIDE 103 109 108   CO2 20 21 20   GLUCOSE 126* 112* 97   BUN 7* 9 7*     Recent Labs     05/19/20  0033 05/20/20  0024 05/21/20  0022   ALBUMIN  3.3 3.6 3.6   TBILIRUBIN 1.0 2.5* 1.8*   ALKPHOSPHAT 71 73 73   TOTPROTEIN 6.1 6.1 6.0   ALTSGPT 14 12 12   ASTSGOT 9* 8* 7*   CREATININE 0.49* 0.27* 0.48*       Imaging:  Dx-chest-portable (1 View)    Result Date: 5/17/2020 5/17/2020 10:52 AM HISTORY/REASON FOR EXAM:  Sepsis. Shortness of breath. TECHNIQUE/EXAM DESCRIPTION AND NUMBER OF VIEWS: Single portable view of the chest. COMPARISON:  None FINDINGS: The cardiac silhouette is within normal limits. There is minimal opacity in the retrocardiac region in the left lower lobe which represent minimal atelectasis or pneumonia. The right lung is clear. No pleural effusion is noted.     Minimal atelectasis or pneumonia in the left lower lobe.      Micro:  Results     Procedure Component Value Units Date/Time    CULTURE RESPIRATORY W/ GRM STN [[id number]]  (Abnormal)  (Susceptibility) Collected:  05/18/20 0906    Order Status:  Completed Specimen:  Respirate from Sputum Updated:  05/20/20 0931     Significant Indicator POS     Source RESP     Site SPUTUM     Culture Result Light growth usual upper respiratory deborah     Gram Stain Result Moderate WBCs.  Few epithelial cells.  Few mixed bacteria, no predominant organism seen.  Specimen Quality Score: 2+       Culture Result Staphylococcus aureus  Light growth      Narrative:       Collected By:[id number] WANDY KHANNA  Collected By:[id number] WANDY KHANNA    Susceptibility     Staphylococcus aureus (1)     Antibiotic Interpretation Microscan Method Status    Azithromycin Sensitive <=2 mcg/mL TYRELL Final    Clindamycin Sensitive <=0.5 mcg/mL TYRELL Final    Cefazolin Sensitive <=8 mcg/mL TYRELL Final    Ceftaroline Sensitive <=0.5 mcg/mL TYRELL Final    Erythromycin Sensitive <=0.25 mcg/mL TYRELL Final    Ampicillin/sulbactam Sensitive <=8/4 mcg/mL TYRELL Final    Oxacillin Sensitive <=0.25 mcg/mL TYRELL Final    Vancomycin Sensitive 1 mcg/mL TYRELL Final    Penicillin Resistant >8 mcg/mL TYRELL Final    Pip/Tazobactam Sensitive <=4 mcg/mL TYRELL Final  "   Trimeth/Sulfa Sensitive <=0.5/9.5 mcg/mL TYRELL Final    Tetracycline Sensitive <=4 mcg/mL TYRELL Final                   URINE CULTURE(NEW) [[id number]] Collected:  05/17/20 1024    Order Status:  Completed Specimen:  Urine Updated:  05/19/20 0722     Significant Indicator NEG     Source UR     Site -     Culture Result No growth at 48 hours.    Narrative:       Indication for culture:->Septic Shock: Persistent  hypotension,  Lactic acid > 4, vasopressors/inotropes started  Indication for culture:->Septic Shock: Persistent    GRAM STAIN [[id number]] Collected:  05/18/20 0906    Order Status:  Completed Specimen:  Respirate Updated:  05/18/20 1608     Significant Indicator .     Source RESP     Site SPUTUM     Gram Stain Result Moderate WBCs.  Few epithelial cells.  Few mixed bacteria, no predominant organism seen.  Specimen Quality Score: 2+      Narrative:       Collected By:[id number] WANDY KHANNA  Collected By:[id number] WANDY KHANNA    BLOOD CULTURE [[id number]] Collected:  05/17/20 1110    Order Status:  Completed Specimen:  Blood from Peripheral Updated:  05/18/20 0732     Significant Indicator NEG     Source BLD     Site PERIPHERAL     Culture Result No Growth  Note: Blood cultures are incubated for 5 days and  are monitored continuously.Positive blood cultures  are called to the RN and reported as soon as  they are identified.      Narrative:       2 of 2 blood culture x2  Sites order. Per Hospital Policy:  Only change Specimen Src: to \"Line\" if specified by physician  order.  No site indicated    BLOOD CULTURE [[id number]] Collected:  05/17/20 1024    Order Status:  Completed Specimen:  Blood from Peripheral Updated:  05/18/20 0732     Significant Indicator NEG     Source BLD     Site PERIPHERAL     Culture Result No Growth  Note: Blood cultures are incubated for 5 days and  are monitored continuously.Positive blood cultures  are called to the RN and reported as soon as  they are identified.      Narrative:       1 " "of 2 for Blood Culture x 2 sites order. Per Hospital  Policy: Only change Specimen Src: to \"Line\" if specified by  physician order.  No site indicated    Blood Culture [[id number]]     Order Status:  Canceled Specimen:  Blood from Peripheral     SARS-CoV-2, PCR (In-House) [[id number]] Collected:  05/17/20 1145    Order Status:  Completed Updated:  05/17/20 1302     SARS-CoV-2 Source NP Swab     SARS-CoV-2 by PCR NotDetected     Comment: [facility] providers: PLEASE REFER TO DE-ESCALATION AND RETESTING PROTOCOL  on [email or web address]  **The Exacter GeneXpert Xpress SARS-CoV-2 Test has been made available for  use under the Emergency Use Authorization (EUA) only.         Narrative:       Rapid    COVID/SARS CoV-2 [[id number]] Collected:  05/17/20 1145    Order Status:  Completed Specimen:  Respirate from Nasopharyngeal Updated:  05/17/20 1159     COVID Order Status Received    Narrative:       Rapid    URINALYSIS [[id number]]  (Abnormal) Collected:  05/17/20 1024    Order Status:  Completed Specimen:  Urine Updated:  05/17/20 1105     Color Yellow     Character Clear     Specific Gravity 1.003     Ph 7.0     Glucose Negative mg/dL      Ketones Negative mg/dL      Protein Negative mg/dL      Bilirubin Negative     Urobilinogen, Urine 1.0     Nitrite Negative     Leukocyte Esterase Negative     Occult Blood Moderate     Micro Urine Req Microscopic    Narrative:       Indication for culture:->Septic Shock: Persistent  hypotension,  Lactic acid > 4, vasopressors/inotropes started    MRSA By PCR (Amp) [[id number]] Collected:  05/17/20 0000    Order Status:  Canceled Specimen:  Other from Nares           Assessment:  Active Hospital Problems    Diagnosis   • *Neutropenic fever (HCC) [D70.9, R50.81]   • CLL (chronic lymphocytic leukemia) (HCC) [C91.10]   • Hemoptysis [R04.2]   • Hyponatremia [E87.1]   • Hypokalemia [E87.6]   • Pancytopenia (HCC) [D61.818]   • Anemia [D64.9]   • Thrombocytopenia (HCC) [D69.6]       Plan:  Neutropenic " fever, not improved  Hemoptysis   Recurrent fever  Remains profoundly neutropenic   COVID neg  Bcxs neg  Sputum +MSSA  CXR neg  QuantiGold neg  1, 3 BTG +  Cocci serology pending  Continue cefepime  Add fluc    CLL  Pancytopenic  Immunosuppressed  Holding chemotherapy as long as feasible    Prognosis guarded

## 2020-05-21 NOTE — PROGRESS NOTES
".  HEMATOLOGY-ONCOLOGY PROGRESS NOTE    CLL newly diagnosed placed on Ibrutinib started on 5/16/20. Stopped on 5/18/20 complaining streaky blood mixed with sputum  - Neutropenic fevers hospitalized on 5/17/20     Subjective:  She started spiking again T max 101.1  Sputum culture S. Aureus   She denies any cough . She states that sputum has mild streaky blood , no hemoptysis      Objective:  Medications reviewed and notable for:  Current Facility-Administered Medications   Medication Dose   • ibuprofen (MOTRIN) tablet 600 mg  600 mg   • hydrocortisone sodium succinate PF (SOLU-CORTEF) 100 MG injection 100 mg  100 mg   • potassium chloride SA (Kdur) tablet 40 mEq  40 mEq   • cefepime (MAXIPIME) 2 g in  mL IVPB  2 g   • acyclovir (ZOVIRAX) tablet 400 mg  400 mg   • amLODIPine (NORVASC) tablet 5 mg  5 mg   • folic acid (FOLVITE) tablet 1 mg  1 mg   • senna-docusate (PERICOLACE or SENOKOT S) 8.6-50 MG per tablet 2 Tab  2 Tab    And   • polyethylene glycol/lytes (MIRALAX) PACKET 1 Packet  1 Packet    And   • magnesium hydroxide (MILK OF MAGNESIA) suspension 30 mL  30 mL    And   • bisacodyl (DULCOLAX) suppository 10 mg  10 mg   • Respiratory Therapy Consult     • NS infusion     • acetaminophen (TYLENOL) tablet 650 mg  650 mg   • labetalol (NORMODYNE/TRANDATE) injection 10 mg  10 mg   • ondansetron (ZOFRAN) syringe/vial injection 4 mg  4 mg   • ondansetron (ZOFRAN ODT) dispertab 4 mg  4 mg   • promethazine (PHENERGAN) tablet 12.5-25 mg  12.5-25 mg   • promethazine (PHENERGAN) suppository 12.5-25 mg  12.5-25 mg   • prochlorperazine (COMPAZINE) injection 5-10 mg  5-10 mg   • guaiFENesin dextromethorphan (ROBITUSSIN DM) 100-10 MG/5ML syrup 10 mL  10 mL       ROS:   I did do 10 point system review which is neg except as mentioned above     /45   Pulse 80   Temp 36.8 °C (98.3 °F) (Oral)   Resp 16   Ht 1.495 m (4' 10.86\")   Wt 45.1 kg (99 lb 6.8 oz)   SpO2 98%       General:  comfortable, NAD  HEENT:  ASIF, " EOMI , Anicteric   Neck:   supple, no lymphadenopathy  Cor:   regular rate and rhythm, no murmurs, rubs, or gallops  Pulm:   clear to auscultation bilaterally  Abd:   Soft, Nt, ND, + BS   Extremities:  warm, no lower extremity edema  Neurologic:  A&O x 3  Pyschiatric:  Appropriate mood and affect    Labs reviewed and notable for:  Recent Labs     05/19/20  0033 05/20/20  0024 05/21/20  0022   WBC 0.6* 0.3* 0.6*   RBC 2.30* 2.43* 2.44*   HEMOGLOBIN 6.9* 7.5* 7.3*   HEMATOCRIT 20.1* 20.6* 20.5*   MCV 87.4 84.8 84.0   MCH 30.0 30.9 29.9   MCHC 34.3 36.4* 35.6*   RDW 41.4 38.5 38.5   PLATELETCT 10* 32* 23*   MPV 10.8 9.5 9.7         .@CMP  Recent Results (from the past 24 hour(s))   RETICULOCYTES COUNT    Collection Time: 05/20/20  8:44 AM   Result Value Ref Range    Reticulocyte Count 0.4 (L) 0.8 - 2.1 %    Retic, Absolute 0.01 (L) 0.04 - 0.06 M/uL    Imm. Reticulocyte Fraction 1.4 (L) 9.3 - 17.4 %    Retic Hgb Equivalent 36.1 (H) 29.0 - 35.0 pg/cell   LDH    Collection Time: 05/20/20  8:44 AM   Result Value Ref Range    LDH Total 152 107 - 266 U/L   KEN (DIRECT BRAD)    Collection Time: 05/20/20  8:44 AM   Result Value Ref Range    Ken With Anti-IgG Reagent POS (A)     Ken With Anti-C3D Reagent NEG    CBC WITH DIFFERENTIAL    Collection Time: 05/21/20 12:22 AM   Result Value Ref Range    WBC 0.6 (LL) 4.8 - 10.8 K/uL    RBC 2.44 (L) 4.20 - 5.40 M/uL    Hemoglobin 7.3 (L) 12.0 - 16.0 g/dL    Hematocrit 20.5 (L) 37.0 - 47.0 %    MCV 84.0 81.4 - 97.8 fL    MCH 29.9 27.0 - 33.0 pg    MCHC 35.6 (H) 33.6 - 35.0 g/dL    RDW 38.5 35.9 - 50.0 fL    Platelet Count 23 (LL) 164 - 446 K/uL    MPV 9.7 9.0 - 12.9 fL    Neutrophils-Polys 3.60 (L) 44.00 - 72.00 %    Lymphocytes 95.50 (H) 22.00 - 41.00 %    Monocytes 0.40 0.00 - 13.40 %    Eosinophils 0.40 0.00 - 6.90 %    Basophils 0.00 0.00 - 1.80 %    Nucleated RBC 0.00 /100 WBC    Neutrophils (Absolute) 0.02 (LL) 2.00 - 7.15 K/uL    Lymphs (Absolute) 0.57 (L) 1.00 - 4.80 K/uL     Monos (Absolute) 0.00 0.00 - 0.85 K/uL    Eos (Absolute) 0.00 0.00 - 0.51 K/uL    Baso (Absolute) 0.00 0.00 - 0.12 K/uL    NRBC (Absolute) 0.00 K/uL    Anisocytosis 1+     Microcytosis 1+    Comp Metabolic Panel    Collection Time: 05/21/20 12:22 AM   Result Value Ref Range    Sodium 138 135 - 145 mmol/L    Potassium 3.7 3.6 - 5.5 mmol/L    Chloride 108 96 - 112 mmol/L    Co2 20 20 - 33 mmol/L    Anion Gap 10.0 7.0 - 16.0    Glucose 97 65 - 99 mg/dL    Bun 7 (L) 8 - 22 mg/dL    Creatinine 0.48 (L) 0.50 - 1.40 mg/dL    Calcium 8.7 8.5 - 10.5 mg/dL    AST(SGOT) 7 (L) 12 - 45 U/L    ALT(SGPT) 12 2 - 50 U/L    Alkaline Phosphatase 73 30 - 99 U/L    Total Bilirubin 1.8 (H) 0.1 - 1.5 mg/dL    Albumin 3.6 3.2 - 4.9 g/dL    Total Protein 6.0 6.0 - 8.2 g/dL    Globulin 2.4 1.9 - 3.5 g/dL    A-G Ratio 1.5 g/dL   ESTIMATED GFR    Collection Time: 05/21/20 12:22 AM   Result Value Ref Range    GFR If African American >60 >60 mL/min/1.73 m 2    GFR If Non African American >60 >60 mL/min/1.73 m 2   DIFFERENTIAL MANUAL    Collection Time: 05/21/20 12:22 AM   Result Value Ref Range    Manual Diff Status PERFORMED    PERIPHERAL SMEAR REVIEW    Collection Time: 05/21/20 12:22 AM   Result Value Ref Range    Peripheral Smear Review see below    PLATELET ESTIMATE    Collection Time: 05/21/20 12:22 AM   Result Value Ref Range    Plt Estimation Marked Decrease    MORPHOLOGY    Collection Time: 05/21/20 12:22 AM   Result Value Ref Range    RBC Morphology Present     Poikilocytosis 1+     Ovalocytes 1+    IMMATURE PLT FRACTION    Collection Time: 05/21/20 12:22 AM   Result Value Ref Range    Imm. Plt Fraction 0.6 0.6 - 13.1 K/uL       RET 0.01 ,  , LYNSEY positive IGG, Complement neg       Assessment and Recommendations:  - Neutropenic fevers - on broad spectrum antibiotics Cefepime and vanco, continues to spike. ID following him. Sputum culture + S. 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 IGG level 875 N   - Prolonged cytopenias due to # 2, transfuse to keep HGB > 7.0 and PLT > 10,000 and all blood products CMV neg and irradiated   - History of PLT tranfusion reaction - premedicate tylenol, benadryl and Solucortef 100 mg as premedications   - History of non immune hemolytic anemia   - ECOG PS 0     Plan:   - Continues to spike  - On broad spectrum antibiotics    Also her Staph sensitive to Cefepime. ID following her   - Supportive measures with transfusions   - IGG level N.          High complexicity/Drug monitoring     We will continue to follow with you; please call with any questions, [phone].      This patient was seen under COVID 19 pandemic disaster response conditions.  During a disaster, the provisions of care is subject to the Crisis Standard of Care     Please note that this dictation was created using voice recognition software.  I have made every reasonable attempt to correct obvious error, but I expected that there are errors of grammar and possibly context  that I did not discover before finalizing the note     Quality-Core Measures        Karissa Moore MD  [facility]   [phone]

## 2020-05-21 NOTE — PROGRESS NOTES
Hospital Medicine Daily Progress Note    Date of Service  5/21/2020    Chief Complaint  54 y.o. female admitted 5/17/2020 with neutropenic fever.    Hospital Course   53 y/o female with recent diagnosis of CLL and chemotherapy was transferred from [facility] center due to ongoing fever and coughing with hemoptysis.  She was recently transfused 2 PRBC units.  Cupboards 19-, chest x-ray possible infiltration on the left side.  Patient neutropenic patient was started on cefepime and vancomycin.  Oncology team and infectious disease were consulted.  She has been on acyclovir, cefepime and vancomycin.  Blood cultures negative. Urine culture negative. Sputum culture positive for MSSA.  She continues to spike fever intermittently.  Chemotherapy on hold due to thrombocytopenia, neutropenic fever    Interval Problem Updates  Still spiking fever last night. No clear source still. Requiring transfusion. Pt allergic reaction to tylenol and benadryl. Ok premedicated with steroid   5/20-patient is feeling better today.  Cough is improving.  Afebrile.  We will continue antibiotic per ID.  If patient afebrile for 3 days we will discharge her home.  Appreciate recommendations from oncology and infectious disease  5/21- she had again fever last night. She feels worse when she has fever. She also had cough with little of blood again. Stable otherwise. On proper antibiotic which are adjusted per ID.     Consultants/Specialty  ID  onco    Code Status  Full code    Disposition  tbd    Review of Systems  Review of Systems   Constitutional: Negative for chills and fever.   HENT: Negative for congestion, nosebleeds and sinus pain.    Eyes: Negative for blurred vision and double vision.   Respiratory: Positive for cough and hemoptysis. Negative for shortness of breath and wheezing.    Cardiovascular: Negative for chest pain, palpitations, claudication, leg swelling and PND.   Gastrointestinal: Negative for heartburn, nausea and vomiting.    Genitourinary: Negative for dysuria, frequency, hematuria and urgency.   Musculoskeletal: Negative for back pain, myalgias and neck pain.   Skin: Negative for rash.   Neurological: Negative for dizziness, tingling, tremors and headaches.   Endo/Heme/Allergies: Bruises/bleeds easily.   Psychiatric/Behavioral: Negative for depression, substance abuse and suicidal ideas.        Physical Exam  Temp:  [36.6 °C (97.9 °F)-38.4 °C (101.1 °F)] 36.6 °C (97.9 °F)  Pulse:  [] 78  Resp:  [15-16] 15  BP: (126-138)/(45-67) 126/45  SpO2:  [98 %-100 %] 100 %    Physical Exam  Vitals signs and nursing note reviewed.   Constitutional:       Appearance: Normal appearance.   HENT:      Head: Normocephalic and atraumatic.      Nose: Nose normal.      Mouth/Throat:      Mouth: Mucous membranes are moist.      Pharynx: Oropharynx is clear.   Eyes:      General: No scleral icterus.        Right eye: No discharge.         Left eye: No discharge.      Extraocular Movements: Extraocular movements intact.      Conjunctiva/sclera: Conjunctivae normal.      Pupils: Pupils are equal, round, and reactive to light.   Neck:      Musculoskeletal: Normal range of motion and neck supple. No neck rigidity or muscular tenderness.   Cardiovascular:      Rate and Rhythm: Normal rate.      Pulses: Normal pulses.      Heart sounds: Normal heart sounds. No murmur.   Pulmonary:      Effort: Pulmonary effort is normal. No respiratory distress.      Breath sounds: No stridor. Rales present. No wheezing.   Abdominal:      General: Bowel sounds are normal. There is no distension.      Palpations: Abdomen is soft.      Tenderness: There is no abdominal tenderness. There is no guarding.   Musculoskeletal: Normal range of motion.         General: No swelling or deformity.   Skin:     General: Skin is warm and dry.      Capillary Refill: Capillary refill takes less than 2 seconds.      Coloration: Skin is not jaundiced.   Neurological:      General: No focal  deficit present.      Mental Status: She is alert and oriented to person, place, and time.      Cranial Nerves: No cranial nerve deficit.      Motor: No weakness.   Psychiatric:         Mood and Affect: Mood normal.         Behavior: Behavior normal.         Fluids    Intake/Output Summary (Last 24 hours) at 5/21/2020 1122  Last data filed at 5/21/2020 0348  Gross per 24 hour   Intake 300 ml   Output --   Net 300 ml       Laboratory  Recent Labs     05/19/20  0033 05/20/20  0024 05/21/20  0022   WBC 0.6* 0.3* 0.6*   RBC 2.30* 2.43* 2.44*   HEMOGLOBIN 6.9* 7.5* 7.3*   HEMATOCRIT 20.1* 20.6* 20.5*   MCV 87.4 84.8 84.0   MCH 30.0 30.9 29.9   MCHC 34.3 36.4* 35.6*   RDW 41.4 38.5 38.5   PLATELETCT 10* 32* 23*   MPV 10.8 9.5 9.7     Recent Labs     05/19/20  0033 05/20/20  0024 05/21/20  0022   SODIUM 134* 139 138   POTASSIUM 3.3* 3.1* 3.7   CHLORIDE 103 109 108   CO2 20 21 20   GLUCOSE 126* 112* 97   BUN 7* 9 7*   CREATININE 0.49* 0.27* 0.48*   CALCIUM 8.6 8.7 8.7                   Imaging  DX-CHEST-PORTABLE (1 VIEW)   Final Result      Minimal atelectasis or pneumonia in the left lower lobe.           Assessment/Plan  * Neutropenic fever (HCC)- (present on admission)  Assessment & Plan  H/o CLL on chemotherapy, ANC 0.04, continue having fever, continue on vanco and cefepime  blood culture NGT   pro calcitonin negative 5/17   Bronchoscopy not possible  covid test negative   Sputum culture MSSA. Continue cefepime per ID . Possible source remain pneumonia, but pt still spiking fever besides being on proper antibiotic.       CLL (chronic lymphocytic leukemia) (HCC)- (present on admission)  Assessment & Plan  Recently diagnosis and on chemotherapy.  Complicated with neutropenia fever  Holding chemo for now  Oncology consulted.   Continue monitoring cell count.   Keep hb > and platelet count >10,000 as per oncology.     Hemoptysis- (present on admission)  Assessment & Plan  Very minimal. Hgb stable   Likely related to  coughing and upper respiratory infection with low platelets  Will consider CT chest if continue having hemoptysis    Pancytopenia (HCC)- (present on admission)  Assessment & Plan  Likely due to CLL and chemotherapy.     Hyponatremia- (present on admission)  Assessment & Plan  Asymptomatic  SIADH . Continue to monitor   Will consider fluid restriction 1500 cc/daily     Thrombocytopenia (HCC)- (present on admission)  Assessment & Plan  S/p transfusion 5/19     Anemia- (present on admission)  Assessment & Plan  Due to chemotherapy  Received 2 units of red blood cell a day before admission  Continue close monitoring  She is transfusion dependent and need to keep hb >7.0 and platelet >10,000 as per oncology.        VTE prophylaxis: scd's due to hemoptysis and anemia

## 2020-05-21 NOTE — PROGRESS NOTES
Assumed patient care at 0700. Pt is A&Ox4 and a standby assist. Bed alarm is on and in the low and locked position. Call light within reach, wearing non-slip socks, and rounded on hourly. Patient does not have a fever this morning. NS @ 83 infusing through PIV.

## 2020-05-21 NOTE — PROGRESS NOTES
"Received bedside report from day shift RN. Assumed care of pt at 1900. Pt A&Ox4. SBA. POC discussed. Pt denies any pain or nausea. Neutropenic precautions in place. Pt spiked fever last night, high of 101.1F. MD paged for PRN orders since pt is allergic to tylenol. PRN motrin ordered. Pt tolerated well and stated \"that was amazing, I stopped sweating.\" Afebrile now. R PIV flushed, infusing NS at 83. Fall precautions in place. Bed locked in lowest position. Bed alarm on. Call light in reach. No further needs at this time.   "

## 2020-05-21 NOTE — CARE PLAN
Problem: Communication  Goal: The ability to communicate needs accurately and effectively will improve  Outcome: PROGRESSING AS EXPECTED  Note: A&Ox4. Communicates needs and calls appropriately.      Problem: Infection  Goal: Will remain free from infection  Outcome: PROGRESSING SLOWER THAN EXPECTED  Note: Neutropenic precautions. IV cefepime in use. Pt spiked fever today the 20th at 1900 of 100.1f.

## 2020-05-21 NOTE — PROGRESS NOTES
Velma from Lab called with critical result of WBC 0.6, Plt 2, Preliminary ANC 0.03 at 0106. Critical lab result read back to Velma.   This critical lab result is within parameters established by [facility] for this patient

## 2020-05-21 NOTE — CARE PLAN
Problem: Communication  Goal: The ability to communicate needs accurately and effectively will improve  Outcome: PROGRESSING AS EXPECTED  Note: Patient able to communicate bathroom needs and other needs effectively.     Problem: Knowledge Deficit  Goal: Knowledge of disease process/condition, treatment plan, diagnostic tests, and medications will improve  Outcome: PROGRESSING AS EXPECTED  Note: Patient educated on medications, nursing interventions, and their diagnosis.  Patient demonstrates desire to be involved in their care.  Patient asks questions about her care/plan.

## 2020-05-21 NOTE — DISCHARGE PLANNING
Care Transition Team Assessment    LSW spoke with Pt via telephone to complete assessment.  Pt reported she lives in a 1st floor apartment with her spouse.  The Pt does not have any children and reported her  helps her with her IADL/ADLs as needed.  Pt did not own/use any DME's prior to admission.  Pharmacy [facility] [place].  Spouse will transport home once discharged.          Information Source  Information Given By: Patient  Who is responsible for making decisions for patient? : Patient    Readmission Evaluation  Is this a readmission?: No    Elopement Risk  Legal Hold: No  Ambulatory or Self Mobile in Wheelchair: Yes  Disoriented: No  Psychiatric Symptoms: None  History of Wandering: No  Elopement this Admit: No  Vocalizing Wanting to Leave: No  Displays Behaviors, Body Language Wanting to Leave: No-Not at Risk for Elopement  Elopement Risk: Not at Risk for Elopement    Interdisciplinary Discharge Planning  Patient or legal guardian wants to designate a caregiver (see row info): No    Discharge Preparedness  What is your plan after discharge?: Uncertain - pending medical team collaboration  What are your discharge supports?: Spouse  Prior Functional Level: Ambulatory, Needs Assist with Activities of Daily Living  Difficulity with ADLs: None  Difficulity with IADLs: Cooking, Driving, Laundry, Shopping    Functional Assesment  Prior Functional Level: Ambulatory, Needs Assist with Activities of Daily Living    Finances  Financial Barriers to Discharge: No  Prescription Coverage: Yes    Vision / Hearing Impairment  Vision Impairment : No  Right Eye Vision: Wears Glasses  Left Eye Vision: Wears Glasses  Hearing Impairment : No         Advance Directive  Advance Directive?: None  Advance Directive offered?: AD Booklet refused    Domestic Abuse  Have you ever been the victim of abuse or violence?: No  Physical Abuse or Sexual Abuse: No  Verbal Abuse or Emotional Abuse: No  Possible Abuse Reported to:: Not  Applicable    Psychological Assessment  History of Substance Abuse: None    Discharge Risks or Barriers  Discharge risks or barriers?: No    Anticipated Discharge Information  Anticipated discharge disposition: Home

## 2020-05-22 NOTE — CARE PLAN
Problem: Safety  Goal: Will remain free from injury  Outcome: PROGRESSING AS EXPECTED  Intervention: Provide assistance with mobility  Note: Patient is standby assist with steady gait. Bed alarm on for safety, call light within reach.      Problem: Infection  Goal: Will remain free from infection  Outcome: PROGRESSING AS EXPECTED  Intervention: Assess signs and symptoms of infection  Note: Pt has intermittent fevers, q4hr vitals being monitored, daily CBC. Neutropenic precautions in place. Hand hygiene performed before and after pt interaction.

## 2020-05-22 NOTE — PROGRESS NOTES
Assumed patient care at 0700. Pt is A&Ox4 and a standby assist. Bed alarm is on and in the low and locked position. Call light within reach, wearing non-slip socks, and rounded on hourly. Patient reports no pain. Patient is afebrile at this time.

## 2020-05-22 NOTE — PROGRESS NOTES
Infectious Disease Progress Note    Author: Lori Boone M.D. Date & Time of service: 2020  12:40 PM    Chief Complaint:  Neutropenic fever  PNA    Interval History:  54-year-old [place] female with nonautoimmune hemolytic anemia and CLL recently started on chemo admitted with neutropenic fever and cough   Temp 103, WBC 0.6, decreased cough Low grade temps yesterday then spiked this morning-no new pain or sxs   AF WBC 0.3 decreased cough-no hemoptysis. Feeling better today. No new complaints except wants to go home   Tmax 101.1 WBC 0.6 slight cough-using suction. Some in sputum No new complaints   Tmax 100.3 WBC 0.7 somnolent today-persistent cough, no worse. Decreased blood  Labs Reviewed, Medications Reviewed and Radiology Reviewed.    Review of Systems:  Review of Systems   Constitutional: Negative for fever.   Respiratory: Positive for cough and sputum production.    All other systems reviewed and are negative.      Hemodynamics:  Temp (24hrs), Av.1 °C (98.7 °F), Min:36.3 °C (97.4 °F), Max:37.9 °C (100.3 °F)  Temperature: 36.3 °C (97.4 °F)  Pulse  Av.5  Min: 77  Max: 129   Blood Pressure: 133/59       Physical Exam:  Physical Exam  Vitals signs and nursing note reviewed.   Constitutional:       General: She is not in acute distress.     Appearance: She is not ill-appearing, toxic-appearing or diaphoretic.   HENT:      Nose: No rhinorrhea.   Eyes:      General:         Right eye: No discharge.         Left eye: No discharge.   Cardiovascular:      Rate and Rhythm: Tachycardia present.      Heart sounds: No murmur.   Pulmonary:      Effort: Pulmonary effort is normal. No respiratory distress.      Breath sounds: No stridor. No wheezing or rhonchi.      Comments: Decreased BS  Abdominal:      General: There is no distension.      Palpations: Abdomen is soft. There is no mass.      Tenderness: There is no abdominal tenderness. There is no guarding.   Skin:     Coloration: Skin  is jaundiced and pale.      Findings: Bruising present.   Neurological:      Comments: somnolent         Meds:    Current Facility-Administered Medications:   •  ibuprofen  •  hydrocortisone sodium succinate PF  •  fluconazole  •  potassium chloride SA  •  cefepime  •  acyclovir  •  amLODIPine  •  folic acid  •  senna-docusate **AND** polyethylene glycol/lytes **AND** magnesium hydroxide **AND** bisacodyl  •  Respiratory Therapy Consult  •  acetaminophen  •  labetalol  •  ondansetron  •  ondansetron  •  promethazine  •  promethazine  •  prochlorperazine  •  guaiFENesin dextromethorphan    Labs:  Recent Labs     05/20/20  0024 05/21/20  0022 05/22/20  0032   WBC 0.3* 0.6* 0.7*   RBC 2.43* 2.44* 2.45*   HEMOGLOBIN 7.5* 7.3* 7.5*   HEMATOCRIT 20.6* 20.5* 21.5*   MCV 84.8 84.0 87.8   MCH 30.9 29.9 30.6   RDW 38.5 38.5 40.5   PLATELETCT 32* 23* 17*   MPV 9.5 9.7 11.0   NEUTSPOLYS 7.20* 3.60* 0.90*   LYMPHOCYTES 91.90* 95.50* 99.10*   MONOCYTES 0.00 0.40 0.00   EOSINOPHILS 0.90 0.40 0.00   BASOPHILS 0.00 0.00 0.00   RBCMORPHOLO Present Present Present     Recent Labs     05/20/20  0024 05/21/20  0022 05/22/20  0032   SODIUM 139 138 135   POTASSIUM 3.1* 3.7 3.9   CHLORIDE 109 108 104   CO2 21 20 20   GLUCOSE 112* 97 101*   BUN 9 7* 8     Recent Labs     05/20/20  0024 05/21/20  0022 05/22/20  0032   ALBUMIN 3.6 3.6 3.8   TBILIRUBIN 2.5* 1.8* 1.3   ALKPHOSPHAT 73 73 78   TOTPROTEIN 6.1 6.0 6.7   ALTSGPT 12 12 14   ASTSGOT 8* 7* 11*   CREATININE 0.27* 0.48* 0.45*       Imaging:  Dx-chest-portable (1 View)    Result Date: 5/17/2020 5/17/2020 10:52 AM HISTORY/REASON FOR EXAM:  Sepsis. Shortness of breath. TECHNIQUE/EXAM DESCRIPTION AND NUMBER OF VIEWS: Single portable view of the chest. COMPARISON:  None FINDINGS: The cardiac silhouette is within normal limits. There is minimal opacity in the retrocardiac region in the left lower lobe which represent minimal atelectasis or pneumonia. The right lung is clear. No pleural  effusion is noted.     Minimal atelectasis or pneumonia in the left lower lobe.      Micro:  Results     Procedure Component Value Units Date/Time    CULTURE RESPIRATORY W/ GRM STN [[id number]]  (Abnormal)  (Susceptibility) Collected:  05/18/20 0906    Order Status:  Completed Specimen:  Respirate from Sputum Updated:  05/20/20 0931     Significant Indicator POS     Source RESP     Site SPUTUM     Culture Result Light growth usual upper respiratory deborah     Gram Stain Result Moderate WBCs.  Few epithelial cells.  Few mixed bacteria, no predominant organism seen.  Specimen Quality Score: 2+       Culture Result Staphylococcus aureus  Light growth      Narrative:       Collected By:[id number] WANDY KHANNA  Collected By:[id number] WANDY KHANNA    Susceptibility     Staphylococcus aureus (1)     Antibiotic Interpretation Microscan Method Status    Azithromycin Sensitive <=2 mcg/mL TYRELL Final    Clindamycin Sensitive <=0.5 mcg/mL TYRELL Final    Cefazolin Sensitive <=8 mcg/mL TYRELL Final    Ceftaroline Sensitive <=0.5 mcg/mL TYRELL Final    Erythromycin Sensitive <=0.25 mcg/mL TYRELL Final    Ampicillin/sulbactam Sensitive <=8/4 mcg/mL TYRELL Final    Oxacillin Sensitive <=0.25 mcg/mL TYRELL Final    Vancomycin Sensitive 1 mcg/mL TYRELL Final    Penicillin Resistant >8 mcg/mL TYRELL Final    Pip/Tazobactam Sensitive <=4 mcg/mL TYRELL Final    Trimeth/Sulfa Sensitive <=0.5/9.5 mcg/mL TYRELL Final    Tetracycline Sensitive <=4 mcg/mL TYRELL Final                   URINE CULTURE(NEW) [[id number]] Collected:  05/17/20 1024    Order Status:  Completed Specimen:  Urine Updated:  05/19/20 0722     Significant Indicator NEG     Source UR     Site -     Culture Result No growth at 48 hours.    Narrative:       Indication for culture:->Septic Shock: Persistent  hypotension,  Lactic acid > 4, vasopressors/inotropes started  Indication for culture:->Septic Shock: Persistent    GRAM STAIN [[id number]] Collected:  05/18/20 0906    Order Status:  Completed Specimen:   "Respirate Updated:  05/18/20 1608     Significant Indicator .     Source RESP     Site SPUTUM     Gram Stain Result Moderate WBCs.  Few epithelial cells.  Few mixed bacteria, no predominant organism seen.  Specimen Quality Score: 2+      Narrative:       Collected By:[id number] WANDY KHANNA  Collected By:[id number] WANDY KHANNA    BLOOD CULTURE [[id number]] Collected:  05/17/20 1110    Order Status:  Completed Specimen:  Blood from Peripheral Updated:  05/18/20 0732     Significant Indicator NEG     Source BLD     Site PERIPHERAL     Culture Result No Growth  Note: Blood cultures are incubated for 5 days and  are monitored continuously.Positive blood cultures  are called to the RN and reported as soon as  they are identified.      Narrative:       2 of 2 blood culture x2  Sites order. Per Hospital Policy:  Only change Specimen Src: to \"Line\" if specified by physician  order.  No site indicated    BLOOD CULTURE [[id number]] Collected:  05/17/20 1024    Order Status:  Completed Specimen:  Blood from Peripheral Updated:  05/18/20 0732     Significant Indicator NEG     Source BLD     Site PERIPHERAL     Culture Result No Growth  Note: Blood cultures are incubated for 5 days and  are monitored continuously.Positive blood cultures  are called to the RN and reported as soon as  they are identified.      Narrative:       1 of 2 for Blood Culture x 2 sites order. Per Hospital  Policy: Only change Specimen Src: to \"Line\" if specified by  physician order.  No site indicated    Blood Culture [[id number]]     Order Status:  Canceled Specimen:  Blood from Peripheral     SARS-CoV-2, PCR (In-House) [[id number]] Collected:  05/17/20 1145    Order Status:  Completed Updated:  05/17/20 1302     SARS-CoV-2 Source NP Swab     SARS-CoV-2 by PCR NotDetected     Comment: [facility] providers: PLEASE REFER TO DE-ESCALATION AND RETESTING PROTOCOL  on [email or web address]  **The [employer] GeneXpert Xpress SARS-CoV-2 Test has been made available for  use " under the Emergency Use Authorization (EUA) only.         Narrative:       Rapid    COVID/SARS CoV-2 [[id number]] Collected:  05/17/20 1145    Order Status:  Completed Specimen:  Respirate from Nasopharyngeal Updated:  05/17/20 1159     COVID Order Status Received    Narrative:       Rapid    URINALYSIS [[id number]]  (Abnormal) Collected:  05/17/20 1024    Order Status:  Completed Specimen:  Urine Updated:  05/17/20 1105     Color Yellow     Character Clear     Specific Gravity 1.003     Ph 7.0     Glucose Negative mg/dL      Ketones Negative mg/dL      Protein Negative mg/dL      Bilirubin Negative     Urobilinogen, Urine 1.0     Nitrite Negative     Leukocyte Esterase Negative     Occult Blood Moderate     Micro Urine Req Microscopic    Narrative:       Indication for culture:->Septic Shock: Persistent  hypotension,  Lactic acid > 4, vasopressors/inotropes started    MRSA By PCR (Amp) [[id number]] Collected:  05/17/20 0000    Order Status:  Canceled Specimen:  Other from Nares           Assessment:  Active Hospital Problems    Diagnosis   • *Neutropenic fever (HCC) [D70.9, R50.81]   • CLL (chronic lymphocytic leukemia) (HCC) [C91.10]   • Hemoptysis [R04.2]   • Hyponatremia [E87.1]   • Hypokalemia [E87.6]   • Pancytopenia (HCC) [D61.818]   • Anemia [D64.9]   • Thrombocytopenia (HCC) [D69.6]       Plan:  Neutropenic fever, persistent  Hemoptysis, improved  Low grade fever    Remains profoundly neutropenic   COVID neg  Bcxs neg  Sputum +MSSA  CXR neg  QuantiGold neg  1, 3 BTG +  Cocci serology pending  Continue cefepime and fluc    CLL  Pancytopenic  Immunosuppressed  Holding chemotherapy as long as feasible    Prognosis guarded

## 2020-05-22 NOTE — PROGRESS NOTES
Hospital Medicine Daily Progress Note    Date of Service  5/22/2020    Chief Complaint  54 y.o. female admitted 5/17/2020 with neutropenic fever.    Hospital Course   55 y/o female with recent diagnosis of CLL and chemotherapy was transferred from [facility] center due to ongoing fever and coughing with hemoptysis.  She was recently transfused 2 PRBC units.  Cupboards 19-, chest x-ray possible infiltration on the left side.  Patient neutropenic patient was started on cefepime and vancomycin.  Oncology team and infectious disease were consulted.  She has been on acyclovir, cefepime and vancomycin.  Blood cultures negative. Urine culture negative. Sputum culture positive for MSSA.  She continues to spike fever intermittently.  Chemotherapy on hold due to thrombocytopenia, neutropenic fever    Interval Problem Updates  Still spiking fever last night. No clear source still. Requiring transfusion. Pt allergic reaction to tylenol and benadryl. Ok premedicated with steroid   5/20-patient is feeling better today.  Cough is improving.  Afebrile.  We will continue antibiotic per ID.  If patient afebrile for 3 days we will discharge her home.  Appreciate recommendations from oncology and infectious disease  5/21- she had again fever last night. She feels worse when she has fever. She also had cough with little of blood again. Stable otherwise. On proper antibiotic which are adjusted per ID.   5/22- cough is about the same. Afebrile overnight. Continue to monitor. Need three days without fever. Continue same plan     Consultants/Specialty  ID  onco    Code Status  Full code    Disposition  tbd    Review of Systems  Review of Systems   Constitutional: Negative for chills and fever.   HENT: Negative for congestion, nosebleeds and sinus pain.    Eyes: Negative for blurred vision and double vision.   Respiratory: Positive for cough and hemoptysis. Negative for shortness of breath and wheezing.    Cardiovascular: Negative for chest  pain, palpitations, claudication, leg swelling and PND.   Gastrointestinal: Negative for heartburn, nausea and vomiting.   Genitourinary: Negative for dysuria, frequency, hematuria and urgency.   Musculoskeletal: Negative for back pain, myalgias and neck pain.   Skin: Negative for rash.   Neurological: Negative for dizziness, tingling, tremors and headaches.   Endo/Heme/Allergies: Bruises/bleeds easily.   Psychiatric/Behavioral: Negative for depression, substance abuse and suicidal ideas.        Physical Exam  Temp:  [36.3 °C (97.4 °F)-37.9 °C (100.3 °F)] 36.3 °C (97.4 °F)  Pulse:  [87-97] 97  Resp:  [16-18] 16  BP: (129-145)/(47-59) 133/59  SpO2:  [95 %-100 %] 98 %    Physical Exam  Vitals signs and nursing note reviewed.   Constitutional:       Appearance: Normal appearance.   HENT:      Head: Normocephalic and atraumatic.      Nose: Nose normal.      Mouth/Throat:      Mouth: Mucous membranes are moist.      Pharynx: Oropharynx is clear.   Eyes:      General: No scleral icterus.        Right eye: No discharge.         Left eye: No discharge.      Extraocular Movements: Extraocular movements intact.      Conjunctiva/sclera: Conjunctivae normal.      Pupils: Pupils are equal, round, and reactive to light.   Neck:      Musculoskeletal: Normal range of motion and neck supple. No neck rigidity or muscular tenderness.   Cardiovascular:      Rate and Rhythm: Normal rate.      Pulses: Normal pulses.      Heart sounds: Normal heart sounds. No murmur.   Pulmonary:      Effort: Pulmonary effort is normal. No respiratory distress.      Breath sounds: No stridor. Rales present. No wheezing.   Abdominal:      General: Bowel sounds are normal. There is no distension.      Palpations: Abdomen is soft.      Tenderness: There is no abdominal tenderness. There is no guarding.   Musculoskeletal: Normal range of motion.         General: No swelling or deformity.   Skin:     General: Skin is warm and dry.      Capillary Refill:  Capillary refill takes less than 2 seconds.      Coloration: Skin is not jaundiced.   Neurological:      General: No focal deficit present.      Mental Status: She is alert and oriented to person, place, and time.      Cranial Nerves: No cranial nerve deficit.      Motor: No weakness.   Psychiatric:         Mood and Affect: Mood normal.         Behavior: Behavior normal.         Fluids  No intake or output data in the 24 hours ending 05/22/20 1423    Laboratory  Recent Labs     05/20/20  0024 05/21/20  0022 05/22/20  0032   WBC 0.3* 0.6* 0.7*   RBC 2.43* 2.44* 2.45*   HEMOGLOBIN 7.5* 7.3* 7.5*   HEMATOCRIT 20.6* 20.5* 21.5*   MCV 84.8 84.0 87.8   MCH 30.9 29.9 30.6   MCHC 36.4* 35.6* 34.9   RDW 38.5 38.5 40.5   PLATELETCT 32* 23* 17*   MPV 9.5 9.7 11.0     Recent Labs     05/20/20  0024 05/21/20  0022 05/22/20  0032   SODIUM 139 138 135   POTASSIUM 3.1* 3.7 3.9   CHLORIDE 109 108 104   CO2 21 20 20   GLUCOSE 112* 97 101*   BUN 9 7* 8   CREATININE 0.27* 0.48* 0.45*   CALCIUM 8.7 8.7 9.2                   Imaging  DX-CHEST-PORTABLE (1 VIEW)   Final Result      Minimal atelectasis or pneumonia in the left lower lobe.           Assessment/Plan  * Neutropenic fever (HCC)- (present on admission)  Assessment & Plan  H/o CLL on chemotherapy, ANC 0.04, continue having fever, continue on vanco and cefepime  blood culture NGT   pro calcitonin negative 5/17   Bronchoscopy not possible  covid test negative   Sputum culture MSSA. Continue cefepime per ID . Possible source remain pneumonia, but pt still spiking fever besides being on proper antibiotic.       CLL (chronic lymphocytic leukemia) (HCC)- (present on admission)  Assessment & Plan  Recently diagnosis and on chemotherapy.  Complicated with neutropenia fever  Holding chemo for now  Oncology consulted.   Continue monitoring cell count.   Keep hb > and platelet count >10,000 as per oncology.     Hemoptysis- (present on admission)  Assessment & Plan  Very minimal. Hgb stable    Likely related to coughing and upper respiratory infection with low platelets  Will consider CT chest if continue having hemoptysis    Pancytopenia (HCC)- (present on admission)  Assessment & Plan  Likely due to CLL and chemotherapy.     Hyponatremia- (present on admission)  Assessment & Plan  Asymptomatic  SIADH . Continue to monitor   Will consider fluid restriction 1500 cc/daily     Thrombocytopenia (HCC)- (present on admission)  Assessment & Plan  S/p transfusion 5/19     Anemia- (present on admission)  Assessment & Plan  Due to chemotherapy  Received 2 units of red blood cell a day before admission  Continue close monitoring  She is transfusion dependent and need to keep hb >7.0 and platelet >10,000 as per oncology.        VTE prophylaxis: scd's due to hemoptysis and anemia     I have seen and examined patient on 5/22/2020. I have reviewed vitals, new labs and imaging. I have discussed POC with RN. There are no changes from (5/21/2020) except for what is mentioned above.

## 2020-05-22 NOTE — CARE PLAN
Problem: Communication  Goal: The ability to communicate needs accurately and effectively will improve  Outcome: PROGRESSING AS EXPECTED  Note: Patient able to communicate toileting and other needs appropriately.     Problem: Knowledge Deficit  Goal: Knowledge of disease process/condition, treatment plan, diagnostic tests, and medications will improve  Outcome: PROGRESSING AS EXPECTED  Note: Patient educated on medications, nursing interventions, and their diagnosis.  Patient demonstrates desire to be involved in their care.

## 2020-05-22 NOTE — PROGRESS NOTES
Assumed care of patient at 1900. Pt is A&Ox4, up standby assist with steady gait. Denies pain or nausea. PIV with positive blood return, saline locked. Call light within reach, hourly rounding in place.

## 2020-05-22 NOTE — PROGRESS NOTES
"  HEMATOLOGY-ONCOLOGY PROGRESS NOTE  ( Primary oncology Dr. Summers)   CLL newly diagnosed placed on Ibrutinib started on 5/16/20. Stopped on 5/18/20 complaining streaky blood mixed with sputum and also fevers   - Neutropenic fevers hospitalized on 5/17/20     Subjective:  Her T max 100.3/   She denies any new complaints. Her cough remains same mild with mild blood streaks whitish sputum   She denies any diarrhea     Objective:  Medications reviewed and notable for:  Current Facility-Administered Medications   Medication Dose   • ibuprofen (MOTRIN) tablet 600 mg  600 mg   • hydrocortisone sodium succinate PF (SOLU-CORTEF) 100 MG injection 100 mg  100 mg   • fluconazole (DIFLUCAN) tablet 400 mg  400 mg   • potassium chloride SA (Kdur) tablet 40 mEq  40 mEq   • cefepime (MAXIPIME) 2 g in  mL IVPB  2 g   • acyclovir (ZOVIRAX) tablet 400 mg  400 mg   • amLODIPine (NORVASC) tablet 5 mg  5 mg   • folic acid (FOLVITE) tablet 1 mg  1 mg   • senna-docusate (PERICOLACE or SENOKOT S) 8.6-50 MG per tablet 2 Tab  2 Tab    And   • polyethylene glycol/lytes (MIRALAX) PACKET 1 Packet  1 Packet    And   • magnesium hydroxide (MILK OF MAGNESIA) suspension 30 mL  30 mL    And   • bisacodyl (DULCOLAX) suppository 10 mg  10 mg   • Respiratory Therapy Consult     • acetaminophen (TYLENOL) tablet 650 mg  650 mg   • labetalol (NORMODYNE/TRANDATE) injection 10 mg  10 mg   • ondansetron (ZOFRAN) syringe/vial injection 4 mg  4 mg   • ondansetron (ZOFRAN ODT) dispertab 4 mg  4 mg   • promethazine (PHENERGAN) tablet 12.5-25 mg  12.5-25 mg   • promethazine (PHENERGAN) suppository 12.5-25 mg  12.5-25 mg   • prochlorperazine (COMPAZINE) injection 5-10 mg  5-10 mg   • guaiFENesin dextromethorphan (ROBITUSSIN DM) 100-10 MG/5ML syrup 10 mL  10 mL       ROS:   I did do 10 point system review which is negative except as mentioned above     /47   Pulse 87   Temp 36.9 °C (98.4 °F) (Oral)   Resp 16   Ht 1.495 m (4' 10.86\")   Wt 45.1 kg (99 " lb 6.8 oz)   SpO2 99%       General:  comfortable, NAD  HEENT:  sclera anicteric, pupils equal, round, reactive to light, oral cavity and oropharynx clear, mucous membranes moist  Neck:   supple, no lymphadenopathy  Cor:   regular rate and rhythm, no murmurs, rubs, or gallops  Pulm:   clear to auscultation bilaterally  Abd:   bowel sounds present, soft, nontender, nondistended,Extremities:  warm, no lower extremity edema  Neurologic:  A&O x 3  Pyschiatric:  Appropriate mood and affect    Labs reviewed and notable for:  Recent Labs     05/20/20  0024 05/21/20  0022 05/22/20  0032   WBC 0.3* 0.6* 0.7*   RBC 2.43* 2.44* 2.45*   HEMOGLOBIN 7.5* 7.3* 7.5*   HEMATOCRIT 20.6* 20.5* 21.5*   MCV 84.8 84.0 87.8   MCH 30.9 29.9 30.6   MCHC 36.4* 35.6* 34.9   RDW 38.5 38.5 40.5   PLATELETCT 32* 23* 17*   MPV 9.5 9.7 11.0         .@CMP  Recent Results (from the past 24 hour(s))   CBC WITH DIFFERENTIAL    Collection Time: 05/22/20 12:32 AM   Result Value Ref Range    WBC 0.7 (LL) 4.8 - 10.8 K/uL    RBC 2.45 (L) 4.20 - 5.40 M/uL    Hemoglobin 7.5 (L) 12.0 - 16.0 g/dL    Hematocrit 21.5 (L) 37.0 - 47.0 %    MCV 87.8 81.4 - 97.8 fL    MCH 30.6 27.0 - 33.0 pg    MCHC 34.9 33.6 - 35.0 g/dL    RDW 40.5 35.9 - 50.0 fL    Platelet Count 17 (LL) 164 - 446 K/uL    MPV 11.0 9.0 - 12.9 fL    Neutrophils-Polys 0.90 (L) 44.00 - 72.00 %    Lymphocytes 99.10 (H) 22.00 - 41.00 %    Monocytes 0.00 0.00 - 13.40 %    Eosinophils 0.00 0.00 - 6.90 %    Basophils 0.00 0.00 - 1.80 %    Nucleated RBC 0.00 /100 WBC    Neutrophils (Absolute) 0.01 (LL) 2.00 - 7.15 K/uL    Lymphs (Absolute) 0.69 (L) 1.00 - 4.80 K/uL    Monos (Absolute) 0.00 0.00 - 0.85 K/uL    Eos (Absolute) 0.00 0.00 - 0.51 K/uL    Baso (Absolute) 0.00 0.00 - 0.12 K/uL    NRBC (Absolute) 0.00 K/uL    Anisocytosis 2+     Microcytosis 2+    Comp Metabolic Panel    Collection Time: 05/22/20 12:32 AM   Result Value Ref Range    Sodium 135 135 - 145 mmol/L    Potassium 3.9 3.6 - 5.5 mmol/L     Chloride 104 96 - 112 mmol/L    Co2 20 20 - 33 mmol/L    Anion Gap 11.0 7.0 - 16.0    Glucose 101 (H) 65 - 99 mg/dL    Bun 8 8 - 22 mg/dL    Creatinine 0.45 (L) 0.50 - 1.40 mg/dL    Calcium 9.2 8.5 - 10.5 mg/dL    AST(SGOT) 11 (L) 12 - 45 U/L    ALT(SGPT) 14 2 - 50 U/L    Alkaline Phosphatase 78 30 - 99 U/L    Total Bilirubin 1.3 0.1 - 1.5 mg/dL    Albumin 3.8 3.2 - 4.9 g/dL    Total Protein 6.7 6.0 - 8.2 g/dL    Globulin 2.9 1.9 - 3.5 g/dL    A-G Ratio 1.3 g/dL   ESTIMATED GFR    Collection Time: 05/22/20 12:32 AM   Result Value Ref Range    GFR If African American >60 >60 mL/min/1.73 m 2    GFR If Non African American >60 >60 mL/min/1.73 m 2   DIFFERENTIAL MANUAL    Collection Time: 05/22/20 12:32 AM   Result Value Ref Range    Manual Diff Status PERFORMED    PERIPHERAL SMEAR REVIEW    Collection Time: 05/22/20 12:32 AM   Result Value Ref Range    Peripheral Smear Review see below    PLATELET ESTIMATE    Collection Time: 05/22/20 12:32 AM   Result Value Ref Range    Plt Estimation Marked Decrease    MORPHOLOGY    Collection Time: 05/22/20 12:32 AM   Result Value Ref Range    RBC Morphology Present     Polychromia 2+     Poikilocytosis 1+     Ovalocytes 1+     Reactive Lymphocytes Few    IMMATURE PLT FRACTION    Collection Time: 05/22/20 12:32 AM   Result Value Ref Range    Imm. Plt Fraction 0.5 (L) 0.6 - 13.1 K/uL       Diagnostic imaging:      Assessment and Recommendations:  - Neutropenic fevers - on broad spectrum antibiotics Cefepime and vanco, continues to spike. ID following him. Sputum culture + S. 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 IGG level 875 N   - Prolonged cytopenias due to # 2, transfuse to keep HGB > 7.0 and PLT > 10,000 and all blood products CMV neg and irradiated   - History of PLT tranfusion reaction - premedicate tylenol, benadryl and Solucortef 100 mg  as premedications   - History of non immune hemolytic anemia   - ECOG PS 0     Plan:   Fevers better - ID placed on broad spectrum antibiotics  - Continue supportive measures with transfusions   - Once fevers resolves and all cultures negative   - We need to re-evaluate treatment plan.      High complexicity/Drug monitoring     We will continue to follow with you; please call with any questions, [phone].      This patient was seen under COVID 19 pandemic disaster response conditions.  During a disaster, the provisions of care is subject to the Crisis Standard of Care     Please note that this dictation was created using voice recognition software.  I have made every reasonable attempt to correct obvious error, but I expected that there are errors of grammar and possibly context  that I did not discover before finalizing the note     Quality-Core Measures        Karissa Moore MD  [facility]   [phone]

## 2020-05-23 PROBLEM — D70.9 NEUTROPENIC FEVER (HCC): Status: RESOLVED | Noted: 2020-01-01 | Resolved: 2020-01-01

## 2020-05-23 PROBLEM — R50.81 NEUTROPENIC FEVER (HCC): Status: RESOLVED | Noted: 2020-01-01 | Resolved: 2020-01-01

## 2020-05-23 NOTE — PROGRESS NOTES
Bedside report received from night shift RN. Assumed care. Pt is A&Ox4. Pt is in bed. Pt denies pain at this time. Pt was updated on the plan of care for the day. All questions answered.   Pt has call light within reach and bed is in lowest position.  All fall precautions in place. Pt had no other needs at this time, hourly rounding in place.  Patient alarm is taken off patient bed as she is no fall risk at this time. Charge nurseTeri is updated and in agreeance for change.

## 2020-05-23 NOTE — PROGRESS NOTES
.  HEMATOLOGY-ONCOLOGY PROGRESS NOTE    ( Primary oncology Dr. Summers)   CLL newly diagnosed placed on Ibrutinib started on 5/16/20. Stopped on 5/18/20 complaining streaky blood mixed with sputum and also fevers   - Neutropenic fevers hospitalized on 5/17/20 - cultures uptodate neg   - Last Temp was 5/20/20     Subjective:  No overnight events, No fevers or chills, No bleeding/   She reports occasional streaky blood mixed with sputum , no hemoptysis   No diarrhea   She would like to go home     Objective:  Medications reviewed and notable for:  Current Facility-Administered Medications   Medication Dose   • ibuprofen (MOTRIN) tablet 600 mg  600 mg   • hydrocortisone sodium succinate PF (SOLU-CORTEF) 100 MG injection 100 mg  100 mg   • fluconazole (DIFLUCAN) tablet 400 mg  400 mg   • potassium chloride SA (Kdur) tablet 40 mEq  40 mEq   • cefepime (MAXIPIME) 2 g in  mL IVPB  2 g   • acyclovir (ZOVIRAX) tablet 400 mg  400 mg   • amLODIPine (NORVASC) tablet 5 mg  5 mg   • folic acid (FOLVITE) tablet 1 mg  1 mg   • senna-docusate (PERICOLACE or SENOKOT S) 8.6-50 MG per tablet 2 Tab  2 Tab    And   • polyethylene glycol/lytes (MIRALAX) PACKET 1 Packet  1 Packet    And   • magnesium hydroxide (MILK OF MAGNESIA) suspension 30 mL  30 mL    And   • bisacodyl (DULCOLAX) suppository 10 mg  10 mg   • Respiratory Therapy Consult     • acetaminophen (TYLENOL) tablet 650 mg  650 mg   • labetalol (NORMODYNE/TRANDATE) injection 10 mg  10 mg   • ondansetron (ZOFRAN) syringe/vial injection 4 mg  4 mg   • ondansetron (ZOFRAN ODT) dispertab 4 mg  4 mg   • promethazine (PHENERGAN) tablet 12.5-25 mg  12.5-25 mg   • promethazine (PHENERGAN) suppository 12.5-25 mg  12.5-25 mg   • prochlorperazine (COMPAZINE) injection 5-10 mg  5-10 mg   • guaiFENesin dextromethorphan (ROBITUSSIN DM) 100-10 MG/5ML syrup 10 mL  10 mL       ROS:   Constitutional: + fatigue, no fevers or chills, no night sweats  Resp: No cough or SOB  Cardio:No chest pain  "or palpitations  Pschy: No depression or anxiety   Neuro: No headaches, no seizure, no vision changes  GI: no abdominal pain, nausea or vomiting. No diarrhea or constipation   All other ROS negative    /66   Pulse (!) 117   Temp 36.7 °C (98 °F) (Oral)   Resp 17   Ht 1.495 m (4' 10.86\")   Wt 45.1 kg (99 lb 6.8 oz)   SpO2 100%       General:  comfortable, NAD  HEENT:  sclera anicteric, pupils equal, round, reactive to light, oral cavity and oropharynx clear, mucous membranes moist  Neck:   supple, no lymphadenopathy  Cor:   regular rate and rhythm, no murmurs, rubs, or gallops  Pulm:   clear to auscultation bilaterally  Abd:   bowel sounds present, soft, nontender, nondistended, no palpable masses or organomegaly  Extremities:  warm, no lower extremity edema  Neurologic:  A&O x 3  Pyschiatric:  Appropriate mood and affect    Labs reviewed and notable for:  Recent Labs     05/21/20  0022 05/22/20  0032 05/23/20  0017   WBC 0.6* 0.7* 1.0*   RBC 2.44* 2.45* 2.57*   HEMOGLOBIN 7.3* 7.5* 7.7*   HEMATOCRIT 20.5* 21.5* 22.2*   MCV 84.0 87.8 86.4   MCH 29.9 30.6 30.0   MCHC 35.6* 34.9 34.7   RDW 38.5 40.5 39.9   PLATELETCT 23* 17* 14*   MPV 9.7 11.0 10.4         .@CMP  Recent Results (from the past 24 hour(s))   CBC WITH DIFFERENTIAL    Collection Time: 05/23/20 12:17 AM   Result Value Ref Range    WBC 1.0 (LL) 4.8 - 10.8 K/uL    RBC 2.57 (L) 4.20 - 5.40 M/uL    Hemoglobin 7.7 (L) 12.0 - 16.0 g/dL    Hematocrit 22.2 (L) 37.0 - 47.0 %    MCV 86.4 81.4 - 97.8 fL    MCH 30.0 27.0 - 33.0 pg    MCHC 34.7 33.6 - 35.0 g/dL    RDW 39.9 35.9 - 50.0 fL    Platelet Count 14 (LL) 164 - 446 K/uL    MPV 10.4 9.0 - 12.9 fL    Neutrophils-Polys 0.00 (L) 44.00 - 72.00 %    Lymphocytes 100.00 (H) 22.00 - 41.00 %    Monocytes 0.00 0.00 - 13.40 %    Eosinophils 0.00 0.00 - 6.90 %    Basophils 0.00 0.00 - 1.80 %    Nucleated RBC 0.00 /100 WBC    Neutrophils (Absolute) 0.00 (LL) 2.00 - 7.15 K/uL    Lymphs (Absolute) 1.00 1.00 - 4.80 " K/uL    Monos (Absolute) 0.00 0.00 - 0.85 K/uL    Eos (Absolute) 0.00 0.00 - 0.51 K/uL    Baso (Absolute) 0.00 0.00 - 0.12 K/uL    NRBC (Absolute) 0.00 K/uL    Anisocytosis 2+     Microcytosis 2+    Comp Metabolic Panel    Collection Time: 05/23/20 12:17 AM   Result Value Ref Range    Sodium 136 135 - 145 mmol/L    Potassium 4.6 3.6 - 5.5 mmol/L    Chloride 103 96 - 112 mmol/L    Co2 22 20 - 33 mmol/L    Anion Gap 11.0 7.0 - 16.0    Glucose 113 (H) 65 - 99 mg/dL    Bun 11 8 - 22 mg/dL    Creatinine 0.52 0.50 - 1.40 mg/dL    Calcium 9.7 8.5 - 10.5 mg/dL    AST(SGOT) 14 12 - 45 U/L    ALT(SGPT) 20 2 - 50 U/L    Alkaline Phosphatase 83 30 - 99 U/L    Total Bilirubin 1.2 0.1 - 1.5 mg/dL    Albumin 4.3 3.2 - 4.9 g/dL    Total Protein 7.5 6.0 - 8.2 g/dL    Globulin 3.2 1.9 - 3.5 g/dL    A-G Ratio 1.3 g/dL   ESTIMATED GFR    Collection Time: 05/23/20 12:17 AM   Result Value Ref Range    GFR If African American >60 >60 mL/min/1.73 m 2    GFR If Non African American >60 >60 mL/min/1.73 m 2   DIFFERENTIAL MANUAL    Collection Time: 05/23/20 12:17 AM   Result Value Ref Range    Manual Diff Status PERFORMED    PERIPHERAL SMEAR REVIEW    Collection Time: 05/23/20 12:17 AM   Result Value Ref Range    Peripheral Smear Review see below    PLATELET ESTIMATE    Collection Time: 05/23/20 12:17 AM   Result Value Ref Range    Plt Estimation Marked Decrease    MORPHOLOGY    Collection Time: 05/23/20 12:17 AM   Result Value Ref Range    RBC Morphology Present     Poikilocytosis 1+     Ovalocytes 1+    IMMATURE PLT FRACTION    Collection Time: 05/23/20 12:17 AM   Result Value Ref Range    Imm. Plt Fraction 0.3 (L) 0.6 - 13.1 K/uL       Diagnostic imaging:      Assessment and Recommendations:  - Neutropenic fevers - on broad spectrum antibiotics Cefepime and vanco, continues to spike. ID following him. Sputum culture + S. 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 IGG level 875 N   - Prolonged cytopenias due to # 2, transfuse to keep HGB > 7.0 and PLT > 10,000 and all blood products CMV neg and irradiated   - History of PLT tranfusion reaction - premedicate tylenol, benadryl and Solucortef 100 mg as premedications   - History of non immune hemolytic anemia   - ECOG PS 0        Plan:   - She is clinically and hemodynamically stable   From the hematology point of view she will have prolonged cytopenias - if she is afebrile and ID is ok can be switched to oral antibiotics and send her home on acyclovir and fluconozole  - Please check wih ID    - Prior to d/c give I unit of PLT today   - She will need CBC with diff daily starting [facility] Monday 5/25/20 standing transfusion if HGB< 7.0 and PLT < 10,000  - She had been doing that as advised by Dr. Summers prior to admission   - Advised to restart Ibrutinib - monitor   - If any bleeding to stop and call immediately   - Also discussed neutropenic precautions      please call with any questions, [phone].      This patient was seen under COVID 19 pandemic disaster response conditions.  During a disaster, the provisions of care is subject to the Crisis Standard of Care     Please note that this dictation was created using voice recognition software.  I have made every reasonable attempt to correct obvious error, but I expected that there are errors of grammar and possibly context  that I did not discover before finalizing the note     Quality-Core Measures        Karissa Moore MD  [facility]   [phone]

## 2020-05-23 NOTE — PROGRESS NOTES
Infectious Disease Progress Note    Author: Lori Boone M.D. Date & Time of service: 2020  12:21 PM    Chief Complaint:  Neutropenic fever  PNA    Interval History:  54-year-old [place] female with nonautoimmune hemolytic anemia and CLL recently started on chemo admitted with neutropenic fever and cough   Temp 103, WBC 0.6, decreased cough Low grade temps yesterday then spiked this morning-no new pain or sxs   AF WBC 0.3 decreased cough-no hemoptysis. Feeling better today. No new complaints except wants to go home   Tmax 101.1 WBC 0.6 slight cough-using suction. Some in sputum No new complaints   Tmax 100.3 WBC 0.7 somnolent today-persistent cough, no worse. Decreased blood   AF WBC 1 ANC 0 feels better-really wants to go home today-states OK for home per oncology. Minimal cough. No hemoptysis. No fever or chills  Labs Reviewed, Medications Reviewed and Radiology Reviewed.    Review of Systems:  Review of Systems   Constitutional: Negative for fever.   Respiratory: Positive for cough and sputum production.    All other systems reviewed and are negative.      Hemodynamics:  Temp (24hrs), Av.7 °C (98 °F), Min:36.3 °C (97.4 °F), Max:37 °C (98.6 °F)  Temperature: 36.7 °C (98 °F)  Pulse  Av.9  Min: 77  Max: 129   Blood Pressure: 122/66       Physical Exam:  Physical Exam  Vitals signs and nursing note reviewed.   Constitutional:       General: She is not in acute distress.     Appearance: She is not ill-appearing, toxic-appearing or diaphoretic.   HENT:      Nose: No rhinorrhea.   Eyes:      General:         Right eye: No discharge.         Left eye: No discharge.   Cardiovascular:      Rate and Rhythm: Tachycardia present.      Heart sounds: No murmur.   Pulmonary:      Effort: Pulmonary effort is normal. No respiratory distress.      Breath sounds: No stridor. No wheezing or rhonchi.      Comments: Decreased BS  Abdominal:      General: There is no distension.      Palpations:  Abdomen is soft. There is no mass.      Tenderness: There is no abdominal tenderness. There is no guarding.   Skin:     Coloration: Skin is jaundiced and pale.      Findings: Bruising present.   Neurological:      Comments: somnolent         Meds:    Current Facility-Administered Medications:   •  ibuprofen  •  hydrocortisone sodium succinate PF  •  fluconazole  •  potassium chloride SA  •  cefepime  •  acyclovir  •  amLODIPine  •  folic acid  •  senna-docusate **AND** polyethylene glycol/lytes **AND** magnesium hydroxide **AND** bisacodyl  •  Respiratory Therapy Consult  •  acetaminophen  •  labetalol  •  ondansetron  •  ondansetron  •  promethazine  •  promethazine  •  prochlorperazine  •  guaiFENesin dextromethorphan    Labs:  Recent Labs     05/21/20 0022 05/22/20 0032 05/23/20  0017   WBC 0.6* 0.7* 1.0*   RBC 2.44* 2.45* 2.57*   HEMOGLOBIN 7.3* 7.5* 7.7*   HEMATOCRIT 20.5* 21.5* 22.2*   MCV 84.0 87.8 86.4   MCH 29.9 30.6 30.0   RDW 38.5 40.5 39.9   PLATELETCT 23* 17* 14*   MPV 9.7 11.0 10.4   NEUTSPOLYS 3.60* 0.90* 0.00*   LYMPHOCYTES 95.50* 99.10* 100.00*   MONOCYTES 0.40 0.00 0.00   EOSINOPHILS 0.40 0.00 0.00   BASOPHILS 0.00 0.00 0.00   RBCMORPHOLO Present Present Present     Recent Labs     05/21/20  0022 05/22/20  0032 05/23/20  0017   SODIUM 138 135 136   POTASSIUM 3.7 3.9 4.6   CHLORIDE 108 104 103   CO2 20 20 22   GLUCOSE 97 101* 113*   BUN 7* 8 11     Recent Labs     05/21/20  0022 05/22/20  0032 05/23/20  0017   ALBUMIN 3.6 3.8 4.3   TBILIRUBIN 1.8* 1.3 1.2   ALKPHOSPHAT 73 78 83   TOTPROTEIN 6.0 6.7 7.5   ALTSGPT 12 14 20   ASTSGOT 7* 11* 14   CREATININE 0.48* 0.45* 0.52       Imaging:  Dx-chest-portable (1 View)    Result Date: 5/17/2020 5/17/2020 10:52 AM HISTORY/REASON FOR EXAM:  Sepsis. Shortness of breath. TECHNIQUE/EXAM DESCRIPTION AND NUMBER OF VIEWS: Single portable view of the chest. COMPARISON:  None FINDINGS: The cardiac silhouette is within normal limits. There is minimal opacity in  "the retrocardiac region in the left lower lobe which represent minimal atelectasis or pneumonia. The right lung is clear. No pleural effusion is noted.     Minimal atelectasis or pneumonia in the left lower lobe.      Micro:  Results     Procedure Component Value Units Date/Time    BLOOD CULTURE [[id number]] Collected:  05/17/20 1110    Order Status:  Completed Specimen:  Blood from Peripheral Updated:  05/22/20 1300     Significant Indicator NEG     Source BLD     Site PERIPHERAL     Culture Result No growth after 5 days of incubation.    Narrative:       2 of 2 blood culture x2  Sites order. Per Hospital Policy:  Only change Specimen Src: to \"Line\" if specified by physician  order.  No site indicated    BLOOD CULTURE [[id number]] Collected:  05/17/20 1024    Order Status:  Completed Specimen:  Blood from Peripheral Updated:  05/22/20 1300     Significant Indicator NEG     Source BLD     Site PERIPHERAL     Culture Result No growth after 5 days of incubation.    Narrative:       1 of 2 for Blood Culture x 2 sites order. Per Hospital  Policy: Only change Specimen Src: to \"Line\" if specified by  physician order.  No site indicated    CULTURE RESPIRATORY W/ GRM STN [[id number]]  (Abnormal)  (Susceptibility) Collected:  05/18/20 0906    Order Status:  Completed Specimen:  Respirate from Sputum Updated:  05/20/20 0931     Significant Indicator POS     Source RESP     Site SPUTUM     Culture Result Light growth usual upper respiratory deborah     Gram Stain Result Moderate WBCs.  Few epithelial cells.  Few mixed bacteria, no predominant organism seen.  Specimen Quality Score: 2+       Culture Result Staphylococcus aureus  Light growth      Narrative:       Collected By:[id number] WANDY KHANNA  Collected By:[id number] WANDY KHANNA    Susceptibility     Staphylococcus aureus (1)     Antibiotic Interpretation Microscan Method Status    Azithromycin Sensitive <=2 mcg/mL TYRELL Final    Clindamycin Sensitive <=0.5 mcg/mL TYRELL Final    " Cefazolin Sensitive <=8 mcg/mL TYRELL Final    Ceftaroline Sensitive <=0.5 mcg/mL TYRELL Final    Erythromycin Sensitive <=0.25 mcg/mL TYRELL Final    Ampicillin/sulbactam Sensitive <=8/4 mcg/mL TYRELL Final    Oxacillin Sensitive <=0.25 mcg/mL TYRELL Final    Vancomycin Sensitive 1 mcg/mL TYRELL Final    Penicillin Resistant >8 mcg/mL TYRELL Final    Pip/Tazobactam Sensitive <=4 mcg/mL TYRELL Final    Trimeth/Sulfa Sensitive <=0.5/9.5 mcg/mL TYRELL Final    Tetracycline Sensitive <=4 mcg/mL TYRELL Final                   URINE CULTURE(NEW) [[id number]] Collected:  05/17/20 1024    Order Status:  Completed Specimen:  Urine Updated:  05/19/20 0722     Significant Indicator NEG     Source UR     Site -     Culture Result No growth at 48 hours.    Narrative:       Indication for culture:->Septic Shock: Persistent  hypotension,  Lactic acid > 4, vasopressors/inotropes started  Indication for culture:->Septic Shock: Persistent    GRAM STAIN [[id number]] Collected:  05/18/20 0906    Order Status:  Completed Specimen:  Respirate Updated:  05/18/20 1608     Significant Indicator .     Source RESP     Site SPUTUM     Gram Stain Result Moderate WBCs.  Few epithelial cells.  Few mixed bacteria, no predominant organism seen.  Specimen Quality Score: 2+      Narrative:       Collected By:[id number] WANDY KHANNA  Collected By:[id number] WANDY KHANNA    Blood Culture [[id number]]     Order Status:  Canceled Specimen:  Blood from Peripheral     SARS-CoV-2, PCR (In-House) [[id number]] Collected:  05/17/20 1145    Order Status:  Completed Updated:  05/17/20 1302     SARS-CoV-2 Source NP Swab     SARS-CoV-2 by PCR NotDetected     Comment: [facility] providers: PLEASE REFER TO DE-ESCALATION AND RETESTING PROTOCOL  on [email or web address]  **The [employer] GeneXpert Xpress SARS-CoV-2 Test has been made available for  use under the Emergency Use Authorization (EUA) only.         Narrative:       Rapid    COVID/SARS CoV-2 [[id number]] Collected:  05/17/20 1145    Order Status:   Completed Specimen:  Respirate from Nasopharyngeal Updated:  05/17/20 1159     COVID Order Status Received    Narrative:       Rapid    URINALYSIS [[id number]]  (Abnormal) Collected:  05/17/20 1024    Order Status:  Completed Specimen:  Urine Updated:  05/17/20 1105     Color Yellow     Character Clear     Specific Gravity 1.003     Ph 7.0     Glucose Negative mg/dL      Ketones Negative mg/dL      Protein Negative mg/dL      Bilirubin Negative     Urobilinogen, Urine 1.0     Nitrite Negative     Leukocyte Esterase Negative     Occult Blood Moderate     Micro Urine Req Microscopic    Narrative:       Indication for culture:->Septic Shock: Persistent  hypotension,  Lactic acid > 4, vasopressors/inotropes started    MRSA By PCR (Amp) [[id number]] Collected:  05/17/20 0000    Order Status:  Canceled Specimen:  Other from Nares           Assessment:  Active Hospital Problems    Diagnosis   • *Neutropenic fever (HCC) [D70.9, R50.81]   • CLL (chronic lymphocytic leukemia) (HCC) [C91.10]   • Hemoptysis [R04.2]   • Hyponatremia [E87.1]   • Hypokalemia [E87.6]   • Pancytopenia (HCC) [D61.818]   • Anemia [D64.9]   • Thrombocytopenia ([facility]) [D69.6]       Plan:  Neutropenic fever  Hemoptysis, improved  Afebrile over 48 hours-last fever 5/20  Remains profoundly neutropenic   COVID neg  Bcxs neg  Sputum +MSSA  CXR neg  QuantiGold neg  1, 3 BTG +  Cocci serology still pending  DC cefepime and start levoflox 500 mg PO daily  Continue fluc-higher dose pending cocci result  Allergies to Bactrim and Amox- No Zyvox due to pancytopenia-start doxy 100 mg PO BID for MSSA PNA    CLL  Pancytopenic  Immunosuppressed  Holding chemotherapy as long as feasible  Continue acyclovir prophylaxis    Prognosis guarded  If discharges today, FU ID clinic 1-2 weeks  DW [facility]osp Dr Becerril

## 2020-05-23 NOTE — CARE PLAN
Problem: Safety  Goal: Will remain free from injury  Outcome: PROGRESSING AS EXPECTED  Intervention: Provide assistance with mobility  Note: Patient is standby assist with steady gait. Bed alarm on for safety. Educated patient on the importance of calling for assistance before ambulating. Patient understands and agrees. Call light within reach.      Problem: Infection  Goal: Will remain free from infection  Outcome: PROGRESSING AS EXPECTED  Intervention: Implement standard precautions and perform hand washing before and after patient contact  Note: Neutropenic precautions in place. Patient is afebrile. Hand hygiene performed before and after patient interaction.

## 2020-05-23 NOTE — DISCHARGE SUMMARY
Discharge Summary    CHIEF COMPLAINT ON ADMISSION  Chief Complaint   Patient presents with   • Sent by MD       Reason for Admission  FR INFUSION R-5     Admission Date  5/17/2020    CODE STATUS  Full Code    HPI & HOSPITAL COURSE  55 y/o female with recent diagnosis of CLL and chemotherapy was transferred from infusion center 5/17/2020 due to ongoing fever, coughing with hemoptysis.  She was recently transfused 2 PRBC units. On arrival COVID 19 negative, chest x-ray possible infiltration on the left side.  Patient was neutropenic. She was started on cefepime and vancomycin for spiking fever (neutropenic fever).  Oncology team and infectious disease were consulted. acyclovir, cefepime and vancomycin was continued.  Blood cultures negative. Urine culture negative. Sputum culture positive for MSSA.  She did continue to spike fever intermittently until 5/20/2020.  Chemotherapy was on hold due to thrombocytopenia, neutropenic fever. She did not have fever since she was placed on diflucan. 1-3 Beta D glucan positive. Cocci pending. ID did recommend pt to continue levofloxacin until 6/1/2020 and doxycycline 100 mg BID for 14 days. Also diflucan was advised 400 mg daily for additional 14 days. Pt will continue treatment and follow up with oncology. Lab apt were done for 5/25/2020.    Therefore, she is discharged in guarded and stable condition to home with close outpatient follow-up.    The patient met 2-midnight criteria for an inpatient stay at the time of discharge.    Discharge Date  05/23/2020     FOLLOW UP ITEMS POST DISCHARGE  None     DISCHARGE DIAGNOSES  Principal Problem (Resolved):    Neutropenic fever (HCC) POA: Yes  Active Problems:    CLL (chronic lymphocytic leukemia) (HCC) (Chronic) POA: Yes    Hemoptysis POA: Yes    Anemia POA: Yes    Thrombocytopenia (HCC) POA: Yes    Hyponatremia POA: Yes    Pancytopenia (HCC) POA: Yes  Resolved Problems:    Hypokalemia POA: Unknown      FOLLOW UP  Future Appointments    Date Time Provider Department Center   5/25/2020  1:45 PM [facility]     Fabio Summers M.D.  [id number] [place] [id number] [phone]    In 1 week        MEDICATIONS ON DISCHARGE     Medication List      START taking these medications      Instructions   doxycycline monohydrate 100 MG tablet  Commonly known as:  ADOXA   Take 1 Tab by mouth every 12 hours for 14 days.  Dose:  100 mg     levoFLOXacin 500 MG tablet  Commonly known as:  LEVAQUIN   Take 1 Tab by mouth every 24 hours for 9 days.  Dose:  500 mg     potassium chloride SA 20 MEQ Tbcr  Commonly known as:  Kdur   Take 2 Tabs by mouth every day.  Dose:  40 mEq        CHANGE how you take these medications      Instructions   acyclovir 400 MG tablet  What changed:  when to take this  Commonly known as:  ZOVIRAX   Take 1 Tab by mouth 2 Times a Day.  Dose:  400 mg     fluconazole 200 MG Tabs  Start taking on:  May 24, 2020  What changed:    · how much to take  · when to take this  · additional instructions  Commonly known as:  DIFLUCAN   Take 2 Tabs by mouth every day for 14 days.  Dose:  400 mg        CONTINUE taking these medications      Instructions   amLODIPine 5 MG Tabs  Commonly known as:  NORVASC   Take 5 mg by mouth every evening.  Dose:  5 mg     folic acid 1 MG Tabs  Commonly known as:  FOLVITE   Take 1 mg by mouth every day.  Dose:  1 mg     Ibrutinib 420 MG Tabs   Take 420 mg by mouth every evening.  Dose:  420 mg     tramadol 50 MG Tabs  Commonly known as:  ULTRAM   Take 50 mg by mouth every 6 hours as needed for Mild Pain. Indications: Pain  Dose:  50 mg        STOP taking these medications    losartan 100 MG Tabs  Commonly known as:  COZAAR     propranolol 20 MG Tabs  Commonly known as:  INDERAL            Allergies  Allergies   Allergen Reactions   • Amoxicillin Hives   • Benadryl Allergy Hives   • Excedrin Migraine Swelling   • Sulfamethoxazole Hives   • Tylenol Hives   • Famotidine Itching        DIET  Orders Placed This Encounter   Procedures   • Diet Order Regular     Standing Status:   Standing     Number of Occurrences:   1     Order Specific Question:   Diet:     Answer:   Regular [1]       ACTIVITY  As tolerated.  Weight bearing as tolerated    CONSULTATIONS  ID   Oncology       PROCEDURES  none    LABORATORY  Lab Results   Component Value Date    SODIUM 136 05/23/2020    POTASSIUM 4.6 05/23/2020    CHLORIDE 103 05/23/2020    CO2 22 05/23/2020    GLUCOSE 113 (H) 05/23/2020    BUN 11 05/23/2020    CREATININE 0.52 05/23/2020        Lab Results   Component Value Date    WBC 1.0 (LL) 05/23/2020    HEMOGLOBIN 7.7 (L) 05/23/2020    HEMATOCRIT 22.2 (L) 05/23/2020    PLATELETCT 14 (LL) 05/23/2020        Total time of the discharge process exceeds 44 minutes.

## 2020-05-24 NOTE — PROGRESS NOTES
Patient discharged. IV removed. Patient left unit via wheelchair with relative . Personal belongings and chemotherapy medications with patient when leaving unit. Patient given prescriptions and instructions of when to visit with physician. Verbalizes understanding. Copy of discharge instructions with patient and in the chart.

## 2020-05-24 NOTE — DISCHARGE INSTRUCTIONS
Discharge Instructions    Discharged to home by car with relative. Discharged via wheelchair, hospital escort: Yes.  Special equipment needed: Not Applicable    Be sure to schedule a follow-up appointment with your primary care doctor or any specialists as instructed.     Discharge Plan:   Diet Plan: Discussed  Activity Level: Discussed  Confirmed Follow up Appointment: Appointment Scheduled  Confirmed Symptoms Management: Discussed  Medication Reconciliation Updated: Yes    I understand that a diet low in cholesterol, fat, and sodium is recommended for good health. Unless I have been given specific instructions below for another diet, I accept this instruction as my diet prescription.   Other diet: Healthy    Special Instructions: None    · Is patient discharged on Warfarin / Coumadin?   No   Neutropenic Fever  Neutropenic fever is a type of fever that can develop in someone who has a very low number of a certain kind of white blood cells called neutrophils (neutropenia). These blood cells are important for fighting infections caused by bacteria and fungi. When you have neutropenia, you could be in danger of a severe infection. You may need to start taking antibiotic medicines.  CAUSES  Neutrophils are made in the spongy tissue inside your bones (bone marrow). Anything that damages your bone marrow or damages neutrophils after they leave your bone marrow can cause neutropenia. Once you have a dangerously low level of neutrophils, you are at risk for infection and neutropenic fever.  Causes of neutropenia may include:  · Cancer treatments.  · Bone marrow cancer.  · Cancer of the white blood cells (leukemia or myeloma).  · Severe infection.  · Bone marrow failure (aplastic anemia).  · Many types of medicines.  · Diseases of the body's defense system (autoimmune diseases).  · Inherited genes that cause neutropenia.  · Vitamin B deficiency.  · Spleen enlargement in rheumatoid arthritis (Felty syndrome).  SIGNS AND  SYMPTOMS  Fever is the main symptom of neutropenic fever. Other signs and symptoms may include:  · Chills.  · Fatigue.  · Painful mouth ulcers.  · Cough.  · Shortness of breath.  · Swollen glands (lymph nodes).  · Sore throat.  · Sinus and ear infections.  · Gum disease.  · Skin infection.  · Burning and frequent urination.  · Rectal infections.  · Vaginal discharge or itching.  DIAGNOSIS   Your health care provider may diagnose neutropenic fever if your neutrophil count is less than 500 neutrophils per microliter of blood and you have a fever of at least 100.4°F (38.0°C).   · Blood tests and other tests that measure neutrophils will be done. These may include:  ¨ A complete blood count (CBC) and a differential white blood count (WBC).  ¨ Peripheral smear. This test involves checking a blood sample under a microscope.  · Other types of tests may also be done, including:  ¨ Chest X-rays.  ¨ Cultures of blood and body fluids to look for a source of infection.  Your health care provider will also determine if your neutropenic fever is high risk or low risk.  · You may have high-risk neutropenic fever if:  ¨ Your neutrophil count is less than 100 neutrophils per microliter of blood.  ¨ You have also been diagnosed with pneumonia or another serious medical problem.  ¨ Your condition requires you to be treated in the hospital.  · You may have low-risk neutropenic fever if:  ¨ Your neutrophil count is more than 100 neutrophils per microliter of blood.  ¨ Your chest X-ray is normal.  ¨ You do not have an active illness or any other problems that require you to be in the hospital.  TREATMENT   You may start treatment as soon as you get diagnosed with neutropenic fever, even if your health care provider is still looking for the source of infection.  · Treatment for high-risk neutropenic fever is antibiotic medicine given through an IV access tube. This is done in the hospital. You may be given a single antibiotic or a  combination of antibiotics.  · Low-risk neutropenic fever may be treated at home. You may have to take one or two different oral antibiotics. In some cases, you may need to be treated with IV antibiotics that are given by a home health care provider who visits your home.  · If your health care provider finds a specific cause of infection, you may be switched to the antibiotics that work best against those particular bacteria.  · If a fungal infection is found, your medicine will be changed to an antifungal medicine.  · If the fever goes away in 3-5 days, you may have to take medicine for about 7 days. If the fever is not responding, you may have to take medicine longer.  · You may have to stop taking any medicine that could be causing neutropenic fever.  · If you have neutropenic fever from cancer treatment drugs (chemotherapy), you may need to take a type of medicine called white blood cell growth factors. This medicine can help prevent fever.  HOME CARE INSTRUCTIONS  · Only take medicines as directed by your health care provider.  ¨ If you are being treated with oral antibiotics at home, you may need to return to your health care provider every day to have your CBC checked. You may have to do this until your fever responds.  ¨ Take your antibiotics as directed. Make sure you finish them even if you start to feel better.  · Preventing infection is important when you have neutropenia. Here are some ways to prevent infections:  ¨ Avoid sick friends and family members.  ¨ Wash your hands often.  ¨ Do not eat uncooked or undercooked meats.  ¨ Wash all fruits and vegetables.  ¨ Do not eat or drink unpasteurized dairy products.  ¨ Get regular dental care, and maintain good dental hygiene.  ¨ Get a flu shot. Ask your health care provider whether you need any other vaccines.  ¨ Wear gloves when gardening.  · Follow up with your health care provider as directed.  SEEK MEDICAL CARE IF:  · You have chills.  · You have a  fever.  · You have signs or symptoms of infection.  SEEK IMMEDIATE MEDICAL CARE IF:  · You have trouble breathing.  · You have chest pain.  This information is not intended to replace advice given to you by your health care provider. Make sure you discuss any questions you have with your health care provider.  Document Released: 12/23/2014 Document Reviewed: 12/23/2014  [employer] Patient Education © 2017 [employer]      Depression / Suicide Risk    As you are discharged from this [facility] facility, it is important to learn how to keep safe from harming yourself.    Recognize the warning signs:  · Abrupt changes in personality, positive or negative- including increase in energy   · Giving away possessions  · Change in eating patterns- significant weight changes-  positive or negative  · Change in sleeping patterns- unable to sleep or sleeping all the time   · Unwillingness or inability to communicate  · Depression  · Unusual sadness, discouragement and loneliness  · Talk of wanting to die  · Neglect of personal appearance   · Rebelliousness- reckless behavior  · Withdrawal from people/activities they love  · Confusion- inability to concentrate     If you or a loved one observes any of these behaviors or has concerns about self-harm, here's what you can do:  · Talk about it- your feelings and reasons for harming yourself  · Remove any means that you might use to hurt yourself (examples: pills, rope, extension cords, firearm)  · Get professional help from the community (Mental Health, Substance Abuse, psychological counseling)  · Do not be alone:Call your Safe Contact- someone whom you trust who will be there for you.  · Call your local CRISIS HOTLINE 068-9161 or 225-539-3139  · Call your local Children's Mobile Crisis Response Team [place] (735) 632-4883 or www.Wheelz  · Call the toll free National Suicide Prevention Hotlines   · National Suicide Prevention Lifeline 041-041-EWXU  (4161)  · [facility] 800-SUICIDE (128-7804)      Discharge Instructions per Otis Becerril M.D.  Follow up with Dr. Summers in one week   Keep appointment for lab work starting 5/25.   Continue medications as instructed    DIET: healthy     ACTIVITY: as tolerated     DIAGNOSIS: neutropenic fever, pancytopenia, pneumonia, hemoptysis     Return to ER if confusion, fever, worsen hemoptysis, chest pain, unusual bleeding, leg swelling, diarrhea

## 2020-05-25 NOTE — PROGRESS NOTES
Patient presents for lab draw and possible blood products. PIV established, blood drawn as ordered. Platelets 31 and hemoglobin 7.0. Call to Dr. Luna, on call for Dr. Summers. Okay to transfuse one unit of packed red blood cells today due to the patient being symptomatic. One unit CMV -, irradiated red blood cells transfused as ordered, line flushed clear. PIV removed, tip intact, compression dressing to site. Instructed patient to follow up with MD office tomorrow. Patient verbalizes understanding and released in no acute distress.

## 2020-05-27 NOTE — PROGRESS NOTES
Pt arrived ambulatory to [facility]fusion Services for labs/possible transfusion. Pt denied fever, signs or symptoms of infection or acute illness today. POC discussed and pt verbalized understanding.     PIV established without difficulty and labs drawn at this time; pt tolerated well. Labs reviewed. Per provider orders, pt to receive one unit of platelets today for platelet count of 13. Premedications and 1 unit of platelets administered per treatment plan; VS taken per policy and pt tolerated well. No signs or symptoms of reaction or complications noted. PIV flushed and removed. Gauze and paper tape applied at this time.     Follow-up care and next appointment for 05/27/20 printed for pt. Carly Chang RN, clarified with provider appointments to be every other day for labs/possible blood products until provider discretion. Further appointments to be scheduled by  at this time and pt verbalized understanding. Pt discharged home to self care in no apparent distress at this time.

## 2020-05-27 NOTE — PROGRESS NOTES
Zach HERCULES from Lab called with critical result of WBC at 1.2 and Platelet count of 13 at 1215. Critical lab result read back to Zach. Critical results called in by kymberly Castellano RN.

## 2020-05-29 NOTE — PROGRESS NOTES
[place] from Lab called with critical result of platelet count 24,000, WBC 1.8 at 1102. Critical lab result read back to Cleveland.   This critical lab result is within parameters established by  for this patient

## 2020-05-29 NOTE — PROGRESS NOTES
Pt arrived ambulatory to [facility] for CBC/possible transfusion.  She denies any bleeding, s/s of infection, of fevers.  PIV established with good blood return noted.  CBC drawn per order.  Platelet count 24,000 and Hgb 8.3.  Pt does not meet parameters for transfusion.  PIV flushed, removed, and gauze/coban placed.  Appointment schedule provided and confirmed appointment for Sunday.  Pt ambulated out of clinic in no apparent distress.

## 2020-05-31 NOTE — PROGRESS NOTES
Report received from KENNETH Parsons. Platelet transfusion completed with no complications. PIV flushed and removed. Pt left IS with no s/sx of distress. Follow up appointment confirmed.

## 2020-05-31 NOTE — PROGRESS NOTES
Pt arrived ambulatory to IS for CBC/possible blood products.  POC discussed.  PIV started to RAC, blood return confirmed, labs drawn as ordered.  Results reviewed, pt meets parameters for platelets.  Premedication given.  Platelets transfusing without complication.  Report given to KENNETH Naidu to assume care of patient for remainder of infusion.  Next appt confirmed.  Pt resting in chair comfortably.

## 2020-06-03 NOTE — PROGRESS NOTES
Pt arrives to [facility] for labs with possible blood products.   Pt denies s/sx of infection.  Pt has scattered bruising to BUE.  PIV established to R-FA and CBC/COD was drawn.  Received call from lab with critical WBC 1.7 and platelets 24,000 today.  Hemoglobin is 6.7.  Pt meets parameters for one unit of irradiated PRBC today.  Solu-Cortef given as pre-medication.  Transfusion completed without adverse reaction.  VS are stable.  PIV flushed and site removed.  Site wrapped with pressure dressing.  Confirmed next appt with pt.  Pt dc home with spouse as transportation.

## 2020-06-04 NOTE — PROGRESS NOTES
Pt arrived to IS, ambulatory, for labs/possible blood products. Pt hand carries in labs from MD office, platelets 17 today. Pt does not meet parameters for RBCs at this time, meets parameters for platelets. Solu-cortef administered with no complications. 1 unit platelets transfused with no s/sx of adverse reactions. PIV flushed and removed. Pt left IS with no s/sx of distress. Follow up appointment confirmed.

## 2020-06-06 NOTE — LETTER
Infusion Services   [address] [place] [id number]  Phone: [phone]  Fax: [phone]              Dear Dr. Summers,    Your patient, Lana Molina (: 1965), was scheduled at [facility].  Lana's encounter diagnosis is:  1. Anemia, unspecified type  CBC WITH DIFFERENTIAL    COD (ADULT)    CBC WITH DIFFERENTIAL    COD (ADULT)    Release Platelet Pheresis    hydrocortisone sodium succinate PF (SOLU-CORTEF) 100 MG injection 100 mg    Release Platelet Pheresis    DISCONTINUED: acetaminophen (TYLENOL) tablet 650 mg    DISCONTINUED: diphenhydrAMINE (BENADRYL) tablet/capsule 25 mg   2. Thrombocytopenia (HCC)  CBC WITH DIFFERENTIAL    COD (ADULT)    CBC WITH DIFFERENTIAL    COD (ADULT)    Release Platelet Pheresis    hydrocortisone sodium succinate PF (SOLU-CORTEF) 100 MG injection 100 mg    Release Platelet Pheresis    DISCONTINUED: acetaminophen (TYLENOL) tablet 650 mg    DISCONTINUED: diphenhydrAMINE (BENADRYL) tablet/capsule 25 mg     Lab results      Results for LANA MOLINA (MRN [id number]) as of 2020 12:10   Ref. Range 2020 11:08   WBC Latest Ref Range: 4.8 - 10.8 K/uL 1.2 (LL)   RBC Latest Ref Range: 4.20 - 5.40 M/uL 2.55 (L)   Hemoglobin Latest Ref Range: 12.0 - 16.0 g/dL 7.4 (L)   Hematocrit Latest Ref Range: 37.0 - 47.0 % 20.8 (L)   MCV Latest Ref Range: 81.4 - 97.8 fL 81.6   MCH Latest Ref Range: 27.0 - 33.0 pg 29.0   MCHC Latest Ref Range: 33.6 - 35.0 g/dL 35.6 (H)   RDW Latest Ref Range: 35.9 - 50.0 fL 36.5   Platelet Count Latest Ref Range: 164 - 446 K/uL 15 (LL)   MPV Latest Ref Range: 9.0 - 12.9 fL 8.9 (L)   Neutrophils-Polys Latest Ref Range: 44.00 - 72.00 % 1.70 (L)   Neutrophils (Absolute) Latest Ref Range: 2.00 - 7.15 K/uL 0.02 (LL)   Lymphocytes Latest Ref Range: 22.00 - 41.00 % 97.50 (H)   Lymphs (Absolute) Latest Ref Range: 1.00 - 4.80 K/uL 1.15   Monocytes Latest Ref Range: 0.00 - 13.40 % 0.80   Monos (Absolute) Latest Ref Range: 0.00 - 0.85 K/uL  0.01   Eosinophils Latest Ref Range: 0.00 - 6.90 % 0.00   Eos (Absolute) Latest Ref Range: 0.00 - 0.51 K/uL 0.00   Basophils Latest Ref Range: 0.00 - 1.80 % 0.00   Baso (Absolute) Latest Ref Range: 0.00 - 0.12 K/uL 0.00   Immature Granulocytes Latest Ref Range: 0.00 - 0.90 % 0.00   Immature Granulocytes (abs) Latest Ref Range: 0.00 - 0.11 K/uL 0.00   Nucleated RBC Latest Units: /100 WBC 0.00   NRBC (Absolute) Latest Units: K/uL 0.00   ABO Grouping Only Unknown AB (A)   Rh Grouping Only Unknown POS   Antibody Screen-Cod Unknown NEG       For more information, you may review the nurse's progress notes in chart review under the notes section.       Sincerely,  Navya Baires R.N.

## 2020-06-06 NOTE — PROGRESS NOTES
"Patient to hospitals for CBC, w possible blood. PIV inserted into right AC, flushed with + blood return, lab drawn. Platelets 15K patient meets parameters for infusion. HGB 7.5 patient does not meet parameters for PRBC's. 1unit of Platelets infusing. 15 min VS taken /56   Pulse 84   Temp (P) 37 °C (98.6 °F) (Temporal)   Resp 16   Ht 1.485 m (4' 10.47\")   Wt 43 kg (94 lb 12.8 oz)   SpO2 100%   BMI 19.50 kg/m²   Platelet infused with no s/s of infusion reaction. /45   Pulse 87   Temp 36.7 °C (98.1 °F) (Oral)   Resp 16   Ht 1.485 m (4' 10.47\")   Wt 43 kg (94 lb 12.8 oz)   SpO2 100%   BMI 19.50 kg/m²   PIV flushed with + blood return and removed. Patient has appointment Monday. Patient to home in care of self.  "

## 2020-06-06 NOTE — PROGRESS NOTES
Gary from Lab called with critical result of WBC 1.2, PLAT 15K. ANC 0.02 at 1045. Critical lab result read back to Gayr.   This critical lab result is within parameters established by  for this patient

## 2020-06-08 NOTE — PROGRESS NOTES
Merrill from Lab called with critical result of WBC1.1, ANC 0.02, Plt 18 at 1105. Critical lab result read back to Merrill.   This is within parameters for current treatment.

## 2020-06-08 NOTE — PROGRESS NOTES
Patient to infusion for labs and poss blood transfusion.  No new concerns today.  Still has intermittent mild cough but per pt much improved since hospitalization.  PIV started in right hand with brisk blood return.  Labs drawn, hgb 6.8, plt 18, ANC 0.03.  Patient premedicated with SolumeCortef only. PATIENT ALLERGIC TO TYLENOL AND BENADRYL.  1 unit PRBC's transfused.  Patient experienced some intermittent burning at IV site.  + blood return noted.  Heat helped relieve and transfusion continued. Max rate 180mL/hr tolerated well for remainder.  Platelets transfused without issue.  VSS throughout.  PIV flushed, saline locked and removed.  Patient aware of next appointment. Discharged ambulatory to home in stable condition.

## 2020-06-09 NOTE — PROGRESS NOTES
[facility]    Subjective:     Chief Complaint   Patient presents with   • Hospital Follow-up     Neutropenic fever     This is my first time meeting Ms. Johnston.      Interval History: 54-year-old Brazilian female recently diagnosed with CLL on chemotherapy via pill with PMH of autoimmune hemolytic anemia, alpha thalassemia, chronic elevation in her bilirubin, anxiety, migraines and hypertension.  Has been following up with Dr. Summers for a nonautoimmune hemolytic anemia, thought to be due to alpha thalassemia. Had prior normal thyroid function, normal rheumatoid factor, normal LAKIA and has tested negative for hepatitis B and C, was negative for Markus's disease and was negative for G6PD deficiency.  Recent prolonged hospitalization at [facility] in April 2020 for severe pancytopenia. She underwent bone marrow biopsy, which showed 70% CLL cells.  She was discharged home in May 2020, she was being seen at the infusion center several times a week for platelet transfusions and red blood cell transfusions.  Hospitalized from 5/17-5/23/2020, admitted due to neutropenic fever with a temperature in the ER 99.1, but increased to 102.6 with associated tachycardia and mild tachypnea.  Patient noted having problems with productive cough with yellow sputum that was streaked with blood starting 4 days PTA.  Blood cultures were negative, COVID was negative, chest x-ray negative, QuantiFERON gold negative, sputum culture was positive MSSA, cocci negative (not noted until after discharge) and 1, 3 BTG positive.  Discharged home on p.o. fluconazole well cocci pending.  Additionally discharged home on p.o. doxycycline 100 mg twice daily for MSSA pneumonia with notation also start p.o. levofloxacin 500 mg daily.  Advised to continue the p.o. acyclovir for prophylaxis per oncology.    Hospital records reviewed    Today, 6/9/2020: Patient reports feeling better from when she discharged from the hospital.  Finish the p.o.  doxycycline on 6/7 with minimal issue.  Has continued on the p.o. fluconazole, stating she only has a few days left, also tolerating with minimal issue.  States she had a little bit of nausea, but no vomiting on the Doxy and fluconazole.  She has occasional headaches, but does not feel this is related to the medications.  Has occasional shortness of breath if she is tired, which does not happen very often.  Notes that she gets transfusions every other day, did receive PRBCs and platelets yesterday.  Being closely followed by Dr. Summers, noting she has weekly appointments.  She is on a chemotherapy pill.  She has an occasional cough with little bit of phlegm that is white and minimal.  States that she never took the levofloxacin that she was prescribed on discharge due to concerns for tendon rupture, but is now being told by the APRN from oncology she is to take this medication once finishes with the doxycycline.  Directed patient to discuss with oncology APRN if this is still needed.  She denies pain of any kind.  States she has been continued on the p.o. acyclovir by Dr. Summers for another 3 months.  Denies feeling generally ill, fevers, chills, general malaise, diarrhea, abdominal pain or chest pain.    Review of Systems   Constitutional: Positive for malaise/fatigue (Occasional). Negative for chills and fever.   HENT: Negative for congestion and sore throat.    Respiratory: Positive for cough, sputum production and shortness of breath.    Cardiovascular: Negative for chest pain.   Gastrointestinal: Positive for nausea. Negative for abdominal pain, constipation, diarrhea and vomiting.   Genitourinary: Negative for dysuria.   Musculoskeletal: Negative for joint pain and myalgias.   Skin: Negative for itching and rash.   Neurological: Positive for headaches. Negative for weakness.   Psychiatric/Behavioral: The patient is not nervous/anxious.        Past Medical History:   Diagnosis Date   • Anxiety disorder    • CLL  "(chronic lymphocytic leukemia) (HCC)    • Hypertension      Social History     Tobacco Use   • Smoking status: Never Smoker   • Smokeless tobacco: Never Used   Substance Use Topics   • Alcohol use: Never     Frequency: Never   • Drug use: Never       Allergies: Amoxicillin; Benadryl allergy; Excedrin migraine; Sulfamethoxazole; Tylenol; and Famotidine    Pt's medication and problem list reviewed.     Objective:     /70 (BP Location: Right arm, Patient Position: Sitting, BP Cuff Size: Adult)   Pulse 86   Temp 37.3 °C (99.1 °F) (Temporal)   Ht 1.49 m (4' 10.66\")   Wt 43.4 kg (95 lb 9.6 oz)   SpO2 99%   BMI 19.53 kg/m²     Physical Exam   Constitutional: She is oriented to person, place, and time and well-developed, well-nourished, and in no distress. No distress.   HENT:   Head: Normocephalic and atraumatic.   Right Ear: External ear normal.   Left Ear: External ear normal.   Eyes: Pupils are equal, round, and reactive to light. EOM are normal. No scleral icterus.   Neck: Normal range of motion. Neck supple. No JVD present. No tracheal deviation present.   Cardiovascular: Normal rate, regular rhythm and normal heart sounds.   No murmur heard.  Pulmonary/Chest: Effort normal and breath sounds normal. No respiratory distress. She has no wheezes. She has no rales.   Abdominal: Soft. Bowel sounds are normal. She exhibits no distension. There is no abdominal tenderness. There is no rebound and no guarding.   Musculoskeletal: Normal range of motion.         General: No tenderness or edema.   Neurological: She is alert and oriented to person, place, and time. No cranial nerve deficit. Gait normal.   Skin: Skin is warm and dry. No rash noted. She is not diaphoretic. No erythema.   Psychiatric: Mood, memory, affect and judgment normal.   Pleasant   Vitals reviewed.      Labs:  WBC   Date/Time Value Ref Range Status   06/08/2020 09:43 AM 1.1 (LL) 4.8 - 10.8 K/uL Final     Comment:     This result has been called to " [id number] by John Hou on 06 08 2020  at 1104, and has been read back.       RBC   Date/Time Value Ref Range Status   06/08/2020 09:43 AM 2.36 (L) 4.20 - 5.40 M/uL Final     Hemoglobin   Date/Time Value Ref Range Status   06/08/2020 09:43 AM 6.8 (L) 12.0 - 16.0 g/dL Final     Hematocrit   Date/Time Value Ref Range Status   06/08/2020 09:43 AM 20.1 (L) 37.0 - 47.0 % Final     MCV   Date/Time Value Ref Range Status   06/08/2020 09:43 AM 85.2 81.4 - 97.8 fL Final     MCH   Date/Time Value Ref Range Status   06/08/2020 09:43 AM 28.8 27.0 - 33.0 pg Final     MCHC   Date/Time Value Ref Range Status   06/08/2020 09:43 AM 33.8 33.6 - 35.0 g/dL Final     MPV   Date/Time Value Ref Range Status   06/08/2020 09:43 AM 11.3 9.0 - 12.9 fL Final      Microbiology:   Ref. Range 5/18/2020 16:14   Cocci Ab CF Latest Ref Range: <1:2  <1:2   Cocci Ab IgG Latest Ref Range: <=0.9 IV 0.3   Cocci Ab IgM Latest Ref Range: <=0.9 IV 0.1   Coccidioides AB ID Latest Ref Range: None Detected  None Detected       Assessment and Plan:   The following treatment plan was discussed with patient at length:    1. Pneumonia due to methicillin susceptible Staphylococcus aureus, unspecified laterality, unspecified part of lung ([facility])      -Finished p.o. doxycycline on 6/7, no additional antibiotics needed as there are no signs of pneumonia.  -Directed to stop taking the p.o. fluconazole as cocci is negative.  -Monitor for s/sx of worsening off abx: fevers, chills, general malaise, worsening cough with sputum production, etc.  Notify ID or go to ER should these s/sx occur.   2. CLL (chronic lymphocytic leukemia) ([facility])      -Follow-up with oncology as directed.  Receiving transfusions every other day.  Clarify with oncology regarding need for levofloxacin-unclear why she would need to be on this medication from ID standpoint.   3. Pancytopenia ([facility])      As above   4. Neutropenic fever (HCC)      Resolved     Follow up: PRN, RTC sooner if needed. FU  with PCP for ongoing chronic medical conditions.     JACQUELINE Nuno.       Please note that this dictation was created using voice recognition software. I have  worked with technical experts from [facility] to optimize the interface.  I have made every reasonable attempt to correct obvious errors, but there may be errors of grammar and possibly content that I did not discover before finalizing the note.

## 2020-06-10 NOTE — PROGRESS NOTES
Pt arrived ambulatory to [facility]fusion Services for labs and possible transfusion. Pt denied fever, signs or symptoms of bleeding or infection today. POC discussed and pt verbalized understanding.     PIV established and labs drawn without difficulty; pt tolerated well. Labs reviewed. Hgb 9.8 and platelet 24; labs do not meet parameters for transfusion today; PIV flushed and removed. Gauze and paper tape applied at this time.     Follow-up care and next appointment confirmed with pt and jeffrey; pt verbalized understanding. Pt discharged home to self care in no apparent distress at this time.

## 2020-06-12 NOTE — PROGRESS NOTES
Pt arrived ambulatory to [facility]fusion Services for labs and possible transfusion. Pt denied fever, signs or symptoms of bleeding or infection today. POC discussed and pt verbalized understanding.     PIV established without difficulty; pt tolerated well. Labs reviewed from [facility] (see media for labs). Hgb 8.2 and platelet 17; Platelet count of 17 meets parameters for transfusion today; Premedication and platelets infused per policy and pt tolerated well. PIV flushed and removed. Gauze and paper tape applied at this time.     Follow-up care and next appointment printed for pt; pt verbalized understanding. Pt discharged home to self care in no apparent distress at this time.

## 2020-06-14 NOTE — PROGRESS NOTES
Patient presents for possible blood products. Reviewed plan of care, patient verbalizes understanding. PIV established, flushes well with brisk blood return. Hemoglobin 8.2 and platelets 14. One unit irradiated, CMV - platelets transfused as ordered. Line flushed clear. PIV removed, tip intact, compression dressing to site. Patient returns Tuesday and released in no acute distress.

## 2020-06-16 NOTE — PROGRESS NOTES
Pt ambulatory to [facility] for CBC w/ poss blood products. Pt w/ no s/s of infection, pt has no complaints at this time.  PIV established in LAC, brisk blood return noted, labs drawn per orders, flushed per protocol.      Minnie from Lab called with critical result of WBC of 0.6, preliminary ANC of 0.02, and platelets of 19K at 0958. Critical lab result read back to Minnie.   This critical lab result is within parameters established by  for this patient.    Pt's Hgb of 7.2 does not meet parameters for RBCs today, but pt's platelet count of 19K does meet parameters for 1 unit platelets.  Pt update on POC, pt in agreement.  Solu-cortef given as pre-med per MD order.  1 unit platelets transfused per MD order w/ no adverse reactions.  PIV flushed and removed, gauze and coban dressing applied.  Pt left on foot in NAD.  Confirmed pt's next appt.

## 2020-06-18 PROBLEM — D70.9 NEUTROPENIC (HCC): Status: ACTIVE | Noted: 2020-01-01

## 2020-06-18 PROBLEM — T80.92XA TRANSFUSION REACTION: Status: ACTIVE | Noted: 2020-01-01

## 2020-06-18 PROBLEM — E87.20 METABOLIC ACIDOSIS: Status: ACTIVE | Noted: 2020-01-01

## 2020-06-18 PROBLEM — I47.10 SVT (SUPRAVENTRICULAR TACHYCARDIA) (HCC): Status: ACTIVE | Noted: 2020-01-01

## 2020-06-18 NOTE — PROGRESS NOTES
"Nutrition Services: RD Consultation - RN requested during [facility] appointment  54 year old female with diagnosis of CLL.       Past Medical History:   Diagnosis Date   • Anxiety disorder    • CLL (chronic lymphocytic leukemia) (HCC)    • Hypertension      RN Patito asked me to visit with patient to assess nutrition.  She is in [facility] today for blood product administration.  Of note she received oral chemotherapy through [facility].  Patient reports that overall her weight has been stable and she her eating \"okay\".  She notes that her appetite is down slightly but she is eating.  She asks if she can drink boost or ensure.  I encouraged patient to do this to supplement meals. Usual body weight is 95-97 pounds per report.    We discussed nutrition related goals during treatment as well and RD role. We discussed the potential side effects of treatment and their impacts on nutrition. We discussed the benefit of small, frequent meals and snacks focusing on high calorie/protein items as well as the benefits of maintaining weight and lean muscle mass throughout treatment. We discussed increased nutrition needs and hypermetabolic effects of cancer. Provided and went over handouts/food lists regarding a general healthy diet, healthy snack ideas, foods high in protein and the benefits of a plant based diet limiting red and processed meats.  Pt did not express any further nutrition-related questions or concerns at this time.     Assessment:  • Pertinent labs and meds - reviewed  • Weight: 96 pounds/43.9 kg  • Height: 4'11''  • BMI: 19.5    Plan/Recommendations:  • Provided and discussed handout regarding general nutrition guidelines as well as nutritional recommendations for patient's with cancer, including tips/tricks should side effects of tx occur.   • Continue with small and frequent meals and snacks.  • Focus on high calorie and protein foods, fruits and vegetables with all meals/snacks - handout provided.  • Incorporate boost plus or " comparable protein supplement 1x/d.    Pt reports understanding and was receptive to information provided.   RD provided contact information. Encouraged pt to reach out as questions/concerns arise.     RD is available PRN  [phone]

## 2020-06-18 NOTE — PROGRESS NOTES
into [facility]usions Services CBC / Possible blood products for for CLL (chronic lymphocytic leukemia). Pt denied having any new or acute complaints today, denies bleeding, c/o to be tired, reports tolerating past treatments well. PIV started, had + blood return flushed briskly. Dr Summers called prior to start transfusion due to fever of 101.7F and patients allergies to tylenol and benadryl. Per Dr. Summers no ibuprofen due to low platelets, per physician okay to proceed with treatment and to call back if patients condition worsen. Pt given pre medication and 2 U PRBC's / 1 U Platelets CMV neg / Irradiated as prescribed, tolerated well, denied having any complaints during or after infusion, until prior discharge patient develop again, fever, SOB, elevated HR, elevate B/P. Talk to Dr. Mcelroy patient to be transfer to ED ASAP for evaluation. PIV change to SL and left in place. Report given to ER RN's on blue pod 15.

## 2020-06-19 PROBLEM — I95.9 HYPOTENSION: Status: ACTIVE | Noted: 2020-01-01

## 2020-06-19 PROBLEM — J96.00 ACUTE RESPIRATORY FAILURE (HCC): Status: ACTIVE | Noted: 2020-01-01

## 2020-06-19 PROBLEM — R50.9 HYPERPYREXIA: Status: ACTIVE | Noted: 2020-01-01

## 2020-06-19 NOTE — CONSULTS
Critical Care Consultation    Date of consult: 6/18/2020    Referring Physician  Torey Mtz M.D.    Reason for Consultation  Fever, possible transfusion reaction    History of Presenting Illness  54 y.o. female with a hx of CLL who presented 6/18/2020 with fever which started during her transfusion at the [facility]. Per report she has tolerated well in the past, however she did have a similar episode at some point. Initially she was doing well then she started to become febrile. Her fever continued to worsen after receiving platelets and 2 units of PRBCs. She was given solu cortef and benadryl. ON arrival to [facility] her O2 sats were 84%. She was noted to be febrile >104F while in the ED. Her chest xray is suggestive of mild volume overload and her initial labs reveal an anion gap of 26, elevated LFT s and tbili 5.8. Initially on presentation she was lethargic and had difficulty following commands which has improved.       Code Status  Prior    Review of Systems  Review of Systems   Unable to perform ROS: Acuity of condition       Past Medical History   has a past medical history of Anxiety disorder, CLL (chronic lymphocytic leukemia) (HCC), and Hypertension.    Surgical History   has a past surgical history that includes cholecystectomy (2005).    Family History  family history is not on file.    Social History   reports that she has never smoked. She has never used smokeless tobacco. She reports that she does not drink alcohol or use drugs.    Medications  Home Medications     Reviewed by Candy Lezama R.N. (Registered Nurse) on 06/18/20 at 1737  Med List Status: Partial   Medication Last Dose Status   acyclovir (ZOVIRAX) 400 MG tablet  Active   amLODIPine (NORVASC) 5 MG Tab  Active   fluconazole (DIFLUCAN) 200 MG Tab  Active   folic acid (FOLVITE) 1 MG Tab  Active   Ibrutinib 420 MG Tab  Active   ondansetron (ZOFRAN ODT) 8 MG TABLET DISPERSIBLE  Active   tramadol (ULTRAM) 50 MG Tab   Active              Current Facility-Administered Medications   Medication Dose Route Frequency Provider Last Rate Last Dose   • DILTIAZem (CARDIZEM) injection 20 mg  20 mg Intravenous Once Torey Mtz M.D.         Current Outpatient Medications   Medication Sig Dispense Refill   • fluconazole (DIFLUCAN) 200 MG Tab Take 200 mg by mouth 2 Times a Day.     • ondansetron (ZOFRAN ODT) 8 MG TABLET DISPERSIBLE as needed.     • acyclovir (ZOVIRAX) 400 MG tablet Take 1 Tab by mouth 2 Times a Day. (Patient not taking: Reported on 6/18/2020) 30 Tab 0   • tramadol (ULTRAM) 50 MG Tab Take 50 mg by mouth every 6 hours as needed for Mild Pain. Indications: Pain     • amLODIPine (NORVASC) 5 MG Tab Take 5 mg by mouth every evening.     • folic acid (FOLVITE) 1 MG Tab Take 1 mg by mouth every day.     • Ibrutinib 420 MG Tab Take 420 mg by mouth every evening.         Allergies  Allergies   Allergen Reactions   • Amoxicillin Hives   • Benadryl Allergy Hives   • Excedrin Migraine Swelling   • Sulfamethoxazole Hives   • Tylenol Hives   • Famotidine Itching       Vital Signs last 24 hours  Temp:  [41.3 °C (106.4 °F)-41.9 °C (107.4 °F)] 41.9 °C (107.4 °F)  Pulse:  [124-184] 184  Resp:  [26] 26  BP: (122-191)/() 122/61  SpO2:  [84 %-98 %] 92 %    Physical Exam  Physical Exam  Constitutional:       General: She is in acute distress.      Appearance: She is ill-appearing.   HENT:      Head: Atraumatic.      Right Ear: External ear normal.      Left Ear: External ear normal.      Nose: Nose normal.      Mouth/Throat:      Mouth: Mucous membranes are moist.      Pharynx: Oropharynx is clear. No oropharyngeal exudate.   Eyes:      General: No scleral icterus.     Extraocular Movements: Extraocular movements intact.      Conjunctiva/sclera: Conjunctivae normal.      Pupils: Pupils are equal, round, and reactive to light.   Neck:      Musculoskeletal: No neck rigidity.   Cardiovascular:      Rate and Rhythm: Tachycardia present.       Pulses: Normal pulses.   Pulmonary:      Effort: Respiratory distress present.   Abdominal:      General: Abdomen is flat. Bowel sounds are normal.   Musculoskeletal:         General: No swelling.   Skin:     Capillary Refill: Capillary refill takes 2 to 3 seconds.      Comments: Mottled throughout   Neurological:      Mental Status: She is alert.      Comments: GCS 15 A&O x 4. Cn 2-12 intact. Moves all 4 ext equally.         Fluids  No intake or output data in the 24 hours ending 06/18/20 6087    Laboratory  Recent Results (from the past 48 hour(s))   CBC WITH DIFFERENTIAL    Collection Time: 06/18/20  9:32 AM   Result Value Ref Range    WBC 1.0 (LL) 4.8 - 10.8 K/uL    RBC 1.91 (L) 4.20 - 5.40 M/uL    Hemoglobin 5.7 (LL) 12.0 - 16.0 g/dL    Hematocrit 16.6 (LL) 37.0 - 47.0 %    MCV 86.9 81.4 - 97.8 fL    MCH 29.8 27.0 - 33.0 pg    MCHC 34.3 33.6 - 35.0 g/dL    RDW 36.8 35.9 - 50.0 fL    Platelet Count 17 (LL) 164 - 446 K/uL    MPV 9.9 9.0 - 12.9 fL    Neutrophils-Polys 11.00 (L) 44.00 - 72.00 %    Lymphocytes 88.10 (H) 22.00 - 41.00 %    Monocytes 0.90 0.00 - 13.40 %    Eosinophils 0.00 0.00 - 6.90 %    Basophils 0.00 0.00 - 1.80 %    Nucleated RBC 0.00 /100 WBC    Neutrophils (Absolute) 0.11 (LL) 2.00 - 7.15 K/uL    Lymphs (Absolute) 0.88 (L) 1.00 - 4.80 K/uL    Monos (Absolute) 0.01 0.00 - 0.85 K/uL    Eos (Absolute) 0.00 0.00 - 0.51 K/uL    Baso (Absolute) 0.00 0.00 - 0.12 K/uL    NRBC (Absolute) 0.00 K/uL   IMMATURE PLT FRACTION    Collection Time: 06/18/20  9:32 AM   Result Value Ref Range    Imm. Plt Fraction 1.0 0.6 - 13.1 K/uL   DIFFERENTIAL MANUAL    Collection Time: 06/18/20  9:32 AM   Result Value Ref Range    Manual Diff Status PERFORMED    PERIPHERAL SMEAR REVIEW    Collection Time: 06/18/20  9:32 AM   Result Value Ref Range    Peripheral Smear Review see below    PLATELET ESTIMATE    Collection Time: 06/18/20  9:32 AM   Result Value Ref Range    Plt Estimation Marked Decrease    PLATELETS REQUEST     Collection Time: 20 10:34 AM   Result Value Ref Range    Component P       PII                 Plts,PheresisIRR    [id number]   issued       20   10:46      Product Type Platelets  Pheresis IRR LR     Dispense Status issued     Unit Number (Barcoded) [id number]     Product Code (Barcoded) E8848F02     Blood Type (Barcoded) 5100    EKG    Collection Time: 20  5:37 PM   Result Value Ref Range    Report       [facility] Emergency Dept.    Test Date:  2020  Pt Name:    CHIDI MOLINA               Department: ER  MRN:        [id number]                      Room:       BL 15  Gender:     Female                       Technician: [id number]  :        1965                   Requested By:ARMANDO GONSALEZ  Order #:    [id number]                    Reading MD:    Measurements  Intervals                                Axis  Rate:       131                          P:          68  NJ:         168                          QRS:        48  QRSD:       82                           T:  QT:         312  QTc:        461    Interpretive Statements  SINUS TACHYCARDIA  PROBABLE LVH WITH SECONDARY REPOL ABNRM  No previous ECG available for comparison     Lactic acid (lactate)    Collection Time: 20  5:41 PM   Result Value Ref Range    Lactic Acid 2.8 (H) 0.5 - 2.0 mmol/L   CBC WITH DIFFERENTIAL    Collection Time: 20  5:41 PM   Result Value Ref Range    WBC 0.4 (LL) 4.8 - 10.8 K/uL    RBC 3.31 (L) 4.20 - 5.40 M/uL    Hemoglobin 10.1 (L) 12.0 - 16.0 g/dL    Hematocrit 28.6 (L) 37.0 - 47.0 %    MCV 87.0 81.4 - 97.8 fL    MCH 30.5 27.0 - 33.0 pg    MCHC 35.1 (H) 33.6 - 35.0 g/dL    RDW 39.0 35.9 - 50.0 fL    Platelet Count 37 (LL) 164 - 446 K/uL    MPV 10.4 9.0 - 12.9 fL    Nucleated RBC 0.00 /100 WBC    NRBC (Absolute) 0.00 K/uL   COMP METABOLIC PANEL    Collection Time: 20  5:42 PM   Result Value Ref Range    Sodium 139 135 - 145 mmol/L    Potassium 3.3 (L) 3.6 - 5.5 mmol/L     Chloride 97 96 - 112 mmol/L    Co2 16 (L) 20 - 33 mmol/L    Anion Gap 26.0 (H) 7.0 - 16.0    Glucose 177 (H) 65 - 99 mg/dL    Bun 9 8 - 22 mg/dL    Creatinine 0.59 0.50 - 1.40 mg/dL    Calcium 9.5 8.5 - 10.5 mg/dL    AST(SGOT) 93 (H) 12 - 45 U/L    ALT(SGPT) 92 (H) 2 - 50 U/L    Alkaline Phosphatase 89 30 - 99 U/L    Total Bilirubin 5.8 (H) 0.1 - 1.5 mg/dL    Albumin 4.5 3.2 - 4.9 g/dL    Total Protein 7.5 6.0 - 8.2 g/dL    Globulin 3.0 1.9 - 3.5 g/dL    A-G Ratio 1.5 g/dL   TROPONIN    Collection Time: 20  5:42 PM   Result Value Ref Range    Troponin T 20 (H) 6 - 19 ng/L   ESTIMATED GFR    Collection Time: 20  5:42 PM   Result Value Ref Range    GFR If African American >60 >60 mL/min/1.73 m 2    GFR If Non African American >60 >60 mL/min/1.73 m 2   URINALYSIS    Collection Time: 20  5:52 PM    Specimen: Urine   Result Value Ref Range    Color Yellow     Character Clear     Specific Gravity 1.011 <1.035    Ph 6.0 5.0 - 8.0    Glucose Negative Negative mg/dL    Ketones Negative Negative mg/dL    Protein 300 (A) Negative mg/dL    Bilirubin Negative Negative    Urobilinogen, Urine 1.0 Negative    Nitrite Positive (A) Negative    Leukocyte Esterase Negative Negative    Occult Blood Large (A) Negative    Micro Urine Req Microscopic    URINE MICROSCOPIC (W/UA)    Collection Time: 20  5:52 PM   Result Value Ref Range    WBC 2-5 /hpf    -150 (A) /hpf    Bacteria Many (A) None /hpf    Epithelial Cells Negative /hpf    Hyaline Cast 0-2 /lpf   EKG    Collection Time: 20  6:30 PM   Result Value Ref Range    Report       [facility] Emergency Dept.    Test Date:  2020  Pt Name:    CHIDI MOLINA               Department: ER  MRN:        [id number]                      Room:        15  Gender:     Female                       Technician: [id number]  :        1965                   Requested By:ARMANDO GONSALEZ  Order #:    [id number]                    Reading  MD:    Measurements  Intervals                                Axis  Rate:       185                          P:          0  CT:         150                          QRS:        52  QRSD:       78                           T:          198  QT:         248  QTc:        436    Interpretive Statements  SUPRAVENTRICULAR TACHYCARDIA  BORDERLINE LOW VOLTAGE IN FRONTAL LEADS  REPOLARIZATION ABNORMALITY, PROB RATE RELATED  Compared to ECG 06/18/2020 17:37:05  Sinus tachycardia no longer present         Imaging  DX-CHEST-PORTABLE (1 VIEW)   Final Result      1.  Apparent resolving LEFT lung base infiltrate or atelectasis.   2.  Diffuse prominence of the pulmonary interstitium may indicate pneumonitis or edema.          Assessment/Plan  Transfusion reaction  Assessment & Plan  Suspect TRALI but AHTR and sepsis related also considered  Continue supportive care  40mg Lasix and monitor need for repeat dose  Follow up hemolytic labs  Currently on non re breather monitor need for NIPPV or possible intubation  Continue coverage with cefepime     CLL (chronic lymphocytic leukemia) ([facility])- (present on admission)  Assessment & Plan  On Ibrutinib as an outpatient  Hold for now    Metabolic acidosis  Assessment & Plan  Moderate elevation in Lactate  Trend lactate.    Neutropenic ([facility])  Assessment & Plan  No overt signs of infection but given neutropenia and reaction will continue cefepime  Follow up blood cx  Monitor need to expand or deescalate coverage    SVT (supraventricular tachycardia) ([facility])  Assessment & Plan  Received several dose of diltiazem and one dose of adenosine in the ED  May need to start Diltiazem gtt    Pancytopenia ([facility])- (present on admission)  Assessment & Plan  Current Hgb 10 after 2 units  Serial H&H  Monitor for signs of hemorrhage and hemolysis    Hypokalemia  Assessment & Plan  Replete prn      Discussed patient condition and risk of morbidity and/or mortality with Hospitalist, RN, RT, Pharmacy, Code status  disscussed, Charge nurse / hot rounds and Patient.    The patient remains critically ill.  Critical care time = 40 minutes in directly providing and coordinating critical care and extensive data review.  No time overlap and excludes procedures.

## 2020-06-19 NOTE — ED NOTES
Report received from KENNETH Valentin. Intensivist at bedside. Remain on monitor. Frequent monitoring in place.

## 2020-06-19 NOTE — PROCEDURES
Central Line Insertion    Date/Time: 6/19/2020 4:58 AM  Performed by: Karl Vega M.D.  Authorized by: Karl Vega M.D.     Consent:     Consent obtained:  Verbal    Consent given by:  Patient    Risks discussed:  Incorrect placement, nerve damage, pneumothorax and infection    Alternatives discussed:  Delayed treatment  Pre-procedure details:     Hand hygiene: Hand hygiene performed prior to insertion      Sterile barrier technique: All elements of maximal sterile technique followed      Skin preparation:  ChloraPrep  Sedation:     Sedation type:  None  Anesthesia:     Anesthesia method:  Local infiltration    Local anesthetic:  Lidocaine 1% w/o epi  Procedure details:     Location:  L subclavian    Patient position:  Flat    Procedural supplies:  Triple lumen    Catheter size:  7.5 Fr    Landmarks identified: yes      Ultrasound guidance: no      Number of attempts:  1    Successful placement: yes    Post-procedure details:     Post-procedure:  Dressing applied and line sutured    Assessment:  Blood return through all ports and free fluid flow    Patient tolerance of procedure:  Tolerated well, no immediate complications  Comments:      Indication: shock and limited PIV access on pressors  CXR pending

## 2020-06-19 NOTE — ASSESSMENT & PLAN NOTE
With acute phase, pancytopenia.  Oncology aware of admission  Protective isolation  Hemoglobin up to 10 after transfusion, admitted to icu for severe transfusion reaction

## 2020-06-19 NOTE — PROGRESS NOTES
Critical Care Progress Note    Date of admission  6/18/2020    Chief Complaint  54 y.o. female admitted 6/18/2020 with fever, possible transfusion reaction    Hospital Course    54 y.o. female with a hx of CLL who presented 6/18/2020 with fever which started during her transfusion at the [facility]. Per report she has tolerated well in the past, however she did have a similar episode at some point. Initially she was doing well then she started to become febrile. Her fever continued to worsen after receiving platelets and 2 units of PRBCs. She was given solu cortef and benadryl. ON arrival to [facility] her O2 sats were 84%. She was noted to be febrile >104F while in the ED. Her chest xray is suggestive of mild volume overload and her initial labs reveal an anion gap of 26, elevated LFT s and tbili 5.8. Initially on presentation she was lethargic and had difficulty following commands which has improved.       Interval Problem Update  Reviewed last 24 hour events:  T-max 106  -139 cc over the last 24 hours  CXR with interstitial edema  Pancytopenia  K2.4  Elevated LFTs    Acyclovir 6/18-P  Cefepime 6/18-P  Vancomycin 6/19-P    LR@pause  Norepinephrine@3    100% on RA  Last BM PTA    Review of Systems  Review of Systems   Constitutional: Negative for chills and fever (Resolved).   HENT: Negative for congestion.    Eyes: Negative for blurred vision.   Respiratory: Negative for cough, sputum production and shortness of breath.    Cardiovascular: Negative for chest pain and palpitations.   Gastrointestinal: Negative for nausea and vomiting.   Neurological: Negative for focal weakness.   Psychiatric/Behavioral: Negative for depression.   All other systems reviewed and are negative.       Vital Signs for last 24 hours   Temp:  [35.6 °C (96.1 °F)-41.9 °C (107.4 °F)] 36 °C (96.8 °F)  Pulse:  [] 73  Resp:  [13-43] 19  BP: ()/() 102/50  SpO2:  [78 %-100 %] 100 %    Hemodynamic parameters for last 24  hours       Respiratory Information for the last 24 hours       Physical Exam   Physical Exam  Vitals signs and nursing note reviewed.   Constitutional:       Appearance: She is not diaphoretic.   HENT:      Head: Normocephalic and atraumatic.   Eyes:      Pupils: Pupils are equal, round, and reactive to light.   Cardiovascular:      Rate and Rhythm: Normal rate.      Pulses: Normal pulses.   Pulmonary:      Breath sounds: No wheezing or rales.   Abdominal:      General: Bowel sounds are normal. There is no distension.      Palpations: Abdomen is soft.   Musculoskeletal:         General: No swelling.   Skin:     General: Skin is warm and dry.   Neurological:      Mental Status: She is alert.      Cranial Nerves: No cranial nerve deficit.   Psychiatric:         Mood and Affect: Mood normal.         Medications  Current Facility-Administered Medications   Medication Dose Route Frequency Provider Last Rate Last Dose   • MD Alert...Vancomycin per Pharmacy   Other PHARMACY TO DOSE Karl Vega M.D.       • norepinephrine (Levophed) infusion 8 mg/250 mL (premix)  0-30 mcg/min Intravenous Continuous Karl Vega M.D. 5.6 mL/hr at 06/19/20 0704 3 mcg/min at 06/19/20 0704   • potassium chloride (KCL) ivpb 10 mEq  10 mEq Intravenous Q HOUR Karl Vega M.D.   Stopped at 06/19/20 0659   • vancomycin (VANCOCIN) 900 mg in  mL IVPB  20 mg/kg Intravenous Q12HR Karl Vega M.D.       • acyclovir (ZOVIRAX) tablet 400 mg  400 mg Oral BID Annie Sam M.D.   400 mg at 06/19/20 0510   • amLODIPine (NORVASC) tablet 5 mg  5 mg Oral Q EVENING Annie Sam M.D.   Stopped at 06/18/20 1945   • senna-docusate (PERICOLACE or SENOKOT S) 8.6-50 MG per tablet 2 Tab  2 Tab Oral BID Annie Sam M.D.   2 Tab at 06/19/20 0510    And   • polyethylene glycol/lytes (MIRALAX) PACKET 1 Packet  1 Packet Oral QDAY PRN Annie Sam M.D.        And   • magnesium hydroxide (MILK OF MAGNESIA) suspension 30 mL  30 mL Oral QDAY  PRKATLYN Sam M.D.        And   • bisacodyl (DULCOLAX) suppository 10 mg  10 mg Rectal QDAY PRKATLYN Sam M.D.       • Respiratory Therapy Consult   Nebulization Continuous RT Annie Sam M.D.       • ondansetron (ZOFRAN) syringe/vial injection 4 mg  4 mg Intravenous Q4HRS PRKATLYN Sam M.D.       • ondansetron (ZOFRAN ODT) dispertab 4 mg  4 mg Oral Q4HRS PRN Annie Sam M.D.       • promethazine (PHENERGAN) tablet 12.5-25 mg  12.5-25 mg Oral Q4HRS PRKATLYN Sam M.D.       • promethazine (PHENERGAN) suppository 12.5-25 mg  12.5-25 mg Rectal Q4HRS PRKATLYN Sam M.D.       • prochlorperazine (COMPAZINE) injection 5-10 mg  5-10 mg Intravenous Q4HRS PRKATLYN Sam M.D.       • MD Alert...ICU Electrolyte Replacement per Pharmacy   Other PHARMACY TO DOSE Alber Carrion M.D.       • cefepime (MAXIPIME) 2 g in  mL IVPB  2 g Intravenous Q12HRS Annie Sam M.D.   Stopped at 06/19/20 0548   • lactated ringers infusion  1,000 mL Intravenous Continuous Alber Carrion M.D.   Stopped at 06/19/20 0100       Fluids    Intake/Output Summary (Last 24 hours) at 6/19/2020 0727  Last data filed at 6/19/2020 0614  Gross per 24 hour   Intake 2230.72 ml   Output 2370 ml   Net -139.28 ml       Laboratory          Recent Labs     06/18/20  1742 06/19/20  0216   SODIUM 139 141   POTASSIUM 3.3* 2.4*   CHLORIDE 97 107   CO2 16* 22   BUN 9 17   CREATININE 0.59 0.56   MAGNESIUM  --  1.6   PHOSPHORUS  --  2.7   CALCIUM 9.5 8.3*     Recent Labs     06/18/20  1742 06/19/20  0216   ALTSGPT 92* 197*   ASTSGOT 93* 192*   ALKPHOSPHAT 89 105*   TBILIRUBIN 5.8* 6.1*   GLUCOSE 177* 145*     Recent Labs     06/18/20  0932 06/18/20  1741 06/18/20  1742 06/19/20  0216   WBC 1.0* 0.4*  --  0.7*   NEUTSPOLYS 11.00* cancel  --  9.60*   LYMPHOCYTES 88.10* cancel  --  89.60*   MONOCYTES 0.90 cancel  --  0.00   EOSINOPHILS 0.00 cancel  --  0.00   BASOPHILS 0.00 cancel  --  0.00   ASTSGOT  --   --  93* 192*   ALTSGPT  --    --  92* 197*   ALKPHOSPHAT  --   --  89 105*   TBILIRUBIN  --   --  5.8* 6.1*     Recent Labs     06/18/20  0932 06/18/20  1741 06/19/20  0216   RBC 1.91* 3.31* 2.68*   HEMOGLOBIN 5.7* 10.1* 7.9*   HEMATOCRIT 16.6* 28.6* 22.5*   PLATELETCT 17* 37* 12*       Imaging  X-Ray:  I have personally reviewed the images and compared with prior images.    Assessment/Plan  * Transfusion reaction- (present on admission)  Assessment & Plan  Suspect TRALI but AHTR and sepsis related also considered  Continue supportive care  Follow up hemolytic labs  Okay to DC antibiotics  Off NIPPV and on nasal cannula    CLL (chronic lymphocytic leukemia) (HCC)- (present on admission)  Assessment & Plan  On Ibrutinib as an outpatient  Hold for now    Hypotension  Assessment & Plan  Hold antihypertensives  Add midodrine  Continue vasopressors with active titration to maintain map greater than 65    Metabolic acidosis- (present on admission)  Assessment & Plan  Resolved    Neutropenic (HCC)- (present on admission)  Assessment & Plan  No overt signs of infection but given neutropenia and reaction will continue cefepime  Follow up blood cx  Monitor need to expand or deescalate coverage  Reverse precautions    SVT (supraventricular tachycardia) (HCC)- (present on admission)  Assessment & Plan  Received several dose of diltiazem and one dose of adenosine in the ED  May need to start Diltiazem gtt  Aggressively optimize electrolytes K currently 2.4 mag 1.6    Pancytopenia (HCC)- (present on admission)  Assessment & Plan  Current Hgb 10 after 2 units  Serial H&H  Monitor for signs of hemorrhage and hemolysis    Hypokalemia- (present on admission)  Assessment & Plan  Replace to keep greater than 4       VTE:  Contraindicated  Ulcer: H2 Antagonist  Lines: Central Line  Ongoing indication addressed    I have performed a physical exam and reviewed and updated ROS and Plan today (6/19/2020). In review of yesterday's note (6/18/2020), there are no changes  except as documented above.     Discussed patient condition and risk of morbidity and/or mortality with Hospitalist, RN, RT, Pharmacy and Patient  The patient remains critically ill.  Critical care time = 31 minutes in directly providing and coordinating critical care and extensive data review.  No time overlap and excludes procedures.

## 2020-06-19 NOTE — PROGRESS NOTES
Spiritual Care Note    Patient Information     Patient's Name: Lana Johnston   MRN: [id number]    YOB: 1965   Age and Gender: 54 y.o. female   Service Area: [facility]   Room (and Bed): [place] [address]   Ethnicity or Nationality:     Primary Language: English   Oriental orthodox/Spiritual preference: Tenriism   Place of Residence: [place]   Family/Friends/Others Present: No   Clinical Team Present: No   Medical Diagnosis(-es)/Procedure(s): TRALI   Code Status: Full Code    Date of Admission: 6/18/2020   Length of Stay: 1 days        Spiritual Care Provider Information:  Name of Spiritual Care Provider: Maritza Simmons  Title of Spiritual Care Provider: Associate Simon  Phone Number: [phone]  E-mail: [email or web address]  Total time : 10 minutes    Spiritual Screen Results:    Gen Nursing  Spiritual Screen  Is your spiritual health or inner well-being important to you as you cope with your medical condition?: Yes  Would you like to receive a visit from our Spiritual Care team or your own Mosque or spiritual leader?: Yes  Was spiritual care education provided to the patient?: Yes     Palliative Care  PC Oriental orthodox/Spiritual Screening  Was spiritual care education provided to the patient?: Yes      Encounter/Request Information  Encounter/Request Type   Visited With: Patient  Nature of the Visit: Initial, On shift  General Visit: Yes  Referral From/ Origin of Request: Epic nursing    Religous Needs/Values  Oriental orthodox Needs Visit  Oriental orthodox Needs: Prayer    Spiritual Assessment     Spiritual Care Encounters    Observations/Symptoms: Accepting, Thankfulness    Interaction/Conversation: Pt, sitting up in recliner, requested prayer for recovery and thanked the .    Assessment: Need    Need: Seeking Spiritual Assistance and Support    Interventions: Compassionate presence, prayer.    Outcomes: Spiritual Comfort    Plan: Visit Upon Request    Notes:

## 2020-06-19 NOTE — ED NOTES
Med rec complete per patient's pharmacy. Pt unable to participate in interview. Unable to review allergies and last dose. Pt picked up LEVAQUIN on 06/03/2020 for a 7 day supply.

## 2020-06-19 NOTE — ED NOTES
Bilateral pivs were obtained, labs including two sets of cultures sent. Pt received abx and was medicated per MAR.

## 2020-06-19 NOTE — ED NOTES
Pt tachypnic and tachycardic on 15L non rebreather. Ice packs placed for temperature. 2 RN's at bedside

## 2020-06-19 NOTE — ASSESSMENT & PLAN NOTE
Received several dose of diltiazem and one dose of adenosine in the ED  May need to start Diltiazem gtt  Aggressively optimize electrolytes K currently 2.4 mag 1.6

## 2020-06-19 NOTE — PROGRESS NOTES
"Pharmacy Kinetics 54 y.o. female on vancomycin day # 1 2020    Currently on Vancomycin 1100 mg load followed by 900 (20mg/kg) iv q12hr (0300,1500)  Provider specified end date: TBD    Indication for Treatment: Neutropenic Fever    Pertinent history per medical record: Admitted on 2020 for fever after a transfusion at the infusion center. Patient has a history of CLL, and a past history of fever after blood product transfusion. Due to high fever, low white count and tachycardia, empiric antibiotics were initiated    Other antibiotics: Cefepime    Allergies: Amoxicillin; Benadryl allergy; Excedrin migraine; Sulfamethoxazole; Tylenol; and Famotidine     List concerns for renal function : patient has had a few low blood pressures, but no other current concerns for accumulation due to decreased renal function.     Pertinent cultures to date:   : urine culture in process, blood culture x 2 in process    MRSA nares swab if pneumonia is a concern (ordered/positive/negative/n-a): NA    Recent Labs     20  0910 20  0932 20  1741 20  0216   WBC 0.6* 1.0* 0.4* 0.7*   NEUTSPOLYS 3.50* 11.00* cancel 9.60*   BANDSSTABS  --   --   --  0.90     Recent Labs     20  1742 20  0216   BUN 9 17   CREATININE 0.59 0.56   ALBUMIN 4.5 3.3     No results for input(s): VANCOTROUGH, VANCOPEAK, VANCORANDOM in the last 72 hours.    Intake/Output Summary (Last 24 hours) at 2020 0612  Last data filed at 2020 0600  Gross per 24 hour   Intake 1930.82 ml   Output 1870 ml   Net 60.82 ml      BP (!) 89/31   Pulse 76   Temp 36.3 °C (97.3 °F)   Resp 15   Ht 1.499 m (4' 11\")   Wt 43.9 kg (96 lb 12.5 oz)   SpO2 96%  Temp (24hrs), Av.9 °C (100.3 °F), Min:35.6 °C (96.1 °F), Max:41.9 °C (107.4 °F)      A/P   1. Vancomycin dose change: New Start  2. Next vancomycin level:  @ 1430 prior to 4th dose  3. Goal trough: 16-20mcg/ml  4. Comments: Please continue to monitor renal function, urine " output, culture data and signs of clinica cure for dosing adjustments and de-escalation of therapy.     Liane Escalona, GarcíaD

## 2020-06-19 NOTE — ED PROVIDER NOTES
ED Provider Note    Scribed for Torey Mtz M.D. by Dory Jha. 6/18/2020, 5:23 PM.    Primary care provider: Ara Duncan M.D.  Means of arrival: Wheel-Chair  History obtained from: Patient  History limited by: Altered Mental Status    CHIEF COMPLAINT  Chief Complaint   Patient presents with   • Fever     BIB staff from infusion center after getting platelets and 2 units RBCs. pt was febrile prior to starting infusion at 38.7, increased more by end of infusions.        JOVITA Johnston is a 54 y.o. female, with a history of leukemia, who presents to the Emergency Department for fever that started prior to arrival. Per nurse, patient was receiving a blood infusion at the [facility], which she has tolerated well in the past, when she started to become febrile. Her fever continued to worsen after receiving platelet and 2 units of PRBCs. She was given solu cortef and benadryl. She became altered and her SpO2 was at 84% when she was transferred here. Patient's highest temperature was 106.4 °F.    Further history of present illness cannot be obtained due to the patient's altered level mental status.      REVIEW OF SYSTEMS  As above otherwise all other systems are negative    Further ROS cannot be obtained due to the patient's altered level mental status.      PAST MEDICAL HISTORY   has a past medical history of Anxiety disorder, CLL (chronic lymphocytic leukemia) (HCC), and Hypertension.    SURGICAL HISTORY   has a past surgical history that includes cholecystectomy (2005).    SOCIAL HISTORY  Social History     Tobacco Use   • Smoking status: Never Smoker   • Smokeless tobacco: Never Used   Substance Use Topics   • Alcohol use: Never     Frequency: Never   • Drug use: Never      Social History     Substance and Sexual Activity   Drug Use Never       FAMILY HISTORY  No family history on file.    CURRENT MEDICATIONS  Home Medications     Reviewed by Genaro Strong (Pharmacy Tech) on  "06/18/20 at 2033  Med List Status: Complete   Medication Last Dose Status   acyclovir (ZOVIRAX) 400 MG tablet UNK Active   amLODIPine (NORVASC) 5 MG Tab UNK Active   Ibrutinib 420 MG Tab UNK Active   levoFLOXacin (LEVAQUIN) 500 MG tablet UNK Active   potassium Chloride ER (K-TAB) 20 MEQ Tab CR tablet UNK Active                ALLERGIES  Allergies   Allergen Reactions   • Amoxicillin Hives   • Benadryl Allergy Hives   • Excedrin Migraine Swelling   • Sulfamethoxazole Hives   • Tylenol Hives   • Famotidine Itching       PHYSICAL EXAM  VITAL SIGNS: /58   Pulse (!) 135   Temp (!) 41.3 °C (106.4 °F) (Temporal)   Resp (!) 26   Ht 1.499 m (4' 11\")   Wt 43.9 kg (96 lb 12.5 oz)   SpO2 93%   BMI 19.55 kg/m²     Constitutional:mottled.  Altered  HENT: Normocephalic, Atraumatic, Bilateral external ears normal, oropharynx dry, No oral exudates, Nose normal.   Eyes:conjunctiva is normal, there are no signs of exudate.   Neck: Supple, no meningeal signs.  Lymphatic: No lymphadenopathy noted.   Cardiovascular: tachycardic rate and rhythm without murmurs gallops or rubs.   Thorax & Lungs: Initial breath sounds are diminished bilaterally but she was rather tachypneic abdomen: Soft, nontender, nondistended. Bowel sounds are present.   Skin: Warm, Dry, mottled in appearance  Back: No tenderness, No CVA tenderness.  Musculoskeletal: Good range of motion in all major joints. No tenderness to palpation or major deformities noted. Intact distal pulses, no clubbing, no cyanosis, no edema,   Neurologic: Altered. Alert to person, no place or time. Confused, but does move extremities and follows commands. Moving all extremities. No gross abnormalities.    Psychiatric: Unable to determine secondary to altered mental status.     I verified that the patient was wearing a mask and I was wearing appropriate PPE every time I entered the room. The patient's mask was on the patient at all times during my encounter except for a brief view " of the oropharynx.     LABS  Results for orders placed or performed during the hospital encounter of 06/18/20   Lactic acid (lactate)   Result Value Ref Range    Lactic Acid 2.8 (H) 0.5 - 2.0 mmol/L   Lactic acid (lactate): Repeat if initial lactic acid result is greater than 2   Result Value Ref Range    Lactic Acid 3.9 (H) 0.5 - 2.0 mmol/L   CBC WITH DIFFERENTIAL   Result Value Ref Range    WBC 0.4 (LL) 4.8 - 10.8 K/uL    RBC 3.31 (L) 4.20 - 5.40 M/uL    Hemoglobin 10.1 (L) 12.0 - 16.0 g/dL    Hematocrit 28.6 (L) 37.0 - 47.0 %    MCV 87.0 81.4 - 97.8 fL    MCH 30.5 27.0 - 33.0 pg    MCHC 35.1 (H) 33.6 - 35.0 g/dL    RDW 39.0 35.9 - 50.0 fL    Platelet Count 37 (LL) 164 - 446 K/uL    MPV 10.4 9.0 - 12.9 fL    Neutrophils-Polys cancel 44.00 - 72.00 %    Lymphocytes cancel 22.00 - 41.00 %    Monocytes cancel 0.00 - 13.40 %    Eosinophils cancel 0.00 - 6.90 %    Basophils cancel 0.00 - 1.80 %    Immature Granulocytes cancel 0.00 - 0.90 %    Nucleated RBC 0.00 /100 WBC    Neutrophils (Absolute) cancel 2.00 - 7.15 K/uL    Lymphs (Absolute) cancel 1.00 - 4.80 K/uL    Monos (Absolute) cancel 0.00 - 0.85 K/uL    Eos (Absolute) cancel 0.00 - 0.51 K/uL    Baso (Absolute) cancel 0.00 - 0.12 K/uL    Immature Granulocytes (abs) cancel 0.00 - 0.11 K/uL    NRBC (Absolute) 0.00 K/uL   COMP METABOLIC PANEL   Result Value Ref Range    Sodium 139 135 - 145 mmol/L    Potassium 3.3 (L) 3.6 - 5.5 mmol/L    Chloride 97 96 - 112 mmol/L    Co2 16 (L) 20 - 33 mmol/L    Anion Gap 26.0 (H) 7.0 - 16.0    Glucose 177 (H) 65 - 99 mg/dL    Bun 9 8 - 22 mg/dL    Creatinine 0.59 0.50 - 1.40 mg/dL    Calcium 9.5 8.5 - 10.5 mg/dL    AST(SGOT) 93 (H) 12 - 45 U/L    ALT(SGPT) 92 (H) 2 - 50 U/L    Alkaline Phosphatase 89 30 - 99 U/L    Total Bilirubin 5.8 (H) 0.1 - 1.5 mg/dL    Albumin 4.5 3.2 - 4.9 g/dL    Total Protein 7.5 6.0 - 8.2 g/dL    Globulin 3.0 1.9 - 3.5 g/dL    A-G Ratio 1.5 g/dL   URINALYSIS    Specimen: Urine   Result Value Ref Range     Color Yellow     Character Clear     Specific Gravity 1.011 <1.035    Ph 6.0 5.0 - 8.0    Glucose Negative Negative mg/dL    Ketones Negative Negative mg/dL    Protein 300 (A) Negative mg/dL    Bilirubin Negative Negative    Urobilinogen, Urine 1.0 Negative    Nitrite Positive (A) Negative    Leukocyte Esterase Negative Negative    Occult Blood Large (A) Negative    Micro Urine Req Microscopic    TROPONIN   Result Value Ref Range    Troponin T 20 (H) 6 - 19 ng/L   URINE MICROSCOPIC (W/UA)   Result Value Ref Range    WBC 2-5 /hpf    -150 (A) /hpf    Bacteria Many (A) None /hpf    Epithelial Cells Negative /hpf    Hyaline Cast 0-2 /lpf   ESTIMATED GFR   Result Value Ref Range    GFR If African American >60 >60 mL/min/1.73 m 2    GFR If Non African American >60 >60 mL/min/1.73 m 2   LDH   Result Value Ref Range    LDH Total 510 (H) 107 - 266 U/L   COVID/SARS CoV-2 PCR    Specimen: Nasopharyngeal; Respirate   Result Value Ref Range    COVID Order Status Received    SARS-CoV-2, PCR (In-House)   Result Value Ref Range    SARS-CoV-2 Source NP Swab    EKG   Result Value Ref Range    Report       [facility] Emergency Dept.    Test Date:  2020  Pt Name:    CHIDI MOLINA               Department: ER  MRN:        [id number]                      Room:        15  Gender:     Female                       Technician: [id number]  :        1965                   Requested By:ARMANDO GONSALEZ  Order #:    [id number]                    Reading MD: ARMANDO GONSALEZ MD    Measurements  Intervals                                Axis  Rate:       131                          P:          68  NV:         168                          QRS:        48  QRSD:       82                           T:  QT:         312  QTc:        461    Interpretive Statements  SINUS TACHYCARDIA  PROBABLE LVH WITH SECONDARY REPOL ABNRM  No previous ECG available for comparison  Electronically Signed On 2020 19:18:48 PDT by  ARMANDO GONSALEZ MD     EKG   Result Value Ref Range    Report       [facility] Emergency Dept.    Test Date:  2020  Pt Name:    CHIDI MOLINA               Department: ER  MRN:        [id number]                      Room:        15  Gender:     Female                       Technician: [id number]  :        1965                   Requested By:ARMANDO GONSALEZ  Order #:    [id number]                    Reading MD: ARMANDO GONSALEZ MD    Measurements  Intervals                                Axis  Rate:       185                          P:          0  UT:         150                          QRS:        52  QRSD:       78                           T:          198  QT:         248  QTc:        436    Interpretive Statements  SUPRAVENTRICULAR TACHYCARDIA  BORDERLINE LOW VOLTAGE IN FRONTAL LEADS  REPOLARIZATION ABNORMALITY, PROB RATE RELATED  Compared to ECG 2020 17:37:05  Sinus tachycardia no longer present  Electronically Signed On 2020 19:18:46 PDT by ARMANDO GONSALEZ MD        All labs reviewed by me.    EKG  Interpreted by me as seen above.     RADIOLOGY  DX-CHEST-PORTABLE (1 VIEW)   Final Result      1.  Apparent resolving LEFT lung base infiltrate or atelectasis.   2.  Diffuse prominence of the pulmonary interstitium may indicate pneumonitis or edema.        The radiologist's interpretation of all radiological studies have been reviewed by me.    COURSE & MEDICAL DECISION MAKING  Pertinent Labs & Imaging studies reviewed. (See chart for details)    5:23 PM - Patient seen and examined at bedside. Patient will be treated with Maxipime 2 g in  mL IVPB, Vistaril 25 mg/mL injection, and NS infusion 1,000 mL. Ordered DX-chest, lactate, CBC with diff, CMP, UA, urine culture, blood culture, troponin, and EKG to evaluate her symptoms. The differential diagnoses include but are not limited to: Sepsis, chest syndrome, acute hemolytic reaction, acute TRALI, vs acute hemolytic  reaction.      6:06 PM - Patient was reevaluated at bedside. She is slightly more alert.    6:32 PM - Nurse informed me patient's temperature is 107.4.     6:33 PM - Paged critical care.      6:36 PM - Emergently called to bedside at this time for repeat EKG showing SVT.  6 mg of adenosine brought the patient down to a sinus rhythm then it went right back up to the 180s range    6:40 PM - I discussed the patient's case and the above findings with Dr. Carrion (Pulmonary) who discussed treating patient with diltiazem injection 20 mg. A cooling blanket will be ordered to help decrease temperature.      7:01 PM - Patient reevaluated. She is mentating, however, her oxygen saturations dropped to the low 70's. She is placed on a NRB. Patient is rhonchus on exam, complaining of chest discomfort. Ordered 40 mg injection furosemide at this time.    7:10 PM - Reevaluated patient with Dr. Carrion (Pulmonary).  All to the room the patient was started to have increasing shortness of breath saturations were 74% give the patient 40 mg of Lasix then at this time she is significantly improved following Lasix.      7:14 PM - I discussed the patient's case and the above findings with Dr. Gil (Hospitalist) who agreed to evaluate patient for hospitalization.     8:05 PM - Called emergently to patient's bedside for elevated heart rate in the 180's. Gave another 10 mg of diltiazem.     8:15 PM - Informed again that her temperature increased to 41.6 °C. She was given 600 mg of Motrin.      CRITICAL CARE  The very real possibilty of a deterioration of this patient's condition required the highest level of my preparedness for sudden, emergent intervention.  I provided critical care services, which included medication orders, frequent reevaluations of the patient's condition and response to treatment, ordering and reviewing test results, and discussing the case with various consultants.  The critical care time associated with the care of the  patient was 78 minutes. This time is exclusive of any other billable procedures.      Decision Making:  Patient presented the emergency department and rather critical condition the patient was rather mottled and initially very altered and in status.  She was maintaining her airway and GCS is about 13.  Patient was rather hypertensive at 161/1 100s with a temperature of 107 °F and SkinTemp.  Probe Clancy was placed and was noted to be 106.  The patient is allergic to Tylenol apparently she gets swollen so we did cold packs IV fluids that were cold.  My initial concerns because the patient's initial platelet count was 11 she did get a platelet transfusion but I did not want to use any NSAIDs because the possibility of the platelet being dysfunctional.  Platelets have come back at over 30 so I do feel that at least a singular dose of NSAID is helpful for antipyresis because the patient continued to be 105 to 106 °F despite the attempt for active cooling with ice packs as well as cool fluids.  Patient then started becoming more hypoxemic and at this point her CMP is come back with an elevated total bilirubin so my impression is that this is most likely a TROLI well is an acute hemolytic reaction secondary to the blood transfusion is my most appropriate clinical impression at this time.  Patient then became more hypoxemic upon evaluation she was much more rhonchorous I do feel is flash pulmonary edema so I started the patient with 40 mg of Lasix and then she started improving.  Prior to that the patient did have a bout of SVT she was in the 180s range I did use 6 mg of adenosine to bring her down to a sinus rhythm and then it went straight back to 180s SVT so use diltiazem to slow the patient down to the 130s range.  At this point I did speak with Dr. Lyle who then came down to evaluate the patient at bedside and will admit the patient to the ICU.  Of also spoken to the hospitalist for admission.  Patient continues to be  hyper pyretic possible septic so I did start the patient with empiric antibiotics her lactic acid was elevated so I started with fluids but then led to the flash pulmonary edema so then started with the Lasix.  At this point the patient is still mottled in appearance but her mentation is improving significantly and will be admitted the ICU for further treatment and care.    DISPOSITION:  Patient will be hospitalized by Dr. Carrion in critical condition.      FINAL IMPRESSION  1. TRALI (transfusion related acute lung injury)    2. Hemolytic transfusion reaction, initial encounter    3. Sepsis, due to unspecified organism, unspecified whether acute organ dysfunction present (HCC)    4. SVT (supraventricular tachycardia) (HCC)    5. Neutropenia, unspecified type (HCC)    6. The critical care time associated with the care of the patient was 78  minutes.   Dory HADDAD (Demetrius), am scribing for, and in the presence of, Torey Mtz M.D..    Electronically signed by: Dory Jha (Demetrius), 6/18/2020    Torey HADDAD M.D. personally performed the services described in this documentation, as scribed by Dory Jha in my presence, and it is both accurate and complete. C    The note accurately reflects work and decisions made by me.  Torey Mtz M.D.  6/18/2020  9:26 PM

## 2020-06-19 NOTE — ASSESSMENT & PLAN NOTE
Severe, presumed related to transfusion reaction.  Patient is allergic to tylenol, use cooled IV fluids, ice packs for cooling measures.

## 2020-06-19 NOTE — ASSESSMENT & PLAN NOTE
No overt signs of infection but given neutropenia and reaction will continue cefepime  Follow up blood cx  Monitor need to expand or deescalate coverage  Reverse precautions

## 2020-06-19 NOTE — ED NOTES
While medicating pt with 10mg diltiazem pt became SOB, O2 SATs dropped to 70's. Nonrebreather applied, pt medicated with 40mg lasix.     Critical care MD at bedside. Bedside report completed with noc RNs, Keegan.

## 2020-06-19 NOTE — ASSESSMENT & PLAN NOTE
Due to CLL, chemotherapy  Follows Dr. Summers outpatient oncologist  6/20 I consulted and discussed with Dr Mcelroy  6/20 transfuse platelets for plt:1  Monitor CBC

## 2020-06-19 NOTE — PROGRESS NOTES
Paged Dr. Vega for patients soft BP's, 70/30's. 1L of LR to be given and then call back with results.

## 2020-06-19 NOTE — ED TRIAGE NOTES
"Chief Complaint   Patient presents with   • Fever     BIB staff from infusion center after getting platelets and 2 units RBCs. pt was febrile prior to starting infusion at 38.7, increased more by end of infusions.      Pt to rm 15 for above. ERP called to bedside. Sepsis order set ordered. Attempting to obtain better PIV access.     Pulse (!) 124   Temp (!) 41.3 °C (106.4 °F) (Temporal)   Resp (!) 26   Ht 1.499 m (4' 11\")   Wt 43.9 kg (96 lb 12.5 oz)   SpO2 (!) 84%   BMI 19.55 kg/m²     "

## 2020-06-19 NOTE — ASSESSMENT & PLAN NOTE
Hypoxic, possible TRALI  Pulmonary/critical care consulted and following, patient going to ICU on non rebreather mask  RT protocol

## 2020-06-19 NOTE — ASSESSMENT & PLAN NOTE
With suspected TRALI  Wean supplemental oxygen as tolerates.  Follow-up on a.m CBC future transfusions may require premedication.

## 2020-06-19 NOTE — ED NOTES
Pt medicated with ibuprofen per MD order. Pt arousable to verbal stimuli. Able to answer orientation questions

## 2020-06-19 NOTE — ASSESSMENT & PLAN NOTE
Suspect TRALI but AHTR and sepsis related also considered  Continue supportive care  Follow up hemolytic labs  Okay to DC antibiotics  Off NIPPV and on nasal cannula

## 2020-06-19 NOTE — PROGRESS NOTES
Pt HR went into the 160's, performed vagal maneuver, HR down to 120s.  /43.  Notified Dr. Carrion, orders received for Metoprolol once.  MD did not want to cancel transfer order at this time.

## 2020-06-19 NOTE — ASSESSMENT & PLAN NOTE
6/19 Improved with fluids and metoprolol  6/20 improved with straight cath due to urinary retention, start Toprol XL 25mg  Monitor on telemetry and checking vitals  If recurs would recommend initiating low-dose long-acting beta-blocker

## 2020-06-19 NOTE — ASSESSMENT & PLAN NOTE
Hold antihypertensives  Add midodrine  Continue vasopressors with active titration to maintain map greater than 65

## 2020-06-19 NOTE — H&P
Hospital Medicine History & Physical Note    Date of Service  6/18/2020    Primary Care Physician  Ara Duncan M.D.    Code Status  Full Code    Chief Complaint  Chief Complaint   Patient presents with   • Fever     BIB staff from infusion center after getting platelets and 2 units RBCs. pt was febrile prior to starting infusion at 38.7, increased more by end of infusions.        History of Presenting Illness  54 y.o. female who presented 6/18/2020 with a very high fever after getting platelets and two units of prbc in the infusion center. She has CLL and getting chemotherapy, she was pancytopenic and given the blood products resulting in confusion, hypoxia and a temperature of 106.4. Patient is allergic to tylenol. She was given solucortef and benadryl in the infusion center. Patient is disoriented and unable to give more history.    I discussed the presenting symptoms, physical examination, lab and radiological study results with the emergency department physician.      Review of Systems  Review of Systems   Unable to perform ROS: Acuity of condition       Past Medical History   has a past medical history of Anxiety disorder, CLL (chronic lymphocytic leukemia) (HCC), and Hypertension.    Surgical History   has a past surgical history that includes cholecystectomy (2005).     Family History  Unable to obtain due to disorientation.     Social History   reports that she has never smoked. She has never used smokeless tobacco. She reports that she does not drink alcohol or use drugs.    Allergies  Allergies   Allergen Reactions   • Amoxicillin Hives   • Benadryl Allergy Hives   • Excedrin Migraine Swelling   • Sulfamethoxazole Hives   • Tylenol Hives   • Famotidine Itching       Medications  Prior to Admission Medications   Prescriptions Last Dose Informant Patient Reported? Taking?   Ibrutinib 420 MG Tab  Patient Yes No   Sig: Take 420 mg by mouth every evening.   acyclovir (ZOVIRAX) 400 MG tablet   No No   Sig:  Take 1 Tab by mouth 2 Times a Day.   Patient not taking: Reported on 6/18/2020   amLODIPine (NORVASC) 5 MG Tab  Patient Yes No   Sig: Take 5 mg by mouth every evening.   fluconazole (DIFLUCAN) 200 MG Tab  Patient Yes No   Sig: Take 200 mg by mouth 2 Times a Day.   folic acid (FOLVITE) 1 MG Tab  Patient Yes No   Sig: Take 1 mg by mouth every day.   ondansetron (ZOFRAN ODT) 8 MG TABLET DISPERSIBLE   Yes No   Sig: as needed.   tramadol (ULTRAM) 50 MG Tab   Yes No   Sig: Take 50 mg by mouth every 6 hours as needed for Mild Pain. Indications: Pain      Facility-Administered Medications: None       Physical Exam  Temp:  [41.3 °C (106.4 °F)-41.9 °C (107.4 °F)] 41.9 °C (107.4 °F)  Pulse:  [124-184] 135  Resp:  [26-42] 38  BP: (122-191)/() 146/65  SpO2:  [78 %-99 %] 96 %    Physical Exam  Vitals signs and nursing note reviewed.   Constitutional:       General: She is in acute distress.      Appearance: She is well-developed. She is ill-appearing and diaphoretic.   HENT:      Right Ear: External ear normal.      Left Ear: External ear normal.      Nose: Nose normal.      Mouth/Throat:      Pharynx: No oropharyngeal exudate.   Eyes:      General: No scleral icterus.        Right eye: No discharge.         Left eye: No discharge.      Conjunctiva/sclera: Conjunctivae normal.   Neck:      Vascular: No JVD.      Trachea: No tracheal deviation.   Cardiovascular:      Rate and Rhythm: Normal rate and regular rhythm.      Heart sounds: Normal heart sounds.   Pulmonary:      Effort: Pulmonary effort is normal. Tachypnea present. No respiratory distress.      Breath sounds: Normal breath sounds. No stridor. No wheezing or rales.   Chest:      Chest wall: No tenderness.   Abdominal:      General: Bowel sounds are normal. There is no distension.      Palpations: Abdomen is soft.      Tenderness: There is no abdominal tenderness.   Musculoskeletal:         General: No tenderness.   Skin:     General: Skin is warm.      Coloration:  Skin is not pale.   Neurological:      Mental Status: She is alert. She is disoriented.      Cranial Nerves: No cranial nerve deficit.      Motor: No abnormal muscle tone.   Psychiatric:         Behavior: Behavior normal.         Laboratory:  Recent Labs     06/16/20  0910 06/18/20  0932 06/18/20  1741   WBC 0.6* 1.0* 0.4*   RBC 2.44* 1.91* 3.31*   HEMOGLOBIN 7.2* 5.7* 10.1*   HEMATOCRIT 20.3* 16.6* 28.6*   MCV 83.2 86.9 87.0   MCH 29.5 29.8 30.5   MCHC 35.5* 34.3 35.1*   RDW 35.5* 36.8 39.0   PLATELETCT 19* 17* 37*   MPV 9.2 9.9 10.4     Recent Labs     06/18/20  1742   SODIUM 139   POTASSIUM 3.3*   CHLORIDE 97   CO2 16*   GLUCOSE 177*   BUN 9   CREATININE 0.59   CALCIUM 9.5     Recent Labs     06/18/20  1742   ALTSGPT 92*   ASTSGOT 93*   ALKPHOSPHAT 89   TBILIRUBIN 5.8*   GLUCOSE 177*         No results for input(s): NTPROBNP in the last 72 hours.      Recent Labs     06/18/20  1742   TROPONINT 20*       Imaging:  DX-CHEST-PORTABLE (1 VIEW)   Final Result      1.  Apparent resolving LEFT lung base infiltrate or atelectasis.   2.  Diffuse prominence of the pulmonary interstitium may indicate pneumonitis or edema.            Assessment/Plan:      * Transfusion reaction- (present on admission)  Assessment & Plan  With suspected TRALI  Admit to icu, patient critically ill with acute respiratory failure and severe hyperpyrexia.  Intensivist consulted and will assume care of the patient  Heme onc to consult and follow    Acute respiratory failure (HCC)  Assessment & Plan  Hypoxic, possible TRALI  Pulmonary/critical care consulted and following, patient going to ICU on non rebreather mask  RT protocol      CLL (chronic lymphocytic leukemia) (HCC)- (present on admission)  Assessment & Plan  With acute phase, pancytopenia.  Oncology aware of admission  Protective isolation  Hemoglobin up to 10 after transfusion, admitted to icu for severe transfusion reaction    Hyperpyrexia- (present on admission)  Assessment &  Plan  Severe, presumed related to transfusion reaction.  Patient is allergic to tylenol, use cooled IV fluids, ice packs for cooling measures.    Metabolic acidosis- (present on admission)  Assessment & Plan  IV fluids infusion, monitor bmp    Neutropenic (HCC)- (present on admission)  Assessment & Plan  Due to CLL, chemotherapy  Protective isolation  Heme-onc to follow    SVT (supraventricular tachycardia) (HCC)- (present on admission)  Assessment & Plan  Improved with fluids and metoprolol    Pancytopenia (HCC)- (present on admission)  Assessment & Plan  Due to CLL, chemotherapy  hemonc to follow  No pharmacologic dvt prophylaxis    Hypokalemia- (present on admission)  Assessment & Plan  Replaced orally, monitor bmp

## 2020-06-19 NOTE — PROGRESS NOTES
Hospital Medicine Daily Progress Note    Date of Service  6/19/2020    Chief Complaint  Fever after receiving blood product transfusion    Hospital Course    54 y.o. female admitted 6/18/2020 with a hx of CLL and on chemotherapy. She has been pancytopenic and was getting a tranfusion of platelets and 2units pRBCs but became febrile with a temp of 106.4 and had increase confusion and hypoxia. SHe was given solucortef and benadryl at infusion center and admitted to hospital.      Interval Problem Update  Wake and alert.  Denies any discomfort.  Low-grade fever per RN of 99 Fahrenheit.  The patient denies any sore throat cough dysuria.  The patient denies any melena nor hematochezia.  Patient remains tachycardic and went into SVT was given IV metoprolol with good resolve.  Patient has been taking in adequate orals and electrolytes were replaced today per RN.  Discussed the patient's case/care with Dr. Pérez who is the transferring physician.  Patient states she normally sees Dr. Fabio Summers is her oncologist.  Patient is able to ambulate on her own to the bathroom.    Consultants/Specialty  Pulmonary    Code Status  FULL    Disposition  Transfer to cancer floor    Review of Systems  Review of Systems   Constitutional: Negative for chills and fever.   HENT: Negative for sore throat.    Eyes: Negative for discharge.   Respiratory: Negative for cough, shortness of breath and stridor.    Cardiovascular: Negative for chest pain, palpitations and leg swelling.   Gastrointestinal: Negative for abdominal pain, diarrhea, nausea and vomiting.   Genitourinary: Negative for dysuria and hematuria.   Musculoskeletal: Negative for back pain and joint pain.   Skin: Negative for rash.   Neurological: Negative for dizziness, sensory change, speech change and headaches.   Psychiatric/Behavioral: Negative for depression. The patient is not nervous/anxious.         Physical Exam  Temp:  [35.6 °C (96.1 °F)-41.9 °C (107.4 °F)] 36.8 °C  (98.2 °F)  Pulse:  [] 84  Resp:  [13-43] 25  BP: ()/() 116/55  SpO2:  [78 %-100 %] 99 %    Physical Exam  Vitals signs reviewed.   Constitutional:       Appearance: Normal appearance. She is not diaphoretic.      Comments: Petite   HENT:      Head: Normocephalic and atraumatic.      Nose: Nose normal.      Mouth/Throat:      Mouth: Mucous membranes are moist.      Pharynx: No oropharyngeal exudate.   Eyes:      General: No scleral icterus.        Right eye: No discharge.         Left eye: No discharge.      Extraocular Movements: Extraocular movements intact.      Conjunctiva/sclera: Conjunctivae normal.   Neck:      Musculoskeletal: Neck supple. No muscular tenderness.   Cardiovascular:      Rate and Rhythm: Regular rhythm. Tachycardia present.      Pulses:           Radial pulses are 2+ on the right side and 2+ on the left side.        Dorsalis pedis pulses are 2+ on the right side and 2+ on the left side.      Heart sounds: No murmur.   Pulmonary:      Effort: Pulmonary effort is normal. No respiratory distress.      Breath sounds: Normal breath sounds. No wheezing or rales.   Abdominal:      General: Bowel sounds are normal. There is no distension.      Palpations: Abdomen is soft.   Musculoskeletal:         General: No swelling or tenderness.      Right lower leg: No edema.      Left lower leg: No edema.   Skin:     Coloration: Skin is not jaundiced or pale.   Neurological:      General: No focal deficit present.      Mental Status: She is alert and oriented to person, place, and time. Mental status is at baseline.      Cranial Nerves: No cranial nerve deficit.   Psychiatric:         Mood and Affect: Mood normal.         Behavior: Behavior normal.         Fluids    Intake/Output Summary (Last 24 hours) at 6/19/2020 1142  Last data filed at 6/19/2020 1000  Gross per 24 hour   Intake 2687.38 ml   Output 2925 ml   Net -237.62 ml       Laboratory  Recent Labs     06/18/20  0932 06/18/20  7516  06/19/20  0216   WBC 1.0* 0.4* 0.7*   RBC 1.91* 3.31* 2.68*   HEMOGLOBIN 5.7* 10.1* 7.9*   HEMATOCRIT 16.6* 28.6* 22.5*   MCV 86.9 87.0 83.2   MCH 29.8 30.5 29.5   MCHC 34.3 35.1* 35.4*   RDW 36.8 39.0 38.2   PLATELETCT 17* 37* 12*   MPV 9.9 10.4 11.7     Recent Labs     06/18/20  1742 06/19/20  0216 06/19/20  1039   SODIUM 139 141  --    POTASSIUM 3.3* 2.4* 3.2*   CHLORIDE 97 107  --    CO2 16* 22  --    GLUCOSE 177* 145*  --    BUN 9 17  --    CREATININE 0.59 0.56  --    CALCIUM 9.5 8.3*  --                    Imaging  DX-CHEST-PORTABLE (1 VIEW)   Final Result         1.  Interstitial pulmonary parenchymal prominence, compatible with interstitial edema and/or infiltrates.   2.  Cardiomegaly      DX-CHEST-PORTABLE (1 VIEW)   Final Result      1.  Apparent resolving LEFT lung base infiltrate or atelectasis.   2.  Diffuse prominence of the pulmonary interstitium may indicate pneumonitis or edema.           Assessment/Plan  * Transfusion reaction- (present on admission)  Assessment & Plan  With suspected TRALI  Admit to icu, patient critically ill with acute respiratory failure and severe hyperpyrexia.  Intensivist consulted and will assume care of the patient  Heme onc to consult and follow    Acute respiratory failure (HCC)  Assessment & Plan  Hypoxic, possible TRALI  Pulmonary/critical care consulted and following, patient going to ICU on non rebreather mask  RT protocol      CLL (chronic lymphocytic leukemia) (HCC)- (present on admission)  Assessment & Plan  With acute phase, pancytopenia.  Oncology aware of admission  Protective isolation  Hemoglobin up to 10 after transfusion, admitted to icu for severe transfusion reaction    Hyperpyrexia- (present on admission)  Assessment & Plan  Severe, presumed related to transfusion reaction.  Patient is allergic to tylenol, use cooled IV fluids, ice packs for cooling measures.    Metabolic acidosis- (present on admission)  Assessment & Plan  IV fluids infusion, monitor  bmp    Neutropenic (HCC)- (present on admission)  Assessment & Plan  Due to CLL, chemotherapy  Protective isolation  Heme-onc to follow    SVT (supraventricular tachycardia) (HCC)- (present on admission)  Assessment & Plan  Improved with fluids and metoprolol    Pancytopenia (HCC)- (present on admission)  Assessment & Plan  Due to CLL, chemotherapy  hemonc to follow  No pharmacologic dvt prophylaxis    Hypokalemia- (present on admission)  Assessment & Plan  Replaced orally, monitor bmp         VTE prophylaxis: SCDs

## 2020-06-20 NOTE — PROGRESS NOTES
Per Dr. Mcelroy oncology patient is not appropriate to be on a medical floor at this time. Platelets ordered for patient per Dr. Mcelroy. I did talk to Stormy RN taking care of this patient. Nursing order place to check platelet count one hour after transfusion. If Stormy has any questions or concerns to please call me.

## 2020-06-20 NOTE — CONSULTS
DATE OF SERVICE:  06/20/2020    Consultation was called by Dr. Seun Shetty.    REASON FOR CONSULTATION:  1.  Pancytopenia.  2.  History of chronic lymphocytic leukemia, currently on ibrutinib therapy.  3.  Diarrhea.  4.  Fevers.    HISTORY OF PRESENT ILLNESS:  The patient is a very pleasant 54-year-old lady   who is under the care of Dr. Summers in our practice for her thalassemia and   CLL.  Initially, patient was diagnosed with thalassemia and was following up   with Dr. Summers.  However, she was admitted to [facility] in 04/2020 with hemoglobin of 4.3 and a platelet count of 19,000.  Bone   marrow biopsy was consistent with CLL negative for BCL1 with negative FISH   panel for MDS and CLL.  The patient was treated with transfusions and was   discharged home and was started on ibrutinib on around 05/15/2020.  Ibrutinib   was held in early June and was resumed by Dr. Summers on 06/17/2020; however,   the patient was found to be pancytopenic at that time and was sent to [facility] for platelet transfusions.  Her blood count on   06/17/2020 showed a hemoglobin of 12.5 and a hematocrit of 36.3 with a   platelet count of 21,000.  All the patient's blood counts done at [facility] had shown a white cell count of 600 with a hemoglobin   and hematocrit of 7.2/20.3 and a platelet count of 19,000 with absolute   neutrophil count of 20 only.  Patient received 2 units of packed red cells and   1 unit of platelets on 06/18/2020, but she developed ____ and was transferred   to the hospital from where she was admitted to the ICU.  Question about   TRALI, likely ____ sepsis were considered.  Cultures were drawn, which were   negative, but a urine culture was positive for Klebsiella pneumoniae.  Patient   has had diarrhea now and is severely pancytopenic.  Her liver functions were   abnormal and her AST was 192 and her ALT was 197 and her total bilirubin was    elevated to 6.1; on 06/18, it was 5.8.  Prior to this, on 05/23, her liver   function tests were in the normal range.  Heme/onc consultation was called for   further management of her condition.  Other than severe fatigue, the patient   is relatively stable.  However, her platelet count is 1000.    PAST MEDICAL HISTORY:  Hypertension, thalassemia, elevated bilirubin, anxiety,   migraine, CLL.    PAST SURGICAL HISTORY:  Cholecystectomy in 2005, bone marrow biopsy.    PERSONAL AND SOCIAL HISTORY:  .  Nonsmoker.  Denies any history of   alcohol abuse.    FAMILY HISTORY:  Not pertinent to this hospitalization.    REVIEW OF SYSTEMS:  GENITOURINARY:  Complains of urinary frequency, but is not passing much urine.    Complains of pain in the lower abdomen.  GASTROINTESTINAL:  No nausea, but loss of appetite, diarrhea.  NEUROLOGICAL:  Denies any seizures or stroke.  PSYCHIATRIC:  Anxious, but denies any depression.  HEMATOLOGICAL/IMMUNOLOGICAL:  See history of present illness.  MUSCULOSKELETAL:  Denies any back or any bony discomfort.  Rest of her review   of systems is negative per CMS/AMA criteria unless as mentioned in history of   present or past illness.    PHYSICAL EXAMINATION:  GENERAL:  The patient is alert and oriented x3.  VITAL SIGNS:  Temperature 36.7, pulse varying between 83 and 120, /61.  HEENT:  Pupils are equal.  Conjunctivae reveals that the patient is icteric.    Oral mucosa reveals tongue is dry.  NECK:  Supple, with no JVD or lymphadenopathy.  LUNGS:  Clear to auscultation with good bilateral air entry.  HEART:  Reveals no S3 or S4.  There is no murmur or rub.  ABDOMEN:  Reveals tenderness in the lower abdomen.  There might be some   abdominal distention.  There is no rebound tenderness.  EXTREMITIES:  No edema of lower extremities.  SKIN:  Reveals no rash or any bruising.  NEUROLOGIC:  Power is equal bilaterally in both upper and lower extremities.  PSYCHIATRIC:  Anxiety, but no  depression.    RADIOLOGICAL STUDIES:  Reviewed.    LABORATORY DATA:  Reviewed.    ASSESSMENT AND PLAN:  1.  Pancytopenia.  Patient has severe pancytopenia.  This has been a problem   in the past also.  However, now, her platelet count is much lower than her   usual baseline at 1000.  I would like to rule out any disseminated   intravascular coagulation.  We will give her 1 unit of platelets and check her   platelet count 1 hour after the platelets have been given to get a better   sense of what the etiology for thrombocytopenia is.  Another possibility could   be autoimmune idiopathic thrombocytopenic purpura like picture caused by her   chronic lymphocytic leukemia.  Third possibility could be drugs.  Fourth   possibility could be underlying sepsis.  2.  Anemia.  Patient is more anemic.  She is also jaundiced.  Her baseline   bilirubin is between 1 and 2.  However, after her last transfusion, her   bilirubin is much higher.  I will get an ultrasound of her abdomen done.  We   will get a reticulocyte count, direct bilirubin, and Sabra' test, and an LDH   checked out on her.  3.  Neutropenia.  Patient has had neutropenia for quite some time and is   thought to be related to her underlying chronic lymphocytic leukemia.  4.  Transfusion-related acute lung injury like reaction.  Patient possibly had   a transfusion-related acute lung injury like reaction to her blood   transfusion.  I have talked to the blood bank about this issue. They will order all the appropriate testing  There might be   an element of hemolysis happening currently, which may explain her lowering of   hemoglobin and hematocrit over the past 2 days and also, her increased   bilirubin.  I will continue to follow her up.       ____________________________________     MD YUVAL Gutierrez / BRITT    DD:  06/20/2020 13:57:59  DT:  06/20/2020 15:08:59    D#:  [id number]  Job#:  [id number]

## 2020-06-20 NOTE — PROGRESS NOTES
Hospital Medicine Daily Progress Note    Date of Service  6/20/2020    Chief Complaint  Fever after receiving blood product transfusion    Hospital Course    54 y.o. female admitted 6/18/2020 with a hx of CLL and on chemotherapy. She has been pancytopenic and was getting a tranfusion of platelets and 2units pRBCs but became febrile with a temp of 106.4 and had increase confusion and hypoxia. SHe was given solucortef and benadryl at infusion center and admitted to hospital.      Interval Problem Update  Increase in watery nonbloody diarrhea.  Urinary retention and had a stat bladder scan with 600cc and required straight cath with symptom relief  No bleeding but has Plateletss of 1 and discussed with Dr Mcelroy for consult  Transfusing platelets  Some tachycardia remains.    Consultants/Specialty  Pulmonary    Code Status  FULL    Disposition  Transfer to cancer floor    Review of Systems  Review of Systems   Constitutional: Negative for chills and fever.   HENT: Negative for sore throat.    Eyes: Negative for discharge.   Respiratory: Negative for cough, shortness of breath and stridor.    Cardiovascular: Negative for chest pain, palpitations and leg swelling.   Gastrointestinal: Positive for abdominal pain and diarrhea. Negative for blood in stool, melena, nausea and vomiting.   Genitourinary: Negative for dysuria and hematuria.        Urinary retention   Musculoskeletal: Negative for back pain and joint pain.   Skin: Negative for rash.   Neurological: Negative for dizziness and headaches.   Psychiatric/Behavioral: The patient is not nervous/anxious.         Physical Exam  Temp:  [36.6 °C (97.9 °F)-37.4 °C (99.3 °F)] 37.4 °C (99.3 °F)  Pulse:  [] 91  Resp:  [17-29] 18  BP: (117-141)/(32-73) 132/39  SpO2:  [85 %-100 %] 97 %    Physical Exam  Vitals signs reviewed.   Constitutional:       Appearance: Normal appearance. She is ill-appearing. She is not diaphoretic.      Comments: Petite   HENT:      Head:  Normocephalic and atraumatic.      Nose: Nose normal.      Mouth/Throat:      Mouth: Mucous membranes are moist.      Pharynx: No oropharyngeal exudate.   Eyes:      General: No scleral icterus.        Right eye: No discharge.         Left eye: No discharge.      Extraocular Movements: Extraocular movements intact.      Conjunctiva/sclera: Conjunctivae normal.   Neck:      Musculoskeletal: Neck supple. No muscular tenderness.   Cardiovascular:      Rate and Rhythm: Regular rhythm. Tachycardia present.      Pulses:           Radial pulses are 2+ on the right side and 2+ on the left side.        Dorsalis pedis pulses are 2+ on the right side and 2+ on the left side.      Heart sounds: No murmur.   Pulmonary:      Effort: Pulmonary effort is normal. No respiratory distress.      Breath sounds: Normal breath sounds. No wheezing or rales.   Abdominal:      General: Bowel sounds are normal. There is distension.      Palpations: Abdomen is soft.   Musculoskeletal:         General: No swelling or tenderness.      Right lower leg: No edema.      Left lower leg: No edema.   Skin:     Coloration: Skin is not jaundiced or pale.   Neurological:      General: No focal deficit present.      Mental Status: She is alert and oriented to person, place, and time. Mental status is at baseline.      Cranial Nerves: No cranial nerve deficit.   Psychiatric:         Mood and Affect: Mood normal.         Behavior: Behavior normal.         Fluids    Intake/Output Summary (Last 24 hours) at 6/20/2020 2117  Last data filed at 6/20/2020 1700  Gross per 24 hour   Intake 728 ml   Output 975 ml   Net -247 ml       Laboratory  Recent Labs     06/18/20  0932 06/18/20  1741 06/19/20  0216 06/20/20  0555 06/20/20  1810   WBC 1.0* 0.4* 0.7* 0.7*  --    RBC 1.91* 3.31* 2.68* 2.33*  --    HEMOGLOBIN 5.7* 10.1* 7.9* 7.0*  --    HEMATOCRIT 16.6* 28.6* 22.5* 19.6*  --    MCV 86.9 87.0 83.2 84.1  --    MCH 29.8 30.5 29.5 30.0  --    MCHC 34.3 35.1* 35.4*  35.7*  --    RDW 36.8 39.0 38.2 39.7  --    PLATELETCT 17* 37* 12* 1* 27*   MPV 9.9 10.4 11.7  --   --      Recent Labs     06/19/20  0216 06/19/20  1039 06/20/20  0555 06/20/20  1400   SODIUM 141  --  135 138   POTASSIUM 2.4* 3.2* 3.3* 3.6   CHLORIDE 107  --  105 103   CO2 22  --  21 20   GLUCOSE 145*  --  127* 140*   BUN 17  --  11 9   CREATININE 0.56  --  0.71 0.64   CALCIUM 8.3*  --  8.6 8.0*     Recent Labs     06/20/20  1400   APTT 43.4*   INR 1.56*         Recent Labs     06/20/20  1400   TRIGLYCERIDE 92   HDL 41   LDL 42       Imaging  DX-CHEST-PORTABLE (1 VIEW)   Final Result         1.  Interstitial pulmonary parenchymal prominence, compatible with interstitial edema and/or infiltrates.   2.  Cardiomegaly      DX-CHEST-PORTABLE (1 VIEW)   Final Result      1.  Apparent resolving LEFT lung base infiltrate or atelectasis.   2.  Diffuse prominence of the pulmonary interstitium may indicate pneumonitis or edema.           Assessment/Plan  * Transfusion reaction- (present on admission)  Assessment & Plan  With suspected TRALI  Wean supplemental oxygen as tolerates.  Follow-up on a.m CBC future transfusions may require premedication.    Acute respiratory failure (HCC)  Assessment & Plan  Hypoxic, possible TRALI  Pulmonary/critical care consulted and following, patient going to ICU on non rebreather mask  RT protocol      CLL (chronic lymphocytic leukemia) (HCC)- (present on admission)  Assessment & Plan  With acute phase, pancytopenia.  Oncology aware of admission  Protective isolation  Hemoglobin up to 10 after transfusion, admitted to icu for severe transfusion reaction    Hypotension  Assessment & Plan  Monitor vitals.  Responding to fluid bolus  On midodrine    Hyperpyrexia- (present on admission)  Assessment & Plan  Severe, presumed related to transfusion reaction.  Patient is allergic to tylenol, use cooled IV fluids, ice packs for cooling measures.    Metabolic acidosis  Assessment & Plan  IV fluids  infusion, monitor bmp    Neutropenic (HCC)- (present on admission)  Assessment & Plan  Due to CLL, chemotherapy  Protective isolation  Heme-onc consulting    SVT (supraventricular tachycardia) (HCC)- (present on admission)  Assessment & Plan  6/19 Improved with fluids and metoprolol  6/20 improved with straight cath due to urinary retention, start Toprol XL 25mg  Monitor on telemetry and checking vitals  If recurs would recommend initiating low-dose long-acting beta-blocker    Pancytopenia (HCC)- (present on admission)  Assessment & Plan  Due to CLL, chemotherapy  Follows Dr. Summers outpatient oncologist  6/20 I consulted and discussed with Dr Mcelroy  6/20 transfuse platelets for plt:1  Monitor CBC    Hypokalemia- (present on admission)  Assessment & Plan  Replaced orally, monitor bmp  6/20 K: 3.3       VTE prophylaxis: SCDs

## 2020-06-20 NOTE — PROGRESS NOTES
Monitor Summary    SR/ST   Three episodes of SVT in the 160-170's that resolved naturally    .16/.08/.28

## 2020-06-20 NOTE — CARE PLAN
Problem: Communication  Goal: The ability to communicate needs accurately and effectively will improve  Outcome: PROGRESSING AS EXPECTED     Problem: Safety  Goal: Will remain free from injury  Outcome: PROGRESSING AS EXPECTED  Goal: Will remain free from falls  Outcome: PROGRESSING AS EXPECTED     Problem: Infection  Goal: Will remain free from infection  Outcome: PROGRESSING AS EXPECTED     Problem: Venous Thromboembolism (VTW)/Deep Vein Thrombosis (DVT) Prevention:  Goal: Patient will participate in Venous Thrombosis (VTE)/Deep Vein Thrombosis (DVT)Prevention Measures  Outcome: PROGRESSING AS EXPECTED     Problem: Knowledge Deficit  Goal: Knowledge of disease process/condition, treatment plan, diagnostic tests, and medications will improve  Outcome: PROGRESSING AS EXPECTED  Goal: Knowledge of the prescribed therapeutic regimen will improve  Outcome: PROGRESSING AS EXPECTED     Problem: Discharge Barriers/Planning  Goal: Patient's continuum of care needs will be met  Outcome: PROGRESSING AS EXPECTED     Problem: Respiratory:  Goal: Respiratory status will improve  Outcome: PROGRESSING AS EXPECTED     Problem: Bowel/Gastric:  Goal: Normal bowel function is maintained or improved  Outcome: PROGRESSING SLOWER THAN EXPECTED  Goal: Will not experience complications related to bowel motility  Outcome: PROGRESSING SLOWER THAN EXPECTED

## 2020-06-21 PROBLEM — I95.9 HYPOTENSION: Status: RESOLVED | Noted: 2020-01-01 | Resolved: 2020-01-01

## 2020-06-21 PROBLEM — T80.92XA TRANSFUSION REACTION: Status: RESOLVED | Noted: 2020-01-01 | Resolved: 2020-01-01

## 2020-06-21 PROBLEM — R50.9 HYPERPYREXIA: Status: RESOLVED | Noted: 2020-01-01 | Resolved: 2020-01-01

## 2020-06-21 PROBLEM — J96.00 ACUTE RESPIRATORY FAILURE (HCC): Status: RESOLVED | Noted: 2020-01-01 | Resolved: 2020-01-01

## 2020-06-21 PROBLEM — I47.10 SVT (SUPRAVENTRICULAR TACHYCARDIA) (HCC): Status: RESOLVED | Noted: 2020-01-01 | Resolved: 2020-01-01

## 2020-06-21 NOTE — PROGRESS NOTES
Stormy from Lab called with critical result of platelets of 27 at 18:44. Critical lab result read back to Stormy.   This critical lab result is within parameters established by  for this patient

## 2020-06-21 NOTE — DISCHARGE SUMMARY
Discharge Summary    CHIEF COMPLAINT ON ADMISSION  Chief Complaint   Patient presents with   • Fever     BIB staff from infusion center after getting platelets and 2 units RBCs. pt was febrile prior to starting infusion at 38.7, increased more by end of infusions.        Reason for Admission  Allergic Reaction     Admission Date  6/18/2020    CODE STATUS  Full Code    HPI & HOSPITAL COURSE  This is a 54 y.o. female admitted 6/18/2020 with a hx of CLL and on chemotherapy. She has been pancytopenic and was getting a tranfusion of platelets and 2units pRBCs but became febrile with a temp of 106.4 and had increase confusion and hypoxia. SHe was given solucortef at infusion center and admitted to hospital. While in the hospital she was given IV fluids and had improved blood pressures.  Oncology consulted and transfused her platelets. She had no active bleeding.  She had some urinary retention and was straight cathed and a small blood clot was removed with subsequent ability to urinate without issue.  She was alert and oriented at time of discharge and will follow up closely with Dr Summers.    Therefore, she is discharged in fair and stable condition to home with close outpatient follow-up.    The patient met 2-midnight criteria for an inpatient stay at the time of discharge.    Discharge Date  6/21/2020    FOLLOW UP ITEMS POST DISCHARGE  None    DISCHARGE DIAGNOSES  Principal Problem (Resolved):    Transfusion reaction POA: Yes  Active Problems:    CLL (chronic lymphocytic leukemia) (HCC) (Chronic) POA: Yes    Hypokalemia POA: Yes    Pancytopenia (HCC) POA: Yes    Neutropenic (HCC) POA: Yes  Resolved Problems:    Acute respiratory failure (HCC) POA: Unknown    SVT (supraventricular tachycardia) (HCC) POA: Yes    Hyperpyrexia POA: Yes    Hypotension POA: Unknown      FOLLOW UP  Future Appointments   Date Time Provider Department Center   6/22/2020 10:15 AM [facility] [place]   6/24/2020  9:45 AM [facility] [address]   6/26/2020  9:00 AM [facility] [address]   6/28/2020  9:45 AM [facility] [address]   6/30/2020  9:00 AM [facility] [address]   7/2/2020  9:45 AM [facility] [address]   7/4/2020 11:00 AM [facility] [address]   7/6/2020  1:30 PM [facility] [address]   7/8/2020 10:00 AM [facility] [address]   7/10/2020  8:15 AM [facility] [address]   7/12/2020  9:45 AM [facility] [address]   7/14/2020 10:00 AM [facility] [address]   7/16/2020 10:15 AM [facility] [address]   7/18/2020  9:45 AM [facility] [address]   7/20/2020 10:00 AM [facility] [address]   7/22/2020 10:00 AM [facility] [address]   7/24/2020 10:00 AM [facility] [address]     Ara Duncan M.D.  [address] [address]  [place] [id number]  [phone]    Schedule an appointment as soon as possible for a visit in 1 month  As needed    Fabio Summers M.D.  [address] [place] [id number]  [phone]    Schedule an appointment as soon as possible for a visit in 2 weeks        MEDICATIONS ON DISCHARGE     Medication List      CONTINUE taking these medications      Instructions   acyclovir 400 MG tablet  Commonly known as:  ZOVIRAX   Take 400 mg by mouth 2 times a day.  Dose:  400 mg     Ibrutinib 420 MG Tabs   Take 420 mg by mouth every evening.  Dose:  420 mg     levoFLOXacin 500 MG tablet  Commonly known as:  LEVAQUIN   Take 500 mg by mouth every day. For 7 days  Dose:  500 mg     potassium Chloride ER 20 MEQ Tbcr tablet  Commonly known as:  K-TAB   Take 20 mEq by mouth 2 times a day.  Dose:  20 mEq        STOP taking these medications    amLODIPine 5 MG Tabs  Commonly known as:  NORVASC            Allergies  Allergies   Allergen Reactions   • Amoxicillin Hives   • Benadryl Allergy Hives   • Excedrin Migraine Swelling    • Sulfamethoxazole Hives   • Tylenol Hives   • Famotidine Itching       DIET  Orders Placed This Encounter   Procedures   • Diet Order Regular     Standing Status:   Standing     Number of Occurrences:   1     Order Specific Question:   Diet:     Answer:   Regular [1]       ACTIVITY  As tolerated.  Weight bearing as tolerated    CONSULTATIONS  Intensivist  Oncology    PROCEDURES  None    LABORATORY  Lab Results   Component Value Date    SODIUM 135 06/21/2020    POTASSIUM 3.3 (L) 06/21/2020    CHLORIDE 107 06/21/2020    CO2 21 06/21/2020    GLUCOSE 114 (H) 06/21/2020    BUN 8 06/21/2020    CREATININE 0.57 06/21/2020        Lab Results   Component Value Date    WBC 1.1 (LL) 06/21/2020    HEMOGLOBIN 6.4 (L) 06/21/2020    HEMATOCRIT 18.2 (L) 06/21/2020    PLATELETCT 22 (LL) 06/21/2020        Total time of the discharge process exceeds 31 minutes.

## 2020-06-21 NOTE — PROGRESS NOTES
Gary from Lab called with critical result of WBC=1.1 and platelets=22 at 0527. Critical lab result read back to TEJAL Aguirre RN.   Dr. Myers notified of critical lab result at 0528.  Critical lab result read back by Dr. Myers.

## 2020-06-21 NOTE — CARE PLAN
Problem: Venous Thromboembolism (VTW)/Deep Vein Thrombosis (DVT) Prevention:  Goal: Patient will participate in Venous Thrombosis (VTE)/Deep Vein Thrombosis (DVT)Prevention Measures  Outcome: PROGRESSING AS EXPECTED  Flowsheets  Taken 6/21/2020 0800  Risk Assessment Score: 1  VTE RISK: Moderate  Mechanical Prophylaxis: SCDs, Sequential Compression Device  Pharmacologic Prophylaxis Used: Contraindicated per MD  Taken 6/21/2020 0916  SCDs, Sequential Compression Device: Refused  Note: The patient has refused SCDs, and cannot receive pharmacologic DVT prophylaxis due to thrombocytopenia. The patient is mobile, and ambulates frequently.      Problem: Safety  Goal: Will remain free from falls  Outcome: PROGRESSING AS EXPECTED  Note: The patient is currently sitting up in the chair. The patient is wearing non-skid footwear, and has all personal belongings within reach.The patient is up self, but the room is free and clear of all clutter and spills. The patient has the call light within reach, and has been educated to call for assistance. Will continue hourly rounding.

## 2020-06-21 NOTE — PROGRESS NOTES
Patient discharged at 1330. Discharge instruction, medications, and future appointments were dicussed with the patient at length. The patient was escorted to her spouse and their vehicle by the RN. The patient has no further question or concerns.

## 2020-06-21 NOTE — DISCHARGE INSTRUCTIONS
Discharge Instructions    Discharged to home by car with relative. Discharged via wheelchair, hospital escort: Yes.  Special equipment needed: Not Applicable    Be sure to schedule a follow-up appointment with your primary care doctor or any specialists as instructed.     Discharge Plan:   Diet Plan: Discussed  Activity Level: Discussed  Confirmed Follow up Appointment: Patient to Call and Schedule Appointment  Confirmed Symptoms Management: Discussed  Medication Reconciliation Updated: Yes    I understand that a diet low in cholesterol, fat, and sodium is recommended for good health. Unless I have been given specific instructions below for another diet, I accept this instruction as my diet prescription.   Other diet: Regular    Special Instructions: None    · Is patient discharged on Warfarin / Coumadin?   No     Depression / Suicide Risk    As you are discharged from this [facility] facility, it is important to learn how to keep safe from harming yourself.    Recognize the warning signs:  · Abrupt changes in personality, positive or negative- including increase in energy   · Giving away possessions  · Change in eating patterns- significant weight changes-  positive or negative  · Change in sleeping patterns- unable to sleep or sleeping all the time   · Unwillingness or inability to communicate  · Depression  · Unusual sadness, discouragement and loneliness  · Talk of wanting to die  · Neglect of personal appearance   · Rebelliousness- reckless behavior  · Withdrawal from people/activities they love  · Confusion- inability to concentrate     If you or a loved one observes any of these behaviors or has concerns about self-harm, here's what you can do:  · Talk about it- your feelings and reasons for harming yourself  · Remove any means that you might use to hurt yourself (examples: pills, rope, extension cords, firearm)  · Get professional help from the community (Mental Health, Substance Abuse, psychological  counseling)  · Do not be alone:Call your Safe Contact- someone whom you trust who will be there for you.  · Call your local CRISIS HOTLINE 586-0030 or 230-770-6638  · Call your local Children's Mobile Crisis Response Team [place] (430) 308-2176 or www.[employer]  · Call the toll free National Suicide Prevention Hotlines   · National Suicide Prevention Lifeline 167-522-FTCB (2548)  · National POLYBONA Line Network 800-SUICIDE (853-3840)      Discharge Instructions per Seun Shetty D.O.    DIET: Healthy balanced diet    ACTIVITY: As tolerates    DIAGNOSIS: transfusion reaction due to chemotherapy induced pancytopenia from leukemia.    Return to ER if worsening fever, hypotension, breathing issues, bleeding or there concerns

## 2020-06-22 NOTE — PROGRESS NOTES
Jake from Lab called with critical result of ANC 0.05, platelets 15, WBC 0.8 at 1145. Critical lab result read back to Jake.   This critical lab result is within parameters established by MD for this patient

## 2020-06-22 NOTE — PROGRESS NOTES
Here for cbc possible blood products. PIV placed with brisk blood return. CBC collected and sent. Discharged with a hemoglobin of 6.4 yesterday. Blood bank notified that RBCs will most likely be needed today. Awaiting results. Continue to monitor.

## 2020-06-22 NOTE — PROGRESS NOTES
Tolerated all infusions with no reactions. PIV flushed and removed with tip intact. Site covered with pressure dressing. Discharged home to self care in no distress at this time. Next appointment in place.

## 2020-06-24 NOTE — PROGRESS NOTES
Patient arrived ambulatory to [facility] for CBC/possible transfusion.  She denies any s/s of infection, fevers, or bleeding.  PIV established with good blood return noted.  CBC drawn per order.    Gary from Lab called with critical result of WBC 0.9, platelet 34,000 at 1040. Critical lab result read back to Gary.   This critical lab result is within parameters established by  for this patient    Hgb 8.2.  Pt dose not meet parameters for transfusions today.  She verbalized understanding.  PIV flushed, removed, and gauze/coban placed.  Confirmed appointment for Friday and pt ambulated out of clinic in no apparent distress.

## 2020-06-25 NOTE — PROGRESS NOTES
Oncology nurse navigation received a distress score of 2 but with no reasons stated.  Patient stated that she was doing good and that she had no needs at this time.  Patient reported that she is actually in Dr. Acuna office right now and she is going to be starting oral chemotherapy for her CLL.  Explained to patient my role briefly and told her that I would send her via my chart a introduction letter which also had my contact information.

## 2020-06-26 NOTE — PROGRESS NOTES
Lana arrives for blood transfusion today. Lana c/o fatigue, but denies shortness of breath and chest pain. CBC done shows Hgb 7.8, Platelets 12,000. Blood transfusion consents signed prior. Called results to Dr. Summers, per Dr. Summers change parameters for transfusing 1 unit of RBCs to hemoglobin of 8, updated orders.     Potential side effects reviewed. 1 units platelets and 1 unit of PRBCs transfused without incident. Lana tolerated well. No signs or symptoms of adverse reaction observed or expressed. Lana rested comfortably during treatment. Next appointment scheduled.     Mauri from Lab called with critical result of WBC 0.08, ANC 0.01, Platelets 12 at 1023. Critical lab result read back to Mauri.   Dr. Summers notified of critical lab result at 1050.  Critical lab result read back by Dr. Summers.

## 2020-06-28 NOTE — PROGRESS NOTES
Patient arrived ambulatory to [facility] for CBC/possible transfusion.  She denies any s/s of infection, fevers, or bleeding.  PIV established with good blood return noted.  CBC drawn per order.  Hgb 10.2, platelet 21,000.  Pt does not meet parameters for transfusion today.  She verbalized understanding.  PIV flushed, removed, and gauze/coban placed.  Confirmed next appointment and she ambulated out of clinic in no apparent distress.

## 2020-06-30 NOTE — PROGRESS NOTES
Patient presents for lab draw and possible blood products. PIV established, flushes well, blood drawn as ordered. Hemoglobin 10.0 and platelets 11. One unit CMV -, irradiated platelets transfused per MD order. PIV removed, tip intact, compression dressing to site. Patient returns Thursday and released in no acute distress.

## 2020-07-01 NOTE — DOCUMENTATION QUERY
"                                                                         [facility]                                                                       Query Response Note      PATIENT:               CHIDI MOLINA  ACCT #:                  [id number]  MRN:                     [id number]  :                      1965  ADMIT DATE:       2020 5:20 PM  DISCH DATE:        2020 1:16 PM  RESPONDING  PROVIDER #:        [id number]           QUERY TEXT:    Pt presented with a transfusion reaction and septic shock is documented as the reason for the central line placement.There is no other documentation of septic shock or sepsis.    Based on clinical findings, risk factors and treatment can sepsis with septic shock be further clarified as    NOTE:  If an appropriate response is not listed below, please respond with a new note.       The patient's Clinical Indicators include:    Post-procedure Diagnosis   \"Septic shock'    ED differential diagnosis:  Sepsis, due to unspecified organism, unspecified whether acute organ dysfunction present\"    Vitals on admission:  Temp 41.3 C/ 106.4 F    RR 26  WBC  0.4    Treatment:  Vasopressor  Central line placement  Options provided:   -- Sepsis with septic shock is ruled in   -- Shock without sepsis is ruled in   -- SIRS with associated shock is ruled in   -- Sepsis with septic shock is ruled out   -- Unable to determine      Query created by: Elsa Quezada on 2020 7:39 AM    RESPONSE TEXT:    Sepsis with septic shock is ruled out       QUERY TEXT:    Discharge Summary documents transfusion reaction. The remainder of the chart documents TRALI (transfusion related acute lung injury).    Based on treatment, clinical findings and risk factors, can this documentation be further clarified?     NOTE:  If an appropriate response is not listed below, please respond with a new note.    The patient's Clinical Indicators include:    Discharge Diagnosis:  Transfusion " reaction    Remainder of chart:  TRALI (transfusion related acute lung injury)    Clinical indicators:  Temperature 106.4  Acute hypoxic respiratory failure  Diaphoresis  Flash pulmonary edema  SVT  Jaudice    Treatment:  Platelet transfusion  Vasopressors  Supplemental O2  Cooling  Adenosine  Lasix  Coomb's test, bili  Options provided:   -- Transfusion reaction   -- Transfusion related acute lung injury (TRALI)   -- Unable to determine      Query created by: Elsa Quezada on 7/1/2020 7:39 AM    RESPONSE TEXT:    Transfusion related acute lung injury (TRALI)          Electronically signed by:  CRYSTAL JORDAN DO 7/1/2020 2:22 PM

## 2020-07-02 PROBLEM — A41.9 SEPSIS (HCC): Status: ACTIVE | Noted: 2020-01-01

## 2020-07-02 NOTE — ED TRIAGE NOTES
Name and  Verified for triage    Pt here from infusion center with c/o fever. States she is due for a platelet transfusion- did not have have transfusion d/t fever of 101. Denies body aches or recent cough. Denies exposure to covid.

## 2020-07-02 NOTE — ED PROVIDER NOTES
ED Provider Note    CHIEF COMPLAINT(1/4)  Chief Complaint   Patient presents with   • Fever       HPI  Lana Johnston is a 54 y.o. female with recently diagnosed CLL on chemotherapy, pancytopenia , multiple platelet infusion therapy for thrombocytopenia who is transferred from the infusion center due to fever of 101.6F.  Patient states that yesterday evening she was feeling feverish but has not measured her temperature at home.  She denies headaches, chills, cough, shortness of breath or chest discomfort.  Denies dysuria, flank pain, abnormal vaginal discharge, nausea or vomiting or diarrhea.  She does state that she was having intermittent burning epigastric pain she was taking potassium pills empty stomach which resolved after she was started taking it with food.  Patient wears dentures and has noted sores in her mouth yesterday.  Apart from that she has no other acute complaints.    REVIEW OF SYSTEMS(1/10)  Pertinent positives include: Fever, white spots/sores in mouth  Pertinent negatives include:, Negative for headache, ear pain, cough, sore throat, sinus pain, shortness of breath, Chest discomfort, abdominal pain, nausea, vomiting, diarrhea, constipation, blood in stools, dysuria, flank pain or back pain.   All other systems are negative.     PAST MEDICAL HISTORY(PFS1,2)  Past Medical History:   Diagnosis Date   • Anxiety disorder    • CLL (chronic lymphocytic leukemia) (HCC)    • Hypertension        FAMILY HISTORY  No family history on file.    SOCIAL HISTORY  Social History     Tobacco Use   • Smoking status: Never Smoker   • Smokeless tobacco: Never Used   Substance Use Topics   • Alcohol use: Never     Frequency: Never   • Drug use: Never     Social History     Substance and Sexual Activity   Drug Use Never       SURGICAL HISTORY  Past Surgical History:   Procedure Laterality Date   • CHOLECYSTECTOMY  2005       CURRENT MEDICATIONS  Home Medications    **Home medications have not yet been reviewed  for this encounter**         ALLERGIES  Allergies   Allergen Reactions   • Amoxicillin Hives   • Benadryl Allergy Hives   • Excedrin Migraine Swelling   • Sulfamethoxazole Hives   • Tylenol Hives   • Famotidine Itching       PHYSICAL EXAM  VITAL SIGNS: BP (!) 163/73   Pulse (!) 108   Temp (!) 38.6 °C (101.4 °F) (Temporal)   Resp 16   Ht 1.524 m (5')   Wt 42.5 kg (93 lb 11.2 oz)   SpO2 98%   BMI 18.30 kg/m²  Reviewed   Constitutional: 54-year-old pleasant female, looking younger than her age.  Not in acute distress  HENT: Normocephalic, atraumatic, bilateral external ears normal   oropharynx moist.  Patient has dentures.  Examination of oral mucosa shows yellowish-white lesions on the hard palate and on the upper gums.   Eyes: PERRLA, conjunctiva pink, no scleral icterus.   Cardiovascular: Normal S1 and S2.  Tachycardic with heart rate in 100-110  Respiratory: Lungs clear on auscultation.  Gastrointestinal: Soft, nontender no guarding or rigidity.  Normal bowel sounds  Skin: No erythema, no rash.   Genitourinary:  No costovertebral angle tenderness.   Neurologic: Alert & oriented x 3, cranial nerves 2-12 intact by passive exam.  No focal deficit noted.  Psychiatric: Affect normal, Judgment normal, Mood normal.         RADIOLOGY/PROCEDURES  DX-CHEST-PORTABLE (1 VIEW)   Final Result      1.  No pneumonia or pulmonary edema.   2.  Stable mild cardiomegaly.          LABORATORY: Reviewed as below.  Results for orders placed or performed during the hospital encounter of 07/02/20   CBC WITH DIFFERENTIAL   Result Value Ref Range    WBC 0.4 (LL) 4.8 - 10.8 K/uL    RBC 2.87 (L) 4.20 - 5.40 M/uL    Hemoglobin 8.4 (L) 12.0 - 16.0 g/dL    Hematocrit 23.8 (L) 37.0 - 47.0 %    MCV 82.9 81.4 - 97.8 fL    MCH 29.3 27.0 - 33.0 pg    MCHC 35.3 (H) 33.6 - 35.0 g/dL    RDW 35.8 (L) 35.9 - 50.0 fL    Platelet Count 10 (LL) 164 - 446 K/uL    MPV 10.7 9.0 - 12.9 fL    Neutrophils-Polys 1.80 (L) 44.00 - 72.00 %    Lymphocytes 98.20  (H) 22.00 - 41.00 %    Monocytes 0.00 0.00 - 13.40 %    Eosinophils 0.00 0.00 - 6.90 %    Basophils 0.00 0.00 - 1.80 %    Nucleated RBC 0.00 /100 WBC    Neutrophils (Absolute) 0.01 (LL) 2.00 - 7.15 K/uL    Lymphs (Absolute) 0.39 (L) 1.00 - 4.80 K/uL    Monos (Absolute) 0.00 0.00 - 0.85 K/uL    Eos (Absolute) 0.00 0.00 - 0.51 K/uL    Baso (Absolute) 0.00 0.00 - 0.12 K/uL    NRBC (Absolute) 0.00 K/uL    Anisocytosis 1+     Microcytosis 1+    COMP METABOLIC PANEL   Result Value Ref Range    Sodium 135 135 - 145 mmol/L    Potassium 3.6 3.6 - 5.5 mmol/L    Chloride 96 96 - 112 mmol/L    Co2 22 20 - 33 mmol/L    Anion Gap 17.0 (H) 7.0 - 16.0    Glucose 106 (H) 65 - 99 mg/dL    Bun 9 8 - 22 mg/dL    Creatinine 0.56 0.50 - 1.40 mg/dL    Calcium 9.5 8.5 - 10.5 mg/dL    AST(SGOT) 15 12 - 45 U/L    ALT(SGPT) 20 2 - 50 U/L    Alkaline Phosphatase 93 30 - 99 U/L    Total Bilirubin 1.9 (H) 0.1 - 1.5 mg/dL    Albumin 4.3 3.2 - 4.9 g/dL    Total Protein 7.7 6.0 - 8.2 g/dL    Globulin 3.4 1.9 - 3.5 g/dL    A-G Ratio 1.3 g/dL   LACTIC ACID   Result Value Ref Range    Lactic Acid 1.2 0.5 - 2.0 mmol/L   COVID/SARS CoV-2 PCR    Specimen: Nasopharyngeal; Respirate   Result Value Ref Range    COVID Order Status Received    SARS-CoV-2, PCR (In-House)   Result Value Ref Range    SARS-CoV-2 Source NP Swab    ESTIMATED GFR   Result Value Ref Range    GFR If African American >60 >60 mL/min/1.73 m 2    GFR If Non African American >60 >60 mL/min/1.73 m 2   DIFFERENTIAL MANUAL   Result Value Ref Range    Manual Diff Status PERFORMED    PERIPHERAL SMEAR REVIEW   Result Value Ref Range    Peripheral Smear Review see below    PLATELET ESTIMATE   Result Value Ref Range    Plt Estimation Marked Decrease    MORPHOLOGY   Result Value Ref Range    RBC Morphology Present    IMMATURE PLT FRACTION   Result Value Ref Range    Imm. Plt Fraction 0.5 (L) 0.6 - 13.1 K/uL       INTERVENTIONS:  Medications   cefepime (MAXIPIME) 2 g in  mL IVPB (2 g  Intravenous New Bag 7/2/20 1713)   .    COURSE & MEDICAL DECISION MAKING    55-year-old pleasant female who presented with complaint of fever which was detected at the platelet infusion center and noticed to have 101.4F temperature on arrival.  Patient has multiple yellowish-white lesions in oral mucosa -on hard palate and upper gums which she noticed yesterday.  No other acute complaints.  No other obvious source of infection on exam.  History concerning with CLL and known pancytopenia.  History of previous hospital admissions for neutropenic fever treated with antibiotics and antifungals. Recent history of UTI in 06/20. Labs showed mildly elevated total bilirubin , pancytopenia with absolute neutrophil count is around 0.1 concerning for neutropenic fever . Has normal lactic acid levels  Fever work up is ordered including blood cultures , UA , Chest X ray. COVID test .Empirical antibiotic -cefepime  given in the ED.  Chest X ray did not show any infiltrates. UA and COVID test pending   Most likely a neutropenic fever. Yellowish white lesions in oral mucosa differentials includes but not limited to multifocal leukoplakia , abscess vs oral candida .    Review nursing notes and vital signs a final time 4:49 PM    PLAN:  Patient would be hospitalized for further evaluation and treatment of Neutropenic fever.     FINAL IMPRESSION  1. Neutropenic fever (HCC)    2. Unspecified lesions of oral mucosa          Electronically signed by: Dominique Blackwell M.D., 7/2/2020 4:49 PM

## 2020-07-02 NOTE — PROGRESS NOTES
Pt presented to infusion for platelets, had CBC drawn at [facility], platelets=16K. Pt febrile on arrival, temp taken several times orally and temporally. Reported fevers to Liza HO who saw pt this morning at office, rec'd order to hold platelets at this time and send pt to ER. Spoke with ER charge regarding getting pt in a room as her ANC is 0.00. Pt escorted to ER via w/c in mask by CNA. Informed blood bank that platelets would not be given right away, they will hold in blood bank for now.

## 2020-07-03 NOTE — PROGRESS NOTES
Pharmacy Kinetics 54 y.o. female on Vancomycin Day # 1 7/3/2020    Loading Dose: Vancomycin 1,000 mg (25 mg/kg) IV x one at 1200   Maintenance Regimen: Vancomycin 750 mg iv q12hr (0000, 1200) - Starting at midnight    Provider specified end date: TBD, generic 3 day stop date applied, END 20     Indication for Treatment: Neutropenic Fever     Pertinent history per medical record: Admitted on 2020 for Sepsis +tachycardia and fever with severe neutropenic and leukopenia. History of CLL taking Ibrutinib. Chemotherapy on hold per Oncologist. Empiric antibiotics initiated.     Other antibiotics: Cefepime 2 grams IV q8h     Allergies: Amoxicillin; Benadryl allergy; Excedrin migraine; Sulfamethoxazole; Tylenol; and Famotidine     List concerns for renal function: No major concerns      Pertinent cultures to date:   7/3/20: Blood cultures x 2 sets = IN process     MRSA nares swab if pneumonia is a concern (ordered/positive/negative/n-a): N/A   · CXR (20) - No pneumonia or pulmonary edema. 2. Stable mild cardiomegaly.    Recent Labs     20  0920 20  1635 20  0314   WBC 0.6* 0.4* 0.4*   NEUTSPOLYS 96.00* 1.80* 0.00*     Recent Labs     20  1635 20  0314   BUN 9 7*   CREATININE 0.56 0.55   ALBUMIN 4.3 4.1     No results for input(s): VANCOTROUGH, VANCOPEAK, VANCORANDOM in the last 72 hours.    Intake/Output Summary (Last 24 hours) at 7/3/2020 0844  Last data filed at 2020 1808  Gross per 24 hour   Intake 100 ml   Output --   Net 100 ml      /53   Pulse (!) 111   Temp (!) 39.5 °C (103.1 °F) (Oral)   Resp 19   Ht 1.524 m (5')   Wt 42.5 kg (93 lb 11.2 oz)   SpO2 94%  Temp (24hrs), Av °C (102.2 °F), Min:37.2 °C (99 °F), Max:39.5 °C (103.1 °F)      A/P   1. Vancomycin dose change: New start   2. Next vancomycin level: TBD   3. Goal trough: 16 - 20 mcg/mL   4. Comments: Minimal concerns for renal function or accumulation.     Racquel Chang, GarcíaD, BCPS

## 2020-07-03 NOTE — DIETARY
Nutrition services: Day 1 of admit.  Lana Johnston is a 54 y.o. female with Sepsis, Neutropenic fever  History includes Chronic lymphocytic leukemia, Pancytopenia  · Patient seen for low BMI  · Patient stated her appetite was decreased slightly with fever, but eating pretty well currently and requested snacks BID between meals.    Assessment:  Height: 152.4 cm (5')  Weight: 42.5 kg (93 lb 11.2 oz)  Body mass index is 18.3 kg/m².  Diet/Intake: regular    Evaluation:   1. Pertinent Labs:  K+3.4, BUN 7  2. Pertinent Meds/Fluids:  LR running at 125 ml/hr per MAR, Kdur  3. In reviewing chart, weight was ~100# 6/21/20  4. But weight was 93-96# earlier in the year (May and June of this year)   5. Current weight down 6# or 6% in the last couple of weeks which is severe loss, but no significant change from May weight of 96#.  May be related to different scales.  Need to follow trends.  6. Given patient's small stature, she can likely meet estimated needs with 25-50% intake if she also has snacks between meals.    Malnutrition Risk: At risk for malnutrition given recent weight loss, but does not currently meet criteria.    Recommendations/Interventions/Plan:  1. Snacks between meals added  2. Encourage intake of meals and snacks  3. Document intake of all PO as % taken in ADL's to provide interdisciplinary communication across all shifts.   4. Monitor weight.  5. Nutrition rep will continue to see patient for ongoing meal and snack preferences.     RD following

## 2020-07-03 NOTE — PROGRESS NOTES
2 RN skin check complete Karo RN   - Elbows red but blanching  - Petechiae to bilateral feet  Devices in place NA.  Skin assessed under devices NA.  Confirmed pressure ulcers found on NA.  New potential pressure ulcers noted on NA. Wound consult placed NA.  The following interventions in place  - Moisturizer   - Ambulatory

## 2020-07-03 NOTE — ASSESSMENT & PLAN NOTE
Cont cefepime, acyclovir, and fluconazole  ID following  BCx NGTD  Will d/w ID about PO antibiotics and possible dc tomorrow

## 2020-07-03 NOTE — ASSESSMENT & PLAN NOTE
She of receiving platelet transfusions she is on active chemotherapy for CLL  Normally follows with Dr. Summers  Transfuse Hg <7.

## 2020-07-03 NOTE — CONSULTS
Consult Note: Hematology/Oncology    Date of consultation: 7/3/2020 11:20 AM    Referring provider: Fabiano    Reason for consultation: CLL       History of presenting illness:   53yo woman with a history of hypertension, anxiety, and migraines, who also has a history of anemia off and on since she was a kid, as well as an elevated bilirubin level. Her hemaglobin chronically ranges from 9-12.  She had a workup in the past with a hematologist, and was told she has a hemolytic anemia. A bone marrow biopsy in 2003 was normal.    She was referred to me in January of 2019 and underwent further workup.    She had further lab testing that showed a normal CMP except for an elevated bilirubin.  She had a normal urinalysis except for trace blood, but no RBCs.  She had, otherwise, normal testing including hemoglobin electrophoresis, vitamin B-12, folic acid, TSH, RF, LAKIA, hepatitis B and hepatitis C antibodies, ESR, PNH testing, G6PD level, ceruloplasmin, and copper levels.  The osmotic fragility test showed a dimorphic population of red blood cells with a subset of increased osmotic fragility which were nonspecific but could be seen in thalassemia.  The SPEP did show a 0.3 g/dL IgA lambda chain specific M-spike (possibly consistent with MGUS).  She did have an elevated LDH with a haptoglobin &lt;10, consistent with non-autoimmune hemolytic anemia.  Her LYNSEY was negative.  Her iron levels were normal, although the ferritin was elevated at 321.  An abdominal ultrasound showed a mildly enlarged spleen measuring up to 12.7 cm, but otherwise, was normal.  Her case was felt to be consistent with a likely alpha thalassemia given the normal hemoglobin electrophoresis (thalassemia minor or hemoglobin H disease).  Eventually, Lana presented to the emergency room at [facility] on 4/20/20 with a hemoglobin of 4.3 and a hematocrit of 11.8 and a white blood cell count of 2.6 and platelets of 19, ANC 0.75.  Further workup,  including a bone marrow biopsy from 4/24/20, showed 70% cells (positive for CD23, dimly positive for CD20 and CD5; negative for BCL-1; normal 46,XX, negative FISH panels for MDS and CLL).  She was treated with transfusions and discharged from the hospital.    She had follow-up with me and we discussed treatment options for CLL.  She elected for ibrutinib therapy which started on 5/15/20.    She has been off and on ibrutinib.  She was recently in [facility] for sepsis.  She was last seen in the office on 7/2/20. She has been pancytopenic throughout treatment.  She came in yesterday for fever.  She was restarted on her ibrutinib on 6/26/20.  She has no other symptoms or bleeding.    Past Medical History:    Past Medical History:   Diagnosis Date   • Anxiety disorder    • CLL (chronic lymphocytic leukemia) (HCC)    • Hypertension        Past surgical history:    Past Surgical History:   Procedure Laterality Date   • CHOLECYSTECTOMY  2005       Allergies:  Amoxicillin; Benadryl allergy; Excedrin migraine; Sulfamethoxazole; Tylenol; and Famotidine    Medications:    Current Facility-Administered Medications   Medication Dose Route Frequency Provider Last Rate Last Dose   • MD Alert...Vancomycin per Pharmacy   Other PHARMACY TO DOSE Paul Mario M.D.       • vancomycin (VANCOCIN) 1,000 mg in  mL IVPB  25 mg/kg Intravenous Once Paul Mario M.D.       • vancomycin (VANCOCIN) 750 mg in  mL IVPB  15 mg/kg Intravenous Q12HR Paul Mario M.D.       • acyclovir (ZOVIRAX) tablet 400 mg  400 mg Oral BID Seun Shetty D.O.   400 mg at 07/03/20 0536   • amLODIPine (NORVASC) tablet 5 mg  5 mg Oral DAILY MEL Montes.O.   5 mg at 07/03/20 0536   • folic acid (FOLVITE) tablet 1 mg  1 mg Oral DAILY MEL Montes.O.   1 mg at 07/03/20 0536   • senna-docusate (PERICOLACE or SENOKOT S) 8.6-50 MG per tablet 2 Tab  2 Tab Oral BID ELMER MontesO.        And   • polyethylene glycol/lytes (MIRALAX) PACKET 1 Packet  1  Packet Oral QDAY PRN Seun Shetty D.O.        And   • magnesium hydroxide (MILK OF MAGNESIA) suspension 30 mL  30 mL Oral QDAY PRN Seun Shetty D.O.        And   • bisacodyl (DULCOLAX) suppository 10 mg  10 mg Rectal QDAY PRN Seun Shetty D.O.       • Respiratory Therapy Consult   Nebulization Continuous RT Seun Shetty D.O.       • lactated ringers infusion  2,000 mL Intravenous Continuous Seun Shetty D.O. 125 mL/hr at 07/02/20 2005 2,000 mL at 07/02/20 2005   • lactated ringers infusion (BOLUS): BMI less than or equal to 30  30 mL/kg Intravenous Once PRN Seun Shetty D.O.       • cefepime (MAXIPIME) 2 g in  mL IVPB  2 g Intravenous Q8HRS Seun Shetty D.O.   Stopped at 07/03/20 0605   • ondansetron (ZOFRAN) syringe/vial injection 4 mg  4 mg Intravenous Q4HRS PRN Seun Shetty D.O.       • ondansetron (ZOFRAN ODT) dispertab 4 mg  4 mg Oral Q4HRS PRN Seun Shetty D.O.       • prochlorperazine (COMPAZINE) injection 5-10 mg  5-10 mg Intravenous Q4HRS PRN Seun Shetty D.O.       • potassium chloride SA (Kdur) tablet 20 mEq  20 mEq Oral BID Seun Shetty D.O.   20 mEq at 07/03/20 0536   • fluconazole (DIFLUCAN) in NS IVPB 200 mg  200 mg Intravenous Q24HRS ELMER MontesOMarley   Stopped at 07/03/20 0058       Social History:     Social History     Socioeconomic History   • Marital status:      Spouse name: Not on file   • Number of children: Not on file   • Years of education: Not on file   • Highest education level: Not on file   Occupational History   • Not on file   Social Needs   • Financial resource strain: Not on file   • Food insecurity     Worry: Not on file     Inability: Not on file   • Transportation needs     Medical: Not on file     Non-medical: Not on file   Tobacco Use   • Smoking status: Never Smoker   • Smokeless tobacco: Never Used   Substance and Sexual Activity   • Alcohol use: Never     Frequency: Never   • Drug use: Never   • Sexual activity: Not on file    Lifestyle   • Physical activity     Days per week: Not on file     Minutes per session: Not on file   • Stress: Not on file   Relationships   • Social connections     Talks on phone: Not on file     Gets together: Not on file     Attends Protestant service: Not on file     Active member of club or organization: Not on file     Attends meetings of clubs or organizations: Not on file     Relationship status: Not on file   • Intimate partner violence     Fear of current or ex partner: Not on file     Emotionally abused: Not on file     Physically abused: Not on file     Forced sexual activity: Not on file   Other Topics Concern   • Not on file   Social History Narrative   • Not on file       Family History:   History reviewed. No pertinent family history.    Review of Systems:   All other review of systems are negative except what was mentioned above in the HPI.    Constitutional: fever, chills, weight loss ,malaise/fatigue.    HEENT: No new auditory or visual complaints. No sore throat and neck pain.     Respiratory:No new cough, sputum production, shortness of breath  Cardiovascular: No new chest pain, palpitations  Gastrointestinal: No heartburn, nausea, vomiting ,abdominal pain    Genitourinary: Negative for dysuria, hematuria    Musculoskeletal: No new arthralgias or myalgias   Skin: Negative for rash and itching.    Neurological: Negative for focal weakness or headaches.    Endo/Heme/Allergies: No abnormal bleed/bruise.    Psychiatric/Behavioral: No new depression/anxiety.    Physical Exam:   Vitals:   /53   Pulse (!) 109   Temp (!) 39.1 °C (102.4 °F) (Oral)   Resp 19   Ht 1.524 m (5')   Wt 42.5 kg (93 lb 11.2 oz)   SpO2 97%   BMI 18.30 kg/m²     General: Not in acute distress, alert and oriented x 3  HEENT: No pallor, icterus. Oropharynx clear.   Neck: Supple, no palpable masses.  Lymph nodes: No palpable cervical, supraclavicular  CVS: regular rate and rhythm, no rubs or gallops  RESP: Clear to  auscultate bilaterally, no wheezing or crackles.   ABD: Soft, non tender, non distended, positive bowel sounds  EXT: No edema or cyanosis  CNS: Alert and oriented x3, No focal deficits.  Skin- No rash      Labs:   Recent Labs     07/02/20  1635 07/03/20  0314   RBC 2.87* 2.81*   HEMOGLOBIN 8.4* 8.3*   HEMATOCRIT 23.8* 23.6*   PLATELETCT 10* 7*   PROTHROMBTM 17.7*  --    INR 1.42*  --      Lab Results   Component Value Date/Time    SODIUM 135 07/03/2020 03:14 AM    POTASSIUM 3.4 (L) 07/03/2020 03:14 AM    CHLORIDE 98 07/03/2020 03:14 AM    CO2 22 07/03/2020 03:14 AM    GLUCOSE 127 (H) 07/03/2020 03:14 AM    BUN 7 (L) 07/03/2020 03:14 AM    CREATININE 0.55 07/03/2020 03:14 AM        Assessment and Plan:  1.  CLL: She initially started ibrutinib back on May 15.  She has been on and off secondary to hospitalizations.  She most recently was started back on June 26, 2020.  She did not take her ibrutinib yesterday.    Dr. Summers believes that she has been pancytopenic because she has significant involvement of her bone marrow and she has been on intermittent therapy.  They believe that she has not been on the medication long enough to get a response.  However she keeps having admissions for fever/evaluate for sepsis.  She also has had some issues with rashes.  They do not believe the ibrutinib is the cause.  They had felt that maybe it was Levaquin but that was recently introduced and she is done well.  They have been slowly inner do seeing this with a different drug each week so they can monitor for any new reactions.  She seems to be allergic to many things.    I will hold her ibrutinib for now since she has profound thrombocytopenia and being worked up for sepsis.  I think if her cultures are negative and her platelets hold with transfusions then we could consider restarting her ibrutinib.    2. 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  The thought is most likely bone marrow involvement of her CLL.  There could be some effect from her medication.    I would keep her platelets above 10,000.  If she has any bleeding will keep her above 20-30,000.    I would keep her hemoglobin above 7    She will require irradiated blood products/CMV negative.    3.  Infectious disease    We will have to see what her cultures show.  I agree with broad-spectrum antibiotics.  I think because of a lot of her rashes and allergies it may be reasonable to get infectious disease involved to help with an outpatient regimen since her counts will not go up soon until she gets response.  She will also need fungal coverage.    4.  Rash    No active rash at this time.      She agreed and verbalized her agreement and understanding with the current plan.  I answered all questions and concerns she has at this time.                    Thank you for allowing me to participate in her care.    Please note that this dictation was created using voice recognition software. I have made every reasonable attempt to correct obvious errors, but I expect that there are errors of grammar and possibly content that I did not discover before finalizing the note.      SIGNATURES:  Waqas Blackwell M.D.    CC:  Ara Duncan M.D.

## 2020-07-03 NOTE — PROGRESS NOTES
HOSPITAL MEDICINE PROGRESS NOTE    Date of service  7/3/2020    Chief Complaint  Fever      Hospital Course:     53 yo woman with CLL p/w neutropenic fever.           Interval History Updates:  continue to spike fever  Discussed with Dr. Garcia    Consultants/Specialty  Heme/Onc    Review of Systems   ROS     Medications:  • MD Alert...Vancomycin per Pharmacy   PHARMACY TO DOSE   • vancomycin  25 mg/kg Once   • vancomycin  15 mg/kg Q12HR   • acyclovir  400 mg BID   • amLODIPine  5 mg DAILY   • folic acid  1 mg DAILY   • senna-docusate  2 Tab BID    And   • polyethylene glycol/lytes  1 Packet QDAY PRN    And   • magnesium hydroxide  30 mL QDAY PRN    And   • bisacodyl  10 mg QDAY PRN   • Respiratory Therapy Consult   Continuous RT   • LR  2,000 mL Continuous   • LR  30 mL/kg Once PRN   • cefepime  2 g Q8HRS   • ondansetron  4 mg Q4HRS PRN   • ondansetron  4 mg Q4HRS PRN   • prochlorperazine  5-10 mg Q4HRS PRN   • potassium chloride SA  20 mEq BID   • fluconazole (DIFLUCAN) IV  200 mg Q24HRS       Physical Exam   Vitals:    07/03/20 0753 07/03/20 0900 07/03/20 1041 07/03/20 1142   BP: 148/53   128/57   Pulse: (!) 111 (!) 109  (!) 106   Resp: 19   17   Temp: (!) 39.5 °C (103.1 °F) (!) 39.2 °C (102.6 °F) (!) 39.1 °C (102.4 °F) (!) 38.9 °C (102.1 °F)   TempSrc: Oral Oral Oral Oral   SpO2: 94% 97%  96%   Weight:       Height:           Physical Exam       Laboratory   Recent Labs     07/02/20  1635 07/03/20  0314   WBC 0.4* 0.4*   RBC 2.87* 2.81*   HEMOGLOBIN 8.4* 8.3*   HEMATOCRIT 23.8* 23.6*   PLATELETCT 10* 7*     Recent Labs     07/02/20  1635 07/03/20  0314   SODIUM 135 135   POTASSIUM 3.6 3.4*   CHLORIDE 96 98   CO2 22 22   GLUCOSE 106* 127*   BUN 9 7*   CREATININE 0.56 0.55      Recent Labs     07/02/20  1635 07/03/20  0314   ALTSGPT 20 20   ASTSGOT 15 14   ALKPHOSPHAT 93 86   TBILIRUBIN 1.9* 1.9*   GLUCOSE 106* 127*      Recent Labs     07/02/20  1635   INR 1.42*           Microbiology  Results     Procedure  "Component Value Units Date/Time    Blood Culture [[id number]]     Order Status:  Sent Specimen:  Blood from Peripheral     Blood Culture [[id number]]     Order Status:  Sent Specimen:  Blood from Peripheral     URINALYSIS,CULTURE IF INDICATED [[id number]]     Order Status:  No result Specimen:  Urine, Clean Catch     BLOOD CULTURE [[id number]] Collected:  07/02/20 1707    Order Status:  Completed Specimen:  Blood from Peripheral Updated:  07/03/20 0726     Significant Indicator NEG     Source BLD     Site PERIPHERAL     Culture Result No Growth  Note: Blood cultures are incubated for 5 days and  are monitored continuously.Positive blood cultures  are called to the RN and reported as soon as  they are identified.      Narrative:       Per Hospital Policy: Only change Specimen Src: to \"Line\" if  specified by physician order.  Left Hand    BLOOD CULTURE [[id number]] Collected:  07/02/20 1635    Order Status:  Completed Specimen:  Blood from Peripheral Updated:  07/03/20 0726     Significant Indicator NEG     Source BLD     Site PERIPHERAL     Culture Result No Growth  Note: Blood cultures are incubated for 5 days and  are monitored continuously.Positive blood cultures  are called to the RN and reported as soon as  they are identified.      Narrative:       Per Hospital Policy: Only change Specimen Src: to \"Line\" if  specified by physician order.  No site indicated    Blood Culture [[id number]]     Order Status:  Canceled Specimen:  Blood from Peripheral     Blood Culture [[id number]]     Order Status:  Canceled Specimen:  Blood from Peripheral     Urinalysis [[id number]]     Order Status:  Canceled Specimen:  Urine, Clean Catch     SARS-CoV-2, PCR (In-House) [[id number]] Collected:  07/02/20 1700    Order Status:  Completed Updated:  07/02/20 1804     SARS-CoV-2 Source NP Swab     SARS-CoV-2 by PCR NotDetected     Comment: [facility] providers: PLEASE REFER TO DE-ESCALATION AND RETESTING PROTOCOL  on [email or web address]  **The Cepheid " GeneXpert Xpress SARS-CoV-2 Test has been made available for  use under the Emergency Use Authorization (EUA) only.         Narrative:       Expected Turn around time?->Rush (Cepheid 2-4 hours)    COVID/SARS CoV-2 PCR [[id number]] Collected:  07/02/20 1700    Order Status:  Completed Specimen:  Respirate from Nasopharyngeal Updated:  07/02/20 1703     COVID Order Status Received    Narrative:       Expected Turn around time?->Rush (Cepheid 2-4 hours)    URINALYSIS,CULTURE IF INDICATED [[id number]]     Order Status:  Canceled Specimen:  Urine              Labs reviewed by me and noted above.    Radiology  DX-CHEST-PORTABLE (1 VIEW)  Narrative: 7/2/2020 5:13 PM    HISTORY/REASON FOR EXAM:  Fever.    TECHNIQUE/EXAM DESCRIPTION AND NUMBER OF VIEWS:  Single portable view of the chest.    COMPARISON: 6/19/2020    FINDINGS:  Cardiac contours prominent.  No focal pulmonary consolidation.  No pleural fluid collection or pneumothorax.  No major bony abnormality is seen.  Impression: 1.  No pneumonia or pulmonary edema.  2.  Stable mild cardiomegaly.      No results found for this or any previous visit.       Assessment and Plan    Active Problems:    Neutropenic fever (HCC) POA: Unknown    CLL (chronic lymphocytic leukemia) (HCC) (Chronic) POA: Yes    Thrombocytopenia (HCC) POA: Yes    Pancytopenia (HCC) POA: Yes    Sepsis (HCC) POA: Unknown  Resolved Problems:    * No resolved hospital problems. *      Hold Ibrutinib for now given profound thrombocytopenia after discussion with Dr. Blackwell  Due to allergies and thrombocytopenia, no other option for antipyretic except steroid, but steroid is not ideal  Ice packs, cooling blanket.  She does not look very ill on visual inspection.  Given her bone marrow appearance, growth factor would be ineffective per Dr. Blackwell  I add Vancomycin to her regimen of cefepime, antiviral, and anifungal.    F/U cultures to see if she is actually infected or just febrile from neutropenia.    For now,  unclear if she actually has sepsis.        DVT prophylaxis: SCD    CODE STATUS:  FULL CODE    Disposition: TBD.    Case was discussed with Primary RN in addition to individual(s) mentioned above.    I have performed a physical exam and reviewed and updated ROS as of today, 7/3/2020.  In review of yesterday's note dated, 7/2/2020, there are no changes except as documented above.      Paul Mario M.D.

## 2020-07-03 NOTE — ED NOTES
Med rec complete per phone interview with patient. Allergies reviewed. Pt completed 4 day supply of CEFDINIR on 06/25/2020 and stated currently taking LEVAQUIN.

## 2020-07-03 NOTE — H&P
Hospital Medicine History & Physical Note    Date of Service  7/2/2020    Primary Care Physician  Ara Duncan M.D.    Code Status  Full Code    Chief Complaint  Chief Complaint   Patient presents with   • Fever       History of Presenting Illness  54 y.o. female with a history of CLL and on chemotherapy with a recent hospitalization for transfusion reaction who presented 7/2/2020 with concern of fever and severe neutropenia.  She was seen at her APRN's office this morning and sent to the emergency room.  Patient states she has been self isolating with her  at her house.  She has been wearing a mask.  Patient denies any diarrhea dysuria nor signs of rash or open wounds.  Patient denies any sore throat or headache.  Patient has been on recent prophylactic antibiotics    Review of Systems  Review of Systems   Constitutional: Positive for fever. Negative for chills and malaise/fatigue.   HENT: Negative for congestion, ear pain and sore throat.    Eyes: Negative for blurred vision.   Respiratory: Negative for shortness of breath.    Cardiovascular: Negative for chest pain and leg swelling.   Gastrointestinal: Negative for abdominal pain, diarrhea and nausea.   Genitourinary: Negative for dysuria, frequency and hematuria.   Musculoskeletal: Negative for back pain and joint pain.   Skin: Negative for rash.   Neurological: Negative for dizziness and headaches.   Psychiatric/Behavioral: The patient is not nervous/anxious.        Past Medical History   has a past medical history of Anxiety disorder, CLL (chronic lymphocytic leukemia) (HCC), and Hypertension.    Surgical History   has a past surgical history that includes cholecystectomy (2005).     Family History  Brother had leukemia    Social History   reports that she has never smoked. She has never used smokeless tobacco. She reports that she does not drink alcohol or use drugs.      Allergies  Allergies   Allergen Reactions   • Amoxicillin Hives      Tolerates cephalosporins   • Benadryl Allergy Hives   • Excedrin Migraine Swelling   • Sulfamethoxazole Hives   • Tylenol Hives   • Famotidine Itching       Medications  Prior to Admission Medications   Prescriptions Last Dose Informant Patient Reported? Taking?   Ibrutinib 420 MG Tab 7/1/2020 at PM Patient Yes No   Sig: Take 420 mg by mouth every evening.   acyclovir (ZOVIRAX) 400 MG tablet 7/2/2020 at AM Patient Yes No   Sig: Take 400 mg by mouth 2 times a day.   amLODIPine (NORVASC) 5 MG Tab 7/2/2020 at AM Patient Yes No   Sig: Take 5 mg by mouth every day.   cefdinir (OMNICEF) 300 MG Cap [id number] at complete Patient Yes Yes   Sig: Take 300 mg by mouth 2 times a day. For 4 days.   folic acid (FOLVITE) 1 MG Tab 7/2/2020 at AM Patient Yes No   Sig: Take 1 mg by mouth every day.   levoFLOXacin (LEVAQUIN) 500 MG tablet 7/2/2020 at AM Patient Yes No   Sig: Take 500 mg by mouth every day.   potassium Chloride ER (K-TAB) 20 MEQ Tab CR tablet 7/1/2020 at AM Patient Yes No   Sig: Take 20 mEq by mouth every day.      Facility-Administered Medications: None       Physical Exam  Temp:  [37.2 °C (99 °F)-38.7 °C (101.6 °F)] 37.2 °C (99 °F)  Pulse:  [] 114  Resp:  [16-20] 18  BP: (148-167)/(58-77) 152/77  SpO2:  [93 %-99 %] 93 %    Physical Exam  Vitals signs reviewed.   Constitutional:       Appearance: Normal appearance. She is not diaphoretic.   HENT:      Head: Normocephalic and atraumatic.      Nose: Nose normal.      Mouth/Throat:      Mouth: Mucous membranes are moist.      Pharynx: No oropharyngeal exudate.   Eyes:      General: No scleral icterus.        Right eye: No discharge.         Left eye: No discharge.      Extraocular Movements: Extraocular movements intact.      Conjunctiva/sclera: Conjunctivae normal.   Neck:      Musculoskeletal: No muscular tenderness.   Cardiovascular:      Rate and Rhythm: Normal rate and regular rhythm.      Pulses:           Radial pulses are 2+ on the right side and 2+ on the  left side.        Dorsalis pedis pulses are 2+ on the right side and 2+ on the left side.      Heart sounds: Murmur present.   Pulmonary:      Effort: Pulmonary effort is normal. No respiratory distress.      Breath sounds: Normal breath sounds. No wheezing or rales.   Abdominal:      General: Bowel sounds are normal. There is no distension.      Palpations: Abdomen is soft.   Musculoskeletal:         General: No swelling or tenderness.      Right lower leg: No edema.      Left lower leg: No edema.   Lymphadenopathy:      Cervical: No cervical adenopathy.   Skin:     Coloration: Skin is not jaundiced or pale.   Neurological:      General: No focal deficit present.      Mental Status: She is alert and oriented to person, place, and time. Mental status is at baseline.      Cranial Nerves: No cranial nerve deficit.   Psychiatric:         Mood and Affect: Mood normal.         Behavior: Behavior normal.         Laboratory:  Recent Labs     06/30/20  0920 07/02/20  1635   WBC 0.6* 0.4*   RBC 3.40* 2.87*   HEMOGLOBIN 10.0* 8.4*   HEMATOCRIT 28.9* 23.8*   MCV 85.0 82.9   MCH 29.4 29.3   MCHC 34.6 35.3*   RDW 36.6 35.8*   PLATELETCT 11* 10*   MPV 10.9 10.7     Recent Labs     07/02/20  1635   SODIUM 135   POTASSIUM 3.6   CHLORIDE 96   CO2 22   GLUCOSE 106*   BUN 9   CREATININE 0.56   CALCIUM 9.5     Recent Labs     07/02/20  1635   ALTSGPT 20   ASTSGOT 15   ALKPHOSPHAT 93   TBILIRUBIN 1.9*   GLUCOSE 106*     Recent Labs     07/02/20  1635   INR 1.42*     No results for input(s): NTPROBNP in the last 72 hours.      No results for input(s): TROPONINT in the last 72 hours.    Imaging:  DX-CHEST-PORTABLE (1 VIEW)   Final Result      1.  No pneumonia or pulmonary edema.   2.  Stable mild cardiomegaly.            Assessment/Plan:    CLL (chronic lymphocytic leukemia) ([facility])- (present on admission)  Assessment & Plan  Monitor CBC  She has been on chemotherapy  Follows with Dr. Summers    Neutropenic fever ([facility])  Assessment & Plan  We  will consult infectious disease 7/3 a.m.  On vancomycin and Zosyn  Follow-up on blood cultures  Continue acyclovir  We will initiate antifungal    Sepsis (HCC)  Assessment & Plan  This is Sepsis Present on admission  SIRS criteria identified on my evaluation include: Fever, with temperature greater than 101 deg F, Tachycardia, with heart rate greater than 90 BPM and Leukopenia, with WBC less than 4,000  Source is unknown  Sepsis protocol initiated  Fluid resuscitation ordered per protocol  IV antibiotics as appropriate for source of sepsis  While organ dysfunction may be noted elsewhere in this problem list or in the chart, degree of organ dysfunction does not meet CMS criteria for severe sepsis          Pancytopenia (HCC)- (present on admission)  Assessment & Plan  She of receiving platelet transfusions she is on active chemotherapy for CLL  Normally follows with Dr. Summers

## 2020-07-03 NOTE — PROGRESS NOTES
Febrile. Tmax 103.1. Ice packed. Allergies to Tylenol and benadryl, none given. Fever reduced to 102.4F. MD Reyes paged and updated. Keep applying ice, no new orders rcvd.

## 2020-07-03 NOTE — PROGRESS NOTES
Ice packs and cooling blanket applied.  Goal is to have a temperature of 101F or less prior to starting platelets per Dr. Mario.  Will continue to monitor.

## 2020-07-03 NOTE — CARE PLAN
Problem: Nutritional:  Goal: Achieve adequate nutritional intake  Description: Patient will consume 50% of meals  Outcome: PROGRESSING AS EXPECTED     No meal intake records yet noted, but patient reports a fair-good appetite and requested snacks.  See RD note.

## 2020-07-03 NOTE — PROGRESS NOTES
Discussed care with Dr. Mario.  Patient febrile.  Packing armpits/groin with ice.  Patient allergic to Tylenol.  Cooling blanket ordered.  Will hold on transfusing platelets until fever subsides. Will continue to monitor.

## 2020-07-03 NOTE — PROGRESS NOTES
Caleb from Lab called with critical result of Plts of 7  at 0504. Critical lab result read back to Caleb.   Dr. Reyes notified of critical lab result at 0527.  Critical lab result read back by Dr. Reyes. Orders recieved to transfuse 1 unit of platelets.

## 2020-07-03 NOTE — ASSESSMENT & PLAN NOTE
This is Sepsis Present on admission  SIRS criteria identified on my evaluation include: Fever, with temperature greater than 101 deg F, Tachycardia, with heart rate greater than 90 BPM and Leukopenia, with WBC less than 4,000  Source is unknown  Sepsis protocol initiated  Fluid resuscitation ordered per protocol  IV antibiotics as appropriate for source of sepsis  While organ dysfunction may be noted elsewhere in this problem list or in the chart, degree of organ dysfunction does not meet CMS criteria for severe sepsis

## 2020-07-04 NOTE — CARE PLAN
Problem: Knowledge Deficit  Goal: Knowledge of disease process/condition, treatment plan, diagnostic tests, and medications will improve  Outcome: PROGRESSING AS EXPECTED     Problem: Knowledge Deficit  Goal: Knowledge of the prescribed therapeutic regimen will improve  Outcome: PROGRESSING AS EXPECTED

## 2020-07-04 NOTE — PROGRESS NOTES
Delvin from Lab called with critical result of WBC 0.4, Hgb 6.6, Plt 26, ANC 0.01 at 0630. Critical lab result read back to Delvin.   This critical lab result is within parameters established by Dr. Blackwell for this patient

## 2020-07-04 NOTE — CARE PLAN
Problem: Communication  Goal: The ability to communicate needs accurately and effectively will improve  Outcome: PROGRESSING AS EXPECTED  Note: Pt A&Ox4. Communicates needs and calls appropriately.      Problem: Safety  Goal: Will remain free from falls  Outcome: PROGRESSING AS EXPECTED  Note: Pt low risk but has low plt. Pt is A&Ox4 and calls for the bathroom appropriately. Fall precautions. No bed alarm because she is appropriate.

## 2020-07-04 NOTE — PROGRESS NOTES
Oncology/Hematology Progress Note               Author: Waqas Blackwell M.D. Date & Time created: 7/4/2020  8:20 AM     CC: Neutropenic fever, CLL    Interval History:    She is feeling little bit better this morning.  She had persistent fever.  She did tolerate her platelet transfusion.    Review of Systems:  Review of Systems   Constitutional: Positive for chills, fever and malaise/fatigue. Negative for weight loss.   HENT: Negative for hearing loss and tinnitus.    Eyes: Negative for blurred vision and photophobia.   Respiratory: Negative for cough, hemoptysis and sputum production.    Cardiovascular: Negative for chest pain, palpitations and orthopnea.   Gastrointestinal: Negative for heartburn, nausea and vomiting.   Genitourinary: Negative for dysuria and urgency.   Musculoskeletal: Negative for myalgias.   Skin: Negative for rash.   Neurological: Negative for dizziness, tingling and headaches.   Endo/Heme/Allergies: Does not bruise/bleed easily.   Psychiatric/Behavioral: Negative for depression.       Physical Exam:  Physical Exam  Eyes:      Extraocular Movements: Extraocular movements intact.      Pupils: Pupils are equal, round, and reactive to light.   Cardiovascular:      Rate and Rhythm: Normal rate and regular rhythm.      Pulses: Normal pulses.      Heart sounds: Normal heart sounds.   Pulmonary:      Effort: Pulmonary effort is normal.      Breath sounds: Normal breath sounds.   Abdominal:      General: Bowel sounds are normal.   Skin:     Coloration: Skin is not jaundiced.   Neurological:      General: No focal deficit present.      Mental Status: She is alert and oriented to person, place, and time.      Cranial Nerves: No cranial nerve deficit.   Psychiatric:         Mood and Affect: Mood normal.         Behavior: Behavior normal.         Thought Content: Thought content normal.         Judgment: Judgment normal.         Labs:          Recent Labs     07/02/20  1635 07/03/20  0314 07/04/20  0559    SODIUM 135 135 131*   POTASSIUM 3.6 3.4* 3.0*   CHLORIDE 96 98 99   CO2 22 22 23   BUN 9 7* 7*   CREATININE 0.56 0.55 0.44*   MAGNESIUM 1.9  --  1.8   CALCIUM 9.5 9.1 8.8     Recent Labs     20  0559   ALTSGPT   --    ASTSGOT 15 14  --    ALKPHOSPHAT 93 86  --    TBILIRUBIN 1.9* 1.9*  --    GLUCOSE 106* 127* 114*     Recent Labs     20  0559   RBC 2.87* 2.81* 2.28*   HEMOGLOBIN 8.4* 8.3* 6.6*   HEMATOCRIT 23.8* 23.6* 18.8*   PLATELETCT 10* 7* 26*   PROTHROMBTM 17.7*  --   --    INR 1.42*  --   --      Recent Labs     20  0559   WBC 0.4* 0.4* 0.4*   NEUTSPOLYS 1.80* 0.00* CANCEL   LYMPHOCYTES 98.20* 100.00* CANCEL   MONOCYTES 0.00 0.00 CANCEL   EOSINOPHILS 0.00 0.00 CANCEL   BASOPHILS 0.00 0.00 CANCEL   ASTSGOT 15 14  --    ALTSGPT   --    ALKPHOSPHAT 93 86  --    TBILIRUBIN 1.9* 1.9*  --      Recent Labs     20  0559   SODIUM 135 135 131*   POTASSIUM 3.6 3.4* 3.0*   CHLORIDE 96 98 99   CO2 22 22 23   GLUCOSE 106* 127* 114*   BUN 9 7* 7*   CREATININE 0.56 0.55 0.44*   CALCIUM 9.5 9.1 8.8     Hemodynamics:  Temp (24hrs), Av.7 °C (101.6 °F), Min:37.7 °C (99.9 °F), Max:39.2 °C (102.6 °F)  Temperature: (!) 38.1 °C (100.5 °F)(RN notified)  Pulse  Av.4  Min: 72  Max: 148   Blood Pressure: 110/56     Respiratory:    Respiration: 16, Pulse Oximetry: 98 %           Fluids:    Intake/Output Summary (Last 24 hours) at 2020 0820  Last data filed at 2020 0050  Gross per 24 hour   Intake 278 ml   Output 1 ml   Net 277 ml        GI/Nutrition:  Orders Placed This Encounter   Procedures   • Diet Order Regular (neutropenic diet)     Standing Status:   Standing     Number of Occurrences:   1     Order Specific Question:   Diet:     Answer:   Regular [1]     Comments:   neutropenic diet     Medical Decision Making, by Problem:  Active Hospital Problems    Diagnosis    • Neutropenic fever (HCC) [D70.9, R50.81]   • CLL (chronic lymphocytic leukemia) (HCC) [C91.10]   • Sepsis (HCC) [A41.9]   • Pancytopenia (HCC) [D61.818]   • Thrombocytopenia (HCC) [D69.6]     Past medical history, past social history, past surgical history unchanged    Plan:    1.  CLL: With no significant findings on FISH.  She has been on ibrutinib on and off since May.  She does have a past history of thalassemia as well.  We discussed that we would reintroduce her ibrutinib once we make sure that she can tolerate transfusions because of her allergies.  Ibrutinib does put her at some risk for bleeding.  Her platelets were under 10,000.  However, she also does have a bone marrow that has significant amount of CLL.  We will have to discuss with her primary oncologist next week on if he will do any additional treatment along with ibrutinib just continue with ibrutinib as long as we she can tolerate it.      2.  Thrombocytopenia: She did tolerate her platelet transfusion last night.  Her platelets are over 20.  She may have to have significant transfusion support with ongoing ibrutinib in the future.  This also could be secondary possibly just to sepsis.  It is hard to really grade any toxicity because she started out with pancytopenia.  We will hold the ibrutinib for now but hopefully can restart early in the week.    --keep platelets above 10,000    3.  Neutropenic fever: She is on broad-spectrum antibiotics.  Her fever was better this morning.  She says she is willing to try Tylenol again.  She said it was years ago when she had a mild reaction.  She is really not tried it again.  She never really had shortness of breath or respiratory compromise with this reaction in the past.  She had some mild swelling in the face.  We are not able to use NSAIDs.  We are using a cooling blanket.  If she has more persistent fevers today than would get ID consultation.    4.  Anemia: Her anemia is multifactorial.  She does  have significant CLL in her bone marrow.  However, she also has a history of thalassemia.    --We will transfuse if less than 7.  We will have to watch iron studies moving forward.      High complexity/discussed with primary team.  Please note that this dictation was created using voice recognition software. I have made every reasonable attempt to correct obvious errors, but I expect that there are errors of grammar and possibly context that I did not discover before finalizing the note    This patient was seen under COVID 19 pandemic disaster response conditions.  During a disaster, the provisions of care is subject to the Crisis Standard of Care  Quality-Core Measures   Reviewed items::  Labs reviewed, Radiology images reviewed and Medications reviewed

## 2020-07-04 NOTE — PROGRESS NOTES
Pharmacy Kinetics 54 y.o. female on Vancomycin Day # 2 2020    Currently on Vancomycin 750 mg iv q12hr (0000, 1200)    Provider specified end date: TBD, generic 3 day stop date applied, END 20      Indication for Treatment: Neutropenic Fever      Pertinent history per medical record: Admitted on 2020 for Sepsis +tachycardia and fever with severe neutropenic and leukopenia. History of CLL taking Ibrutinib. Chemotherapy on hold per Oncologist. Empiric antibiotics initiated.      Other antibiotics: Cefepime 2 grams IV q8h      Allergies: Amoxicillin; Benadryl allergy; Excedrin migraine; Sulfamethoxazole; Tylenol; and Famotidine      List concerns for renal function: No major concerns      Pertinent cultures to date:   20: Peripheral Blood Cultures x 2 sets = NGTD  7/3/20: Peripheral Blood Cultures x 2 sets = NGTD     MRSA nares swab if pneumonia is a concern (ordered/positive/negative/n-a): N/A   · CXR (20) - No pneumonia or pulmonary edema. 2. Stable mild cardiomegaly.  · On room air     Recent Labs     20  1635 20  0314 20  0559   WBC 0.4* 0.4* 0.4*   NEUTSPOLYS 1.80* 0.00* CANCEL     Recent Labs     20  1635 20  0314 20  0559   BUN 9 7* 7*   CREATININE 0.56 0.55 0.44*   ALBUMIN 4.3 4.1  --      No results for input(s): VANCOTROUGH, VANCOPEAK, VANCORANDOM in the last 72 hours.    Intake/Output Summary (Last 24 hours) at 2020 1052  Last data filed at 2020 0050  Gross per 24 hour   Intake 278 ml   Output 1 ml   Net 277 ml      /56   Pulse 92   Temp 37.3 °C (99.1 °F) (Oral)   Resp 16   Ht 1.524 m (5')   Wt 42.5 kg (93 lb 11.2 oz)   SpO2 98%  Temp (24hrs), Av.5 °C (101.3 °F), Min:37.3 °C (99.1 °F), Max:39.2 °C (102.5 °F)      A/P   1. Vancomycin dose change: No.   2. Next vancomycin level: Tomorrow, 20 at 1130   3. Goal trough: 16 - 20 mcg/mL   4. Comments: VS WNL, Renal function stable. Remains febrile - Tmax 102.5 F over last 24 hours.      Racquel Chang, PharmD, BCPS

## 2020-07-04 NOTE — PROGRESS NOTES
Per notes, Hospitalist stated to give platelets if temp < 101F. Pt was not getting under 102F. Oncologist paged for verification due to the patient's need for platelets. MD gave the okay to give the platelets while the patient has a fever. Pt remains febrile. Ice packs in use.

## 2020-07-04 NOTE — PROGRESS NOTES
HOSPITAL MEDICINE PROGRESS NOTE    Date of service  7/4/2020    Chief Complaint  Fever      Hospital Course:     53 yo woman with CLL p/w neutropenic fever.          Interval History Updates:  continue to spike fever  Discussed with Dr. Garcia.  Spoke to patient about her Tylenol allergy.  It was more than 15 yrs ago.  Reactions were not anaphylatic, just some eye lids itching.  She agrees to try a small dose of acetaminophen today.    Consultants/Specialty  Heme/Onc    Review of Systems   Review of Systems   Constitutional: Positive for fever.        Medications:  • potassium chloride SA  40 mEq BID   • magnesium sulfate  2 g Once   • MD Alert...Vancomycin per Pharmacy   PHARMACY TO DOSE   • vancomycin  15 mg/kg Q12HR   • acyclovir  400 mg BID   • amLODIPine  5 mg DAILY   • folic acid  1 mg DAILY   • senna-docusate  2 Tab BID    And   • polyethylene glycol/lytes  1 Packet QDAY PRN    And   • magnesium hydroxide  30 mL QDAY PRN    And   • bisacodyl  10 mg QDAY PRN   • Respiratory Therapy Consult   Continuous RT   • LR  30 mL/kg Once PRN   • cefepime  2 g Q8HRS   • ondansetron  4 mg Q4HRS PRN   • ondansetron  4 mg Q4HRS PRN   • prochlorperazine  5-10 mg Q4HRS PRN   • fluconazole (DIFLUCAN) IV  200 mg Q24HRS       Physical Exam   Vitals:    07/04/20 0036 07/04/20 0050 07/04/20 0512 07/04/20 0744   BP: 132/65 125/68 135/63 110/56   Pulse: (!) 108 (!) 110 96 92   Resp: 17 17 17 16   Temp: (!) 38.3 °C (101 °F) (!) 39.2 °C (102.5 °F) (!) 39.1 °C (102.4 °F) (!) 38.1 °C (100.5 °F)   TempSrc: Oral Oral Oral Oral   SpO2: 97% 97% 95% 98%   Weight:       Height:           Physical Exam   Constitutional: She is oriented to person, place, and time and well-developed, well-nourished, and in no distress. No distress.   HENT:   Head: Normocephalic and atraumatic.   Eyes: Pupils are equal, round, and reactive to light. Conjunctivae are normal. No scleral icterus.   Neck: Normal range of motion. Neck supple.   Cardiovascular: Normal  "rate, regular rhythm and intact distal pulses. Exam reveals no gallop and no friction rub.   No murmur heard.  Pulmonary/Chest: Effort normal and breath sounds normal. No respiratory distress. She has no wheezes. She has no rales. She exhibits no tenderness.   Abdominal: Soft. Bowel sounds are normal. She exhibits no distension and no mass. There is no abdominal tenderness. There is no rebound and no guarding.   Musculoskeletal: Normal range of motion.         General: No tenderness or edema.   Neurological: She is alert and oriented to person, place, and time.   Skin: Skin is warm and dry. She is not diaphoretic.   Psychiatric: Mood and affect normal.   Vitals reviewed.         Laboratory   Recent Labs     07/02/20  1635 07/03/20  0314 07/04/20  0559   WBC 0.4* 0.4* 0.4*   RBC 2.87* 2.81* 2.28*   HEMOGLOBIN 8.4* 8.3* 6.6*   HEMATOCRIT 23.8* 23.6* 18.8*   PLATELETCT 10* 7* 26*     Recent Labs     07/02/20  1635 07/03/20  0314 07/04/20  0559   SODIUM 135 135 131*   POTASSIUM 3.6 3.4* 3.0*   CHLORIDE 96 98 99   CO2 22 22 23   GLUCOSE 106* 127* 114*   BUN 9 7* 7*   CREATININE 0.56 0.55 0.44*      Recent Labs     07/02/20  1635 07/03/20  0314 07/04/20  0559   ALTSGPT 20 20  --    ASTSGOT 15 14  --    ALKPHOSPHAT 93 86  --    TBILIRUBIN 1.9* 1.9*  --    GLUCOSE 106* 127* 114*      Recent Labs     07/02/20  1635   INR 1.42*           Microbiology  Results     Procedure Component Value Units Date/Time    Blood Culture [[id number]] Collected:  07/03/20 1253    Order Status:  Completed Specimen:  Blood from Peripheral Updated:  07/04/20 0740     Significant Indicator NEG     Source BLD     Site PERIPHERAL     Culture Result No Growth  Note: Blood cultures are incubated for 5 days and  are monitored continuously.Positive blood cultures  are called to the RN and reported as soon as  they are identified.      Narrative:       Per Hospital Policy: Only change Specimen Src: to \"Line\" if  specified by physician order.  Right AC    " "Blood Culture [[id number]] Collected:  07/03/20 1253    Order Status:  Completed Specimen:  Blood from Peripheral Updated:  07/04/20 0740     Significant Indicator NEG     Source BLD     Site PERIPHERAL     Culture Result No Growth  Note: Blood cultures are incubated for 5 days and  are monitored continuously.Positive blood cultures  are called to the RN and reported as soon as  they are identified.      Narrative:       Per Hospital Policy: Only change Specimen Src: to \"Line\" if  specified by physician order.  Right Wrist    URINALYSIS,CULTURE IF INDICATED [[id number]]  (Abnormal) Collected:  07/03/20 1640    Order Status:  Completed Specimen:  Urine, Clean Catch Updated:  07/03/20 1706     Color Yellow     Character Clear     Specific Gravity 1.007     Ph 8.0     Glucose Negative mg/dL      Ketones Negative mg/dL      Protein Negative mg/dL      Bilirubin Negative     Urobilinogen, Urine 1.0     Nitrite Negative     Leukocyte Esterase Negative     Occult Blood Moderate     Micro Urine Req Microscopic    Narrative:       Collected By:[id number] MALENA DERAS    BLOOD CULTURE [[id number]] Collected:  07/02/20 1707    Order Status:  Completed Specimen:  Blood from Peripheral Updated:  07/03/20 0726     Significant Indicator NEG     Source BLD     Site PERIPHERAL     Culture Result No Growth  Note: Blood cultures are incubated for 5 days and  are monitored continuously.Positive blood cultures  are called to the RN and reported as soon as  they are identified.      Narrative:       Per Hospital Policy: Only change Specimen Src: to \"Line\" if  specified by physician order.  Left Hand    BLOOD CULTURE [[id number]] Collected:  07/02/20 1635    Order Status:  Completed Specimen:  Blood from Peripheral Updated:  07/03/20 0726     Significant Indicator NEG     Source BLD     Site PERIPHERAL     Culture Result No Growth  Note: Blood cultures are incubated for 5 days and  are monitored continuously.Positive blood cultures  are called " "to the RN and reported as soon as  they are identified.      Narrative:       Per Hospital Policy: Only change Specimen Src: to \"Line\" if  specified by physician order.  No site indicated    Blood Culture [[id number]]     Order Status:  Canceled Specimen:  Blood from Peripheral     Blood Culture [[id number]]     Order Status:  Canceled Specimen:  Blood from Peripheral     Urinalysis [[id number]]     Order Status:  Canceled Specimen:  Urine, Clean Catch     SARS-CoV-2, PCR (In-House) [[id number]] Collected:  07/02/20 1700    Order Status:  Completed Updated:  07/02/20 1804     SARS-CoV-2 Source NP Swab     SARS-CoV-2 by PCR NotDetected     Comment: [facility] providers: PLEASE REFER TO DE-ESCALATION AND RETESTING PROTOCOL  on [email or web address]  **The "[employer] GeneXpert Xpress SARS-CoV-2 Test has been made available for  use under the Emergency Use Authorization (EUA) only.         Narrative:       Expected Turn around time?->Rush (Cepheid 2-4 hours)    COVID/SARS CoV-2 PCR [[id number]] Collected:  07/02/20 1700    Order Status:  Completed Specimen:  Respirate from Nasopharyngeal Updated:  07/02/20 1703     COVID Order Status Received    Narrative:       Expected Turn around time?->Rush (Cepheid 2-4 hours)    URINALYSIS,CULTURE IF INDICATED [[id number]]     Order Status:  Canceled Specimen:  Urine              Labs reviewed by me and noted above.    Radiology  DX-CHEST-PORTABLE (1 VIEW)  Narrative: 7/2/2020 5:13 PM    HISTORY/REASON FOR EXAM:  Fever.    TECHNIQUE/EXAM DESCRIPTION AND NUMBER OF VIEWS:  Single portable view of the chest.    COMPARISON: 6/19/2020    FINDINGS:  Cardiac contours prominent.  No focal pulmonary consolidation.  No pleural fluid collection or pneumothorax.  No major bony abnormality is seen.  Impression: 1.  No pneumonia or pulmonary edema.  2.  Stable mild cardiomegaly.      No results found for this or any previous visit.       Assessment and Plan    Active Problems:    Neutropenic fever (HCC) POA: " Unknown    CLL (chronic lymphocytic leukemia) (HCC) (Chronic) POA: Yes    Thrombocytopenia (HCC) POA: Yes    Pancytopenia (HCC) POA: Yes    Sepsis (HCC) POA: Unknown  Resolved Problems:    * No resolved hospital problems. *      Try 325mg acetaminophen x1 and monitor for reaction. If none, then can use higher dose and close monitor for antipyretic effect.  Transfuse 1 unit RBC for Hg <7.  Hold Ibrutinib for now given profound thrombocytopenia after discussion with Dr. Blackwell  Ice packs, cooling blanket.  She does not look very ill on visual inspection.  Given her bone marrow appearance, growth factor would be ineffective per Dr. Blackwell  Cont Vancomycin, cefepime, antiviral, and anifungal.    F/U cultures to see if she is actually infected or just febrile from neutropenia.    For now, unclear if she actually has sepsis.        DVT prophylaxis: SCD    CODE STATUS:  FULL CODE    Disposition: TBD.    Case was discussed with Primary RN in addition to individual(s) mentioned above.    I have performed a physical exam and reviewed and updated ROS as of today, 7/4/2020.  In review of yesterday's note dated, 7/3/2020, there are no changes except as documented above.      Paul Mario M.D.

## 2020-07-05 NOTE — CARE PLAN
Problem: Infection  Goal: Will remain free from infection  Outcome: PROGRESSING AS EXPECTED  Note: No source of infection identified. PT febrile at times. PRN tylenol in use.

## 2020-07-05 NOTE — PROGRESS NOTES
Pharmacy Kinetics 54 y.o. female on vancomycin day # 3 2020    Currently on Vancomycin 750 mg iv q12hr (0000, 1200)     Provider specified end date: TBD     Indication for Treatment: Neutropenic Fever      Pertinent history per medical record: Admitted on 2020 for Sepsis +tachycardia and fever with severe neutropenic and leukopenia. History of CLL taking Ibrutinib. Chemotherapy on hold per Oncologist. Empiric antibiotics initiated.      Other antibiotics: Cefepime 2 grams IV q8h      Allergies: Amoxicillin; Benadryl allergy; Excedrin migraine; Sulfamethoxazole; Tylenol; and Famotidine      List concerns for renal function: No major concerns      Pertinent cultures to date:   20: Peripheral Blood Cultures x 2 sets = NGTD  7/3/20: Peripheral Blood Cultures x 2 sets = NGTD     MRSA nares swab if pneumonia is a concern (ordered/positive/negative/n-a): N/A   · CXR (20) - No pneumonia or pulmonary edema. 2. Stable mild cardiomegaly.  · On room air     Recent Labs     20  1635 20  0314 20  0559 20  0056   WBC 0.4* 0.4* 0.4* 0.6*   NEUTSPOLYS 1.80* 0.00* CANCEL 0.90*     Recent Labs     20  1635 20  0314 20  0559 20  0056   BUN 9 7* 7* 9   CREATININE 0.56 0.55 0.44* 0.40*   ALBUMIN 4.3 4.1  --   --      Recent Labs     20  1131   VANCOTROUGH 5.8*       Intake/Output Summary (Last 24 hours) at 2020 1536  Last data filed at 2020 1800  Gross per 24 hour   Intake 590 ml   Output --   Net 590 ml      /67   Pulse 90   Temp 36.4 °C (97.6 °F) (Oral)   Resp 16   Ht 1.524 m (5')   Wt 42 kg (92 lb 9.5 oz)   SpO2 99%  Temp (24hrs), Av.3 °C (99.2 °F), Min:36.4 °C (97.6 °F), Max:38.5 °C (101.3 °F)      A/P   1. Vancomycin dose change: increase to 750mg q8h(2000, 0400, 1200)  2. Next vancomycin level: prior to 1200 dose 20(ordered)  3. Goal trough: 15-20mcg/mL  4. Comments: Remains severely neutropenic and febrile. All cultures remain NGTD.  Renal function appears stable. Difficult case as pt is low weight but will not get to therapeutic goal range with q12h dosing. Increased to q8h frequency to reach therapeutic goal without exceeding max mg/kg/dose guidelines. Trough as above. Pharmacy will monitor/adjust as needed per protocol.     ELMER Bosch Pharm.D.

## 2020-07-05 NOTE — PROGRESS NOTES
HOSPITAL MEDICINE PROGRESS NOTE    Date of service  7/5/2020    Chief Complaint  Fever      Hospital Course:     53 yo woman with CLL p/w neutropenic fever.          Interval History Updates:  Fever curve much improved.  Tolerate acetaminophen without reaction.  Allergy removed.    Consultants/Specialty  Heme/Onc    Review of Systems   Review of Systems   Constitutional: Positive for fever.        Medications:  • potassium chloride SA  40 mEq BID   • acetaminophen  650 mg Q6HRS PRN   • fluconazole  200 mg DAILY   • MD Alert...Vancomycin per Pharmacy   PHARMACY TO DOSE   • vancomycin  15 mg/kg Q12HR   • acyclovir  400 mg BID   • amLODIPine  5 mg DAILY   • folic acid  1 mg DAILY   • senna-docusate  2 Tab BID    And   • polyethylene glycol/lytes  1 Packet QDAY PRN    And   • magnesium hydroxide  30 mL QDAY PRN    And   • bisacodyl  10 mg QDAY PRN   • Respiratory Therapy Consult   Continuous RT   • LR  30 mL/kg Once PRN   • cefepime  2 g Q8HRS   • ondansetron  4 mg Q4HRS PRN   • ondansetron  4 mg Q4HRS PRN   • prochlorperazine  5-10 mg Q4HRS PRN       Physical Exam   Vitals:    07/04/20 1952 07/05/20 0000 07/05/20 0541 07/05/20 0718   BP: 120/61 121/57 139/63 130/61   Pulse: 85 73 93 (!) 105   Resp: 16 16 16    Temp: 37.4 °C (99.4 °F) 36.7 °C (98.1 °F) 37.4 °C (99.3 °F) 37.4 °C (99.3 °F)   TempSrc: Oral Oral Oral Oral   SpO2: 97% 97% 96% 97%   Weight:       Height:           Physical Exam   Constitutional: She is oriented to person, place, and time and well-developed, well-nourished, and in no distress. No distress.   HENT:   Head: Normocephalic and atraumatic.   Eyes: Pupils are equal, round, and reactive to light. Conjunctivae are normal. No scleral icterus.   Neck: Normal range of motion. Neck supple.   Cardiovascular: Normal rate, regular rhythm and intact distal pulses. Exam reveals no gallop and no friction rub.   No murmur heard.  Pulmonary/Chest: Effort normal and breath sounds normal. No respiratory distress.  "She has no wheezes. She has no rales. She exhibits no tenderness.   Abdominal: Soft. Bowel sounds are normal. She exhibits no distension and no mass. There is no abdominal tenderness. There is no rebound and no guarding.   Musculoskeletal: Normal range of motion.         General: No tenderness or edema.   Neurological: She is alert and oriented to person, place, and time.   Skin: Skin is warm and dry. She is not diaphoretic.   Psychiatric: Mood and affect normal.   Vitals reviewed.         Laboratory   Recent Labs     07/03/20 0314 07/04/20 0559 07/05/20  0056   WBC 0.4* 0.4* 0.6*   RBC 2.81* 2.28* 2.74*   HEMOGLOBIN 8.3* 6.6* 8.1*   HEMATOCRIT 23.6* 18.8* 22.8*   PLATELETCT 7* 26* 16*     Recent Labs     07/03/20 0314 07/04/20 0559 07/05/20  0056   SODIUM 135 131* 137   POTASSIUM 3.4* 3.0* 3.5*   CHLORIDE 98 99 104   CO2 22 23 22   GLUCOSE 127* 114* 106*   BUN 7* 7* 9   CREATININE 0.55 0.44* 0.40*      Recent Labs     07/02/20  1635 07/03/20 0314 07/04/20 0559 07/05/20  0056   ALTSGPT 20 20  --   --    ASTSGOT 15 14  --   --    ALKPHOSPHAT 93 86  --   --    TBILIRUBIN 1.9* 1.9*  --   --    GLUCOSE 106* 127* 114* 106*      Recent Labs     07/02/20  1635   INR 1.42*           Microbiology  Results     Procedure Component Value Units Date/Time    Blood Culture [[id number]] Collected:  07/03/20 1253    Order Status:  Completed Specimen:  Blood from Peripheral Updated:  07/04/20 0740     Significant Indicator NEG     Source BLD     Site PERIPHERAL     Culture Result No Growth  Note: Blood cultures are incubated for 5 days and  are monitored continuously.Positive blood cultures  are called to the RN and reported as soon as  they are identified.      Narrative:       Per Hospital Policy: Only change Specimen Src: to \"Line\" if  specified by physician order.  Right AC    Blood Culture [[id number]] Collected:  07/03/20 1253    Order Status:  Completed Specimen:  Blood from Peripheral Updated:  07/04/20 0740     " "Significant Indicator NEG     Source BLD     Site PERIPHERAL     Culture Result No Growth  Note: Blood cultures are incubated for 5 days and  are monitored continuously.Positive blood cultures  are called to the RN and reported as soon as  they are identified.      Narrative:       Per Hospital Policy: Only change Specimen Src: to \"Line\" if  specified by physician order.  Right Wrist    URINALYSIS,CULTURE IF INDICATED [[id number]]  (Abnormal) Collected:  07/03/20 1640    Order Status:  Completed Specimen:  Urine, Clean Catch Updated:  07/03/20 1706     Color Yellow     Character Clear     Specific Gravity 1.007     Ph 8.0     Glucose Negative mg/dL      Ketones Negative mg/dL      Protein Negative mg/dL      Bilirubin Negative     Urobilinogen, Urine 1.0     Nitrite Negative     Leukocyte Esterase Negative     Occult Blood Moderate     Micro Urine Req Microscopic    Narrative:       Collected By:[id number] MALENA DERAS    BLOOD CULTURE [[id number]] Collected:  07/02/20 1707    Order Status:  Completed Specimen:  Blood from Peripheral Updated:  07/03/20 0726     Significant Indicator NEG     Source BLD     Site PERIPHERAL     Culture Result No Growth  Note: Blood cultures are incubated for 5 days and  are monitored continuously.Positive blood cultures  are called to the RN and reported as soon as  they are identified.      Narrative:       Per Hospital Policy: Only change Specimen Src: to \"Line\" if  specified by physician order.  Left Hand    BLOOD CULTURE [[id number]] Collected:  07/02/20 1635    Order Status:  Completed Specimen:  Blood from Peripheral Updated:  07/03/20 0726     Significant Indicator NEG     Source BLD     Site PERIPHERAL     Culture Result No Growth  Note: Blood cultures are incubated for 5 days and  are monitored continuously.Positive blood cultures  are called to the RN and reported as soon as  they are identified.      Narrative:       Per Hospital Policy: Only change Specimen Src: to \"Line\" " if  specified by physician order.  No site indicated    Blood Culture [[id number]]     Order Status:  Canceled Specimen:  Blood from Peripheral     Blood Culture [[id number]]     Order Status:  Canceled Specimen:  Blood from Peripheral     Urinalysis [[id number]]     Order Status:  Canceled Specimen:  Urine, Clean Catch     SARS-CoV-2, PCR (In-House) [[id number]] Collected:  07/02/20 1700    Order Status:  Completed Updated:  07/02/20 1804     SARS-CoV-2 Source NP Swab     SARS-CoV-2 by PCR NotDetected     Comment: [facility] providers: PLEASE REFER TO DE-ESCALATION AND RETESTING PROTOCOL  on [email or web address]  **The [employer] GeneXpert Xpress SARS-CoV-2 Test has been made available for  use under the Emergency Use Authorization (EUA) only.         Narrative:       Expected Turn around time?->Rush (Cepheid 2-4 hours)    COVID/SARS CoV-2 PCR [[id number]] Collected:  07/02/20 1700    Order Status:  Completed Specimen:  Respirate from Nasopharyngeal Updated:  07/02/20 1703     COVID Order Status Received    Narrative:       Expected Turn around time?->Rush (Cepheid 2-4 hours)    URINALYSIS,CULTURE IF INDICATED [[id number]]     Order Status:  Canceled Specimen:  Urine              Labs reviewed by me and noted above.    Radiology  DX-CHEST-PORTABLE (1 VIEW)  Narrative: 7/2/2020 5:13 PM    HISTORY/REASON FOR EXAM:  Fever.    TECHNIQUE/EXAM DESCRIPTION AND NUMBER OF VIEWS:  Single portable view of the chest.    COMPARISON: 6/19/2020    FINDINGS:  Cardiac contours prominent.  No focal pulmonary consolidation.  No pleural fluid collection or pneumothorax.  No major bony abnormality is seen.  Impression: 1.  No pneumonia or pulmonary edema.  2.  Stable mild cardiomegaly.      No results found for this or any previous visit.       Assessment and Plan    Active Problems:    Neutropenic fever (HCC) POA: Unknown    CLL (chronic lymphocytic leukemia) (HCC) (Chronic) POA: Yes    Thrombocytopenia (HCC) POA: Yes    Pancytopenia (HCC) POA: Yes     Sepsis (HCC) POA: Unknown  Resolved Problems:    * No resolved hospital problems. *      Transfuse for Hg <7 or plt <10,000.  Hold Ibrutinib for now given profound thrombocytopenia after discussion with Dr. Blackwell.  Restart early next week.  Cont Vancomycin, cefepime, antiviral, and anifungal.    F/U cultures to see if she is actually infected or just febrile from neutropenia.    Doubt sepsis at this point.  Neutropenic fever.      DVT prophylaxis: SCD    CODE STATUS:  FULL CODE    Disposition: Anticipate home in 2-3 days.    Case was discussed with Primary RN in addition to individual(s) mentioned above.    I have performed a physical exam and reviewed and updated ROS as of today, 7/5/2020.  In review of yesterday's note dated, 7/4/2020, there are no changes except as documented above.      Paul Mario M.D.

## 2020-07-05 NOTE — PROGRESS NOTES
Oncology/Hematology Progress Note               Author: Waqas Blackwell M.D. Date & Time created: 7/5/2020  8:01 AM     CC: Neutropenic fever, CLL    Interval History:    She did well overnight.  Her fever curve is better.  She tolerated Tylenol without incident.  She is wondering when she could go home.    Review of Systems:  Review of Systems   Constitutional: Positive for chills, fever and malaise/fatigue. Negative for weight loss.   HENT: Negative for hearing loss and tinnitus.    Eyes: Negative for blurred vision and double vision.   Respiratory: Negative for cough and hemoptysis.    Cardiovascular: Negative for chest pain and palpitations.   Gastrointestinal: Negative for heartburn and nausea.   Genitourinary: Negative for dysuria and urgency.   Musculoskeletal: Negative for myalgias.   Skin: Negative for rash.   Neurological: Negative for dizziness, tingling and headaches.   Endo/Heme/Allergies: Does not bruise/bleed easily.   Psychiatric/Behavioral: Negative for depression.       Physical Exam:  Physical Exam  Constitutional:       Appearance: Normal appearance.   Eyes:      General:         Right eye: No discharge.      Extraocular Movements: Extraocular movements intact.      Conjunctiva/sclera: Conjunctivae normal.      Pupils: Pupils are equal, round, and reactive to light.   Cardiovascular:      Rate and Rhythm: Normal rate and regular rhythm.      Heart sounds: Normal heart sounds.   Pulmonary:      Effort: Pulmonary effort is normal.      Breath sounds: Normal breath sounds.   Abdominal:      General: Bowel sounds are normal. There is no distension.      Tenderness: There is no abdominal tenderness.   Skin:     Coloration: Skin is not jaundiced.   Neurological:      General: No focal deficit present.      Mental Status: She is alert and oriented to person, place, and time.      Cranial Nerves: No cranial nerve deficit.   Psychiatric:         Mood and Affect: Mood normal.         Behavior: Behavior  normal.         Thought Content: Thought content normal.         Judgment: Judgment normal.         Labs:          Recent Labs     2059 20  0056   SODIUM 135 135 131* 137   POTASSIUM 3.6 3.4* 3.0* 3.5*   CHLORIDE 96 98 99 104   CO2 22 22 23 22   BUN 9 7* 7* 9   CREATININE 0.56 0.55 0.44* 0.40*   MAGNESIUM 1.9  --  1.8 2.1   CALCIUM 9.5 9.1 8.8 8.6     Recent Labs     2059 20  0056   ALTSGPT   --   --    ASTSGOT 15 14  --   --    ALKPHOSPHAT 93 86  --   --    TBILIRUBIN 1.9* 1.9*  --   --    GLUCOSE 106* 127* 114* 106*     Recent Labs     2059 206   RBC 2.87* 2.81* 2.28* 2.74*   HEMOGLOBIN 8.4* 8.3* 6.6* 8.1*   HEMATOCRIT 23.8* 23.6* 18.8* 22.8*   PLATELETCT 10* 7* 26* 16*   PROTHROMBTM 17.7*  --   --   --    INR 1.42*  --   --   --      Recent Labs     206   WBC 0.4* 0.4* 0.4* 0.6*   NEUTSPOLYS 1.80* 0.00* CANCEL 0.90*   LYMPHOCYTES 98.20* 100.00* CANCEL 99.10*   MONOCYTES 0.00 0.00 CANCEL 0.00   EOSINOPHILS 0.00 0.00 CANCEL 0.00   BASOPHILS 0.00 0.00 CANCEL 0.00   ASTSGOT 15 14  --   --    ALTSGPT   --   --    ALKPHOSPHAT 93 86  --   --    TBILIRUBIN 1.9* 1.9*  --   --      Recent Labs     2059 20  0056   SODIUM 135 131* 137   POTASSIUM 3.4* 3.0* 3.5*   CHLORIDE 98 99 104   CO2 22 23 22   GLUCOSE 127* 114* 106*   BUN 7* 7* 9   CREATININE 0.55 0.44* 0.40*   CALCIUM 9.1 8.8 8.6     Hemodynamics:  Temp (24hrs), Av.4 °C (99.3 °F), Min:36.7 °C (98.1 °F), Max:38.5 °C (101.3 °F)  Temperature: 37.4 °C (99.3 °F)  Pulse  Av  Min: 72  Max: 148   Blood Pressure: 130/61     Respiratory:    Respiration: 16, Pulse Oximetry: 97 %           Fluids:    Intake/Output Summary (Last 24 hours) at 2020 0820  Last data filed at 2020 0050  Gross per 24 hour   Intake 278 ml   Output 1 ml    Net 277 ml        GI/Nutrition:  Orders Placed This Encounter   Procedures   • Diet Order Regular (neutropenic diet)     Standing Status:   Standing     Number of Occurrences:   1     Order Specific Question:   Diet:     Answer:   Regular [1]     Comments:   neutropenic diet     Medical Decision Making, by Problem:  Active Hospital Problems    Diagnosis   • Neutropenic fever (HCC) [D70.9, R50.81]   • CLL (chronic lymphocytic leukemia) (HCC) [C91.10]   • Sepsis (HCC) [A41.9]   • Pancytopenia (HCC) [D61.818]   • Thrombocytopenia (HCC) [D69.6]     Past medical history, past social history, past surgical history unchanged    Plan:    1.  CLL: With no significant findings on FISH.  She has been on ibrutinib on and off since May.  She does have a past history of thalassemia as well.  We discussed that we would reintroduce her ibrutinib once we make sure that she can tolerate transfusions because of her allergies.  Ibrutinib does put her at some risk for bleeding.  Her platelets were under 10,000.  However, she also does have a bone marrow that has significant amount of CLL.  We will have to discuss with her primary oncologist next week on if he will do any additional treatment along with ibrutinib just continue with ibrutinib as long as we she can tolerate it.      2.  Thrombocytopenia:    --keep platelets above 10,000    3.  Neutropenic fever: She is on broad-spectrum antibiotics.     --follow Cultures    4.  Anemia: Her anemia is multifactorial.  She does have significant CLL in her bone marrow.  However, she also has a history of thalassemia.    --We will transfuse if less than 7.  We will have to watch iron studies moving forward.    PLAN 7/5/20    --- Her fever curve is better.  Continue IV antibiotics and follow cultures.  --- She had significant platelet consumption most likely from high fevers along with possibly her disease/ibrutinib.  We will hold ibrutinib through the weekend.  We may be able to start it back  on Monday /Tuesday if she is doing well and her cultures are negative with stabilization of her thrombocytopenia.  --- We discussed ill depend on how she does over the next several days/cultures/blood counts on disposition for discharge.  --- She did tolerate Tylenol without any incident.  --- We can update Dr. Summers next week.  I think most likely he will just continue ibrutinib,      High complexity/discussed with primary team.  Please note that this dictation was created using voice recognition software. I have made every reasonable attempt to correct obvious errors, but I expect that there are errors of grammar and possibly context that I did not discover before finalizing the note    This patient was seen under COVID 19 pandemic disaster response conditions.  During a disaster, the provisions of care is subject to the Crisis Standard of Care  Quality-Core Measures   Reviewed items::  Labs reviewed, Radiology images reviewed and Medications reviewed

## 2020-07-05 NOTE — CARE PLAN
Problem: Safety  Goal: Will remain free from falls  Outcome: PROGRESSING AS EXPECTED       Problem: Knowledge Deficit  Goal: Knowledge of the prescribed therapeutic regimen will improve  Outcome: PROGRESSING AS EXPECTED

## 2020-07-05 NOTE — PROGRESS NOTES
Yvonne from Lab called with critical result of WBC 0.6, Plt 16, ANC 0.02 at 0116. Critical lab result read back to Yvonne.   This critical lab result is within parameters established by Dr. Blackwell for this patient

## 2020-07-06 PROBLEM — A41.9 SEPSIS (HCC): Status: RESOLVED | Noted: 2020-01-01 | Resolved: 2020-01-01

## 2020-07-06 NOTE — PROGRESS NOTES
Oncology/Hematology Progress Note               Author: Jazmín Snowden M.D. Date & Time created: 7/6/2020  10:26 AM     CC: Neutropenic fever, CLL    Interval History:  No acute events overnight. She reports she did have another fever but took tylenol and tolerated this just fine. No new symptoms today. She wants to go home.     Review of Systems:  Review of Systems   Constitutional: Positive for fever and malaise/fatigue. Negative for chills and weight loss.   HENT: Negative for hearing loss and tinnitus.    Eyes: Negative for blurred vision and double vision.   Respiratory: Negative for cough and shortness of breath.    Cardiovascular: Negative for chest pain and palpitations.   Gastrointestinal: Negative for abdominal pain, blood in stool, diarrhea, heartburn, melena and nausea.   Genitourinary: Negative for dysuria and urgency.   Musculoskeletal: Negative for myalgias.   Skin: Negative for rash.   Neurological: Negative for dizziness, tingling and headaches.   Endo/Heme/Allergies: Bruises/bleeds easily.   Psychiatric/Behavioral: Negative for depression. The patient is not nervous/anxious.        Physical Exam:  Physical Exam  Constitutional:       General: She is not in acute distress.     Appearance: Normal appearance.   HENT:      Head: Normocephalic and atraumatic.      Right Ear: External ear normal.      Left Ear: External ear normal.      Nose: Nose normal.      Mouth/Throat:      Mouth: Mucous membranes are moist.      Pharynx: Oropharynx is clear.   Eyes:      General: No scleral icterus.     Conjunctiva/sclera: Conjunctivae normal.   Neck:      Musculoskeletal: Neck supple. No muscular tenderness.   Cardiovascular:      Rate and Rhythm: Normal rate and regular rhythm.      Heart sounds: Normal heart sounds.   Pulmonary:      Effort: Pulmonary effort is normal.      Breath sounds: Normal breath sounds.   Abdominal:      General: Abdomen is flat. Bowel sounds are normal. There is no distension.       Palpations: Abdomen is soft.      Tenderness: There is no abdominal tenderness.   Musculoskeletal: Normal range of motion.         General: No swelling or tenderness.   Skin:     General: Skin is warm and dry.      Coloration: Skin is not jaundiced.      Findings: Bruising (arms over IV and lab draw sites) present.   Neurological:      General: No focal deficit present.      Mental Status: She is alert and oriented to person, place, and time.   Psychiatric:         Mood and Affect: Mood normal.         Behavior: Behavior normal.         Thought Content: Thought content normal.         Judgment: Judgment normal.         Labs:          Recent Labs     206 20  0053   SODIUM 131* 137 135   POTASSIUM 3.0* 3.5* 3.6   CHLORIDE 99 104 102   CO2 23 22 21   BUN 7* 9 8   CREATININE 0.44* 0.40* 0.42*   MAGNESIUM 1.8 2.1 1.8   CALCIUM 8.8 8.6 8.7     Recent Labs     2059 20  0056 20  0053   GLUCOSE 114* 106* 109*     Recent Labs     206 20  0053   RBC 2.28* 2.74* 2.65*   HEMOGLOBIN 6.6* 8.1* 7.7*   HEMATOCRIT 18.8* 22.8* 21.8*   PLATELETCT 26* 16* 11*     Recent Labs     2059 206 20  0053   WBC 0.4* 0.6* 0.5*   NEUTSPOLYS CANCEL 0.90* 1.70*   LYMPHOCYTES CANCEL 99.10* 98.30*   MONOCYTES CANCEL 0.00 0.00   EOSINOPHILS CANCEL 0.00 0.00   BASOPHILS CANCEL 0.00 0.00     Recent Labs     206 20  0053   SODIUM 131* 137 135   POTASSIUM 3.0* 3.5* 3.6   CHLORIDE 99 104 102   CO2 23 22 21   GLUCOSE 114* 106* 109*   BUN 7* 9 8   CREATININE 0.44* 0.40* 0.42*   CALCIUM 8.8 8.6 8.7     Hemodynamics:  Temp (24hrs), Av.1 °C (98.8 °F), Min:36.4 °C (97.6 °F), Max:38.1 °C (100.6 °F)  Temperature: 36.8 °C (98.2 °F)  Pulse  Av.7  Min: 71  Max: 148   Blood Pressure: 124/63     Respiratory:    Respiration: 16, Pulse Oximetry: 98 %           Fluids:    Intake/Output Summary (Last 24 hours) at 2020  0820  Last data filed at 7/4/2020 0050  Gross per 24 hour   Intake 278 ml   Output 1 ml   Net 277 ml     Weight: 42 kg (92 lb 9.5 oz)  GI/Nutrition:  Orders Placed This Encounter   Procedures   • Diet Order Regular (neutropenic diet)     Standing Status:   Standing     Number of Occurrences:   1     Order Specific Question:   Diet:     Answer:   Regular [1]     Comments:   neutropenic diet     Medical Decision Making, by Problem:  Active Hospital Problems    Diagnosis   • Neutropenic fever (HCC) [D70.9, R50.81]   • CLL (chronic lymphocytic leukemia) (HCC) [C91.10]   • Sepsis (HCC) [A41.9]   • Pancytopenia (HCC) [D61.818]   • Thrombocytopenia (HCC) [D69.6]     Past medical history, past social history, past surgical history unchanged    Assessment and Plan:    1.  CLL:  FISH normal.  She has been on ibrutinib on and off since May.  She does have a past history of thalassemia as well. Her ibrutinib has been on hold since hospitalization due to severe thrombocytopenia and neutropenic fevers. Her marrow had significant involvement of CLL at diagnosis.  Holding Ibrutinib until neutropenic fevers treated/improved.    2.  Thrombocytopenia - throught to be secondary to marrow involvement of CLL however she could also have an immune-mediated component  -- keep platelets above 10,000 unless bleeding then higher  -- Will discuss with Dr. Summers adding rituximab    3.  Neutropenic fever - still had a fever in the last 24 hours to 38.1  - She is on broad-spectrum antibiotics.   - Culture negative to date  - No source identified  - She is on cefepime, vancomycin, acyclovir and fluconazole    4.  Anemia: Her anemia is multifactorial.  She does have significant CLL in her bone marrow.  However, she also has a history of thalassemia.  - Transfuse for hemoglobin less than 7 or symptomatic    High complexity/discussed with primary team.    This patient was seen under COVID 19 pandemic disaster response conditions.  During a disaster,  the provisions of care is subject to the Crisis Standard of Care    Quality-Core Measures   Reviewed items::  Labs reviewed, Radiology images reviewed and Medications reviewed

## 2020-07-06 NOTE — PROGRESS NOTES
HOSPITAL MEDICINE PROGRESS NOTE    Date of service  7/6/2020    Chief Complaint  Fever      Hospital Course:     53 yo woman with CLL p/w neutropenic fever.          Interval History Updates:  Fever curve much improved.  Tolerate acetaminophen without reaction.  Allergy removed.    Consultants/Specialty  Heme/Onc    Review of Systems   Review of Systems   Constitutional: Positive for fever.        Medications:  • vancomycin  18 mg/kg Q8HR   • potassium chloride SA  40 mEq BID   • acetaminophen  650 mg Q6HRS PRN   • fluconazole  200 mg DAILY   • MD Alert...Vancomycin per Pharmacy   PHARMACY TO DOSE   • acyclovir  400 mg BID   • amLODIPine  5 mg DAILY   • folic acid  1 mg DAILY   • senna-docusate  2 Tab BID    And   • polyethylene glycol/lytes  1 Packet QDAY PRN    And   • magnesium hydroxide  30 mL QDAY PRN    And   • bisacodyl  10 mg QDAY PRN   • Respiratory Therapy Consult   Continuous RT   • LR  30 mL/kg Once PRN   • cefepime  2 g Q8HRS   • ondansetron  4 mg Q4HRS PRN   • ondansetron  4 mg Q4HRS PRN   • prochlorperazine  5-10 mg Q4HRS PRN       Physical Exam   Vitals:    07/05/20 1943 07/05/20 2351 07/06/20 0417 07/06/20 0842   BP: 126/64 127/66 121/67 124/63   Pulse: 84 99 71 84   Resp: 18 16 18 16   Temp: 37.6 °C (99.7 °F) (!) 38.1 °C (100.6 °F) 36.7 °C (98 °F) 36.8 °C (98.2 °F)   TempSrc: Oral Oral Oral Oral   SpO2: 99% 98% 98% 98%   Weight:       Height:           Physical Exam   Constitutional: She is oriented to person, place, and time and well-developed, well-nourished, and in no distress. No distress.   HENT:   Head: Normocephalic and atraumatic.   Eyes: Pupils are equal, round, and reactive to light. Conjunctivae are normal. No scleral icterus.   Neck: Normal range of motion. Neck supple.   Cardiovascular: Normal rate, regular rhythm and intact distal pulses. Exam reveals no gallop and no friction rub.   No murmur heard.  Pulmonary/Chest: Effort normal and breath sounds normal. No respiratory distress.  "She has no wheezes. She has no rales. She exhibits no tenderness.   Abdominal: Soft. Bowel sounds are normal. She exhibits no distension and no mass. There is no abdominal tenderness. There is no rebound and no guarding.   Musculoskeletal: Normal range of motion.         General: No tenderness or edema.   Neurological: She is alert and oriented to person, place, and time.   Skin: Skin is warm and dry. She is not diaphoretic.   Psychiatric: Mood and affect normal.   Vitals reviewed.         Laboratory   Recent Labs     07/04/20 0559 07/05/20  0056 07/06/20  0053   WBC 0.4* 0.6* 0.5*   RBC 2.28* 2.74* 2.65*   HEMOGLOBIN 6.6* 8.1* 7.7*   HEMATOCRIT 18.8* 22.8* 21.8*   PLATELETCT 26* 16* 11*     Recent Labs     07/04/20  0559 07/05/20  0056 07/06/20  0053   SODIUM 131* 137 135   POTASSIUM 3.0* 3.5* 3.6   CHLORIDE 99 104 102   CO2 23 22 21   GLUCOSE 114* 106* 109*   BUN 7* 9 8   CREATININE 0.44* 0.40* 0.42*      Recent Labs     07/04/20  0559 07/05/20  0056 07/06/20  0053   GLUCOSE 114* 106* 109*                Microbiology  Results     Procedure Component Value Units Date/Time    Blood Culture [[id number]] Collected:  07/03/20 1253    Order Status:  Completed Specimen:  Blood from Peripheral Updated:  07/04/20 0740     Significant Indicator NEG     Source BLD     Site PERIPHERAL     Culture Result No Growth  Note: Blood cultures are incubated for 5 days and  are monitored continuously.Positive blood cultures  are called to the RN and reported as soon as  they are identified.      Narrative:       Per Hospital Policy: Only change Specimen Src: to \"Line\" if  specified by physician order.  Right AC    Blood Culture [[id number]] Collected:  07/03/20 1253    Order Status:  Completed Specimen:  Blood from Peripheral Updated:  07/04/20 0740     Significant Indicator NEG     Source BLD     Site PERIPHERAL     Culture Result No Growth  Note: Blood cultures are incubated for 5 days and  are monitored continuously.Positive blood " "cultures  are called to the RN and reported as soon as  they are identified.      Narrative:       Per Hospital Policy: Only change Specimen Src: to \"Line\" if  specified by physician order.  Right Wrist    URINALYSIS,CULTURE IF INDICATED [[id number]]  (Abnormal) Collected:  07/03/20 1640    Order Status:  Completed Specimen:  Urine, Clean Catch Updated:  07/03/20 1706     Color Yellow     Character Clear     Specific Gravity 1.007     Ph 8.0     Glucose Negative mg/dL      Ketones Negative mg/dL      Protein Negative mg/dL      Bilirubin Negative     Urobilinogen, Urine 1.0     Nitrite Negative     Leukocyte Esterase Negative     Occult Blood Moderate     Micro Urine Req Microscopic    Narrative:       Collected By:[id number] MALENA DERAS    BLOOD CULTURE [[id number]] Collected:  07/02/20 1707    Order Status:  Completed Specimen:  Blood from Peripheral Updated:  07/03/20 0726     Significant Indicator NEG     Source BLD     Site PERIPHERAL     Culture Result No Growth  Note: Blood cultures are incubated for 5 days and  are monitored continuously.Positive blood cultures  are called to the RN and reported as soon as  they are identified.      Narrative:       Per Hospital Policy: Only change Specimen Src: to \"Line\" if  specified by physician order.  Left Hand    BLOOD CULTURE [[id number]] Collected:  07/02/20 1635    Order Status:  Completed Specimen:  Blood from Peripheral Updated:  07/03/20 0726     Significant Indicator NEG     Source BLD     Site PERIPHERAL     Culture Result No Growth  Note: Blood cultures are incubated for 5 days and  are monitored continuously.Positive blood cultures  are called to the RN and reported as soon as  they are identified.      Narrative:       Per Hospital Policy: Only change Specimen Src: to \"Line\" if  specified by physician order.  No site indicated    Blood Culture [[id number]]     Order Status:  Canceled Specimen:  Blood from Peripheral     Blood Culture [[id number]]     Order Status: "  Canceled Specimen:  Blood from Peripheral     Urinalysis [[id number]]     Order Status:  Canceled Specimen:  Urine, Clean Catch     SARS-CoV-2, PCR (In-House) [[id number]] Collected:  07/02/20 1700    Order Status:  Completed Updated:  07/02/20 1804     SARS-CoV-2 Source NP Swab     SARS-CoV-2 by PCR NotDetected     Comment: [facility] providers: PLEASE REFER TO DE-ESCALATION AND RETESTING PROTOCOL  on [email or web address]  **[employer] GeneXpert Xpress SARS-CoV-2 Test has been made available for  use under the Emergency Use Authorization (EUA) only.         Narrative:       Expected Turn around time?->Rush (Cepheid 2-4 hours)    COVID/SARS CoV-2 PCR [[id number]] Collected:  07/02/20 1700    Order Status:  Completed Specimen:  Respirate from Nasopharyngeal Updated:  07/02/20 1703     COVID Order Status Received    Narrative:       Expected Turn around time?->Rush (Cepheid 2-4 hours)    URINALYSIS,CULTURE IF INDICATED [[id number]]     Order Status:  Canceled Specimen:  Urine              Labs reviewed by me and noted above.    Radiology  DX-CHEST-PORTABLE (1 VIEW)  Narrative: 7/2/2020 5:13 PM    HISTORY/REASON FOR EXAM:  Fever.    TECHNIQUE/EXAM DESCRIPTION AND NUMBER OF VIEWS:  Single portable view of the chest.    COMPARISON: 6/19/2020    FINDINGS:  Cardiac contours prominent.  No focal pulmonary consolidation.  No pleural fluid collection or pneumothorax.  No major bony abnormality is seen.  Impression: 1.  No pneumonia or pulmonary edema.  2.  Stable mild cardiomegaly.      No results found for this or any previous visit.       Assessment and Plan    CLL (chronic lymphocytic leukemia) ([facility])- (present on admission)  Assessment & Plan  Monitor CBC  She has been on chemotherapy  Follows with Dr. Summers    Neutropenic fever ([facility])  Assessment & Plan  Cont antibiotic, antifungal, and antiviral.  Given culture negative after 5 days, stop vancomycin while keeping cefepime per Dr. Gale.    Fever curve improved  Need to be  afebrile 72 hours with NEG culture before DC can be considered.    Pancytopenia (HCC)- (present on admission)  Assessment & Plan  She of receiving platelet transfusions she is on active chemotherapy for CLL  Normally follows with Dr. Summers  Transfuse Hg <7.    Thrombocytopenia (HCC)- (present on admission)  Assessment & Plan  Transfuse for plt <10,000.      DVT prophylaxis: SCD    CODE STATUS:  FULL CODE    Disposition: Anticipate home in 2-3 days.    Case was discussed with Primary RN in addition to individual(s) mentioned above.    I have performed a physical exam and reviewed and updated ROS as of today, 7/6/2020.  In review of yesterday's note dated, 7/5/2020, there are no changes except as documented above.      Paul Mario M.D.

## 2020-07-06 NOTE — PROGRESS NOTES
REcieved report and assumed care of patient at 1900. Reviewed chart and completed assessment. Patient is A&O x4, up self. No c/o pain or n/v this evening. Medicated per MAR. PIV RFA SL. Patient able to make needs known. All needs met.

## 2020-07-06 NOTE — PROGRESS NOTES
Velma from Lab called with critical result of WBC 0.5,  Platelet 11 at 0203. Critical lab result read back to Velma.   This critical lab result is within parameters established by  for this patient

## 2020-07-07 NOTE — CARE PLAN
Problem: Communication  Goal: The ability to communicate needs accurately and effectively will improve  Outcome: PROGRESSING AS EXPECTED     Problem: Safety  Goal: Will remain free from injury  Outcome: PROGRESSING AS EXPECTED     Problem: Infection  Goal: Will remain free from infection  Outcome: PROGRESSING AS EXPECTED     Problem: Knowledge Deficit  Goal: Knowledge of disease process/condition, treatment plan, diagnostic tests, and medications will improve  Outcome: PROGRESSING AS EXPECTED     Problem: Fluid Volume:  Goal: Will maintain balanced intake and output  Outcome: PROGRESSING AS EXPECTED

## 2020-07-07 NOTE — PROGRESS NOTES
Patient is alert and oriented. Patient is just finishing up her platelets at this time. Patient denies pain. Hourly rounding in practice. Call light within reach.

## 2020-07-07 NOTE — DISCHARGE PLANNING
Care Transition Team Assessment    LSW met with Pt at bedside to complete assessment.  Pt reported she lives in a 2-story apartment with her bedroom and kitchen on the bottom floor.  Pt lives with her spouse who helps her with ADL/IADLs.  Pt's brother lives in a apartment close to hers and helps when needed.  Pharmacy is [facility].  Pt does not own/use any DME's.      Pt had questions regarding applying for SSI/Disability when her short-term medical insurance has run out at work.  LSW provided Pt with SSI/Disability resources.             Information Source  Information Given By: Patient  Who is responsible for making decisions for patient? : Patient    Readmission Evaluation  Is this a readmission?: Yes - unplanned readmission    Elopement Risk  Legal Hold: No  Ambulatory or Self Mobile in Wheelchair: Yes  Disoriented: No  Psychiatric Symptoms: None  History of Wandering: No  Elopement this Admit: No  Vocalizing Wanting to Leave: No  Displays Behaviors, Body Language Wanting to Leave: No-Not at Risk for Elopement  Elopement Risk: Not at Risk for Elopement    Interdisciplinary Discharge Planning  Does Admitting Nurse Feel This Could be a Complex Discharge?: No  Lives with - Patient's Self Care Capacity: Spouse  Patient or legal guardian wants to designate a caregiver (see row info): No  Support Systems: Family Member(s)  Housing / Facility: 1 Story Apartment / Condo  Do You Take your Prescribed Medications Regularly: Yes  Able to Return to Previous ADL's: Yes  Mobility Issues: No  Prior Services: None  Assistance Needed: No  Durable Medical Equipment: Not Applicable    Discharge Preparedness  What is your plan after discharge?: Home with help  What are your discharge supports?: Sibling, Spouse  Prior Functional Level: Needs Assist with Activities of Daily Living  Difficulity with ADLs: Walking  Difficulity with IADLs: Cooking, Driving, Laundry, Shopping    Functional Assesment  Prior Functional Level: Needs  Assist with Activities of Daily Living    Finances  Financial Barriers to Discharge: No  Prescription Coverage: Yes    Vision / Hearing Impairment  Right Eye Vision: Wears Glasses  Left Eye Vision: Wears Glasses         Advance Directive  Advance Directive?: None  Advance Directive offered?: AD Booklet refused    Domestic Abuse  Have you ever been the victim of abuse or violence?: No  Physical Abuse or Sexual Abuse: No  Verbal Abuse or Emotional Abuse: No  Possible Abuse Reported to:: Not Applicable         Discharge Risks or Barriers  Discharge risks or barriers?: No  Patient risk factors: Vulnerable adult    Anticipated Discharge Information  Anticipated discharge disposition: Home

## 2020-07-07 NOTE — CONSULTS
DATE OF SERVICE:  07/07/2020    INFECTIOUS DISEASE CONSULTATION    REASON FOR CONSULT:  Neutropenic fever.    CONSULTING PHYSICIAN:  Dax Kaur MD    HISTORY OF PRESENT ILLNESS:  This is a 54-year-old female with CLL, on   chemotherapy with ibrutinib, who was admitted to the hospital on 07/02/2020   due to fever and pancytopenia with associated severe neutropenia.  She was   seen at the nurse practitioner's office on the morning of admission and sent   directly to the emergency room.  She states she has not had any COVID   exposure, so she has been self-isolating at home with her  who has been   wearing a mask.  She had no cough, shortness of breath, headache, neck pain,   nausea, vomiting, diarrhea, chest pain, shortness of breath, or other   complaints.  She has been compliant with prophylaxis.  Since admission, she   received multitude of antibiotics including IV fluconazole, cefepime,   vancomycin.  Her fevers have decreased.  She remains severely pancytopenic.    She has developed no additional symptomatology.  Infectious disease is   consulted for antibiotic recommendations and management.  Her chemotherapy is   currently on hold.      REVIEW OF SYSTEMS:  Positive for fatigue and bruising, otherwise all other   systems are reviewed and are negative.      PAST MEDICAL HISTORY:  CLL, hypertension.      PAST SURGICAL HISTORY:  Cholecystectomy.      FAMILY HISTORY:  Leukemia.      SOCIAL HISTORY:  She is nonsmoker, does not drink or use illicit drugs.  She   is .  No recent travel.  No known sick contacts.      ALLERGIES:  SHE IS ALLERGIC TO AMOXICILLIN, WHICH CAUSES HIVES; BACTRIM, WHICH   CAUSES HIVES AND SWELLING WITH EXCEDRIN.      PHYSICAL EXAMINATION:    GENERAL:  She is a chronically ill, thin female, in no acute distress,   pleasant, cooperative.    VITAL SIGNS:  Her last fever was 100.6 on 07/05/2020, is currently afebrile at   99.1, blood pressure 122/61, pulse of 83, respiratory rate  16, oxygen   saturation is 96-99% on room air.  She weighs 42 kilos.    HEENT:  Normocephalic, atraumatic.  Pupils equal, round, reactive to light.    Extraocular movements intact.  Oropharynx is clear.  She has good dentition.    She has no oral ulcerations or thrush.    NECK:  Supple.  There is no JVD or stridor.    CARDIOVASCULAR:  Regular rate and rhythm, unable to auscultate any murmurs,   rubs, or gallops.    CHEST:  Clear to auscultation bilaterally, unlabored.  There is no wheezing or   rhonchi.    ABDOMEN:  Soft, nontender, nondistended.  There is no rebound or guarding.    EXTREMITIES:  Show no cyanosis, clubbing, or edema.    SKIN:  Reveals extensive ecchymoses.    NEUROLOGIC:  She is awake, alert and oriented.  Speech is without dysarthria.    Cranial nerves are intact.  She is moving all extremities.    PSYCHIATRIC:  Mood is normal.  Affect is normal.      CURRENT LABORATORY DATA:  Show white blood cell count of 0.7, hemoglobin of   8.4, hematocrit 24.1, platelets of 7.  Sodium 136, potassium 3.8, chloride 99,   bicarbonate 24, glucose 111, BUN 10, creatinine 0.48.  Urinalysis was   negative except for blood.  HIV was negative.  COVID was negative.    Procalcitonin was normal at 0.13.  Hep panel in 06/2020 was negative.  She had   another COVID test on 06/18 that was negative.  Cocci serologies were   negative in 05/2020 as was QuantiFERON Gold.  Blood cultures x4 are negative.        DIAGNOSTIC DATA:  Chest x-ray showed no infiltrates on 07/02/2020.  EKG on   06/18/2020 showed QTC of 436.      ASSESSMENT AND PLAN:  A 54-year-old female with chronic lymphocytic leukemia,   admitted with severe pancytopenia and neutropenic fever.  For neutropenic   fever, her fevers have resolved for just over 24 hours.  She is currently on   vancomycin, cefepime.  The vancomycin will not reinitiated as she does   not have an IV line in place and her blood cultures x4 are not showing any   gram-positive isolates.  She  should continue on cefepime at neutropenic   dosing.  Continue to monitor for new findings.  If she remains afebrile, as   she is expected to have prolonged neutropenia, may be able to transition to   oral quinolone.  For chronic lymphocytic leukemia, agree with holding   chemotherapy as long as feasible.  She is receiving transfusions as needed.    She should continue on antifungal and antiviral prophylaxis.  We will decrease   dose of Diflucan due to patient's body weight of only 42 kilos.  Further   recommendations per culture results and clinical course.      Thank you and we will follow with you.       ____________________________________     MD SUDARSHAN SANTILLAN / BRITT    DD:  07/07/2020 11:53:13  DT:  07/07/2020 16:06:02    D#:  [id number]  Job#:  [id number]

## 2020-07-07 NOTE — PROGRESS NOTES
HOSPITAL MEDICINE PROGRESS NOTE    Date of service  7/7/2020    Chief Complaint  Fever      Hospital Course:     55 yo woman with CLL p/w neutropenic fever.          Interval History Updates:  tmax 99.6 overnight  Required platelet transfusion  I have consulted ID    Consultants/Specialty  Heme/Onc   Infectious Disease    Review of Systems   Review of Systems   Constitutional: Positive for fever. Negative for chills and malaise/fatigue.   Respiratory: Negative for cough, sputum production and shortness of breath.    Gastrointestinal: Negative for abdominal pain, diarrhea, nausea and vomiting.   Genitourinary: Negative for dysuria and urgency.   All other systems reviewed and are negative.       Medications:  • potassium chloride SA  40 mEq BID   • acetaminophen  650 mg Q6HRS PRN   • fluconazole  200 mg DAILY   • acyclovir  400 mg BID   • amLODIPine  5 mg DAILY   • folic acid  1 mg DAILY   • senna-docusate  2 Tab BID    And   • polyethylene glycol/lytes  1 Packet QDAY PRN    And   • magnesium hydroxide  30 mL QDAY PRN    And   • bisacodyl  10 mg QDAY PRN   • Respiratory Therapy Consult   Continuous RT   • LR  30 mL/kg Once PRN   • cefepime  2 g Q8HRS   • ondansetron  4 mg Q4HRS PRN   • ondansetron  4 mg Q4HRS PRN   • prochlorperazine  5-10 mg Q4HRS PRN       Physical Exam   Vitals:    07/07/20 0811 07/07/20 0936 07/07/20 1015 07/07/20 1053   BP: 133/69  124/62 122/61   Pulse: 93  89 83   Resp: 18  16 16   Temp: 37.3 °C (99.2 °F) 37.3 °C (99.2 °F) 37.1 °C (98.8 °F) 37.3 °C (99.1 °F)   TempSrc: Oral Oral Oral Oral   SpO2: 99%  98% 96%   Weight:       Height:           Physical Exam   Constitutional: She is oriented to person, place, and time and well-developed, well-nourished, and in no distress.   Cardiovascular: Normal rate and regular rhythm.   Pulmonary/Chest: Effort normal and breath sounds normal. No respiratory distress.   Abdominal: Soft. Bowel sounds are normal. There is no abdominal tenderness.  "  Neurological: She is alert and oriented to person, place, and time.   Nursing note and vitals reviewed.         Laboratory   Recent Labs     07/05/20 0056 07/06/20 0053 07/07/20 0049   WBC 0.6* 0.5* 0.7*   RBC 2.74* 2.65* 2.91*   HEMOGLOBIN 8.1* 7.7* 8.4*   HEMATOCRIT 22.8* 21.8* 24.1*   PLATELETCT 16* 11* 7*     Recent Labs     07/05/20 0056 07/06/20 0053 07/07/20 0049   SODIUM 137 135 136   POTASSIUM 3.5* 3.6 3.8   CHLORIDE 104 102 99   CO2 22 21 24   GLUCOSE 106* 109* 111*   BUN 9 8 10   CREATININE 0.40* 0.42* 0.48*      Recent Labs     07/05/20 0056 07/06/20 0053 07/07/20 0049   GLUCOSE 106* 109* 111*                Microbiology  Results     Procedure Component Value Units Date/Time    Blood Culture [[id number]] Collected:  07/03/20 1253    Order Status:  Completed Specimen:  Blood from Peripheral Updated:  07/04/20 0740     Significant Indicator NEG     Source BLD     Site PERIPHERAL     Culture Result No Growth  Note: Blood cultures are incubated for 5 days and  are monitored continuously.Positive blood cultures  are called to the RN and reported as soon as  they are identified.      Narrative:       Per Hospital Policy: Only change Specimen Src: to \"Line\" if  specified by physician order.  Right AC    Blood Culture [[id number]] Collected:  07/03/20 1253    Order Status:  Completed Specimen:  Blood from Peripheral Updated:  07/04/20 0740     Significant Indicator NEG     Source BLD     Site PERIPHERAL     Culture Result No Growth  Note: Blood cultures are incubated for 5 days and  are monitored continuously.Positive blood cultures  are called to the RN and reported as soon as  they are identified.      Narrative:       Per Hospital Policy: Only change Specimen Src: to \"Line\" if  specified by physician order.  Right Wrist    URINALYSIS,CULTURE IF INDICATED [[id number]]  (Abnormal) Collected:  07/03/20 1640    Order Status:  Completed Specimen:  Urine, Clean Catch Updated:  07/03/20 1706     Color Yellow " "    Character Clear     Specific Gravity 1.007     Ph 8.0     Glucose Negative mg/dL      Ketones Negative mg/dL      Protein Negative mg/dL      Bilirubin Negative     Urobilinogen, Urine 1.0     Nitrite Negative     Leukocyte Esterase Negative     Occult Blood Moderate     Micro Urine Req Microscopic    Narrative:       Collected By:[id number] MALENA DERAS    BLOOD CULTURE [[id number]] Collected:  07/02/20 1707    Order Status:  Completed Specimen:  Blood from Peripheral Updated:  07/03/20 0726     Significant Indicator NEG     Source BLD     Site PERIPHERAL     Culture Result No Growth  Note: Blood cultures are incubated for 5 days and  are monitored continuously.Positive blood cultures  are called to the RN and reported as soon as  they are identified.      Narrative:       Per Hospital Policy: Only change Specimen Src: to \"Line\" if  specified by physician order.  Left Hand    BLOOD CULTURE [[id number]] Collected:  07/02/20 1635    Order Status:  Completed Specimen:  Blood from Peripheral Updated:  07/03/20 0726     Significant Indicator NEG     Source BLD     Site PERIPHERAL     Culture Result No Growth  Note: Blood cultures are incubated for 5 days and  are monitored continuously.Positive blood cultures  are called to the RN and reported as soon as  they are identified.      Narrative:       Per Hospital Policy: Only change Specimen Src: to \"Line\" if  specified by physician order.  No site indicated    Blood Culture [[id number]]     Order Status:  Canceled Specimen:  Blood from Peripheral     Blood Culture [[id number]]     Order Status:  Canceled Specimen:  Blood from Peripheral     Urinalysis [[id number]]     Order Status:  Canceled Specimen:  Urine, Clean Catch     SARS-CoV-2, PCR (In-House) [[id number]] Collected:  07/02/20 1700    Order Status:  Completed Updated:  07/02/20 1804     SARS-CoV-2 Source NP Swab     SARS-CoV-2 by PCR NotDetected     Comment: [facility] providers: PLEASE REFER TO DE-ESCALATION AND " RETESTING PROTOCOL  on [email or web address]  **The [employer] GeneXpert Xpress SARS-CoV-2 Test has been made available for  use under the Emergency Use Authorization (EUA) only.         Narrative:       Expected Turn around time?->Rush (Cepheid 2-4 hours)    COVID/SARS CoV-2 PCR [[id number]] Collected:  07/02/20 1700    Order Status:  Completed Specimen:  Respirate from Nasopharyngeal Updated:  07/02/20 1703     COVID Order Status Received    Narrative:       Expected Turn around time?->Rush (Cepheid 2-4 hours)    URINALYSIS,CULTURE IF INDICATED [[id number]]     Order Status:  Canceled Specimen:  Urine              Labs reviewed by me and noted above.    Radiology  DX-CHEST-PORTABLE (1 VIEW)  Narrative: 7/2/2020 5:13 PM    HISTORY/REASON FOR EXAM:  Fever.    TECHNIQUE/EXAM DESCRIPTION AND NUMBER OF VIEWS:  Single portable view of the chest.    COMPARISON: 6/19/2020    FINDINGS:  Cardiac contours prominent.  No focal pulmonary consolidation.  No pleural fluid collection or pneumothorax.  No major bony abnormality is seen.  Impression: 1.  No pneumonia or pulmonary edema.  2.  Stable mild cardiomegaly.      No results found for this or any previous visit.       Assessment and Plan    CLL (chronic lymphocytic leukemia) (HCC)- (present on admission)  Assessment & Plan  Monitor CBC  She has been on chemotherapy  Follows with Dr. Summers    Neutropenic fever ([facility])  Assessment & Plan  Cont antibiotic, antifungal, and antiviral.  Given culture negative after 5 days, stop vancomycin while keeping cefepime per Dr. Gale.    I have consulted ID      Pancytopenia ([facility])- (present on admission)  Assessment & Plan  She of receiving platelet transfusions she is on active chemotherapy for CLL  Normally follows with Dr. Summers  Transfuse Hg <7.    Thrombocytopenia ([facility])- (present on admission)  Assessment & Plan  Transfuse for plt <10,000.      DVT prophylaxis: SCD    CODE STATUS:  FULL CODE    Disposition: Anticipate home in 2-3  days.    Case was discussed with Primary RN in addition to individual(s) mentioned above.    I have performed a physical exam and reviewed and updated ROS as of today, 7/7/2020.  In review of yesterday's note dated, 7/6/2020, there are no changes except as documented above.      Dax Kaur M.D.

## 2020-07-07 NOTE — CARE PLAN
Problem: Nutritional:  Goal: Achieve adequate nutritional intake  Description: Patient will consume 50% of meals  Outcome: MET     Patient eating 50-75% of most meals.  Nutrition Representative will continue to see her for ongoing meal and snack preferences.

## 2020-07-07 NOTE — PROGRESS NOTES
Velma from Lab called with critical result of Platelets 7, WBC 0.7, ANC 0.0 at 0150. Critical lab result read back to Velma.   Harper HO notified of critical lab result at 0215.  Critical lab result read back by Harper HO.    Order received, transfuse 1 unit platelets, CMV negative, irradiated.

## 2020-07-07 NOTE — PROGRESS NOTES
Received report and assumed care of patient at 1900. Reviewed chart and completed assessment. Patient is A&O x4, up self, no c/o pain or n/v. Temp of 100.4, tylenol given with good response. Platelet transfusion ordered for today. PIV in RAC patent. Medicated per MAR. All needs met.

## 2020-07-07 NOTE — PROGRESS NOTES
Oncology/Hematology Progress Note               Author: Jazmín Snowden M.D. Date & Time created: 7/7/2020  1:20 PM     CC: Neutropenic fever, CLL    Interval History:  No acute events overnight. No further fevers, pt reports overall she feels better. She just finished the platelet infusion and has noticed a few bumps on her legs. They are not itchy. No difficulties breathing, no swelling of mouth or lips.    Review of Systems:  Review of Systems   Constitutional: Positive for malaise/fatigue. Negative for chills, fever and weight loss.   HENT: Negative for hearing loss and tinnitus.    Eyes: Negative for blurred vision and double vision.   Respiratory: Negative for cough and shortness of breath.    Cardiovascular: Negative for chest pain and palpitations.   Gastrointestinal: Negative for abdominal pain, blood in stool, diarrhea, heartburn, melena and nausea.   Genitourinary: Negative for dysuria and urgency.   Musculoskeletal: Negative for myalgias.   Skin: Negative for rash.   Neurological: Negative for dizziness, tingling and headaches.   Endo/Heme/Allergies: Bruises/bleeds easily.   Psychiatric/Behavioral: Negative for depression. The patient is not nervous/anxious.        Physical Exam:  Physical Exam  Constitutional:       General: She is not in acute distress.     Appearance: Normal appearance.   HENT:      Head: Normocephalic and atraumatic.      Right Ear: External ear normal.      Left Ear: External ear normal.      Nose: Nose normal.      Mouth/Throat:      Mouth: Mucous membranes are moist.      Pharynx: Oropharynx is clear.   Eyes:      General: No scleral icterus.     Conjunctiva/sclera: Conjunctivae normal.   Neck:      Musculoskeletal: Neck supple. No muscular tenderness.   Cardiovascular:      Rate and Rhythm: Normal rate and regular rhythm.      Heart sounds: Normal heart sounds.   Pulmonary:      Effort: Pulmonary effort is normal.      Breath sounds: Normal breath sounds.   Abdominal:       General: Abdomen is flat. Bowel sounds are normal. There is no distension.      Palpations: Abdomen is soft.      Tenderness: There is no abdominal tenderness.   Musculoskeletal: Normal range of motion.         General: No swelling or tenderness.   Skin:     General: Skin is warm and dry.      Coloration: Skin is not jaundiced.      Findings: Bruising (arms over IV and lab draw sites) and rash (few hives on the thighs bilaterally) present.   Neurological:      General: No focal deficit present.      Mental Status: She is alert and oriented to person, place, and time.   Psychiatric:         Mood and Affect: Mood normal.         Behavior: Behavior normal.         Thought Content: Thought content normal.         Judgment: Judgment normal.         Labs:          Recent Labs     20  004   SODIUM 137 135 136   POTASSIUM 3.5* 3.6 3.8   CHLORIDE 104 102 99   CO2 22 21 24   BUN 9 8 10   CREATININE 0.40* 0.42* 0.48*   MAGNESIUM 2.1 1.8 1.8   CALCIUM 8.6 8.7 9.3     Recent Labs     20  004   GLUCOSE 106* 109* 111*     Recent Labs     20  0049   RBC 2.74* 2.65* 2.91*   HEMOGLOBIN 8.1* 7.7* 8.4*   HEMATOCRIT 22.8* 21.8* 24.1*   PLATELETCT 16* 11* 7*     Recent Labs     20  0049   WBC 0.6* 0.5* 0.7*   NEUTSPOLYS 0.90* 1.70* 0.00*   LYMPHOCYTES 99.10* 98.30* 100.00*   MONOCYTES 0.00 0.00 0.00   EOSINOPHILS 0.00 0.00 0.00   BASOPHILS 0.00 0.00 0.00     Recent Labs     20  004   SODIUM 137 135 136   POTASSIUM 3.5* 3.6 3.8   CHLORIDE 104 102 99   CO2 22 21 24   GLUCOSE 106* 109* 111*   BUN 9 8 10   CREATININE 0.40* 0.42* 0.48*   CALCIUM 8.6 8.7 9.3     Hemodynamics:  Temp (24hrs), Av.3 °C (99.1 °F), Min:36.7 °C (98.1 °F), Max:37.8 °C (100 °F)  Temperature: 37.2 °C (98.9 °F)  Pulse  Av.6  Min: 71  Max: 148   Blood Pressure: 126/62      Respiratory:    Respiration: 17, Pulse Oximetry: 100 %           Fluids:    Intake/Output Summary (Last 24 hours) at 7/4/2020 0820  Last data filed at 7/4/2020 0050  Gross per 24 hour   Intake 278 ml   Output 1 ml   Net 277 ml        GI/Nutrition:  Orders Placed This Encounter   Procedures   • Diet Order Regular (neutropenic diet)     Standing Status:   Standing     Number of Occurrences:   1     Order Specific Question:   Diet:     Answer:   Regular [1]     Comments:   neutropenic diet     Medical Decision Making, by Problem:  Active Hospital Problems    Diagnosis   • Neutropenic fever (HCC) [D70.9, R50.81]   • CLL (chronic lymphocytic leukemia) (HCC) [C91.10]   • Sepsis (HCC) [A41.9]   • Pancytopenia (HCC) [D61.818]   • Thrombocytopenia (HCC) [D69.6]     Past medical history, past social history, past surgical history unchanged    Assessment and Plan:    1.  CLL:  FISH normal.  She has been on ibrutinib on and off since May.  She does have a past history of thalassemia as well. Her ibrutinib has been on hold since hospitalization due to severe thrombocytopenia and neutropenic fevers. Her marrow had significant involvement of CLL at diagnosis.  Holding Ibrutinib until neutropenic fevers resolve.    2.  Thrombocytopenia - throught to be secondary to marrow involvement of CLL however she could also have an immune-mediated component  -- keep platelets above 10,000 unless bleeding then higher  -- Will discuss with Dr. Summers adding rituximab, awaiting his call back  -- she received platelets today, will monitor for response    3.  Neutropenic fever - afebrile last 24 hours  - She is on broad-spectrum antibiotics.   - Culture negative to date  - No source identified  - She is on cefepime, acyclovir and fluconazole  - ID stopped vanco    4.  Anemia: Her anemia is multifactorial.  She does have significant CLL in her bone marrow.  However, she also has a history of thalassemia.  - Transfuse for hemoglobin less than 7 or  symptomatic    High complexity/discussed with primary team.    This patient was seen under COVID 19 pandemic disaster response conditions.  During a disaster, the provisions of care is subject to the Crisis Standard of Care    Quality-Core Measures   Reviewed items::  Labs reviewed, Radiology images reviewed and Medications reviewed

## 2020-07-07 NOTE — CARE PLAN
Problem: Safety  Goal: Will remain free from injury  Outcome: PROGRESSING AS EXPECTED     Problem: Venous Thromboembolism (VTW)/Deep Vein Thrombosis (DVT) Prevention:  Goal: Patient will participate in Venous Thrombosis (VTE)/Deep Vein Thrombosis (DVT)Prevention Measures  Outcome: PROGRESSING AS EXPECTED  Note: Patient ambulates in room, dvt prophylaxis not indicated due to thrombocytopenia.

## 2020-07-08 NOTE — CARE PLAN
Problem: Communication  Goal: The ability to communicate needs accurately and effectively will improve  Outcome: PROGRESSING AS EXPECTED     Problem: Safety  Goal: Will remain free from injury  Outcome: PROGRESSING AS EXPECTED  Goal: Will remain free from falls  Outcome: PROGRESSING AS EXPECTED     Problem: Communication  Goal: The ability to communicate needs accurately and effectively will improve  Outcome: PROGRESSING AS EXPECTED

## 2020-07-08 NOTE — PROGRESS NOTES
HOSPITAL MEDICINE PROGRESS NOTE    Date of service  7/8/2020    Chief Complaint  Fever      Hospital Course:     54-year-old female with past medical history of CLL on chemotherapy presenting on 7/2 with neutropenic fever.  She was started on broad-spectrum antibiotics.  Oncology was consulted and her chemotherapy was held.  She has remained afebrile since 7/5.  She was seen by ID and vancomycin was discontinued.  Her blood cultures have remained negative since admission.       Interval History Updates:  tmax 100.4 yesterday afternoon  She denies any other symptoms other than occasionally feeling feverish    Consultants/Specialty  Heme/Onc   Infectious Disease    Review of Systems   Review of Systems   Constitutional: Positive for fever. Negative for chills and malaise/fatigue.   Respiratory: Negative for cough, sputum production and shortness of breath.    Gastrointestinal: Negative for abdominal pain, diarrhea, nausea and vomiting.   Genitourinary: Negative for dysuria and urgency.   All other systems reviewed and are negative.       Medications:  • fluconazole  100 mg DAILY   • potassium chloride SA  40 mEq BID   • acetaminophen  650 mg Q6HRS PRN   • acyclovir  400 mg BID   • amLODIPine  5 mg DAILY   • folic acid  1 mg DAILY   • senna-docusate  2 Tab BID    And   • polyethylene glycol/lytes  1 Packet QDAY PRN    And   • magnesium hydroxide  30 mL QDAY PRN    And   • bisacodyl  10 mg QDAY PRN   • Respiratory Therapy Consult   Continuous RT   • LR  30 mL/kg Once PRN   • cefepime  2 g Q8HRS   • ondansetron  4 mg Q4HRS PRN   • ondansetron  4 mg Q4HRS PRN   • prochlorperazine  5-10 mg Q4HRS PRN       Physical Exam   Vitals:    07/07/20 2200 07/08/20 0100 07/08/20 0500 07/08/20 0726   BP: 124/59 116/54 116/59 117/65   Pulse: 92 88 88 100   Resp: 17 17 18 17   Temp: 37.9 °C (100.3 °F) 37.6 °C (99.7 °F) 37.6 °C (99.6 °F) 37.4 °C (99.3 °F)   TempSrc: Oral Oral Oral Oral   SpO2: 97% 99% 99% 98%   Weight:       Height:      "      Physical Exam   Constitutional: She is oriented to person, place, and time and well-developed, well-nourished, and in no distress.   Cardiovascular: Normal rate and regular rhythm.   Pulmonary/Chest: Effort normal and breath sounds normal. No respiratory distress.   Abdominal: Soft. Bowel sounds are normal. There is no abdominal tenderness.   Neurological: She is alert and oriented to person, place, and time.   Nursing note and vitals reviewed.         Laboratory   Recent Labs     07/06/20 0053 07/07/20  0049 07/08/20  0006   WBC 0.5* 0.7* 0.7*   RBC 2.65* 2.91* 2.51*   HEMOGLOBIN 7.7* 8.4* 7.3*   HEMATOCRIT 21.8* 24.1* 21.1*   PLATELETCT 11* 7* 32*     Recent Labs     07/06/20 0053 07/07/20  0049 07/08/20  0006   SODIUM 135 136 135   POTASSIUM 3.6 3.8 4.1   CHLORIDE 102 99 99   CO2 21 24 23   GLUCOSE 109* 111* 118*   BUN 8 10 12   CREATININE 0.42* 0.48* 0.47*      Recent Labs     07/06/20 0053 07/07/20  0049 07/08/20  0006   GLUCOSE 109* 111* 118*                Microbiology  Results     Procedure Component Value Units Date/Time    BLOOD CULTURE [[id number]] Collected:  07/02/20 1635    Order Status:  Completed Specimen:  Blood from Peripheral Updated:  07/07/20 1900     Significant Indicator NEG     Source BLD     Site PERIPHERAL     Culture Result No growth after 5 days of incubation.    Narrative:       Per Hospital Policy: Only change Specimen Src: to \"Line\" if  specified by physician order.  No site indicated    BLOOD CULTURE [[id number]] Collected:  07/02/20 1707    Order Status:  Completed Specimen:  Blood from Peripheral Updated:  07/07/20 1900     Significant Indicator NEG     Source BLD     Site PERIPHERAL     Culture Result No growth after 5 days of incubation.    Narrative:       Per Hospital Policy: Only change Specimen Src: to \"Line\" if  specified by physician order.  Left Hand    Blood Culture [[id number]] Collected:  07/03/20 1253    Order Status:  Completed Specimen:  Blood from Peripheral " "Updated:  07/04/20 0740     Significant Indicator NEG     Source BLD     Site PERIPHERAL     Culture Result No Growth  Note: Blood cultures are incubated for 5 days and  are monitored continuously.Positive blood cultures  are called to the RN and reported as soon as  they are identified.      Narrative:       Per Hospital Policy: Only change Specimen Src: to \"Line\" if  specified by physician order.  Right AC    Blood Culture [[id number]] Collected:  07/03/20 1253    Order Status:  Completed Specimen:  Blood from Peripheral Updated:  07/04/20 0740     Significant Indicator NEG     Source BLD     Site PERIPHERAL     Culture Result No Growth  Note: Blood cultures are incubated for 5 days and  are monitored continuously.Positive blood cultures  are called to the RN and reported as soon as  they are identified.      Narrative:       Per Hospital Policy: Only change Specimen Src: to \"Line\" if  specified by physician order.  Right Wrist    URINALYSIS,CULTURE IF INDICATED [[id number]]  (Abnormal) Collected:  07/03/20 1640    Order Status:  Completed Specimen:  Urine, Clean Catch Updated:  07/03/20 1706     Color Yellow     Character Clear     Specific Gravity 1.007     Ph 8.0     Glucose Negative mg/dL      Ketones Negative mg/dL      Protein Negative mg/dL      Bilirubin Negative     Urobilinogen, Urine 1.0     Nitrite Negative     Leukocyte Esterase Negative     Occult Blood Moderate     Micro Urine Req Microscopic    Narrative:       Collected By:[id number] MALENA DERAS    Blood Culture [[id number]]     Order Status:  Canceled Specimen:  Blood from Peripheral     Blood Culture [[id number]]     Order Status:  Canceled Specimen:  Blood from Peripheral     Urinalysis [[id number]]     Order Status:  Canceled Specimen:  Urine, Clean Catch     SARS-CoV-2, PCR (In-House) [[id number]] Collected:  07/02/20 1700    Order Status:  Completed Updated:  07/02/20 1804     SARS-CoV-2 Source NP Swab     SARS-CoV-2 by PCR NotDetected     Comment: " [facility] providers: PLEASE REFER TO DE-ESCALATION AND RETESTING PROTOCOL  on [email or web address]  **The [employer] GeneXpert Xpress SARS-CoV-2 Test has been made available for  use under the Emergency Use Authorization (EUA) only.         Narrative:       Expected Turn around time?->Rush (Cepheid 2-4 hours)    COVID/SARS CoV-2 PCR [[id number]] Collected:  07/02/20 1700    Order Status:  Completed Specimen:  Respirate from Nasopharyngeal Updated:  07/02/20 1703     COVID Order Status Received    Narrative:       Expected Turn around time?->Rush (Cepheid 2-4 hours)    URINALYSIS,CULTURE IF INDICATED [[id number]]     Order Status:  Canceled Specimen:  Urine              Labs reviewed by me and noted above.    Radiology  DX-CHEST-PORTABLE (1 VIEW)  Narrative: 7/2/2020 5:13 PM    HISTORY/REASON FOR EXAM:  Fever.    TECHNIQUE/EXAM DESCRIPTION AND NUMBER OF VIEWS:  Single portable view of the chest.    COMPARISON: 6/19/2020    FINDINGS:  Cardiac contours prominent.  No focal pulmonary consolidation.  No pleural fluid collection or pneumothorax.  No major bony abnormality is seen.  Impression: 1.  No pneumonia or pulmonary edema.  2.  Stable mild cardiomegaly.      No results found for this or any previous visit.       Assessment and Plan    CLL (chronic lymphocytic leukemia) (HCC)- (present on admission)  Assessment & Plan  Monitor CBC  She has been on chemotherapy  Follows with Dr. Summers    Neutropenic fever (HCC)  Assessment & Plan  Cont cefepime, acyclovir, and fluconazole  ID following  BCx NGTD      Pancytopenia (HCC)- (present on admission)  Assessment & Plan  She of receiving platelet transfusions she is on active chemotherapy for CLL  Normally follows with Dr. Summers  Transfuse Hg <7.    Thrombocytopenia (HCC)- (present on admission)  Assessment & Plan  Transfuse for plt <10,000.      DVT prophylaxis: SCD    CODE STATUS:  FULL CODE    Disposition: Anticipate home in 2-3 days.    Case was discussed with Primary RN in  addition to individual(s) mentioned above.    I have performed a physical exam and reviewed and updated ROS as of today, 7/8/2020.  In review of yesterday's note dated, 7/7/2020, there are no changes except as documented above.      Dax Kaur M.D.

## 2020-07-08 NOTE — CARE PLAN
Problem: Communication  Goal: The ability to communicate needs accurately and effectively will improve  Outcome: PROGRESSING AS EXPECTED  Note: Patient able to make needs known     Problem: Safety  Goal: Will remain free from falls  Outcome: PROGRESSING AS EXPECTED

## 2020-07-08 NOTE — PROGRESS NOTES
Oncology/Hematology Progress Note               Author: Jazmín Snowden M.D. Date & Time created: 7/8/2020  1:40 PM     CC: Neutropenic fever, CLL    Interval History:  No acute events overnight. No further fevers, she is feeling well. No new concerns, wants to go home. The rash on her legs resolved within an hour yesterday, no return.    Review of Systems:  Review of Systems   Constitutional: Positive for malaise/fatigue. Negative for chills, fever and weight loss.   HENT: Negative for hearing loss and tinnitus.    Eyes: Negative for blurred vision and double vision.   Respiratory: Negative for cough and shortness of breath.    Cardiovascular: Negative for chest pain and palpitations.   Gastrointestinal: Negative for abdominal pain, blood in stool, diarrhea, heartburn, melena and nausea.   Genitourinary: Negative for dysuria and urgency.   Musculoskeletal: Negative for myalgias.   Skin: Negative for rash.   Neurological: Negative for dizziness, tingling and headaches.   Endo/Heme/Allergies: Bruises/bleeds easily.   Psychiatric/Behavioral: Negative for depression. The patient is not nervous/anxious.        Physical Exam:  Physical Exam  Constitutional:       General: She is not in acute distress.     Appearance: Normal appearance.   HENT:      Head: Normocephalic and atraumatic.      Right Ear: External ear normal.      Left Ear: External ear normal.      Nose: Nose normal.      Mouth/Throat:      Mouth: Mucous membranes are moist.      Pharynx: Oropharynx is clear.   Eyes:      General: No scleral icterus.     Conjunctiva/sclera: Conjunctivae normal.   Neck:      Musculoskeletal: Neck supple. No muscular tenderness.   Cardiovascular:      Rate and Rhythm: Normal rate and regular rhythm.      Heart sounds: Normal heart sounds.   Pulmonary:      Effort: Pulmonary effort is normal.      Breath sounds: Normal breath sounds.   Abdominal:      General: Abdomen is flat. Bowel sounds are normal. There is no distension.       Palpations: Abdomen is soft.      Tenderness: There is no abdominal tenderness.   Musculoskeletal: Normal range of motion.         General: No swelling or tenderness.   Skin:     General: Skin is warm and dry.      Coloration: Skin is not jaundiced.      Findings: Bruising (arms over IV and lab draw sites) present. No rash.   Neurological:      General: No focal deficit present.      Mental Status: She is alert and oriented to person, place, and time.   Psychiatric:         Mood and Affect: Mood normal.         Behavior: Behavior normal.         Thought Content: Thought content normal.         Judgment: Judgment normal.         Labs:          Recent Labs     20   SODIUM 135 136 135   POTASSIUM 3.6 3.8 4.1   CHLORIDE 102 99 99   CO2 21 24 23   BUN 8 10 12   CREATININE 0.42* 0.48* 0.47*   MAGNESIUM 1.8 1.8 2.0   CALCIUM 8.7 9.3 9.3     Recent Labs     20  000   GLUCOSE 109* 111* 118*     Recent Labs     20  000   RBC 2.65* 2.91* 2.51*   HEMOGLOBIN 7.7* 8.4* 7.3*   HEMATOCRIT 21.8* 24.1* 21.1*   PLATELETCT 11* 7* 32*     Recent Labs     206   WBC 0.5* 0.7* 0.7*   NEUTSPOLYS 1.70* 0.00* 1.30*   LYMPHOCYTES 98.30* 100.00* 98.00*   MONOCYTES 0.00 0.00 0.70   EOSINOPHILS 0.00 0.00 0.00   BASOPHILS 0.00 0.00 0.00     Recent Labs     20  000   SODIUM 135 136 135   POTASSIUM 3.6 3.8 4.1   CHLORIDE 102 99 99   CO2 21 24 23   GLUCOSE 109* 111* 118*   BUN 8 10 12   CREATININE 0.42* 0.48* 0.47*   CALCIUM 8.7 9.3 9.3     Hemodynamics:  Temp (24hrs), Av.7 °C (99.8 °F), Min:37.3 °C (99.2 °F), Max:38 °C (100.4 °F)  Temperature: 37.3 °C (99.2 °F)  Pulse  Av.5  Min: 71  Max: 148   Blood Pressure: (!) 92/52(RN notified)     Respiratory:    Respiration: 16, Pulse Oximetry: 97 %           Fluids:    Intake/Output Summary (Last 24  hours) at 7/4/2020 0820  Last data filed at 7/4/2020 0050  Gross per 24 hour   Intake 278 ml   Output 1 ml   Net 277 ml        GI/Nutrition:  Orders Placed This Encounter   Procedures   • Diet Order Regular (neutropenic diet)     Standing Status:   Standing     Number of Occurrences:   1     Order Specific Question:   Diet:     Answer:   Regular [1]     Comments:   neutropenic diet     Medical Decision Making, by Problem:  Active Hospital Problems    Diagnosis   • Neutropenic fever (HCC) [D70.9, R50.81]   • CLL (chronic lymphocytic leukemia) (HCC) [C91.10]   • Sepsis (HCC) [A41.9]   • Pancytopenia (HCC) [D61.818]   • Thrombocytopenia (HCC) [D69.6]     Past medical history, past social history, past surgical history unchanged    Assessment and Plan:    1.  CLL:  FISH normal.  She has been on ibrutinib on and off since May.  She does have a past history of thalassemia as well. Her ibrutinib has been on hold since hospitalization due to severe thrombocytopenia and neutropenic fevers. Her marrow had significant involvement of CLL at diagnosis.  Holding Ibrutinib until neutropenic fevers resolve. Spoke with Dr. Summers her outpatient oncologist who is considering adding rituximab to her ibrutinib. If she stays inpatient longer than another day, can try to start as an inpatient. I discussed this therapy with the patient. Reviewed side effects, administration, and schedule. She was in agreement.    2.  Thrombocytopenia - throught to be secondary to marrow involvement of CLL however she could also have an immune-mediated component  -- keep platelets above 10,000 unless bleeding then higher  -- she responded well to platelets yesterday, count is 32 today, since she responded well, less likely an immune-induced process.    3.  Neutropenic fever - afebrile last 48 hours  - Culture negative to date  - No source identified  - She is on cefepime, acyclovir and fluconazole  - will discuss with ID about switching to po antibiotics  in prep for discharge    4.  Anemia: Her anemia is multifactorial.  She does have significant CLL in her bone marrow.  However, she also has a history of thalassemia.  - Transfuse for hemoglobin less than 7 or symptomatic    High complexity/discussed with primary team.    This patient was seen under COVID 19 pandemic disaster response conditions.  During a disaster, the provisions of care is subject to the Crisis Standard of Care    Quality-Core Measures   Reviewed items::  Labs reviewed, Radiology images reviewed and Medications reviewed

## 2020-07-08 NOTE — PROGRESS NOTES
Infectious Disease Progress Note    Author: Lori Boone M.D. Date & Time of service: 2020  12:37 PM    Chief Complaint:  Neutropenic fever    Interval History:  54-year-old female with CLL, on chemotherapy with ibrutinib, who was admitted to the hospital on 2020 due to fever and pancytopenia with associated severe neutropenia.   AF WBC 0.7remains pancytopenic-no new complaints +fatigue and did not sleep well last night    Labs Reviewed and Medications Reviewed.    Review of Systems:  Review of Systems   Constitutional: Positive for malaise/fatigue. Negative for fever.   Respiratory: Negative for cough and shortness of breath.    Cardiovascular: Negative for chest pain.   Gastrointestinal: Negative for nausea and vomiting.   All other systems reviewed and are negative.      Hemodynamics:  Temp (24hrs), Av.6 °C (99.7 °F), Min:37.2 °C (98.9 °F), Max:38 °C (100.4 °F)  Temperature: 37.3 °C (99.2 °F)  Pulse  Av.5  Min: 71  Max: 148   Blood Pressure: (!) 92/52       Physical Exam:  Physical Exam  Vitals signs reviewed.   Constitutional:       General: She is not in acute distress.     Appearance: She is ill-appearing. She is not toxic-appearing or diaphoretic.   HENT:      Nose: No rhinorrhea.      Mouth/Throat:      Mouth: Mucous membranes are moist.      Pharynx: No oropharyngeal exudate.   Eyes:      General: No scleral icterus.     Extraocular Movements: Extraocular movements intact.      Pupils: Pupils are equal, round, and reactive to light.   Cardiovascular:      Rate and Rhythm: Normal rate.   Pulmonary:      Effort: Pulmonary effort is normal. No respiratory distress.      Breath sounds: No stridor.   Abdominal:      General: There is no distension.      Palpations: Abdomen is soft.   Musculoskeletal:      Right lower leg: No edema.      Left lower leg: No edema.   Skin:     Coloration: Skin is not jaundiced.      Findings: Bruising present.   Neurological:      General: No focal  deficit present.      Mental Status: She is alert and oriented to person, place, and time.      Cranial Nerves: No cranial nerve deficit.   Psychiatric:         Mood and Affect: Mood normal.         Behavior: Behavior normal.         Meds:    Current Facility-Administered Medications:   •  fluconazole  •  potassium chloride SA  •  acetaminophen  •  acyclovir  •  amLODIPine  •  folic acid  •  senna-docusate **AND** polyethylene glycol/lytes **AND** magnesium hydroxide **AND** bisacodyl  •  Respiratory Therapy Consult  •  LR  •  cefepime  •  ondansetron  •  ondansetron  •  prochlorperazine    Labs:  Recent Labs     07/06/20  0053 07/07/20  0049 07/08/20  0006   WBC 0.5* 0.7* 0.7*   RBC 2.65* 2.91* 2.51*   HEMOGLOBIN 7.7* 8.4* 7.3*   HEMATOCRIT 21.8* 24.1* 21.1*   MCV 82.3 82.8 84.1   MCH 29.1 28.9 29.1   RDW 35.6* 35.9 35.8*   PLATELETCT 11* 7* 32*   MPV 10.1 10.7 9.6   NEUTSPOLYS 1.70* 0.00* 1.30*   LYMPHOCYTES 98.30* 100.00* 98.00*   MONOCYTES 0.00 0.00 0.70   EOSINOPHILS 0.00 0.00 0.00   BASOPHILS 0.00 0.00 0.00   RBCMORPHOLO Present Present Present     Recent Labs     07/06/20  0053 07/07/20  0049 07/08/20  0006   SODIUM 135 136 135   POTASSIUM 3.6 3.8 4.1   CHLORIDE 102 99 99   CO2 21 24 23   GLUCOSE 109* 111* 118*   BUN 8 10 12     Recent Labs     07/06/20  0053 07/07/20  0049 07/08/20  0006   CREATININE 0.42* 0.48* 0.47*       Imaging:  Dx-chest-portable (1 View)    Result Date: 7/2/2020 7/2/2020 5:13 PM HISTORY/REASON FOR EXAM:  Fever. TECHNIQUE/EXAM DESCRIPTION AND NUMBER OF VIEWS: Single portable view of the chest. COMPARISON: 6/19/2020 FINDINGS: Cardiac contours prominent. No focal pulmonary consolidation. No pleural fluid collection or pneumothorax. No major bony abnormality is seen.     1.  No pneumonia or pulmonary edema. 2.  Stable mild cardiomegaly.    Dx-chest-portable (1 View)    Result Date: 6/19/2020 6/19/2020 4:58 AM HISTORY/REASON FOR EXAM: left subclavian central line TECHNIQUE/EXAM  "DESCRIPTION:  Single AP view of the chest. COMPARISON: June 18, 2020 FINDINGS: Left subclavian central line is seen terminating in the superior vena cava.   Overlying cardiac leads are present. Cardiomegaly is observed. The mediastinal contour appears within normal limits.  Bilateral perihilar interstitial prominence and bronchial wall cuffing is noted. The lungs appear well expanded bilaterally.  Hazy interstitial bilateral pulmonary opacities are seen. No significant pleural effusions are identified. The bony structures appear age-appropriate.     1.  Interstitial pulmonary parenchymal prominence, compatible with interstitial edema and/or infiltrates. 2.  Cardiomegaly    Dx-chest-portable (1 View)    Result Date: 6/18/2020 6/18/2020 5:40 PM HISTORY/REASON FOR EXAM:  possible sepsis. TECHNIQUE/EXAM DESCRIPTION AND NUMBER OF VIEWS: Single portable view of the chest. COMPARISON: 5/17/2020 FINDINGS: Cardiac mediastinal contour is unchanged. Mildly prominent interstitium. Ill-defined opacity at LEFT lung base, improved from prior exam. No pleural fluid collection or pneumothorax. No major bony abnormality is seen.     1.  Apparent resolving LEFT lung base infiltrate or atelectasis. 2.  Diffuse prominence of the pulmonary interstitium may indicate pneumonitis or edema.      Micro:  Results     Procedure Component Value Units Date/Time    BLOOD CULTURE [[id number]] Collected:  07/02/20 1635    Order Status:  Completed Specimen:  Blood from Peripheral Updated:  07/07/20 1900     Significant Indicator NEG     Source BLD     Site PERIPHERAL     Culture Result No growth after 5 days of incubation.    Narrative:       Per Hospital Policy: Only change Specimen Src: to \"Line\" if  specified by physician order.  No site indicated    BLOOD CULTURE [[id number]] Collected:  07/02/20 1707    Order Status:  Completed Specimen:  Blood from Peripheral Updated:  07/07/20 1900     Significant Indicator NEG     Source BLD     Site " "PERIPHERAL     Culture Result No growth after 5 days of incubation.    Narrative:       Per Hospital Policy: Only change Specimen Src: to \"Line\" if  specified by physician order.  Left Hand    Blood Culture [[id number]] Collected:  07/03/20 1253    Order Status:  Completed Specimen:  Blood from Peripheral Updated:  07/04/20 0740     Significant Indicator NEG     Source BLD     Site PERIPHERAL     Culture Result No Growth  Note: Blood cultures are incubated for 5 days and  are monitored continuously.Positive blood cultures  are called to the RN and reported as soon as  they are identified.      Narrative:       Per Hospital Policy: Only change Specimen Src: to \"Line\" if  specified by physician order.  Right AC    Blood Culture [[id number]] Collected:  07/03/20 1253    Order Status:  Completed Specimen:  Blood from Peripheral Updated:  07/04/20 0740     Significant Indicator NEG     Source BLD     Site PERIPHERAL     Culture Result No Growth  Note: Blood cultures are incubated for 5 days and  are monitored continuously.Positive blood cultures  are called to the RN and reported as soon as  they are identified.      Narrative:       Per Hospital Policy: Only change Specimen Src: to \"Line\" if  specified by physician order.  Right Wrist    URINALYSIS,CULTURE IF INDICATED [[id number]]  (Abnormal) Collected:  07/03/20 1640    Order Status:  Completed Specimen:  Urine, Clean Catch Updated:  07/03/20 1706     Color Yellow     Character Clear     Specific Gravity 1.007     Ph 8.0     Glucose Negative mg/dL      Ketones Negative mg/dL      Protein Negative mg/dL      Bilirubin Negative     Urobilinogen, Urine 1.0     Nitrite Negative     Leukocyte Esterase Negative     Occult Blood Moderate     Micro Urine Req Microscopic    Narrative:       Collected By:[id number] MALENA DERAS    Blood Culture [[id number]]     Order Status:  Canceled Specimen:  Blood from Peripheral     Blood Culture [[id number]]     Order Status:  Canceled Specimen: "  Blood from Peripheral     Urinalysis [[id number]]     Order Status:  Canceled Specimen:  Urine, Clean Catch     SARS-CoV-2, PCR (In-House) [[id number]] Collected:  07/02/20 1700    Order Status:  Completed Updated:  07/02/20 1804     SARS-CoV-2 Source NP Swab     SARS-CoV-2 by PCR NotDetected     Comment: [facility] providers: PLEASE REFER TO DE-ESCALATION AND RETESTING PROTOCOL  on [email or web address]  **The [employer] GeneXpert Xpress SARS-CoV-2 Test has been made available for  use under the Emergency Use Authorization (EUA) only.         Narrative:       Expected Turn around time?->Rush (Cepheid 2-4 hours)    COVID/SARS CoV-2 PCR [[id number]] Collected:  07/02/20 1700    Order Status:  Completed Specimen:  Respirate from Nasopharyngeal Updated:  07/02/20 1703     COVID Order Status Received    Narrative:       Expected Turn around time?->Rush (Cepheid 2-4 hours)    URINALYSIS,CULTURE IF INDICATED [[id number]]     Order Status:  Canceled Specimen:  Urine           Assessment:  Active Hospital Problems    Diagnosis   • Neutropenic fever (HCC) [D70.9, R50.81]   • CLL (chronic lymphocytic leukemia) (HCC) [C91.10]   • Pancytopenia (HCC) [D61.818]   • Thrombocytopenia ([facility]) [D69.6]       Plan:  Neutropenic fever  Afebrile since 7/5  Remains profoundly neutropenic   COVID neg  Bcxs neg  Ucx neg  CXR neg-no resp complaints  QuantiGold and cocci neg 5/2020  Continue cefepime      CLL  Pancytopenic  Immunosuppressed  Holding chemotherapy as long as feasible  Getting transfusions as needed  Continue acyclovir and antifungal prophylaxis

## 2020-07-08 NOTE — PROGRESS NOTES
Patient is alert and oriented up self with a steady gait. Patient is neutropenic precautions in place. Patient did not need transfusions today per oncology. Reviewed POC with  patient. Call light within reach hourly rounding in practice. Call light within reach.

## 2020-07-08 NOTE — PROGRESS NOTES
Velma from Lab called with critical result of WBC 0.7, Platelets 32 at 0132. Critical lab result read back to Velma.   This critical lab result is within parameters established by  for this patient

## 2020-07-09 PROBLEM — D70.9 NEUTROPENIC FEVER (HCC): Status: RESOLVED | Noted: 2020-01-01 | Resolved: 2020-01-01

## 2020-07-09 PROBLEM — R50.81 NEUTROPENIC FEVER (HCC): Status: RESOLVED | Noted: 2020-01-01 | Resolved: 2020-01-01

## 2020-07-09 NOTE — PROGRESS NOTES
HOSPITAL MEDICINE PROGRESS NOTE    Date of service  7/9/2020    Chief Complaint  Fever      Hospital Course:     54-year-old female with past medical history of CLL on chemotherapy presenting on 7/2 with neutropenic fever.  She was started on broad-spectrum antibiotics.  Oncology was consulted and her chemotherapy was held.  She was seen by ID and vancomycin was discontinued.  Her blood cultures have remained negative since admission.       Interval History Updates:  tmax 100.6 overnight  hgb 6.9 so PRBC ordered  She continues to occasionally feel feverish      Consultants/Specialty  Heme/Onc   Infectious Disease    Review of Systems   Review of Systems   Constitutional: Positive for fever. Negative for chills and malaise/fatigue.   Respiratory: Negative for cough, sputum production and shortness of breath.    Gastrointestinal: Negative for abdominal pain, diarrhea, nausea and vomiting.   Genitourinary: Negative for dysuria and urgency.   All other systems reviewed and are negative.       Medications:  • fluconazole  100 mg DAILY   • potassium chloride SA  40 mEq BID   • acetaminophen  650 mg Q6HRS PRN   • acyclovir  400 mg BID   • amLODIPine  5 mg DAILY   • folic acid  1 mg DAILY   • senna-docusate  2 Tab BID    And   • polyethylene glycol/lytes  1 Packet QDAY PRN    And   • magnesium hydroxide  30 mL QDAY PRN    And   • bisacodyl  10 mg QDAY PRN   • Respiratory Therapy Consult   Continuous RT   • LR  30 mL/kg Once PRN   • cefepime  2 g Q8HRS   • ondansetron  4 mg Q4HRS PRN   • ondansetron  4 mg Q4HRS PRN   • prochlorperazine  5-10 mg Q4HRS PRN       Physical Exam   Vitals:    07/09/20 0338 07/09/20 0456 07/09/20 0524 07/09/20 0915   BP: 108/58 115/58 110/52 115/59   Pulse: 77 86 84 76   Resp: 18 18 18 16   Temp: 37.1 °C (98.8 °F) 37.6 °C (99.7 °F) 37.7 °C (99.9 °F) 36.7 °C (98 °F)   TempSrc: Oral Oral Oral    SpO2: 99% 100% 98% 99%   Weight:       Height:           Physical Exam   Constitutional: She is oriented  "to person, place, and time and well-developed, well-nourished, and in no distress.   Cardiovascular: Normal rate and regular rhythm.   Pulmonary/Chest: Effort normal and breath sounds normal. No respiratory distress.   Abdominal: Soft. Bowel sounds are normal. There is no abdominal tenderness.   Neurological: She is alert and oriented to person, place, and time.   Nursing note and vitals reviewed.         Laboratory   Recent Labs     07/07/20 0049 07/08/20 0006 07/09/20 0046   WBC 0.7* 0.7* 1.0*   RBC 2.91* 2.51* 2.36*   HEMOGLOBIN 8.4* 7.3* 6.9*   HEMATOCRIT 24.1* 21.1* 19.6*   PLATELETCT 7* 32* 22*     Recent Labs     07/07/20 0049 07/08/20 0006 07/09/20 0046   SODIUM 136 135 134*   POTASSIUM 3.8 4.1 4.3   CHLORIDE 99 99 102   CO2 24 23 24   GLUCOSE 111* 118* 112*   BUN 10 12 12   CREATININE 0.48* 0.47* 0.49*      Recent Labs     07/07/20 0049 07/08/20 0006 07/09/20 0046   ALTSGPT  --   --  38   ASTSGOT  --   --  30   ALKPHOSPHAT  --   --  85   TBILIRUBIN  --   --  0.8   GLUCOSE 111* 118* 112*                Microbiology  Results     Procedure Component Value Units Date/Time    Blood Culture [[id number]] Collected:  07/03/20 1253    Order Status:  Completed Specimen:  Blood from Peripheral Updated:  07/08/20 1500     Significant Indicator NEG     Source BLD     Site PERIPHERAL     Culture Result No growth after 5 days of incubation.    Narrative:       Per Hospital Policy: Only change Specimen Src: to \"Line\" if  specified by physician order.  Right Wrist    Blood Culture [[id number]] Collected:  07/03/20 1253    Order Status:  Completed Specimen:  Blood from Peripheral Updated:  07/08/20 1500     Significant Indicator NEG     Source BLD     Site PERIPHERAL     Culture Result No growth after 5 days of incubation.    Narrative:       Per Hospital Policy: Only change Specimen Src: to \"Line\" if  specified by physician order.  Right AC    BLOOD CULTURE [[id number]] Collected:  07/02/20 1635    Order Status:  " "Completed Specimen:  Blood from Peripheral Updated:  07/07/20 1900     Significant Indicator NEG     Source BLD     Site PERIPHERAL     Culture Result No growth after 5 days of incubation.    Narrative:       Per Hospital Policy: Only change Specimen Src: to \"Line\" if  specified by physician order.  No site indicated    BLOOD CULTURE [[id number]] Collected:  07/02/20 1707    Order Status:  Completed Specimen:  Blood from Peripheral Updated:  07/07/20 1900     Significant Indicator NEG     Source BLD     Site PERIPHERAL     Culture Result No growth after 5 days of incubation.    Narrative:       Per Hospital Policy: Only change Specimen Src: to \"Line\" if  specified by physician order.  Left Hand    URINALYSIS,CULTURE IF INDICATED [[id number]]  (Abnormal) Collected:  07/03/20 1640    Order Status:  Completed Specimen:  Urine, Clean Catch Updated:  07/03/20 1706     Color Yellow     Character Clear     Specific Gravity 1.007     Ph 8.0     Glucose Negative mg/dL      Ketones Negative mg/dL      Protein Negative mg/dL      Bilirubin Negative     Urobilinogen, Urine 1.0     Nitrite Negative     Leukocyte Esterase Negative     Occult Blood Moderate     Micro Urine Req Microscopic    Narrative:       Collected By:[id number] MALENA DERAS    Blood Culture [[id number]]     Order Status:  Canceled Specimen:  Blood from Peripheral     Blood Culture [[id number]]     Order Status:  Canceled Specimen:  Blood from Peripheral     Urinalysis [[id number]]     Order Status:  Canceled Specimen:  Urine, Clean Catch     SARS-CoV-2, PCR (In-House) [[id number]] Collected:  07/02/20 1700    Order Status:  Completed Updated:  07/02/20 1804     SARS-CoV-2 Source NP Swab     SARS-CoV-2 by PCR NotDetected     Comment: [facility] providers: PLEASE REFER TO DE-ESCALATION AND RETESTING PROTOCOL  on [email or web address]  **The [employer] GeneXpert Xpress SARS-CoV-2 Test has been made available for  use under the Emergency Use Authorization (EUA) only.         " Narrative:       Expected Turn around time?->Rush (Cepheid 2-4 hours)    COVID/SARS CoV-2 PCR [[id number]] Collected:  07/02/20 1700    Order Status:  Completed Specimen:  Respirate from Nasopharyngeal Updated:  07/02/20 1703     COVID Order Status Received    Narrative:       Expected Turn around time?->Rush (Cepheid 2-4 hours)    URINALYSIS,CULTURE IF INDICATED [[id number]]     Order Status:  Canceled Specimen:  Urine              Labs reviewed by me and noted above.    Radiology  DX-CHEST-PORTABLE (1 VIEW)  Narrative: 7/2/2020 5:13 PM    HISTORY/REASON FOR EXAM:  Fever.    TECHNIQUE/EXAM DESCRIPTION AND NUMBER OF VIEWS:  Single portable view of the chest.    COMPARISON: 6/19/2020    FINDINGS:  Cardiac contours prominent.  No focal pulmonary consolidation.  No pleural fluid collection or pneumothorax.  No major bony abnormality is seen.  Impression: 1.  No pneumonia or pulmonary edema.  2.  Stable mild cardiomegaly.      No results found for this or any previous visit.       Assessment and Plan    CLL (chronic lymphocytic leukemia) (HCC)- (present on admission)  Assessment & Plan  Monitor CBC  She has been on chemotherapy  Follows with Dr. Summers    Neutropenic fever ([facility])  Assessment & Plan  Cont cefepime, acyclovir, and fluconazole  ID following  BCx NGTD  Will d/w ID about PO antibiotics and possible dc tomorrow      Pancytopenia (HCC)- (present on admission)  Assessment & Plan  She of receiving platelet transfusions she is on active chemotherapy for CLL  Normally follows with Dr. Summers  Transfuse Hg <7.    Thrombocytopenia (HCC)- (present on admission)  Assessment & Plan  Transfuse for plt <10,000.      DVT prophylaxis: SCD    CODE STATUS:  FULL CODE    Disposition: Possible dc tomorrow if switched to PO antibiotics    Case was discussed with Primary RN in addition to individual(s) mentioned above.    I have performed a physical exam and reviewed and updated ROS as of today, 7/9/2020.  In review of yesterday's  note dated, 7/8/2020, there are no changes except as documented above.      Dax Kaur M.D.

## 2020-07-09 NOTE — DISCHARGE SUMMARY
Discharge Summary    CHIEF COMPLAINT ON ADMISSION  Chief Complaint   Patient presents with   • Fever       Reason for Admission  other     Admission Date  7/2/2020    CODE STATUS  Full Code    HPI & HOSPITAL COURSE  54-year-old female with past medical history of CLL on chemotherapy presenting on 7/2 with neutropenic fever.  She was started on broad-spectrum antibiotics.  Oncology was consulted and her chemotherapy was held.  She was seen by ID and vancomycin was discontinued.  Her blood cultures have remained negative since admission. As there is no source for possible infection it is felt that she may be having fevers due to her CLL. She will be discharged on levaquin to be continued at discretion of oncologist and fluconazole.      Therefore, she is discharged in fair and stable condition to home with close outpatient follow-up.    The patient met 2-midnight criteria for an inpatient stay at the time of discharge.    Discharge Date  7/9/20    FOLLOW UP ITEMS POST DISCHARGE  Oncologist to reassess need of levaquin    DISCHARGE DIAGNOSES  Active Problems:    CLL (chronic lymphocytic leukemia) (HCC) (Chronic) POA: Yes    Thrombocytopenia (HCC) POA: Yes    Pancytopenia (HCC) POA: Yes  Resolved Problems:    Neutropenic fever (HCC) POA: Unknown    Sepsis (HCC) POA: Unknown      FOLLOW UP  Future Appointments   Date Time Provider Department Center   7/10/2020  8:15 AM [facility]   7/12/2020  9:45 AM [facility] [address]   7/14/2020 10:00 AM [facility] [address]   7/16/2020  3:00 PM [facility] [address]   7/18/2020  9:45 AM [facility] [address]   7/20/2020 10:00 AM [facility] [address]   7/22/2020 10:00 AM [facility] [address]   7/24/2020 10:00 AM [facility] [address]     No follow-up provider specified.    MEDICATIONS ON DISCHARGE     Medication List      START taking these medications       Instructions   fluconazole 100 MG Tabs  Commonly known as:  DIFLUCAN   Take 1 Tab by mouth every day.  Dose:  100 mg        CONTINUE taking these medications      Instructions   acyclovir 400 MG tablet  Commonly known as:  ZOVIRAX   Take 400 mg by mouth 2 times a day.  Dose:  400 mg     amLODIPine 5 MG Tabs  Commonly known as:  NORVASC   Take 5 mg by mouth every day.  Dose:  5 mg     folic acid 1 MG Tabs  Commonly known as:  FOLVITE   Take 1 mg by mouth every day.  Dose:  1 mg     levoFLOXacin 500 MG tablet  Commonly known as:  LEVAQUIN   Take 1 Tab by mouth every day.  Dose:  500 mg     potassium Chloride ER 20 MEQ Tbcr tablet  Commonly known as:  K-TAB   Take 20 mEq by mouth every day.  Dose:  20 mEq        STOP taking these medications    cefdinir 300 MG Caps  Commonly known as:  OMNICEF     Ibrutinib 420 MG Tabs            Allergies  Allergies   Allergen Reactions   • Amoxicillin Hives     Tolerates cephalosporins   • Benadryl Allergy Hives   • Excedrin Migraine Swelling   • Sulfamethoxazole Hives   • Famotidine Itching       DIET  Orders Placed This Encounter   Procedures   • Diet Order Regular (neutropenic diet)     Standing Status:   Standing     Number of Occurrences:   1     Order Specific Question:   Diet:     Answer:   Regular [1]     Comments:   neutropenic diet       ACTIVITY  As tolerated.  Weight bearing as tolerated    CONSULTATIONS  Oncology   ID    PROCEDURES  none    LABORATORY  Lab Results   Component Value Date    SODIUM 134 (L) 07/09/2020    POTASSIUM 4.3 07/09/2020    CHLORIDE 102 07/09/2020    CO2 24 07/09/2020    GLUCOSE 112 (H) 07/09/2020    BUN 12 07/09/2020    CREATININE 0.49 (L) 07/09/2020        Lab Results   Component Value Date    WBC 1.0 (LL) 07/09/2020    HEMOGLOBIN 6.9 (L) 07/09/2020    HEMATOCRIT 19.6 (L) 07/09/2020    PLATELETCT 22 (LL) 07/09/2020        Total time of the discharge process exceeds 36 minutes.

## 2020-07-09 NOTE — CARE PLAN
Problem: Communication  Goal: The ability to communicate needs accurately and effectively will improve  Outcome: PROGRESSING AS EXPECTED  Note: Pt able to communicate needs effectively.     Problem: Safety  Goal: Will remain free from injury  Outcome: PROGRESSING AS EXPECTED  Note: Pt up self and steady in room, call light within reach, hourly rounding in place.

## 2020-07-09 NOTE — CARE PLAN
Problem: Safety  Goal: Will remain free from injury  Outcome: PROGRESSING AS EXPECTED  Intervention: Provide assistance with mobility  Note: Pt is A&Ox4, up self with steady gait. Calls appropriately if assistance is needed.      Problem: Infection  Goal: Will remain free from infection  Outcome: PROGRESSING AS EXPECTED  Intervention: Assess signs and symptoms of infection  Note: Pt with fever of 100.6 F oral, administered Tylenol per MAR. Temp now down to 100 F. Q4hr vitals being monitored. Neutropenic precautions in place.

## 2020-07-09 NOTE — DISCHARGE INSTRUCTIONS
Neutropenic Fever  Neutropenic fever is a type of fever that can develop when you have a very low number of a certain kind of white blood cells called neutrophils. This condition is called neutropenia. Neutrophils are made in the spongy tissue inside the bones (bone marrow), and they help the body fight infection.  When you have neutropenia, you could be in danger of a severe infection and neutropenic fever. Neutropenic fever must be treated right away with antibiotic medicines.  What are the causes?  This condition is caused by damage to the bone marrow or damage to neutrophils after they leave the bone marrow. Causes of neutropenia may include:  · Cancer treatments.  · Bone marrow cancer.  · Cancer of the white blood cells (leukemia or myeloma).  · Severe infection.  · Bone marrow failure (aplastic anemia).  · Many types of medicines.  · Conditions that affect the body’s disease-fighting system (autoimmune diseases).  · Genes that are passed from parent to child (inherited).  · Lack (deficiency) of vitamin B.  · Enlarged spleen in rheumatoid arthritis (Felty syndrome).  What are the signs or symptoms?  The main symptom of this condition is fever. Other symptoms include:  · Chills.  · Fatigue.  · Painful mouth ulcers.  · Cough.  · Shortness of breath.  · Swollen glands (lymph nodes).  · Sore throat.  · Sinus and ear infections.  · Gum disease.  · Skin infection.  · Burning and frequent urination.  · Rectal infections.  · Vaginal discharge or itching.  · Body aches.  How is this diagnosed?  You may be diagnosed with neutropenic fever if you have a neutrophil count of less than 500 neutrophils per microliter of blood and a fever of at least 100.4°F (38.0°C). You may have tests, such as:  · Blood tests. These may include:  ? A complete blood count (CBC) and a differential white blood count (WBC).  ? Peripheral smear. This test involves checking a blood sample under a microscope.  · Tests to see whether germs grow from  samples of your blood, urine, or other body fluids (cultures). These may be done to check for a source of infection.  · Chest X-rays.  Your health care provider will also determine whether your neutropenic fever is high risk or low risk.  · You may have high-risk neutropenic fever if:  ? Your neutrophil count is less than 100 neutrophils per microliter of blood.  ? You have also been diagnosed with pneumonia or another serious medical problem or infection.  ? Your condition requires you to be treated in the hospital.  ? You are older than 60 years.  · You may have low-risk neutropenic fever if:  ? Your neutrophil count is more than 100 neutrophils per microliter of blood.  ? Your chest X-ray is normal.  ? You do not have an active illness or any other problems that require you to be in the hospital.  How is this treated?  Treatment for this condition may be started as soon as you get diagnosed with neutropenic fever, even if your health care provider is still looking for the source of infection.  · You may have to stop taking any medicine that could be causing neutropenic fever.  · If you have high-risk neutropenic fever, you will receive one or more antibiotics through an IV in the hospital.  · If you have low-risk neutropenic fever, you may be treated at home. Treatment may involve:  ? Taking one or two antibiotics by mouth (orally).  ? Receiving IV antibiotics that are given by a health care provider who visits your home.  · If your health care provider finds a specific cause of infection, you may be switched to antibiotics that work best against those bacteria. If a fungal infection is found, your medicine will be changed to an antifungal medicine.  · If your fever goes away in 3-5 days, you may have to take medicine for about 7 days. If your fever does not go away after 3-5 days, you may have to take medicine for a longer period.  · If your fever is caused by cancer medicines (chemotherapy), you may need to  take a certain medicine (white blood cell growth factors) that helps prevent fever.  Follow these instructions at home:  Preventing infection    · Take steps to prevent infections:  ? Avoid contact with sick people.  ? Do not eat uncooked or undercooked meats.  ? Wash all fruits and vegetables before eating.  ? Do not eat or drink unpasteurized dairy products.  ? Get regular dental care, and maintain good dental hygiene.  ? Get a flu shot (influenza vaccine). Ask your health care provider whether you need any other vaccines.  ? Wear gloves when gardening.  ? Wash your hands often with soap and water. If soap and water are not available, use hand .  General instructions  · Take over-the-counter and prescription medicines only as told by your health care provider.  · Take your antibiotic medicine as told by your health care provider. Do not stop taking the antibiotic even if you start to feel better.  · Drink enough fluid to keep your urine pale yellow. This helps to prevent dehydration.  · Keep all follow-up visits as told by your health care provider. This is important. If you are being treated with oral antibiotics at home, you may need to return to your health care provider every day to have your CBC checked. You may have to do this until your fever gets better.  Contact a health care provider if:  · You have chills.  · You have a fever.  · You have signs or symptoms of an infection.  Get help right away if:  · You have trouble breathing.  · You have chest pain.  · You feel faint or dizzy.  · You faint.  Summary  · Neutropenic fever is a type of fever that can develop when you have a very low number of white blood cells called neutrophils.  · Neutropenic fever must be treated right away with antibiotic medicines.  · Causes of this condition include cancers of the blood and bone marrow, as well as medicines to treat cancer (chemotherapy).  · Take steps to reduce your risk of infection. These may include  avoiding contact with sick people, cooking all meats thoroughly, washing fruits and vegetables before eating, and washing hands often with soap and water.  This information is not intended to replace advice given to you by your health care provider. Make sure you discuss any questions you have with your health care provider.  Document Released: 12/23/2014 Document Revised: 11/30/2018 Document Reviewed: 03/27/2018  [employer] Patient Education © 2020 [employer]  Discharge Instructions    Discharged to home by car with relative. Discharged via wheelchair, hospital escort: Yes.  Special equipment needed: Not Applicable    Be sure to schedule a follow-up appointment with your primary care doctor or any specialists as instructed.     Discharge Plan:   Diet Plan: Discussed  Activity Level: Discussed  Confirmed Follow up Appointment: No (Comments)  Confirmed Symptoms Management: Discussed  Medication Reconciliation Updated: Yes    I understand that a diet low in cholesterol, fat, and sodium is recommended for good health. Unless I have been given specific instructions below for another diet, I accept this instruction as my diet prescription.   Other diet: Regular    Special Instructions: None    · Is patient discharged on Warfarin / Coumadin?   No     Depression / Suicide Risk    As you are discharged from this [facility], it is important to learn how to keep safe from harming yourself.    Recognize the warning signs:  · Abrupt changes in personality, positive or negative- including increase in energy   · Giving away possessions  · Change in eating patterns- significant weight changes-  positive or negative  · Change in sleeping patterns- unable to sleep or sleeping all the time   · Unwillingness or inability to communicate  · Depression  · Unusual sadness, discouragement and loneliness  · Talk of wanting to die  · Neglect of personal appearance   · Rebelliousness- reckless behavior  · Withdrawal from  people/activities they love  · Confusion- inability to concentrate     If you or a loved one observes any of these behaviors or has concerns about self-harm, here's what you can do:  · Talk about it- your feelings and reasons for harming yourself  · Remove any means that you might use to hurt yourself (examples: pills, rope, extension cords, firearm)  · Get professional help from the community (Mental Health, Substance Abuse, psychological counseling)  · Do not be alone:Call your Safe Contact- someone whom you trust who will be there for you.  · Call your local CRISIS HOTLINE 914-1912 or 699-731-0030  · Call your local Children's Mobile Crisis Response Team [place] (052) 673-6238 or www.EPS  · Call the toll free National Suicide Prevention Hotlines   · National Suicide Prevention Lifeline 481-543-XMPI (9897)  · National Hope Line Network 800-SUICIDE (732-4101)

## 2020-07-09 NOTE — DISCHARGE PLANNING
Meds-to-Beds: Discharge prescription orders listed below delivered to patient's bedside. RN notified. Patient counseled.      Partial fill of 15 tablets of fluconazole dispensed to patient. Patient agreeable to have remainder mailed to home address on file.     Insurance limits levofloxacin to 14 tablets per 30 days. Discussed with  and order changed to 1 tablet daily for 14 days.      Lana Johnston [facility]   Home Medication Instructions KATELYN:[id number]    Printed on:07/09/20 1332   Medication Information                      fluconazole (DIFLUCAN) 100 MG Tab  Take 1 Tab by mouth every day.             levoFLOXacin (LEVAQUIN) 500 MG tablet  Take 1 Tab by mouth every day.               Pretty Bear, PharmD

## 2020-07-09 NOTE — PROGRESS NOTES
Infectious Disease Progress Note    Author: Lori Boone M.D. Date & Time of service: 2020  11:58 AM    Chief Complaint:  Neutropenic fever    Interval History:  54-year-old female with CLL, on chemotherapy with ibrutinib, who was admitted to the hospital on 2020 due to fever and pancytopenia with associated severe neutropenia.   AF WBC 0.7remains pancytopenic-no new complaints +fatigue and did not sleep well last night   Tmax 100.6 WBC getting transfusions. Wants to go home    Labs Reviewed and Medications Reviewed.    Review of Systems:  Review of Systems   Constitutional: Positive for malaise/fatigue. Negative for fever.   Respiratory: Negative for cough, hemoptysis and shortness of breath.    Cardiovascular: Negative for chest pain.   Gastrointestinal: Negative for nausea and vomiting.   Genitourinary: Negative for dysuria.   Neurological: Negative for dizziness.   All other systems reviewed and are negative.      Hemodynamics:  Temp (24hrs), Av.4 °C (99.4 °F), Min:36.7 °C (98 °F), Max:38.1 °C (100.6 °F)  Temperature: 37.1 °C (98.8 °F)  Pulse  Av.1  Min: 71  Max: 148   Blood Pressure: 127/68       Physical Exam:  Physical Exam  Vitals signs reviewed.   Constitutional:       General: She is not in acute distress.     Appearance: She is ill-appearing. She is not toxic-appearing or diaphoretic.   HENT:      Nose: No rhinorrhea.      Mouth/Throat:      Mouth: Mucous membranes are moist.      Pharynx: No oropharyngeal exudate.   Eyes:      General: No scleral icterus.     Extraocular Movements: Extraocular movements intact.      Pupils: Pupils are equal, round, and reactive to light.   Cardiovascular:      Rate and Rhythm: Normal rate.   Pulmonary:      Effort: Pulmonary effort is normal. No respiratory distress.      Breath sounds: No stridor.   Abdominal:      General: There is no distension.      Palpations: Abdomen is soft.   Musculoskeletal:      Right lower leg: No edema.       Left lower leg: No edema.   Skin:     Coloration: Skin is not jaundiced.      Findings: Bruising present.   Neurological:      General: No focal deficit present.      Mental Status: She is alert and oriented to person, place, and time.      Cranial Nerves: No cranial nerve deficit.   Psychiatric:         Mood and Affect: Mood normal.         Behavior: Behavior normal.         Meds:    Current Facility-Administered Medications:   •  fluconazole  •  potassium chloride SA  •  acetaminophen  •  acyclovir  •  amLODIPine  •  folic acid  •  senna-docusate **AND** polyethylene glycol/lytes **AND** magnesium hydroxide **AND** bisacodyl  •  Respiratory Therapy Consult  •  LR  •  cefepime  •  ondansetron  •  ondansetron  •  prochlorperazine    Labs:  Recent Labs     07/07/20 0049 07/08/20 0006 07/09/20 0046   WBC 0.7* 0.7* 1.0*   RBC 2.91* 2.51* 2.36*   HEMOGLOBIN 8.4* 7.3* 6.9*   HEMATOCRIT 24.1* 21.1* 19.6*   MCV 82.8 84.1 83.1   MCH 28.9 29.1 29.2   RDW 35.9 35.8* 35.2*   PLATELETCT 7* 32* 22*   MPV 10.7 9.6 8.6*   NEUTSPOLYS 0.00* 1.30* 0.00*   LYMPHOCYTES 100.00* 98.00* 100.00*   MONOCYTES 0.00 0.70 0.00   EOSINOPHILS 0.00 0.00 0.00   BASOPHILS 0.00 0.00 0.00   RBCMORPHOLO Present Present Present     Recent Labs     07/07/20 0049 07/08/20 0006 07/09/20 0046   SODIUM 136 135 134*   POTASSIUM 3.8 4.1 4.3   CHLORIDE 99 99 102   CO2 24 23 24   GLUCOSE 111* 118* 112*   BUN 10 12 12     Recent Labs     07/07/20 0049 07/08/20 0006 07/09/20 0046   ALBUMIN  --   --  3.7   TBILIRUBIN  --   --  0.8   ALKPHOSPHAT  --   --  85   TOTPROTEIN  --   --  6.9   ALTSGPT  --   --  38   ASTSGOT  --   --  30   CREATININE 0.48* 0.47* 0.49*       Imaging:  Dx-chest-portable (1 View)    Result Date: 7/2/2020 7/2/2020 5:13 PM HISTORY/REASON FOR EXAM:  Fever. TECHNIQUE/EXAM DESCRIPTION AND NUMBER OF VIEWS: Single portable view of the chest. COMPARISON: 6/19/2020 FINDINGS: Cardiac contours prominent. No focal pulmonary consolidation. No  pleural fluid collection or pneumothorax. No major bony abnormality is seen.     1.  No pneumonia or pulmonary edema. 2.  Stable mild cardiomegaly.    Dx-chest-portable (1 View)    Result Date: 6/19/2020 6/19/2020 4:58 AM HISTORY/REASON FOR EXAM: left subclavian central line TECHNIQUE/EXAM DESCRIPTION:  Single AP view of the chest. COMPARISON: June 18, 2020 FINDINGS: Left subclavian central line is seen terminating in the superior vena cava.   Overlying cardiac leads are present. Cardiomegaly is observed. The mediastinal contour appears within normal limits.  Bilateral perihilar interstitial prominence and bronchial wall cuffing is noted. The lungs appear well expanded bilaterally.  Hazy interstitial bilateral pulmonary opacities are seen. No significant pleural effusions are identified. The bony structures appear age-appropriate.     1.  Interstitial pulmonary parenchymal prominence, compatible with interstitial edema and/or infiltrates. 2.  Cardiomegaly    Dx-chest-portable (1 View)    Result Date: 6/18/2020 6/18/2020 5:40 PM HISTORY/REASON FOR EXAM:  possible sepsis. TECHNIQUE/EXAM DESCRIPTION AND NUMBER OF VIEWS: Single portable view of the chest. COMPARISON: 5/17/2020 FINDINGS: Cardiac mediastinal contour is unchanged. Mildly prominent interstitium. Ill-defined opacity at LEFT lung base, improved from prior exam. No pleural fluid collection or pneumothorax. No major bony abnormality is seen.     1.  Apparent resolving LEFT lung base infiltrate or atelectasis. 2.  Diffuse prominence of the pulmonary interstitium may indicate pneumonitis or edema.      Micro:  Results     Procedure Component Value Units Date/Time    Blood Culture [[id number]] Collected:  07/03/20 1253    Order Status:  Completed Specimen:  Blood from Peripheral Updated:  07/08/20 1500     Significant Indicator NEG     Source BLD     Site PERIPHERAL     Culture Result No growth after 5 days of incubation.    Narrative:       Per Hospital  "Policy: Only change Specimen Src: to \"Line\" if  specified by physician order.  Right Wrist    Blood Culture [[id number]] Collected:  07/03/20 1253    Order Status:  Completed Specimen:  Blood from Peripheral Updated:  07/08/20 1500     Significant Indicator NEG     Source BLD     Site PERIPHERAL     Culture Result No growth after 5 days of incubation.    Narrative:       Per Hospital Policy: Only change Specimen Src: to \"Line\" if  specified by physician order.  Right AC    BLOOD CULTURE [[id number]] Collected:  07/02/20 1635    Order Status:  Completed Specimen:  Blood from Peripheral Updated:  07/07/20 1900     Significant Indicator NEG     Source BLD     Site PERIPHERAL     Culture Result No growth after 5 days of incubation.    Narrative:       Per Hospital Policy: Only change Specimen Src: to \"Line\" if  specified by physician order.  No site indicated    BLOOD CULTURE [[id number]] Collected:  07/02/20 1707    Order Status:  Completed Specimen:  Blood from Peripheral Updated:  07/07/20 1900     Significant Indicator NEG     Source BLD     Site PERIPHERAL     Culture Result No growth after 5 days of incubation.    Narrative:       Per Hospital Policy: Only change Specimen Src: to \"Line\" if  specified by physician order.  Left Hand    URINALYSIS,CULTURE IF INDICATED [[id number]]  (Abnormal) Collected:  07/03/20 1640    Order Status:  Completed Specimen:  Urine, Clean Catch Updated:  07/03/20 1706     Color Yellow     Character Clear     Specific Gravity 1.007     Ph 8.0     Glucose Negative mg/dL      Ketones Negative mg/dL      Protein Negative mg/dL      Bilirubin Negative     Urobilinogen, Urine 1.0     Nitrite Negative     Leukocyte Esterase Negative     Occult Blood Moderate     Micro Urine Req Microscopic    Narrative:       Collected By:[id number] MALENA DERAS    Blood Culture [[id number]]     Order Status:  Canceled Specimen:  Blood from Peripheral     Blood Culture [[id number]]     Order Status:  Canceled " Specimen:  Blood from Peripheral     Urinalysis [[id number]]     Order Status:  Canceled Specimen:  Urine, Clean Catch     SARS-CoV-2, PCR (In-House) [[id number]] Collected:  07/02/20 1700    Order Status:  Completed Updated:  07/02/20 1804     SARS-CoV-2 Source NP Swab     SARS-CoV-2 by PCR NotDetected     Comment: [facility] providers: PLEASE REFER TO DE-ESCALATION AND RETESTING PROTOCOL  on [email or web address]  **The [employer] GeneXpert Xpress SARS-CoV-2 Test has been made available for  use under the Emergency Use Authorization (EUA) only.         Narrative:       Expected Turn around time?->Rush (Cepheid 2-4 hours)    COVID/SARS CoV-2 PCR [[id number]] Collected:  07/02/20 1700    Order Status:  Completed Specimen:  Respirate from Nasopharyngeal Updated:  07/02/20 1703     COVID Order Status Received    Narrative:       Expected Turn around time?->Rush (Cepheid 2-4 hours)    URINALYSIS,CULTURE IF INDICATED [[id number]]     Order Status:  Canceled Specimen:  Urine           Assessment:  Active Hospital Problems    Diagnosis   • Neutropenic fever (HCC) [D70.9, R50.81]   • CLL (chronic lymphocytic leukemia) (HCC) [C91.10]   • Pancytopenia (HCC) [D61.818]   • Thrombocytopenia (HCC) [D69.6]       Plan:  Neutropenic fever  Low grade fever-multifactorial incl cancer, transfusions, atelectasis, possible occult infection  Remains profoundly neutropenic   COVID neg  Bcxs neg 7/2 and 7/3  Ucx neg  CXR neg-no resp complaints  QuantiGold and cocci neg 5/2020  Continue cefepime while in hosp  Can change to oral levoflox 500 mg PO daily if plan for discharge-100% oral bioavailability  If temp at home greater than 101, cough, etc-return to hosp     CLL  Pancytopenic  Immunosuppressed  Holding chemotherapy as long as feasible  Getting transfusions as needed  Continue acyclovir and antifungal prophylaxis    [facility] Oncology Dr Snowden

## 2020-07-09 NOTE — PROGRESS NOTES
Oncology/Hematology Progress Note               Author: Jazmín Snowden M.D. Date & Time created: 7/9/2020  11:37 AM     CC: Neutropenic fever, CLL    Interval History:  No acute events overnight. Still with low grade fevers, pt otherwise feeling well. Wants to go home. She thinks she gets too many lab draws here. She has infusion appointment tomorrow already arranged.     Review of Systems:  Review of Systems   Constitutional: Positive for malaise/fatigue. Negative for chills, fever and weight loss.   HENT: Negative for hearing loss and tinnitus.    Eyes: Negative for blurred vision and double vision.   Respiratory: Negative for cough and shortness of breath.    Cardiovascular: Negative for chest pain and palpitations.   Gastrointestinal: Negative for abdominal pain, blood in stool, diarrhea, heartburn, melena and nausea.   Genitourinary: Negative for dysuria and urgency.   Musculoskeletal: Negative for myalgias.   Skin: Negative for rash.   Neurological: Negative for dizziness, tingling and headaches.   Endo/Heme/Allergies: Bruises/bleeds easily.   Psychiatric/Behavioral: Negative for depression. The patient is not nervous/anxious.        Physical Exam:  Physical Exam  Constitutional:       General: She is not in acute distress.     Appearance: Normal appearance.   HENT:      Head: Normocephalic and atraumatic.      Right Ear: External ear normal.      Left Ear: External ear normal.      Nose: Nose normal.      Mouth/Throat:      Mouth: Mucous membranes are moist.      Pharynx: Oropharynx is clear.   Eyes:      General: No scleral icterus.     Conjunctiva/sclera: Conjunctivae normal.   Neck:      Musculoskeletal: Neck supple. No muscular tenderness.   Cardiovascular:      Rate and Rhythm: Normal rate.   Pulmonary:      Effort: Pulmonary effort is normal.      Breath sounds: Normal breath sounds.   Abdominal:      General: Abdomen is flat. Bowel sounds are normal. There is no distension.      Palpations: Abdomen  is soft.      Tenderness: There is no abdominal tenderness.   Musculoskeletal: Normal range of motion.         General: No swelling or tenderness.   Skin:     General: Skin is warm and dry.      Coloration: Skin is not jaundiced.      Findings: Bruising (arms over IV and lab draw sites) present. No rash.   Neurological:      General: No focal deficit present.      Mental Status: She is alert and oriented to person, place, and time.   Psychiatric:         Mood and Affect: Mood normal.         Behavior: Behavior normal.         Thought Content: Thought content normal.         Judgment: Judgment normal.         Labs:          Recent Labs     20   SODIUM 136 135 134*   POTASSIUM 3.8 4.1 4.3   CHLORIDE 99 99 102   CO2 24 23 24   BUN 10 12 12   CREATININE 0.48* 0.47* 0.49*   MAGNESIUM 1.8 2.0 2.2   CALCIUM 9.3 9.3 9.5     Recent Labs     20   ALTSGPT  --   --  38   ASTSGOT  --   --  30   ALKPHOSPHAT  --   --  85   TBILIRUBIN  --   --  0.8   GLUCOSE 111* 118* 112*     Recent Labs     20   RBC 2.91* 2.51* 2.36*   HEMOGLOBIN 8.4* 7.3* 6.9*   HEMATOCRIT 24.1* 21.1* 19.6*   PLATELETCT 7* 32* 22*     Recent Labs     20   WBC 0.7* 0.7* 1.0*   NEUTSPOLYS 0.00* 1.30* 0.00*   LYMPHOCYTES 100.00* 98.00* 100.00*   MONOCYTES 0.00 0.70 0.00   EOSINOPHILS 0.00 0.00 0.00   BASOPHILS 0.00 0.00 0.00   ASTSGOT  --   --  30   ALTSGPT  --   --  38   ALKPHOSPHAT  --   --  85   TBILIRUBIN  --   --  0.8     Recent Labs     20   SODIUM 136 135 134*   POTASSIUM 3.8 4.1 4.3   CHLORIDE 99 99 102   CO2 24 23 24   GLUCOSE 111* 118* 112*   BUN 10 12 12   CREATININE 0.48* 0.47* 0.49*   CALCIUM 9.3 9.3 9.5     Hemodynamics:  Temp (24hrs), Av.4 °C (99.4 °F), Min:36.7 °C (98 °F), Max:38.1 °C (100.6 °F)  Temperature: 36.7 °C (98 °F)  Pulse  Avg:  93.1  Min: 71  Max: 148   Blood Pressure: 115/59     Respiratory:    Respiration: 16, Pulse Oximetry: 99 %           Fluids:    Intake/Output Summary (Last 24 hours) at 7/4/2020 0820  Last data filed at 7/4/2020 0050  Gross per 24 hour   Intake 278 ml   Output 1 ml   Net 277 ml        GI/Nutrition:  Orders Placed This Encounter   Procedures   • Diet Order Regular (neutropenic diet)     Standing Status:   Standing     Number of Occurrences:   1     Order Specific Question:   Diet:     Answer:   Regular [1]     Comments:   neutropenic diet     Medical Decision Making, by Problem:  Active Hospital Problems    Diagnosis   • Neutropenic fever (HCC) [D70.9, R50.81]   • CLL (chronic lymphocytic leukemia) (HCC) [C91.10]   • Sepsis (HCC) [A41.9]   • Pancytopenia (HCC) [D61.818]   • Thrombocytopenia (HCC) [D69.6]     Past medical history, past social history, past surgical history unchanged    Assessment and Plan:    1.  CLL:  FISH normal.  She has been on ibrutinib on and off since May.  She does have a past history of thalassemia as well. Her ibrutinib has been on hold since hospitalization due to severe thrombocytopenia and neutropenic fevers. Her marrow had significant involvement of CLL at diagnosis.  Holding Ibrutinib until neutropenic fevers resolve. Spoke with Dr. Summers her outpatient oncologist who is considering adding rituximab to her ibrutinib. Since, she is discharging today, this will need to be arranged as an outpatient. I updated Dr. Summers regarding this, he will discuss with her tomorrow when to re-start her ibrutinib as well.     2.  Thrombocytopenia - throught to be secondary to marrow involvement of CLL however she could also have an immune-mediated component  -- keep platelets above 10,000 unless bleeding then higher  -- she responded well to platelets yesterday, count is 32 post-transfusion, since she responded well, less likely an immune-induced process.  -- She has outpatient infusion center  appointments already arranged for transfusions as needed    3.  Neutropenic fever - afebrile last 48 hours  - Culture negative to date  - No source identified  - ID approved switching to levofloxacin for discharge  - Her fevers are most likely related to her CLL  - She is going to be neutropenic for a long time and pt understands the risk. ID has recommended that if she spikes a temp greater of 101 or greater that she will need to return to the ER. I reviewed this all with the patient she was in agreement and preferred to go home.  - She will remain on acyclovir and fluconazole as well.    4.  Anemia: Her anemia is multifactorial.  She does have significant CLL in her bone marrow.  However, she also has a history of thalassemia.  - Transfuse for hemoglobin less than 7 or symptomatic  -- She has outpatient [facility] appointments    High complexity/discussed with primary team, RN, and ID  Pt understands the discharge instructions.    This patient was seen under COVID 19 pandemic disaster response conditions.  During a disaster, the provisions of care is subject to the Crisis Standard of Care    Quality-Core Measures   Reviewed items::  Labs reviewed, Radiology images reviewed and Medications reviewed

## 2020-07-09 NOTE — PROGRESS NOTES
Pt discharged to home with . Pt discharged via wheelchair with staff. Discharge instructions given, all questions answered at this time.

## 2020-07-09 NOTE — PROGRESS NOTES
A&Ox4. R PIV, transfusing RBC's. Pt is up self and steady in room. Not complaining of any pain at this time. Afebrile this AM. Bed locked and in lowest position, hourly rounding in place, pt calls for assistance appropriately

## 2020-07-09 NOTE — PROGRESS NOTES
Patient's hemoglobin this morning is 6.9. Kay DARBY and received orders to transfuse one unit of CMV negative and irradiated PRBC.

## 2020-07-10 NOTE — PROGRESS NOTES
Lana arrives for blood transfusion today. Lana c/o fatigue, but denies shortness of breath and chest pain. CBC done shows Hgb 10.1, Platelets 14. Critical results reported to Dr. Summers at 1030, no new orders received regarding lab values. Dr. Summers states ok for patient to receive tylenol for future treatments since she tolerated medication while hospitalized. Dr. Summers gave telephone order with read-back for tylenol, entered in blood plan. Blood transfusion consents signed. Potential side effects reviewed. PIV started, pre meds given, 2 unit platelets transfused without incident. Lana tolerated well. No signs or symptoms of adverse reaction observed or expressed. Lana rested comfortably during treatment. Next appointment scheduled, discharged home to self care.

## 2020-07-12 NOTE — PROGRESS NOTES
Gary from Lab called with critical result of platelets of 30K, WBC of 0.7 and ANC of 0 at 1040. Critical lab result read back to Gary.   This critical lab result is within parameters established by Dr. Summers for this patient.

## 2020-07-12 NOTE — PROGRESS NOTES
Pt presented to infusion center for possible blood products. She had no new, acute complaints. PIV started on 1st attempt, brisk blood return observed. CBC drawn as ordered. Hemoglobin=8.9, platelets=30K. Pt did NOT meet parameters for any blood product today. PIV flushed and removed, gauze drsg placed. Has next appt, left on foot to self care.

## 2020-07-14 NOTE — PROGRESS NOTES
Delvin from Lab called with critical result of WBC 0.6, Platelets 12, and ANC .01. Critical lab result read back to Delvin.   This critical lab result is within parameters established by  for this patient.

## 2020-07-14 NOTE — PROGRESS NOTES
Patient arrived to the infusion center for CBC and possible transfusion. Patient in good spirits today. PIV established and labs drawn. Labs verified and platelets are 12. Premedication given. Platelets transfused without difficulty. PIV flushed, discontinued, tip intact, and site covered with gauze and coban. Patient confirms next appointment. Left infusion center with no apparent distress.

## 2020-07-17 NOTE — PROGRESS NOTES
into Infusions Services COD / 1 U Platelets / 1 U PRBC's for CLL (chronic lymphocytic leukemia). Pt denied having any new or acute complaints today, denies bleeding, c/o to be tired, reports tolerating past treatments well. PIV started, had + blood return flushed briskly. Pt given pre medication and 1 U PRBC's / 1 U Platelets CMV neg / Irradiated as prescribed, tolerated well, denied having any complaints during or after infusion. PIV removed catheter intact, compression dressing applied. Discharge home to self care in [facility]. Appointment confirm for next treatment.

## 2020-07-18 NOTE — PROGRESS NOTES
Gary from Lab called with critical result of platelet count of 20 at 1024. Critical lab result read back to Gary.This critical lab result is within parameters established by Dr. Summers for this patient

## 2020-07-18 NOTE — PROGRESS NOTES
Pt arrived ambulatory to Infusion Services for labs and possible transfusion. Pt denied fever, signs or symptoms of bleeding or infection today. POC discussed and pt verbalized understanding.     PIV established and labs drawn without difficulty; pt tolerated well. Labs reviewed. Hgb 8.9 and platelet 20; labs do not meet parameters for transfusion today. PIV flushed and removed. Sterile gauze and paper tape applied at this time.     Follow-up care discussed and next appointment confirmed with pt, and pt verbalized understanding. Pt discharged home to self care in no apparent distress at this time.

## 2020-07-20 NOTE — PROGRESS NOTES
Pt returns to [facility] for labs with possible blood products.  Pt denies s/sx of infection or new signs of bleeding.  Pt has scattered bruising to arms.  PIV established to L-FA and labs drawn.  Received call from lab with critical WBC 0.5, platelets 8,000 and ANC 0.02 today.  Pt meets parameters for one unit of platelets.  Pre-medications given.  Platelets given without adverse reaction.  PIV flushed and site removed.  Site wrapped with pressure dressing.  Confirmed next appt with pt.  Pt dc home with spouse as transportation.

## 2020-07-22 NOTE — DOCUMENTATION QUERY
"                                                                         [facility]                                                                       Query Response Note      PATIENT:               CHIDI MOLINA  ACCT #:                  [id number]  MRN:                     [id number]  :                      1965  ADMIT DATE:       2020 4:01 PM  DISCH DATE:        2020 1:25 PM  RESPONDING  PROVIDER #:        [id number]           QUERY TEXT:    Sepsis/SIRS is documented in the Medical Record. Please clarify whether:    NOTE:  If an appropriate response is not listed below, please respond with a new note.    The patient's Clinical Indicators include:  Sepsis with no source of infection is noted as being \"resolved\" in the  D/S.    Lactic Acid level  1.3 on 7/3  Temperature greater than 101  Pulse greater than 90  Leukopenia with WBC less than 4000    Progress Notes 7/3 -  indicate   \"For now, unclear if she actually has sepsis.\"    Progress Note  indicate  \"Doubt sepsis at this point.  Neutropenic fever.\"  Options provided:   -- Sepsis has been ruled in   -- Sepsis has been ruled out   -- Unable to determine      Query created by: Pati Orlando on 2020 11:13 AM    RESPONSE TEXT:    Sepsis has been ruled out          Electronically signed by:  CURT YI MD 2020 2:06 PM              "

## 2020-07-22 NOTE — PROGRESS NOTES
Pt returns to [facility] for labs and possible blood products.  Pt denies current fever, illness or infection.  Pt arrived with labs previously drawn on 07/21/2020 at [facility], Hgb 7.3, Platelets 40K.  Pt within parameters to receive PRBC 1 unit.  PIV placed, brisk blood return observed.  1 unit PRBC transfused as ordered, pt tolerated well.  PIV flushed and removed, gauze and coban to site.  Pt discharged to self  Care in [facility], next appointment confirmed.

## 2020-07-24 NOTE — PROGRESS NOTES
PT arrived ambulatory to Infusion Services for labs and possible blood products; Pt denied fever, signs or symptoms of infection or acute illness today. POC discussed and pt verbalized understanding.     PIV established and labs reviewed from [facility]; pt tolerated well. Platelet count 14; per treatment plan, pt meets parameters for platelet transfusion today. Premedications administered per MAR, and 1 unit of platelets transfused per treatment plan; pt tolerated well. No signs or symptoms of reaction or complications noted. PIV flushed and removed. Gauze and paper tape applied at this time.     Follow-up care and future appointments discussed with pt; pt verbalized understanding. Pt discharged home to self care in no apparent distress at this time.

## 2020-07-29 PROBLEM — N30.00 ACUTE CYSTITIS WITHOUT HEMATURIA: Status: ACTIVE | Noted: 2020-01-01

## 2020-07-29 NOTE — ED NOTES
Tech from Lab called with critical result of WBC, Hgb, platelets at 1458. Critical lab result read back to Tech.   Dr. Avitia notified of critical lab result at 1457.  Critical lab result read back by Dr. Avitia.

## 2020-07-29 NOTE — ED TRIAGE NOTES
Chief Complaint   Patient presents with   • Fever     reportedly febrile in infusion services today = 101F   • Peripheral Edema     udden onset LUE edema/pain, Monday after PIV infiltrated.     Patient bib tech from infusion services, patient reportedly febrile at 101F, tylenol given in infusion services.  PTA PIV placed in L hand.      Patient A&O on arrival to B[facility], reports current chemo treatment for Leukemia and reports low WBC - Neutropenic precautions in place, patient educated, verbalized understanding.      Chart up for ERP.

## 2020-07-29 NOTE — PROGRESS NOTES
53yo with CLL transfusion dependant noted to have fever at Infusion center earlier today.  Patient is neutropenic directed to the emergency department.  Dr. Mcelroy from oncology consulted recommending admission treatment for neutropenic fever and he will ask oncology on-call to evaluate the patient.    Dr. Shetty will evaluate patient for admission

## 2020-07-29 NOTE — ASSESSMENT & PLAN NOTE
Follows with Dr. Mcelroy  That last time she presented clinically like this, chemo was held by Oncology until her cell counts returned to normal.    Hold chemo.

## 2020-07-29 NOTE — ASSESSMENT & PLAN NOTE
Will transfuse if Hg <7.  Given that she has scan hemoptysis, will transfuse to keep plt around 50,000.  Etiology is due to chemotherapy for CLL.  Transfuse 1  Unit plt today.

## 2020-07-29 NOTE — ASSESSMENT & PLAN NOTE
Patient is well known to me.    Cont cefepime, fluconazole, acyclovir  Afebrile for 24 hours as of this AM.

## 2020-07-29 NOTE — H&P
Hospital Medicine History & Physical Note    Date of Service  7/29/2020    Primary Care Physician  Ara Duncan M.D.    Code Status  Full Code    Chief Complaint  Chief Complaint   Patient presents with   • Fever     reportedly febrile in infusion services today = 101F   • Peripheral Edema     udden onset LUE edema/pain, Monday after PIV infiltrated.       History of Presenting Illness  54 y.o. female with a history of CLL and on chemotherapy who presented 7/29/2020 with concern of a fever at transfusion center and was sent to [facility].  She did not have any transfusions given prior to her vitals check.  Due to her neutropenic fever I was asked to admit her to the hospital.  Patient denies any dysuria or frequency but does have a UTI based on urine analysis.  She states she has had a cough but no sore throat no productive sputum.  She denies any headache neck pain or back pain.  Patient's main complaint is left antecubital pain from a prior IV access attempt.  She denies any wounds or rashes.  She denies any travel or sick contact.  She remains on daily chemotherapy oral agent.  She does complain of general malaise and increasing fatigue.  Her hemoglobin today was 5.9.    Review of Systems  Review of Systems   Constitutional: Positive for fever and malaise/fatigue. Negative for chills.   Eyes: Negative for discharge.   Respiratory: Positive for cough. Negative for sputum production, shortness of breath and stridor.    Cardiovascular: Negative for chest pain, palpitations and leg swelling.   Gastrointestinal: Negative for abdominal pain, diarrhea, nausea and vomiting.   Genitourinary: Negative for dysuria and hematuria.   Musculoskeletal: Negative for back pain and joint pain.   Skin: Negative for rash.   Neurological: Negative for dizziness and headaches.   Psychiatric/Behavioral: Negative for depression. The patient is not nervous/anxious.        Past Medical History   has a past medical history of Anxiety  disorder, CLL (chronic lymphocytic leukemia) (HCC), and Hypertension.    Surgical History   has a past surgical history that includes cholecystectomy ().     Family History  Parents   Brother had leukemia    Social History   reports that she has never smoked. She has never used smokeless tobacco. She reports that she does not drink alcohol or use drugs.  She is .    Allergies  Allergies   Allergen Reactions   • Amoxicillin Hives     Tolerates cephalosporins   • Benadryl Allergy Hives   • Excedrin Migraine Swelling   • Sulfamethoxazole Hives   • Famotidine Itching       Medications  Prior to Admission Medications   Prescriptions Last Dose Informant Patient Reported? Taking?   acyclovir (ZOVIRAX) 400 MG tablet 2020 at 0900 Patient Yes No   Sig: Take 400 mg by mouth 2 times a day.   amLODIPine (NORVASC) 10 MG Tab 2020 at 0900  Yes No   Sig: Take 10 mg by mouth every day.   fluconazole (DIFLUCAN) 100 MG Tab 2020 at 0900  No No   Sig: Take 1 Tab by mouth every day.   folic acid (FOLVITE) 1 MG Tab 2020 at 0900 Patient Yes No   Sig: Take 1 mg by mouth every day.   levoFLOXacin (LEVAQUIN) 500 MG tablet 2020 at 0900  No No   Sig: Take 1 Tab by mouth every day.      Facility-Administered Medications: None       Physical Exam  Temp:  [36.7 °C (98 °F)] 36.7 °C (98 °F)  Pulse:  [73-97] 85  Resp:  [16-21] 20  BP: (109-126)/(51-58) 109/53  SpO2:  [91 %-96 %] 96 %    Physical Exam    Laboratory:  Recent Labs     20  1200 20  0915 20  1325   WBC 0.2* 0.3* 0.1*   RBC 2.89* 2.58* 2.17*   HEMOGLOBIN 8.0* 7.1* 5.9*   HEMATOCRIT 23.5* 20.9* 17.1*   MCV 81.3* 81.0* 80.2*   MCH 27.7 27.5 26.7*   MCHC 34.0 34.0 33.3*   RDW 39.5 39.8 39.1   PLATELETCT 9* 8* 6*   MPV 10.2 9.8 10.9     Recent Labs     07/29/20  1327   SODIUM 126*   POTASSIUM 3.0*   CHLORIDE 91*   CO2 22   GLUCOSE 121*   BUN 8   CREATININE 0.51   CALCIUM 8.6     Recent Labs     20  1327   GLUCOSE 121*          No results for input(s): NTPROBNP in the last 72 hours.      No results for input(s): TROPONINT in the last 72 hours.    Imaging:  No orders to display         Assessment/Plan:  I anticipate this patient will require at least two midnights for appropriate medical management, necessitating inpatient admission.    * Neutropenic fever (HCC)- (present on admission)  Assessment & Plan  Initiated cefepime, continuing acyclovir  Allergic to Bactrim and penicillin  Monitor cultures    Acute cystitis without hematuria  Assessment & Plan  Monitor urine culture  Cefepime    Sepsis (HCC)  Assessment & Plan  This is Sepsis Present on admission  SIRS criteria identified on my evaluation include: Tachycardia, with heart rate greater than 90 BPM and Leukopenia, with WBC less than 4,000  Source is UTI  Sepsis protocol initiated  Fluid resuscitation ordered per protocol  IV antibiotics as appropriate for source of sepsis  While organ dysfunction may be noted elsewhere in this problem list or in the chart, degree of organ dysfunction does not meet CMS criteria for severe sepsis    Pancytopenia (HCC)- (present on admission)  Assessment & Plan  Monitor CBC  Transfuse packed red blood cells    CLL (chronic lymphocytic leukemia) (HCC)- (present on admission)  Assessment & Plan  Follows with Dr. Mcelroy  Continue outpatient chemotherapy agent    Hypokalemia- (present on admission)  Assessment & Plan  Check magnesium   replace potassium   monitor BMP    Hyponatremia- (present on admission)  Assessment & Plan  States she has been eating.  Gentle IV fluids monitor BMP in a.m.

## 2020-07-29 NOTE — ED PROVIDER NOTES
ED Provider Note    CHIEF COMPLAINT  Chief Complaint   Patient presents with   • Fever     reportedly febrile in infusion services today = 101F   • Peripheral Edema     udden onset LUE edema/pain, Monday after PIV infiltrated.       HPI  Lana Johnston is a 54 y.o. female who presents to the emergency department for fever at time of screening for today's transfusion.  Past history of CLL.  Gets every other day infusions.  Chronic pancytopenia.  She also notes ongoing discomfort to the left arm after infiltration at infusion 2 days ago on Monday.  Again primarily here at the direction of her physicians given her chronic pancytopenia immunocompromise state and fever this morning.    REVIEW OF SYSTEMS  See HPI for further details. All other systems are negative.     PAST MEDICAL HISTORY   has a past medical history of Anxiety disorder, CLL (chronic lymphocytic leukemia) (HCC), and Hypertension.    SOCIAL HISTORY  Social History     Tobacco Use   • Smoking status: Never Smoker   • Smokeless tobacco: Never Used   Substance and Sexual Activity   • Alcohol use: Never     Frequency: Never   • Drug use: Never   • Sexual activity: Not on file       SURGICAL HISTORY   has a past surgical history that includes cholecystectomy (2005).    CURRENT MEDICATIONS  Home Medications     Reviewed by Genaro Cárdenas (Pharmacy Tech) on 07/29/20 at 1614  Med List Status: Complete   Medication Last Dose Status   acyclovir (ZOVIRAX) 400 MG tablet 7/29/2020 Active   amLODIPine (NORVASC) 10 MG Tab 7/29/2020 Active   fluconazole (DIFLUCAN) 100 MG Tab 7/29/2020 Active   folic acid (FOLVITE) 1 MG Tab 7/29/2020 Active   levoFLOXacin (LEVAQUIN) 500 MG tablet 7/29/2020 Active                ALLERGIES  Allergies   Allergen Reactions   • Amoxicillin Hives     Tolerates cephalosporins   • Benadryl Allergy Hives   • Excedrin Migraine Swelling   • Sulfamethoxazole Hives   • Famotidine Itching       PHYSICAL EXAM  VITAL SIGNS: /53   Pulse  85   Temp 36.7 °C (98 °F) (Oral)   Resp 20   Ht 1.524 m (5')   Wt 42 kg (92 lb 9.5 oz)   SpO2 96%   BMI 18.08 kg/m²  @RACHAEL[[id number]::@   Pulse ox interpretation: I interpret this pulse ox as normal.  Constitutional: Alert in no apparent distress.  HENT: No signs of trauma, Bilateral external ears normal, Nose normal.   Eyes: Pupils are equal and reactive, Conjunctiva normal, Non-icteric.   Neck: Normal range of motion, No tenderness, Supple, No stridor.    Cardiovascular: Regular rate and rhythm, no murmurs.   Thorax & Lungs: Normal breath sounds, No respiratory distress, No wheezing, No chest tenderness.   Abdomen: Bowel sounds normal, Soft, No tenderness, No masses, No pulsatile masses. No peritoneal signs.  Skin: Warm, Dry, No erythema, No rash.   Back: No bony tenderness, No CVA tenderness.   Extremities: Intact distal pulses, left upper extremity: Multiple areas of ecchymosis secondary to IV steroids.  Also large area to the distal medial aspect of the arm secondary to the axonal infiltration as was historically noted.  Full range of motion of the elbow.  Nontender  Musculoskeletal: Good range of motion in all major joints. No tenderness to palpation or major deformities noted.   Neurologic: Alert , Normal motor function, Normal sensory function, No focal deficits noted.   Psychiatric: Affect normal, Judgment normal, Mood normal.       DIAGNOSTIC STUDIES / PROCEDURES    EKG  Results for orders placed or performed during the hospital encounter of 20   EKG   Result Value Ref Range    Report       [facility] Emergency Dept.    Test Date:  2020  Pt Name:    CHIDI MOLINA               Department: ER  MRN:        [id number]                      Room:        15  Gender:     Female                       Technician: [id number]  :        1965                   Requested By:ARMANDO GONSALEZ  Order #:    [id number]                    Joshua MD: ARMANDO GONSALEZ,  MD    Measurements  Intervals                                Axis  Rate:       131                          P:          68  MD:         168                          QRS:        48  QRSD:       82                           T:  QT:         312  QTc:        461    Interpretive Statements  SINUS TACHYCARDIA  PROBABLE LVH WITH SECONDARY REPOL ABNRM  No previous ECG available for comparison  Electronically Signed On 2020 19:18:48 PDT by ARMANDO GONSALEZ MD     EKG   Result Value Ref Range    Report       [facility] Emergency Dept.    Test Date:  2020  Pt Name:    CHIDI MOLINA               Department: ER  MRN:        [id number]                      Room:        15  Gender:     Female                       Technician: [id number]  :        1965                   Requested By:ARMANDO GONSALEZ  Order #:    [id number]                    Reading MD: ARMANDO GONSALEZ MD    Measurements  Intervals                                Axis  Rate:       185                          P:          0  MD:         150                          QRS:        52  QRSD:       78                           T:          198  QT:         248  QTc:        436    Interpretive Statements  SUPRAVENTRICULAR TACHYCARDIA  BORDERLINE LOW VOLTAGE IN FRONTAL LEADS  REPOLARIZATION ABNORMALITY, PROB RATE RELATED  Compared to ECG 2020 17:37:05  Sinus tachycardia no longer present  Electronically Signed On 2020 19:18:46 PDT by ARMANDO GONSALEZ MD           LABS  Results for orders placed or performed during the hospital encounter of 20   CBC WITH DIFFERENTIAL   Result Value Ref Range    WBC 0.1 (LL) 4.8 - 10.8 K/uL    RBC 2.17 (L) 4.20 - 5.40 M/uL    Hemoglobin 5.9 (LL) 12.0 - 16.0 g/dL    Hematocrit 17.1 (LL) 37.0 - 47.0 %    MCV 80.2 (L) 81.4 - 97.8 fL    MCH 26.7 (L) 27.0 - 33.0 pg    MCHC 33.3 (L) 33.6 - 35.0 g/dL    RDW 39.1 35.9 - 50.0 fL    Platelet Count 6 (LL) 164 - 446 K/uL    MPV 10.9 9.0 - 12.9 fL     Neutrophils-Polys 1.00 (L) 44.00 - 72.00 %    Lymphocytes 99.00 (H) 22.00 - 41.00 %    Monocytes 0.00 0.00 - 13.40 %    Eosinophils 0.00 0.00 - 6.90 %    Basophils 0.00 0.00 - 1.80 %    Nucleated RBC 0.00 /100 WBC    Neutrophils (Absolute) 0.00 (LL) 2.00 - 7.15 K/uL    Lymphs (Absolute) 0.10 (L) 1.00 - 4.80 K/uL    Monos (Absolute) 0.00 0.00 - 0.85 K/uL    Eos (Absolute) 0.00 0.00 - 0.51 K/uL    Baso (Absolute) 0.00 0.00 - 0.12 K/uL    NRBC (Absolute) 0.00 K/uL    Anisocytosis 1+     Macrocytosis 1+     Microcytosis 1+    BASIC METABOLIC PANEL   Result Value Ref Range    Sodium 126 (L) 135 - 145 mmol/L    Potassium 3.0 (L) 3.6 - 5.5 mmol/L    Chloride 91 (L) 96 - 112 mmol/L    Co2 22 20 - 33 mmol/L    Glucose 121 (H) 65 - 99 mg/dL    Bun 8 8 - 22 mg/dL    Creatinine 0.51 0.50 - 1.40 mg/dL    Calcium 8.6 8.5 - 10.5 mg/dL    Anion Gap 13.0 7.0 - 16.0   URINALYSIS    Specimen: Urine, Clean Catch   Result Value Ref Range    Color Yellow     Character Clear     Specific Gravity <=1.005 <1.035    Ph 7.0 5.0 - 8.0    Glucose Negative Negative mg/dL    Ketones Negative Negative mg/dL    Protein Negative Negative mg/dL    Bilirubin Negative Negative    Urobilinogen, Urine 0.2 Negative    Nitrite Negative Negative    Leukocyte Esterase Negative Negative    Occult Blood Small (A) Negative    Micro Urine Req Microscopic    ESTIMATED GFR   Result Value Ref Range    GFR If African American >60 >60 mL/min/1.73 m 2    GFR If Non African American >60 >60 mL/min/1.73 m 2   DIFFERENTIAL MANUAL   Result Value Ref Range    Manual Diff Status PERFORMED    PERIPHERAL SMEAR REVIEW   Result Value Ref Range    Peripheral Smear Review see below    PLATELET ESTIMATE   Result Value Ref Range    Plt Estimation Marked Decrease    MORPHOLOGY   Result Value Ref Range    RBC Morphology Present     Poikilocytosis 1+     Ovalocytes 1+    IMMATURE PLT FRACTION   Result Value Ref Range    Imm. Plt Fraction 0.5 (L) 0.6 - 13.1 K/uL   URINE MICROSCOPIC  (W/UA)   Result Value Ref Range    WBC 10-20 (A) /hpf    RBC 5-10 (A) /hpf    Bacteria Few (A) None /hpf    Epithelial Cells Few /hpf    Budding Yeast Present (A) Absent /hpf   Routine (COVID/SARS COV-2 In-House PCR up to 24 hours)    Specimen: Nasopharyngeal; Respirate   Result Value Ref Range    COVID Order Status Received    Prothrombin time (INR)   Result Value Ref Range    PT 19.0 (H) 12.0 - 14.6 sec    INR 1.54 (H) 0.87 - 1.13   Magnesium   Result Value Ref Range    Magnesium 1.7 1.5 - 2.5 mg/dL   SARS-CoV-2, PCR (In-House)   Result Value Ref Range    SARS-CoV-2 Source NP Swab     SARS-CoV-2 by PCR NotDetected    COMPONENT CELLULAR   Result Value Ref Range    Component R       RI                  RedBloodCellsIRR    [id number]   selected     07/29/20   17:41      Product Type Red Blood Cells IRR LR     Dispense Status selected     Unit Number (Barcoded) [id number]     Product Code (Barcoded) H5651R20     Blood Type (Barcoded) 5100          RADIOLOGY  No orders to display           COURSE & MEDICAL DECISION MAKING  Pertinent Labs & Imaging studies reviewed. (See chart for details)  54-year-old female presented to the emergency department for evaluation of fever that was identified at transfusion center.  No fever on our screening here.  Laboratory as noted above showing persistent pancytopenia.  ANC today however is 0.  Also noted as a hyponatremia.  I discussed the case with Dr. Mcelroy her oncologist which asked that she be brought in for further fever of unknown origin work-up especially in light of the ANC of 0.  Patient agreeable to this plan.    FINAL IMPRESSION  1. Fever of unknown origin    2. Pancytopenia (HCC)    3. Hyponatremia    4.  Left elbow infiltration        Electronically signed by: Monico Avitia M.D., 7/29/2020 3:18 PM

## 2020-07-29 NOTE — ASSESSMENT & PLAN NOTE
Sepsis resolving on broad spec abx.    Sepsis source is lung; pneumonia  Sepsis protocol started on admission  Criteria are neutropenia, fever, CXR result, tachycardia despite normal lactic acid.    Improving on treatment of primary problem/source.

## 2020-07-29 NOTE — PROGRESS NOTES
Pt arrived ambulatory to Infusion Services for labs and possible blood products; Pt denied fever, signs or symptoms of infection or acute illness today. POC discussed and pt verbalized understanding. PIV established without difficulty and labs drawn at this time; pt tolerated well. Labs reviewed; pt meets parameters for 1 units of platelet for count of 8 and 1 unit of PRBCs for count of 7.1. Premedications administered per MAR; VS taken prior to hanging blood products and pt presents with Tmax of 101.0 temporal; Dr. Mcelroy on call for Dr. Summers and order received not to transfuse PRBCs or platelets, and instead transfer pt to ED. Spoke with Elysia, ED charge RN, regarding pt transfer at this time, noting labs and tylenol administered one hour prior to pt temp spike; Charge RN verbalized understanding. Pt taken to ED in wheelchair with CNA escort to room Blue 20. PRBCs sent back to blood bank at this time. PIV flushed and left in place at this time.

## 2020-07-29 NOTE — ED NOTES
Lab techfrom Lab called with critical result of absolute neutrophils =0 at 1519. Critical lab result read back to Segun.   Dr. Avitia notified of critical lab result at 1519.  Critical lab result read back by Dr. Avitia.

## 2020-07-29 NOTE — PROGRESS NOTES
Mateo from Lab called with critical result of WBC 0.3, platelet count 8 and ANC 0.00 at 1039. Critical lab result read back to Mateo.   This critical lab result is within parameters established by  for this patient

## 2020-07-29 NOTE — ED NOTES
Med rec updated and complete. Allergies reviewed. Met with pt at bedside. Pt stated that her doctor recently instructed her to stop her oral chemo.    Pt is currently on levofloxacin , fluconazole and acyclovir.    Long term use of Acyclovir.  fluconazole and levofloxacin were started on 07/09/20    [place] [facility] [address]

## 2020-07-30 PROBLEM — N30.00 ACUTE CYSTITIS WITHOUT HEMATURIA: Status: RESOLVED | Noted: 2020-01-01 | Resolved: 2020-01-01

## 2020-07-30 PROBLEM — E87.6 HYPOKALEMIA: Status: RESOLVED | Noted: 2020-01-01 | Resolved: 2020-01-01

## 2020-07-30 NOTE — ED NOTES
Per blood bank, patient requires CMV negative and irradiated blood, admit MD contacted for new transfusion order, awaiting return call.

## 2020-07-30 NOTE — PROGRESS NOTES
2 RN Skin Check    2 RN skin check complete with Giovanna COOPER.   Devices in place: Tele box.  Skin assessed under devices: yes.  Confirmed pressure ulcers found on: N/a.  New potential pressure ulcers noted on n/a. Wound consult placed No.  The following interventions in place Pillows and Lotion.

## 2020-07-30 NOTE — PROGRESS NOTES
HOSPITAL MEDICINE PROGRESS NOTE    Date of service  7/30/2020    Chief Complaint  Fever (reportedly febrile in infusion services today = 101F) and Peripheral Edema (udden onset LUE edema/pain, Monday after PIV infiltrated.)      Hospital Course:     54-year-old woman who has a history of CLL on chemotherapy presented with neutropenic fever.           Interval History Updates:  Fever to 39.4.        Consultants/Specialty  None    Review of Systems   Review of Systems   Constitutional: Positive for fever. Negative for chills.   Genitourinary: Negative for dysuria, frequency and urgency.        Medications:  • MD Alert...Vancomycin per Pharmacy   PHARMACY TO DOSE   • vancomycin  25 mg/kg Once   • vancomycin  15 mg/kg Q12HR   • potassium chloride SA  40 mEq Once   • acyclovir  400 mg BID   • amLODIPine  10 mg DAILY   • fluconazole  100 mg DAILY   • folic acid  1 mg DAILY   • senna-docusate  2 Tab BID    And   • polyethylene glycol/lytes  1 Packet QDAY PRN    And   • magnesium hydroxide  30 mL QDAY PRN    And   • bisacodyl  10 mg QDAY PRN   • LR  30 mL/kg Once PRN   • acetaminophen  650 mg Q6HRS PRN   • Pharmacy Consult Request  1 Each PHARMACY TO DOSE    And   • oxyCODONE immediate-release  2.5 mg Q3HRS PRN    And   • oxyCODONE immediate-release  5 mg Q3HRS PRN    And   • morphine injection  2 mg Q3HRS PRN   • cefepime  2 g Q8HRS   • enalaprilat  1.25 mg Q6HRS PRN   • ondansetron  4 mg Q4HRS PRN   • ondansetron  4 mg Q4HRS PRN   • promethazine  12.5-25 mg Q4HRS PRN   • prochlorperazine  5-10 mg Q4HRS PRN       Physical Exam   Vitals:    07/30/20 0705 07/30/20 0756 07/30/20 0946 07/30/20 1007   BP:    112/65   Pulse: (!) 106   100   Resp:    17   Temp: 36.3 °C (97.4 °F) (!) 38.4 °C (101.2 °F) 37.2 °C (99 °F) 36.4 °C (97.6 °F)   TempSrc:  Oral Oral    SpO2:       Weight:       Height:           Physical Exam   Constitutional: She is oriented to person, place, and time and well-developed, well-nourished, and in no  distress. No distress.   HENT:   Head: Normocephalic and atraumatic.   Eyes: Pupils are equal, round, and reactive to light. Conjunctivae are normal. No scleral icterus.   Neck: Normal range of motion. Neck supple.   Cardiovascular: Normal rate, regular rhythm and intact distal pulses. Exam reveals no gallop and no friction rub.   No murmur heard.  Pulmonary/Chest: Effort normal and breath sounds normal. No respiratory distress. She has no wheezes. She has no rales. She exhibits no tenderness.   Abdominal: Soft. Bowel sounds are normal. She exhibits no distension and no mass. There is no abdominal tenderness. There is no rebound and no guarding.   Musculoskeletal: Normal range of motion.         General: No tenderness or edema.   Neurological: She is alert and oriented to person, place, and time.   Skin: Skin is warm and dry. She is not diaphoretic.   Psychiatric: Mood and affect normal.   Vitals reviewed.         Laboratory   Recent Labs     07/29/20  0915 07/29/20  1325 07/30/20  0159 07/30/20  0333   WBC 0.3* 0.1*  --  0.3*   RBC 2.58* 2.17*  --  2.53*   HEMOGLOBIN 7.1* 5.9* 8.0* 7.1*   HEMATOCRIT 20.9* 17.1* 23.2* 20.4*   PLATELETCT 8* 6*  --  23*     Recent Labs     07/29/20  1327 07/30/20  0333   SODIUM 126* 131*   POTASSIUM 3.0* 3.2*   CHLORIDE 91* 95*   CO2 22 25   GLUCOSE 121* 100*   BUN 8 11   CREATININE 0.51 0.47*      Recent Labs     07/29/20  1327 07/30/20  0333   ALTSGPT  --  26   ASTSGOT  --  15   ALKPHOSPHAT  --  164*   TBILIRUBIN  --  3.0*   GLUCOSE 121* 100*      Recent Labs     07/29/20  1327   INR 1.54*           Microbiology  Results     Procedure Component Value Units Date/Time    Blood Culture [[id number]] Collected:  07/29/20 1840    Order Status:  Completed Specimen:  Blood from Peripheral Updated:  07/30/20 0831     Significant Indicator NEG     Source BLD     Site PERIPHERAL     Culture Result No Growth  Note: Blood cultures are incubated for 5 days and  are monitored continuously.Positive  "blood cultures  are called to the RN and reported as soon as  they are identified.      Narrative:       From different peripheral sites, if not done within the last  24 hours (Per Hospital Policy: Only change specimen source to  \"Line\" if specified by physician order)  No site indicated    Blood Culture [[id number]] Collected:  07/29/20 2304    Order Status:  Completed Specimen:  Blood from Peripheral Updated:  07/29/20 2330    Narrative:       From different peripheral sites, if not done within the last  24 hours (Per Hospital Policy: Only change specimen source to  \"Line\" if specified by physician order)    SARS-CoV-2, PCR (In-House) [[id number]] Collected:  07/29/20 1650    Order Status:  Completed Updated:  07/29/20 1800     SARS-CoV-2 Source NP Swab     SARS-CoV-2 by PCR NotDetected     Comment: [facility] providers: PLEASE REFER TO DE-ESCALATION AND RETESTING PROTOCOL  on [email or web address]  **The [employer] GeneXpert Xpress SARS-CoV-2 Test has been made available for  use under the Emergency Use Authorization (EUA) only.         Narrative:       Is patient being admitted?->Yes  Does this patient meet criteria for Rush/Cepheid per [facility]  Inpatient Workflow? (See workflow link below)->Yes  Expected turn around time?->Rush (Cepheid 2-4 hours)    Urine Culture (New) (Sensitivity) [[id number]] Collected:  07/29/20 1450    Order Status:  Completed Specimen:  Urine, Clean Catch Updated:  07/29/20 1723    Narrative:       Indication for culture:->Kidney transplant recipient,  Neutropenic patient, after urologic procedure/surgery or  Urinary tract obstruction with fever >100.4F    Routine (COVID/SARS COV-2 In-House PCR up to 24 hours) [[id number]] Collected:  07/29/20 1650    Order Status:  Completed Specimen:  Respirate from Nasopharyngeal Updated:  07/29/20 1656     COVID Order Status Received     Comment: The order for SARS CoV-2 testing has been received by the  Laboratory. This result is neither positive nor " negative.  Final results of testing will report in 24-48 hours, separately.         Narrative:       Is patient being admitted?->Yes  Does this patient meet criteria for Rush/Cepheid per [facility]  Inpatient Workflow? (See workflow link below)->Yes  Expected turn around time?->Rush (Cepheid 2-4 hours)    Urinalysis [[id number]]     Order Status:  Sent Specimen:  Urine     URINALYSIS [[id number]]  (Abnormal) Collected:  07/29/20 1450    Order Status:  Completed Specimen:  Urine, Clean Catch Updated:  07/29/20 1522     Color Yellow     Character Clear     Specific Gravity <=1.005     Ph 7.0     Glucose Negative mg/dL      Ketones Negative mg/dL      Protein Negative mg/dL      Bilirubin Negative     Urobilinogen, Urine 0.2     Nitrite Negative     Leukocyte Esterase Negative     Occult Blood Small     Micro Urine Req Microscopic             Labs reviewed by me and noted above.    Radiology  IR-US GUIDED PIV  Narrative: EXAMINATION:                                                                          HISTORY/REASON FOR EXAM:  Ultrasound Guided PIV.     TECHNIQUE/EXAM DESCRIPTION AND NUMBER OF VIEWS:  Peripheral IV insertion   with ultrasound guidance.  The procedure was prepared using maximal   sterile barrier technique including sterile gown, mask, cap, and donning   of sterile gloves following appropriate hand hygiene and/or sterile scrub.   Patient skin site was prepped with 2% Chlorhexidine solution.       FINDINGS: Peripheral IV insertion with Ultrasound Guidance was performed   by qualified imaging nursing staff without the assistance of a   Radiologist.  Impression:  Ultrasound-guided PERIPHERAL IV INSERTION performed by   qualified nursing staff as above.      No results found for this or any previous visit.       Assessment and Plan      * Neutropenic fever (HCC)- (present on admission)  Assessment & Plan  Patient is well known to me.    Cont cefepime, continuing acyclovir  Add vancomycin for 5 days, DC earlier  if blood culture NEG.  Monitor cultures    Pancytopenia (HCC)- (present on admission)  Assessment & Plan  Will transfuse if Hg <7 and plt <10.  Etiology is due to chemotherapy for CLL.    CLL (chronic lymphocytic leukemia) (HCC)- (present on admission)  Assessment & Plan  Follows with Dr. Mcelroy  That last time she presented clinically like this, chemo was held by Oncology until her cell counts returned to normal.    Hold chemo.    Sepsis (HCC)  Assessment & Plan  Doubt sepsis given other clinical picture.  She is neutropenic with fever.  UA negative for nitrite and LE with few bacteria.  I know her well clinically as I took care of her last admission with same clinical presentation.    Hyponatremia- (present on admission)  Assessment & Plan  Improving with IVF.  aSx.        DVT prophylaxis: SCD  Severely thrombocytopenic to be on heparin/lovenox.    CODE STATUS:  FULL CODE    Disposition: Anticipate home in 3-5 days.    Case was discussed with Primary RN in addition to individual(s) mentioned above.    I have performed a physical exam and reviewed and updated ROS as of today, 7/30/2020.  In review of yesterday's note dated, 7/29/2020, there are no changes except as documented above.      Paul Mario M.D.

## 2020-07-30 NOTE — DIETARY
Nutrition services: Day 1 of admit.  Lana Johnston is a 54 y.o. female with admitting DX of neutropenic fever.   Consult received for Malnutrition screening tool score of 2 (2-13 lb in 1 month and poor PO pta).     Spoke with pt at bedside. Pt reports a usual body weight of 95-97 lb and believes she last weighed this much ~1 month ago. She also reports that her appetite has been low d/t a weird taste in her mouth ever since starting her chemo medication. She states she has been eating ~50% of normal. She reports some nausea in the mornings and relays taking her chemotherapy medication in the morning with water. States she has anti-nausea medication but does not take it often, does not feel nausea interferes with appetite as much as metallic taste in mouth. Pt states at home she eats mostly rice, vegetables, and drinks ~1/2 Boost drink per day. She reports that she can not cook much because she has been feeling weak. Pt agreeable to having 1 Boost per day with breakfast, adding snacks between meals, and adding a larger HS snacks, as she reports increased hunger in the evening/night. Pt declined trying high protein ice cream supplement, stating that cold foods cause her to cough.     Assessment:  Height: 152.4 cm (5')  Weight: 40.6 kg (89 lb 8.1 oz)  Body mass index is 17.48 kg/m²., BMI classification: underweight  Diet/Intake: Regular; Breakfast today <25%    Evaluation:   1. Pt started chemotherapy at the end of June for CLL.   2. Per chart review, pt weight was 97 lb on 6/28. Significant 8.2% wt loss in the past month - severe.   3. Pt noted with 1+ LUE edema this morning.    4. Labs: Sodium 131 (trending up - desirable), Potassium 3.2 (trending up - desirable), Glucose 100, creatinine 0.47   5. Meds: Folvite, Zofran (not given), bowel protocol in place, Kdur (completed today), hydrocortisone (given yesterday), NS infusion (completed yesterday), lactated ringers infusion (completed yesterday)       Malnutrition Risk: Pt with severe malnutrition in the context of acute/chronic illness related to recent CLL diagnosis and chemotherapy treatment as evidenced by severe 8.9% wt loss in past 1 month and pt report of intake 50% of normal for the past month.    Recommendations/Plan:  1. RD to add Boost supplement QD with breakfast. OK per Dr. Mario.   2. RD to add snacks between meals.   3. Keep Boost drink on ice throughout day once opened. Pt encouraged to attempt to drink 1 carton Boost/day. Suggested drinking Boost between meals and taking medications with Boost v water to increase overall intake.   4. Provided patient with handout discussing general cancer nutrition tips, emphasizing tips for managing changes in taste. Kitchen will send plastic utensils with meals to help remedy metallic taste in mouth.   5. Encourage intake of meals and supplements  6. Document intake of all meals and supplements as % taken in ADL's to provide interdisciplinary communication across all shifts.   7. Monitor weight.  8. Nutrition rep to see patient for ongoing meal and snack preferences.     RD following.

## 2020-07-30 NOTE — CARE PLAN
Problem: Safety  Goal: Will remain free from falls  Note: Pt mobility assessed at beginning of shift. Pt is standby assist. Fall precautions in place. Non-slip socks on. Bed in lowest locked position. Call light within reach. Pt educated to call for assistance and verbalizes understanding.      Problem: Knowledge Deficit  Goal: Knowledge of the prescribed therapeutic regimen will improve  Note: Pt educated about disease process. Reason why medications are taken. And informed about treatment plan.

## 2020-07-30 NOTE — PROGRESS NOTES
Pharmacy Kinetics 54 y.o. female on vancomycin day # 1 2020    Currently on Vancomycin New Start  Provider specified end date: TBD - empiric - 8/3/20    Indication for Treatment: Neutropenic Fever    Pertinent history per medical record: Admitted on 2020 for fever. Histor of CLL on chemotherapy who was at transfusion center and presented with a fever. Patient didn't receive transfusions PMH: anxiety, HTN    Other antibiotics: Cefepime 2g IV q8h     Allergies: Amoxicillin; Benadryl allergy; Excedrin migraine; Sulfamethoxazole; and Famotidine     List concerns for renal function: low albumin, elevated T bili,    Pertinent cultures to date:    PBC x1 - NGTD  Urine culture pending     MRSA nares swab if pneumonia is a concern (ordered/positive/negative/n-a): n/a    Recent Labs     20  1200 20  0915 20  1325 20  0333   WBC 0.2* 0.3* 0.1* 0.3*   NEUTSPOLYS 0.00* 0.00* 1.00*  --      Recent Labs     20  1327 20  0333   BUN 8 11   CREATININE 0.51 0.47*   ALBUMIN  --  2.6*     No results for input(s): VANCOTROUGH, VANCOPEAK, VANCORANDOM in the last 72 hours.No intake or output data in the 24 hours ending 20 0904   /53   Pulse (!) 106   Temp (!) 38.4 °C (101.2 °F) (Oral)   Resp 17   Ht 1.524 m (5')   Wt 40.6 kg (89 lb 8.1 oz)   SpO2 99%  Temp (24hrs), Av.2 °C (99 °F), Min:36.3 °C (97.4 °F), Max:39 °C (102.2 °F)      A/P   1. Vancomycin dose change: Load 25 mg/kg IV x1 then start 15 mg/kg IV q12h  2. Next vancomycin level: prior 3rd total dose (tomorrow at 9:30 AM)  3. Goal trough: 15-20  4. Comments: Patient with temp 101.2 and tachycardia this AM in presence of neutropenia. Empiric vancomycin, check trough prior dose tomorrow.     Mel De Santiago, Pharm.D, BCPS, BCCP

## 2020-07-30 NOTE — DISCHARGE PLANNING
Care Transition Team Assessment    RN AMANDA spoke with patient at bedside to complete transition assessment.  Patient verified face sheet as correct.  Patient lives in a 1st floor apartment with her .  Normally she is able to take care of her ADLs/IADLs as needed.  When she is not feeling well, her  in able to assist as needed.  Patient does not drive at all, so she does rely on her  for transportation to and from appointments, but says that's not a problem.  Patient uses the [facility] for her pharmacy.  She denies any needs currently and does not anticipate needing anything on discharge.    DC Plan:  Home w/ no needs.    Information Source  Orientation : Oriented x 4  Information Given By: Patient  Who is responsible for making decisions for patient? : Patient    Readmission Evaluation  Is this a readmission?: Yes - unplanned readmission  Why do you think you were readmitted?: complications of CLL and treatment    Elopement Risk  Legal Hold: No  Ambulatory or Self Mobile in Wheelchair: Yes  Disoriented: No  Psychiatric Symptoms: None  History of Wandering: No  Elopement this Admit: No  Vocalizing Wanting to Leave: No  Displays Behaviors, Body Language Wanting to Leave: No-Not at Risk for Elopement  Elopement Risk: Not at Risk for Elopement    Interdisciplinary Discharge Planning  Primary Care Physician: Dr. Ara Duncan  Lives with - Patient's Self Care Capacity: Spouse  Patient or legal guardian wants to designate a caregiver (see row info): No  Support Systems: Spouse / Significant Other  Housing / Facility: 1 Story Apartment / Condo  Do You Take your Prescribed Medications Regularly: Yes  Able to Return to Previous ADL's: Yes  Mobility Issues: No  Prior Services: Home-Independent  Patient Expects to be Discharged to:: home  Assistance Needed: No  Durable Medical Equipment: Not Applicable    Discharge Preparedness  What is your plan after discharge?: Home with help  What are your  discharge supports?: Spouse  Prior Functional Level: Ambulatory, Independent with Activities of Daily Living, Independent with Medication Management  Difficulity with ADLs: None  Difficulity with IADLs: None    Functional Assesment  Prior Functional Level: Ambulatory, Independent with Activities of Daily Living, Independent with Medication Management    Finances  Financial Barriers to Discharge: No  Prescription Coverage: Yes    Vision / Hearing Impairment  Vision Impairment : No  Hearing Impairment : No         Advance Directive  Advance Directive?: None  Advance Directive offered?: AD Booklet refused    Domestic Abuse  Have you ever been the victim of abuse or violence?: No  Physical Abuse or Sexual Abuse: No  Verbal Abuse or Emotional Abuse: No  Possible Abuse Reported to:: Not Applicable    Psychological Assessment  History of Substance Abuse: None  History of Psychiatric Problems: No  Non-compliant with Treatment: No  Newly Diagnosed Illness: No    Discharge Risks or Barriers  Discharge risks or barriers?: No  Patient risk factors: Readmission    Anticipated Discharge Information  Anticipated discharge disposition: Home  Discharge Address: [address] [place] [address] [address]     [place] [address]  Discharge Contact Phone Number: [phone]

## 2020-07-30 NOTE — ED NOTES
Platelet transfusion initiated.      Bedside report to KENNETH Williamson.  aLna Johnston transported to T7 via [facility] with rnx2. All personal belongings in possession.  NAD noted.

## 2020-07-30 NOTE — CARE PLAN
Problem: Nutritional:  Goal: Achieve adequate nutritional intake  Description: Patient will consume >50% of meals and supplements  Outcome: NOT MET   See RD note.

## 2020-07-31 PROBLEM — J18.9 NOSOCOMIAL PNEUMONIA: Status: ACTIVE | Noted: 2020-01-01
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
## 2020-07-31 NOTE — PROGRESS NOTES
HOSPITAL MEDICINE PROGRESS NOTE    Date of service  7/31/2020    Chief Complaint  Fever (reportedly febrile in infusion services today = 101F) and Peripheral Edema (udden onset LUE edema/pain, Monday after PIV infiltrated.)      Hospital Course:     54-year-old woman who has a history of CLL on chemotherapy presented with neutropenic fever.          Interval History Updates:  Intermitten fever.  This is better fever trend than during last admission.      Consultants/Specialty  None    Review of Systems   Review of Systems   Constitutional: Positive for fever. Negative for chills.   Respiratory: Positive for cough.    Genitourinary: Negative for dysuria, frequency and urgency.        Medications:  • 0.9 % NaCl with KCl 40 mEq 1,000 mL   Continuous   • PEDS potassium chloride (KCL-PERIPHERAL) IV  10 mEq Q HOUR   • magnesium sulfate  2 g Once   • NS      • acyclovir  400 mg BID   • amLODIPine  10 mg DAILY   • fluconazole  100 mg DAILY   • folic acid  1 mg DAILY   • senna-docusate  2 Tab BID    And   • polyethylene glycol/lytes  1 Packet QDAY PRN    And   • magnesium hydroxide  30 mL QDAY PRN    And   • bisacodyl  10 mg QDAY PRN   • LR  30 mL/kg Once PRN   • acetaminophen  650 mg Q6HRS PRN   • Pharmacy Consult Request  1 Each PHARMACY TO DOSE    And   • oxyCODONE immediate-release  2.5 mg Q3HRS PRN    And   • oxyCODONE immediate-release  5 mg Q3HRS PRN    And   • morphine injection  2 mg Q3HRS PRN   • cefepime  2 g Q8HRS   • enalaprilat  1.25 mg Q6HRS PRN   • ondansetron  4 mg Q4HRS PRN   • ondansetron  4 mg Q4HRS PRN   • promethazine  12.5-25 mg Q4HRS PRN   • prochlorperazine  5-10 mg Q4HRS PRN       Physical Exam   Vitals:    07/30/20 2303 07/31/20 0309 07/31/20 0707 07/31/20 0810   BP: 124/55 122/53  121/53   Pulse: (!) 101 85  88   Resp: 17 18  18   Temp: (!) 38.7 °C (101.7 °F) 37.9 °C (100.3 °F) (!) 38.9 °C (102.1 °F) 36.8 °C (98.2 °F)   TempSrc: Oral Temporal Temporal Temporal   SpO2: 99% 98%  96%   Weight:        Height:           Physical Exam   Constitutional: She is oriented to person, place, and time and well-developed, well-nourished, and in no distress. No distress.   HENT:   Head: Normocephalic and atraumatic.   Eyes: Pupils are equal, round, and reactive to light. Conjunctivae are normal. No scleral icterus.   Neck: Normal range of motion. Neck supple.   Cardiovascular: Normal rate, regular rhythm and intact distal pulses. Exam reveals no gallop and no friction rub.   No murmur heard.  Pulmonary/Chest: Effort normal and breath sounds normal. No respiratory distress. She has no wheezes. She has no rales. She exhibits no tenderness.   Abdominal: Soft. Bowel sounds are normal. She exhibits no distension and no mass. There is no abdominal tenderness. There is no rebound and no guarding.   Musculoskeletal: Normal range of motion.         General: No tenderness or edema.   Neurological: She is alert and oriented to person, place, and time.   Skin: Skin is warm and dry. She is not diaphoretic.   Psychiatric: Mood and affect normal.   Vitals reviewed.         Laboratory   Recent Labs     07/29/20  1325 07/30/20  0159 07/30/20  0333 07/31/20  0400   WBC 0.1*  --  0.3* 0.3*   RBC 2.17*  --  2.53* 2.77*   HEMOGLOBIN 5.9* 8.0* 7.1* 7.5*   HEMATOCRIT 17.1* 23.2* 20.4* 22.2*   PLATELETCT 6*  --  23* 14*     Recent Labs     07/29/20  1327 07/30/20  0333 07/31/20  0400   SODIUM 126* 131* 129*   POTASSIUM 3.0* 3.2* 2.6*   CHLORIDE 91* 95* 94*   CO2 22 25 22   GLUCOSE 121* 100* 101*   BUN 8 11 8   CREATININE 0.51 0.47* 0.51      Recent Labs     07/29/20  1327 07/30/20  0333 07/31/20  0400   ALTSGPT  --  26  --    ASTSGOT  --  15  --    ALKPHOSPHAT  --  164*  --    TBILIRUBIN  --  3.0*  --    GLUCOSE 121* 100* 101*      Recent Labs     07/29/20  1327   INR 1.54*           Microbiology  Results     Procedure Component Value Units Date/Time    Urine Culture (New) (Sensitivity) [[id number]] Collected:  07/29/20 1458    Order Status:   "Completed Specimen:  Urine, Clean Catch Updated:  07/31/20 0749     Significant Indicator NEG     Source UR     Site URINE, CLEAN CATCH     Culture Result No growth at 48 hours.    Narrative:       Indication for culture:->Kidney transplant recipient,  Neutropenic patient, after urologic procedure/surgery or  Urinary tract obstruction with fever >100.4F  Indication for culture:->Kidney transplant recipient,    Blood Culture [[id number]] Collected:  07/29/20 2304    Order Status:  Completed Specimen:  Blood from Peripheral Updated:  07/31/20 0730     Significant Indicator NEG     Source BLD     Site PERIPHERAL     Culture Result No Growth  Note: Blood cultures are incubated for 5 days and  are monitored continuously.Positive blood cultures  are called to the RN and reported as soon as  they are identified.      Narrative:       From different peripheral sites, if not done within the last  24 hours (Per Hospital Policy: Only change specimen source to  \"Line\" if specified by physician order)  No site indicated    Blood Culture [[id number]] Collected:  07/29/20 1840    Order Status:  Completed Specimen:  Blood from Peripheral Updated:  07/30/20 0831     Significant Indicator NEG     Source BLD     Site PERIPHERAL     Culture Result No Growth  Note: Blood cultures are incubated for 5 days and  are monitored continuously.Positive blood cultures  are called to the RN and reported as soon as  they are identified.      Narrative:       From different peripheral sites, if not done within the last  24 hours (Per Hospital Policy: Only change specimen source to  \"Line\" if specified by physician order)  No site indicated    SARS-CoV-2, PCR (In-House) [[id number]] Collected:  07/29/20 1650    Order Status:  Completed Updated:  07/29/20 1800     SARS-CoV-2 Source NP Swab     SARS-CoV-2 by PCR NotDetected     Comment: [facility] providers: PLEASE REFER TO DE-ESCALATION AND RETESTING PROTOCOL  on [email or web address]  **The [employer] GeneXpert " Xpress SARS-CoV-2 Test has been made available for  use under the Emergency Use Authorization (EUA) only.         Narrative:       Is patient being admitted?->Yes  Does this patient meet criteria for Rush/Cepheid per [facility]  Inpatient Workflow? (See workflow link below)->Yes  Expected turn around time?->Rush (Cepheid 2-4 hours)    Routine (COVID/SARS COV-2 In-House PCR up to 24 hours) [[id number]] Collected:  07/29/20 1650    Order Status:  Completed Specimen:  Respirate from Nasopharyngeal Updated:  07/29/20 1656     COVID Order Status Received     Comment: The order for SARS CoV-2 testing has been received by the  Laboratory. This result is neither positive nor negative.  Final results of testing will report in 24-48 hours, separately.         Narrative:       Is patient being admitted?->Yes  Does this patient meet criteria for Rush/Cepheid per [facility]  Inpatient Workflow? (See workflow link below)->Yes  Expected turn around time?->Rush (Cepheid 2-4 hours)    Urinalysis [[id number]]     Order Status:  Canceled Specimen:  Urine     URINALYSIS [[id number]]  (Abnormal) Collected:  07/29/20 1450    Order Status:  Completed Specimen:  Urine, Clean Catch Updated:  07/29/20 1522     Color Yellow     Character Clear     Specific Gravity <=1.005     Ph 7.0     Glucose Negative mg/dL      Ketones Negative mg/dL      Protein Negative mg/dL      Bilirubin Negative     Urobilinogen, Urine 0.2     Nitrite Negative     Leukocyte Esterase Negative     Occult Blood Small     Micro Urine Req Microscopic             Labs reviewed by me and noted above.    Radiology  IR-US GUIDED PIV  Narrative: EXAMINATION:                                                                          HISTORY/REASON FOR EXAM:  Ultrasound Guided PIV.     TECHNIQUE/EXAM DESCRIPTION AND NUMBER OF VIEWS:  Peripheral IV insertion   with ultrasound guidance.  The procedure was prepared using maximal   sterile barrier technique including sterile gown, mask, cap, and  donning   of sterile gloves following appropriate hand hygiene and/or sterile scrub.   Patient skin site was prepped with 2% Chlorhexidine solution.       FINDINGS: Peripheral IV insertion with Ultrasound Guidance was performed   by qualified imaging nursing staff without the assistance of a   Radiologist.  Impression:  Ultrasound-guided PERIPHERAL IV INSERTION performed by   qualified nursing staff as above.      No results found for this or any previous visit.       Assessment and Plan      * Neutropenic fever (HCC)- (present on admission)  Assessment & Plan  Patient is well known to me.    Cont cefepime, fluconazole, acyclovir  DC vancomycin given that blood culture negative after 2 days of incubation.  Restarted vancomycin for left side HAP.  Monitor cultures  Ice packs, cooling blanket    Pancytopenia (HCC)- (present on admission)  Assessment & Plan  Will transfuse if Hg <7 and plt <10.  Etiology is due to chemotherapy for CLL.    CLL (chronic lymphocytic leukemia) (HCC)- (present on admission)  Assessment & Plan  Follows with Dr. Mcelroy  That last time she presented clinically like this, chemo was held by Oncology until her cell counts returned to normal.    Hold chemo.    Sepsis (HCC)  Assessment & Plan  Sepsis source is lung; pneumonia  Sepsis protocol started on admission  Criteria are neutropenia, fever, CXR result, tachycardia despite normal lactic acid.    See addendum below.    Patient reassessed x2 today, so far stable but very ill.      Hyponatremia- (present on admission)  Assessment & Plan  Improving with IVF.  aSx.        DVT prophylaxis: SCD  Severely thrombocytopenic to be on heparin/lovenox.    CODE STATUS:  FULL CODE    Disposition: plan to DC home when neutropenic fever resolved and afebrile >72 hours    Case was discussed with Primary RN in addition to individual(s) mentioned above.    I have performed a physical exam and reviewed and updated ROS as of today, 7/31/2020.  In review of  yesterday's note dated, 7/30/2020, there are no changes except as documented above.      Paul Mario M.D.       Addendum  17:30 HR    CBC returned and reviewed.  Reviewed CXR and noted Radiology interpretation.  Agree with LLL infiltrate consistent with PNA.  Since she was recently admitted, I consider this HAP in a severely immunocompromise patient.  Patient is already on cefepime, acyclovir and diflucan.  Aspergillus Ab pending.  I restart her on Vancomycin, for at least another 5 days and add doxy to cover atypical.      Her plt is 11, but the trajectory is down trending.  I expect it to be lower than 10 by morning, so will preemptively transfuse 1 unit of platelet to stay ahead of the curve.      Lactic acid 2.0.       Continue IVF  I will order an echo to assess for LV function in case she needs even more aggressive IVF resuscitation.  Trend lactic q4  Start ipratropium NEBs, avoid albuterol given tachycardia  Sputum culture  Add Vanco and doxy as discussed above  Check RUQ US.    Discussed with primary RN.

## 2020-07-31 NOTE — PROGRESS NOTES
0736: Spoke to MD Mario at this time for concerns of temp >100.0 for over 12hrs. Pt also becomes tachy 150's when ambulating. MD ordered to continue to monitor and use ice to attempt to bring temp down if tylenol not effective. MD also notified of PLT 14 and . No new orders at this time, will continue to monitor.     0823: Lab called with critical potassium level 2.6. will notify MD Mario.     0845: MD Mario notified of potassium level 2.6. Awaiting orders, will continue to monitor.    1401: Patient maintaining 140's for over 10 mins. MD Mario notified and at bedside assessing patient. All other vitals wnl, will continue to monitor.     1510: MD Mario notified of patients temp of 102.8 and 's. MD ordered to give tylenol and ice packs to patient. Bolus still infusing at this time, CXR taken at this time.     1620: MD Mario notified of lactic 2.0    1720: MD Mario notified of critical CBC results and PLT of 10. Awaiting orders. Will continue to monitor. Express my concerns to MD about patients status, since patient hemodynamically stable, will continue to monitor but low threshold for ICU need if hypotension or increased HR.

## 2020-07-31 NOTE — PROGRESS NOTES
12 hour Chart Check completed.     Monitor Summary-  SR 81-  with first degree  Rare PVC's  0.24/0.10/0.30

## 2020-08-01 NOTE — PROGRESS NOTES
MS:    SR-ST:  (high 154s, notified MD regarding elevated temp/HR. Motrin administered)  (R), (O) PAC  (R) PVC  .16/.08/.32    12 HR CC

## 2020-08-01 NOTE — PROGRESS NOTES
Delvin from Lab called with critical result of Hgb at 6.1. Critical lab result read back to Delvin.   Dr. Tima Reyes notified of critical lab result at 0450.  Critical lab result read back by Dr. Tima Reyes. New orders in for 1 unit of RBC transfusion.

## 2020-08-01 NOTE — CARE PLAN
Problem: Infection  Goal: Will remain free from infection  Outcome: PROGRESSING AS EXPECTED  Neutropenic precautions in place. Hand hygiene performed before and after pt contact. Monitor for signs and symptoms of infection.     Problem: Knowledge Deficit  Goal: Knowledge of disease process/condition, treatment plan, diagnostic tests, and medications will improve  Outcome: PROGRESSING AS EXPECTED   Plan of care discussed w/ pt. Encourage pt to voice feelings/concerns regarding treatment plan, diagnostic tests, procedures/results and medications. Answer accordingly.

## 2020-08-01 NOTE — PROGRESS NOTES
Pharmacy Kinetics 2020  54 y.o. female on vancomycin day # 2     Restart vancomycin  Provider specified end date: TBD    Indication for Treatment: pneumonia, neutropenic fever    Pertinent history per medical record: Admitted on 2020 for fever. History of CLL on chemotherapy presented from the infusion center with recurrent fever. Similar episode recently requiring admission and discharge. Vancomycin initiated and then stopped due to negative cultures. Restarted due to chest xray with new infiltrate.     Other antibiotics: cefepime 2g IV q8h, doxycycline 100mg BID    Allergies: Amoxicillin; Benadryl allergy; Excedrin migraine; Sulfamethoxazole; and Famotidine     List concerns for renal function: Hypoalbuminemia, elevated T bili    Pertinent cultures to date:    blood x 2 - NGTD   urine - NGTD    MRSA nares swab if pneumonia is a concern: ordered    Recent Labs     20  0915 20  1325 20  0333 20  0400   WBC 0.3* 0.1* 0.3* 0.3*   NEUTSPOLYS 0.00* 1.00*  --  CANCEL     Recent Labs     20  1327 20  0333 20  0400 20  1458   BUN 8 11 8 7*   CREATININE 0.51 0.47* 0.51 0.55   ALBUMIN  --  2.6*  --  2.7*     Recent Labs     20  0923   VANCOTROUGH 4.4*       Intake/Output Summary (Last 24 hours) at 2020 1703  Last data filed at 2020 1400  Gross per 24 hour   Intake 900 ml   Output --   Net 900 ml      /53   Pulse (!) 109   Temp (!) 39.3 °C (102.8 °F) (Oral)   Resp 18   Ht 1.524 m (5')   Wt 40.6 kg (89 lb 8.1 oz)   SpO2 92%  Temp (24hrs), Av.2 °C (100.8 °F), Min:36.8 °C (98.2 °F), Max:39.3 °C (102.8 °F)      A/P   1. Vancomycin dose change:   a. Reload with 1,000mg IV x 1 (25 mg/kg)  b. Start vancomycin 500 mg IV q8h (15 mg/kg)  2. Next vancomycin level: 20 @ 930  3. Goal trough: 15-20 mcg/mL  4. Comments: Level this morning subtherapeutic, though drawn 8 hours after second dose. Second dose given 4 hours late. Will start with  more frequent dosing due to clearance. Low risk for accumulation. Early trough to assess current dosing. May requiring transition to alternative therapy if not therapeutic. Recommend rapid de-escalation based on nares swab. Prior beta d glucan positive though on cefepime at the time, consider ID consult. Also possible transfusion reaction related to recent transfusions. Pharmacy will continue to follow.      Fawad Ridley, PharmD

## 2020-08-01 NOTE — CARE PLAN
Respiratory Update    Treatment modality: Atrovent   Frequency: BID     Pt tolerating current treatments well with no adverse reactions.

## 2020-08-01 NOTE — PROGRESS NOTES
HOSPITAL MEDICINE PROGRESS NOTE    Date of service  8/1/2020    Chief Complaint  Fever (reportedly febrile in infusion services today = 101F) and Peripheral Edema (udden onset LUE edema/pain, Monday after PIV infiltrated.)      Hospital Course:     54-year-old woman who has a history of CLL on chemotherapy presented with fever, severely neutropenia, found to have left side PNA and sepsis due to PNA.          Interval History Updates:  Last fever 1213 last night.  No fever since.  Ibuprofen was ordered over night.  I dc this given severe thrombocytopenia.  Use acetaminophen instead.  Hg 6.1.  Getting 1 unit PRBC    Consultants/Specialty  None    Review of Systems   Review of Systems   Constitutional: Positive for fever. Negative for chills.   Respiratory: Positive for cough.    Genitourinary: Negative for dysuria, frequency and urgency.        Medications:  • ipratropium  0.5 mg BID (RT)   • 0.9 % NaCl with KCl 40 mEq 1,000 mL   Continuous   • omeprazole  20 mg BID   • doxycycline monohydrate  100 mg Q12HRS   • guaiFENesin ER  600 mg Q12HRS   • MD Alert...Vancomycin per Pharmacy   PHARMACY TO DOSE   • vancomycin  15 mg/kg Q8HR   • ibuprofen  400 mg Q6HRS PRN   • acyclovir  400 mg BID   • amLODIPine  10 mg DAILY   • fluconazole  100 mg DAILY   • folic acid  1 mg DAILY   • senna-docusate  2 Tab BID    And   • polyethylene glycol/lytes  1 Packet QDAY PRN    And   • magnesium hydroxide  30 mL QDAY PRN    And   • bisacodyl  10 mg QDAY PRN   • LR  30 mL/kg Once PRN   • acetaminophen  650 mg Q6HRS PRN   • Pharmacy Consult Request  1 Each PHARMACY TO DOSE    And   • oxyCODONE immediate-release  2.5 mg Q3HRS PRN    And   • oxyCODONE immediate-release  5 mg Q3HRS PRN    And   • morphine injection  2 mg Q3HRS PRN   • cefepime  2 g Q8HRS   • enalaprilat  1.25 mg Q6HRS PRN   • ondansetron  4 mg Q4HRS PRN   • ondansetron  4 mg Q4HRS PRN   • promethazine  12.5-25 mg Q4HRS PRN   • prochlorperazine  5-10 mg Q4HRS PRN       Physical  Exam   Vitals:    08/01/20 0906 08/01/20 0921 08/01/20 1016 08/01/20 1111   BP: 119/54 109/50  111/55   Pulse: 88 81 83 83   Resp: 16 16 16 16   Temp: 36.3 °C (97.3 °F) 36.4 °C (97.5 °F)  36.4 °C (97.5 °F)   TempSrc: Temporal Temporal  Temporal   SpO2: 92% 92% 96% 98%   Weight:       Height:           Physical Exam   Constitutional: She is oriented to person, place, and time and well-developed, well-nourished, and in no distress. No distress.   HENT:   Head: Normocephalic and atraumatic.   Eyes: Pupils are equal, round, and reactive to light. Conjunctivae are normal. No scleral icterus.   Neck: Normal range of motion. Neck supple.   Cardiovascular: Normal rate, regular rhythm and intact distal pulses. Exam reveals no gallop and no friction rub.   No murmur heard.  Pulmonary/Chest: Effort normal. No respiratory distress. She has no wheezes. She has rales. She exhibits no tenderness.   Abdominal: Soft. Bowel sounds are normal. She exhibits no distension and no mass. There is no abdominal tenderness. There is no rebound and no guarding.   Musculoskeletal: Normal range of motion.         General: No tenderness or edema.   Neurological: She is alert and oriented to person, place, and time.   Skin: Skin is warm and dry. She is not diaphoretic.   Psychiatric: Mood and affect normal.   Vitals reviewed.         Laboratory   Recent Labs     07/31/20  0400 07/31/20 1458 08/01/20  0339   WBC 0.3* 0.2* 0.2*   RBC 2.77* 2.70* 2.24*   HEMOGLOBIN 7.5* 7.3* 6.1*   HEMATOCRIT 22.2* 22.0* 18.4*   PLATELETCT 14* 11* 25*     Recent Labs     07/31/20  0400 07/31/20  1458 08/01/20  0339   SODIUM 129* 130* 134*   POTASSIUM 2.6* 3.2* 3.2*   CHLORIDE 94* 95* 104   CO2 22 19* 19*   GLUCOSE 101* 157* 112*   BUN 8 7* 7*   CREATININE 0.51 0.55 0.41*      Recent Labs     07/30/20  0333 07/31/20  0400 07/31/20  1458 08/01/20  0339   ALTSGPT 26  --  35  --    ASTSGOT 15  --  22  --    ALKPHOSPHAT 164*  --  216*  --    TBILIRUBIN 3.0*  --  1.7*  --     DBILIRUBIN  --   --   --  1.1*   GLUCOSE 100* 101* 157* 112*      Recent Labs     07/29/20  1327   INR 1.54*           Microbiology  Results     Procedure Component Value Units Date/Time    CULTURE RESPIRATORY W/ [facility] [[id number]] Collected:  08/01/20 0825    Order Status:  Completed Specimen:  Sputum Updated:  08/01/20 0952    Narrative:       Collected By:[id number] ROMEO ROSENBERG    CULTURE RESP W/O GRAM STAIN [[id number]] Collected:  08/01/20 0825    Order Status:  Canceled Specimen:  Sputum     MRSA By PCR (Amp) [[id number]] Collected:  08/01/20 0315    Order Status:  Completed Specimen:  Respirate from Nares Updated:  08/01/20 0322    Narrative:       Collected By:[id number] SIDNEY PATEL    CULTURE RESPIRATORY W/ [facility] [[id number]]     Order Status:  Completed Specimen:  Respirate from Sputum     CULTURE RESPIRATORY W/ [facility] [[id number]]     Order Status:  Canceled Specimen:  Respirate from Sputum     Urine Culture (New) (Sensitivity) [[id number]] Collected:  07/29/20 1450    Order Status:  Completed Specimen:  Urine, Clean Catch Updated:  07/31/20 0749     Significant Indicator NEG     Source UR     Site URINE, CLEAN CATCH     Culture Result No growth at 48 hours.    Narrative:       Indication for culture:->Kidney transplant recipient,  Neutropenic patient, after urologic procedure/surgery or  Urinary tract obstruction with fever >100.4F  Indication for culture:->Kidney transplant recipient,    Blood Culture [[id number]] Collected:  07/29/20 2304    Order Status:  Completed Specimen:  Blood from Peripheral Updated:  07/31/20 0730     Significant Indicator NEG     Source BLD     Site PERIPHERAL     Culture Result No Growth  Note: Blood cultures are incubated for 5 days and  are monitored continuously.Positive blood cultures  are called to the RN and reported as soon as  they are identified.      Narrative:       From different peripheral sites, if not done within the last  24 hours (Per Hospital Policy:  "Only change specimen source to  \"Line\" if specified by physician order)  No site indicated    Blood Culture [[id number]] Collected:  07/29/20 1840    Order Status:  Completed Specimen:  Blood from Peripheral Updated:  07/30/20 0831     Significant Indicator NEG     Source BLD     Site PERIPHERAL     Culture Result No Growth  Note: Blood cultures are incubated for 5 days and  are monitored continuously.Positive blood cultures  are called to the RN and reported as soon as  they are identified.      Narrative:       From different peripheral sites, if not done within the last  24 hours (Per Hospital Policy: Only change specimen source to  \"Line\" if specified by physician order)  No site indicated    SARS-CoV-2, PCR (In-House) [[id number]] Collected:  07/29/20 1650    Order Status:  Completed Updated:  07/29/20 1800     SARS-CoV-2 Source NP Swab     SARS-CoV-2 by PCR NotDetected     Comment: [facility] providers: PLEASE REFER TO DE-ESCALATION AND RETESTING PROTOCOL  on [email or web address]  **The [employer] GeneXpert Xpress SARS-CoV-2 Test has been made available for  use under the Emergency Use Authorization (EUA) only.         Narrative:       Is patient being admitted?->Yes  Does this patient meet criteria for Rush/Cepheid per [facility]  Inpatient Workflow? (See workflow link below)->Yes  Expected turn around time?->Rush (Cepheid 2-4 hours)    Routine (COVID/SARS COV-2 In-House PCR up to 24 hours) [[id number]] Collected:  07/29/20 1650    Order Status:  Completed Specimen:  Respirate from Nasopharyngeal Updated:  07/29/20 1656     COVID Order Status Received     Comment: The order for SARS CoV-2 testing has been received by the  Laboratory. This result is neither positive nor negative.  Final results of testing will report in 24-48 hours, separately.         Narrative:       Is patient being admitted?->Yes  Does this patient meet criteria for Rush/Cepheid per [facility]  Inpatient Workflow? (See workflow link below)->Yes  Expected turn " around time?->Rush (Cepheid 2-4 hours)    Urinalysis [[id number]]     Order Status:  Canceled Specimen:  Urine     URINALYSIS [[id number]]  (Abnormal) Collected:  07/29/20 1450    Order Status:  Completed Specimen:  Urine, Clean Catch Updated:  07/29/20 1522     Color Yellow     Character Clear     Specific Gravity <=1.005     Ph 7.0     Glucose Negative mg/dL      Ketones Negative mg/dL      Protein Negative mg/dL      Bilirubin Negative     Urobilinogen, Urine 0.2     Nitrite Negative     Leukocyte Esterase Negative     Occult Blood Small     Micro Urine Req Microscopic             Labs reviewed by me and noted above.    Radiology  US-RUQ  Narrative: 8/1/2020 6:50 AM    HISTORY/REASON FOR EXAM:  Abnormal Labs  Abdominal pain    TECHNIQUE/EXAM DESCRIPTION AND NUMBER OF VIEWS:  Real-time sonography of the liver and biliary tree.    COMPARISON: None    FINDINGS:  The liver is normal in contour. There is no evidence of solid mass lesion. The liver measures 11.04 cm.    The gallbladder is surgically absent.  The common duct measures 5.30 mm.    The visualized pancreas is unremarkable.  The visualized aorta is normal in caliber.    Intrahepatic IVC is patent.    The portal vein is patent with hepatopetal flow. The MPV measures 1.12 cm.    The right kidney measures 11.05 cm. There is no hydronephrosis.    There is no ascites.  Impression: Status post cholecystectomy.    No biliary ductal dilatation is seen.      No results found for this or any previous visit.       Assessment and Plan      * Nosocomial pneumonia  Assessment & Plan  Clinically improving on broad spec antibiotics - cefepime, vancomycin, doxy.  Fever curve improved.  If MRSA screen NEG, then de-escalate vanc to Unasyn and maintain GN and atypical coverage.  Sepsis protocol, IVF  Sputum culture    Pancytopenia (HCC)- (present on admission)  Assessment & Plan  Will transfuse if Hg <7 and plt <10.  Etiology is due to chemotherapy for CLL.    Sepsis  ([facility])  Assessment & Plan  Sepsis source is lung; pneumonia  Sepsis protocol started on admission  Criteria are neutropenia, fever, CXR result, tachycardia despite normal lactic acid.    Improving on treatment of primary problem/source.     Hyponatremia- (present on admission)  Assessment & Plan  Improving with IVF.  aSx.    CLL (chronic lymphocytic leukemia) (HCC)- (present on admission)  Assessment & Plan  Follows with Dr. Mcelroy  That last time she presented clinically like this, chemo was held by Oncology until her cell counts returned to normal.    Hold chemo.    Neutropenic fever (HCC)- (present on admission)  Assessment & Plan  Patient is well known to me.    Cont cefepime, fluconazole, acyclovir  DC vancomycin given that blood culture negative after 2 days of incubation.  Restarted vancomycin for left side HAP.  Monitor cultures  Ice packs, cooling blanket        DVT prophylaxis: SCD  Severely thrombocytopenic to be on heparin/lovenox.    CODE STATUS:  FULL CODE    Disposition: Anticipate home in 3-4 days.    Case was discussed with Primary RN in addition to individual(s) mentioned above.    I have performed a physical exam and reviewed and updated ROS as of today, 8/1/2020.  In review of yesterday's note dated, 7/31/2020, there are no changes except as documented above.      Paul Mario M.D.

## 2020-08-01 NOTE — PROGRESS NOTES
Pharmacy Kinetics 54 y.o. female on vancomycin day # 3 2020    Currently on Vancomycin 500 mg iv q8hr  Provider specified end date: TBD    Indication for Treatment: pneumonia, neutropenic fever     Pertinent history per medical record: Admitted on 2020 for fever. History of CLL on chemotherapy presented from the infusion center with recurrent fever. Similar episode recently requiring admission and discharge. Vancomycin initiated and then stopped due to negative cultures. Restarted due to chest xray with new infiltrate.      Other antibiotics: cefepime 2g IV q8h, doxycycline 100mg BID     Allergies: Amoxicillin; Benadryl allergy; Excedrin migraine; Sulfamethoxazole; and Famotidine      List concerns for renal function: Hypoalbuminemia, elevated T bili     Pertinent cultures to date:    blood x 2 - NGTD   urine - negative     MRSA nares swab if pneumonia is a concern: ordered (will be completed around 1800 today)    Recent Labs     20  0333 20  0400 20  1458 20  0339   WBC 0.3* 0.3* 0.2* 0.2*   NEUTSPOLYS  --  CANCEL CANCEL  --      Recent Labs     20  0333 20  0400 20  1458 20  0339   BUN 11 8 7* 7*   CREATININE 0.47* 0.51 0.55 0.41*   ALBUMIN 2.6*  --  2.7*  --      Recent Labs     20  0923 20  0924   VANCOTROUGH 4.4* 9.1*       Intake/Output Summary (Last 24 hours) at 2020 1420  Last data filed at 2020 1111  Gross per 24 hour   Intake 1880 ml   Output --   Net 1880 ml      /55   Pulse 83   Temp 36.4 °C (97.5 °F) (Temporal)   Resp 16   Ht 1.524 m (5')   Wt 42.7 kg (94 lb 1 oz)   SpO2 98%  Temp (24hrs), Av.2 °C (98.9 °F), Min:36.2 °C (97.2 °F), Max:39.3 °C (102.8 °F)      A/P   1. Vancomycin dose change: not indicate  2. Next vancomycin level: tomorrow at 0930 if MRSA PCR positive (not ordered yet)  3. Goal trough: 15 - 20 mcg/mL  4. Comments: Vancomycin continues for concerns for MRSA PNA. Micro reported that the  MRSA by PCR should be done by this evening. Patient remains neutropenic with most recent fever on 7/31 PM. Patient's O2 requirements consistent and renal function stable. Vancomycin trough from this AM sub-therapeutic but as patient is currently improving and only collected after two maintenance doses, will continue with current regimen. Will plan on obtaining a trough tomorrow AM pending MRSA PCR results prior to 5th dose of schedule regimen. Pharmacy will continue to monitor for de-escalation.    Thank you!    Brooke Resendiz, PharmD, BCPS

## 2020-08-01 NOTE — PROGRESS NOTES
Handoff report received. Assumed patient care. Patient resting in bed. Patient not in distress, AAOX 4. Safety precautions in place. Call light and personal belongings within reach. Bed is locked and in lowest position. Educated to call for assistance if needed, pt verbalized understanding.  at bedside. Will continue to monitor.     Ultrasound imaging called to verify pt's NPO status. Pt currently eating dinner. Orders in for pt to be NPO at 0000 for US RUQ tomorrow.

## 2020-08-01 NOTE — ASSESSMENT & PLAN NOTE
Clinically improving on broad spec antibiotics - cefepime, vancomycin, doxy.  Afebrile for 24 hours.  If MRSA screen NEG, then de-escalate vanc to Unasyn and maintain GN and atypical coverage.  Sepsis protocol, IVF  Sputum culture x2 contaminated.  Repeat CXR today to assess progression  Robitussin 10ml q6 PRN for coughs  Scan blood on tissue with clear phlegm.

## 2020-08-02 PROBLEM — J96.01 ACUTE HYPOXEMIC RESPIRATORY FAILURE (HCC): Status: ACTIVE | Noted: 2020-01-01

## 2020-08-02 PROBLEM — I27.20 PULMONARY HYPERTENSION (HCC): Status: ACTIVE | Noted: 2020-01-01

## 2020-08-02 NOTE — ASSESSMENT & PLAN NOTE
Secondary to CLL and chemotherapy  Hold chemotherapy  Transfuse per protocols  Serial CBC  Appreciate oncology input

## 2020-08-02 NOTE — PROGRESS NOTES
Handoff report received. Assumed patient care. Patient resting in bed. Patient not in distress, AAOX 4. Safety precautions in place. Call light and personal belongings within reach. Bed is locked and in lowest position. Educated to call for assistance if needed, pt verbalized understanding.  at bedside. Will continue to monitor.

## 2020-08-02 NOTE — ASSESSMENT & PLAN NOTE
Pancytopenia secondary to CLL and chemotherapy  Protective isolation for severe neutropenia  Appreciate ID input, broad-spectrum antimicrobial therapy and antifungal therapy with amphotericin  Bronchoscopy 8/4 negative so far

## 2020-08-02 NOTE — PROGRESS NOTES
Lab called with critical result of WBC at 0.1 and Platelet level of 16.    Dr. Jensen notified of critical lab result at 0515. Critical lab result read back by Dr. Jensen. No new orders at this time. Will continue to monitor and notify day shift RN.

## 2020-08-02 NOTE — CARE PLAN
Problem: Bronchoconstriction:  Goal: Improve in air movement and diminished wheezing  Outcome: PROGRESSING AS EXPECTED   Atrovent BID. Pt receiving 1 LPM.

## 2020-08-02 NOTE — ASSESSMENT & PLAN NOTE
Dense left-sided pneumonia with necrosis versus mass on CT  Bronchoscopy 8/4 without purulence but extrinsic compression of left lower lobe  BAL for culture, AFB, fungal, cytology and galactomannan -final results pending  Ongoing antibiotics as above

## 2020-08-02 NOTE — ASSESSMENT & PLAN NOTE
History of SVT w/ recurrence 2/2 to fever/sepsis   Optimize electrolytes  Cardioversion for unstable SVT

## 2020-08-02 NOTE — PROGRESS NOTES
HOSPITAL MEDICINE PROGRESS NOTE    Date of service  8/2/2020    Chief Complaint  Fever (reportedly febrile in infusion services today = 101F) and Peripheral Edema (udden onset LUE edema/pain, Monday after PIV infiltrated.)      Hospital Course:     54-year-old woman who has a history of CLL on chemotherapy presented with fever, severely neutropenia, found to have left side PNA and sepsis due to PNA.  On presentation, was started on cefepime for presumptive urinary source.  Urinalysis was unimpressive, but CXR ordered due to her coughs reveals the left lung infiltrate.  Abx was broaden to include MRSA and GPC coverage with vancomycin and atypical with Doxy.  MRSA screen returned negative today, so Vancomycin discontinued in favor of the remaining antibiotics, antifungal and antiviral.      Given her prolong history of neutropenia, aspergillus Ab was sent and is pending at this time.  Sputum cultures attempted twice, but both times contaminated unfortunately.  Blood cx has remain negative.  At one point, CT chest was considered, but patient fever resolved, so held off.  Also, patient and her  did not want more exposure to radiation.      From a sepsis POV, her fever resolved on 7/31/20 at about midnight.  She has not spiked a fever until this afternoon when she is transferred to ICU (see addendum below).  Lactic acid has not been high, rather normal with resuscitation effort.  She has a high procalcitonin at 7.      This morning, she showed me slight amount of blood tinged phlegm on her tissues.  Etiology is most likely due to her pneumonia superimposing on her severe thrombocytopenia.  I decided to keep her plt around 50,000, so wrote for a unit of platelet to transfuse for a plt of 16.  In the past, Heme/Onc recommended to transfuse for plt <10,000 without any sign of bleeding.      Regarding her neutropenia, her bone marrow biopsy showed packed bone marrow.  Per Oncology, Dr. Blackwell, the last time she was  hospitalized on R3, it is not surprising that she has such a degree of neutropenia.  Chemotherapy was held at that time to be resume after discharge from the hospital once her neutropenic fever resolved.         Interval History Updates:  Last fever 1213 night of 7/31.  No fever since.      Consultants/Specialty  None    Review of Systems   Review of Systems   Constitutional: Negative for chills and fever.   Respiratory: Positive for cough, hemoptysis and sputum production.    Genitourinary: Negative for dysuria, frequency and urgency.        Medications:  • ipratropium  0.5 mg BID (RT)   • 0.9 % NaCl with KCl 40 mEq 1,000 mL   Continuous   • omeprazole  20 mg BID   • doxycycline monohydrate  100 mg Q12HRS   • guaiFENesin ER  600 mg Q12HRS   • MD Alert...Vancomycin per Pharmacy   PHARMACY TO DOSE   • vancomycin  15 mg/kg Q8HR   • acyclovir  400 mg BID   • amLODIPine  10 mg DAILY   • fluconazole  100 mg DAILY   • folic acid  1 mg DAILY   • senna-docusate  2 Tab BID    And   • polyethylene glycol/lytes  1 Packet QDAY PRN    And   • magnesium hydroxide  30 mL QDAY PRN    And   • bisacodyl  10 mg QDAY PRN   • LR  30 mL/kg Once PRN   • acetaminophen  650 mg Q6HRS PRN   • Pharmacy Consult Request  1 Each PHARMACY TO DOSE    And   • oxyCODONE immediate-release  2.5 mg Q3HRS PRN    And   • oxyCODONE immediate-release  5 mg Q3HRS PRN    And   • morphine injection  2 mg Q3HRS PRN   • cefepime  2 g Q8HRS   • enalaprilat  1.25 mg Q6HRS PRN   • ondansetron  4 mg Q4HRS PRN   • ondansetron  4 mg Q4HRS PRN   • promethazine  12.5-25 mg Q4HRS PRN   • prochlorperazine  5-10 mg Q4HRS PRN       Physical Exam   Vitals:    08/02/20 0100 08/02/20 0430 08/02/20 0627 08/02/20 0809   BP:  147/74  154/63   Pulse: (!) 120 (!) 117 (!) 106 (!) 123   Resp:  18 18 16   Temp: 37.4 °C (99.4 °F) 37.1 °C (98.7 °F)  37.6 °C (99.7 °F)   TempSrc: Oral Temporal  Temporal   SpO2:  93% 92% 91%   Weight:       Height:           Physical Exam   Constitutional:  She is oriented to person, place, and time and well-developed, well-nourished, and in no distress. No distress.   HENT:   Head: Normocephalic and atraumatic.   Eyes: Pupils are equal, round, and reactive to light. Conjunctivae are normal. No scleral icterus.   Neck: Normal range of motion. Neck supple.   Cardiovascular: Normal rate, regular rhythm and intact distal pulses. Exam reveals no gallop and no friction rub.   No murmur heard.  Pulmonary/Chest: Effort normal. No respiratory distress. She has no wheezes. She has rales. She exhibits no tenderness.   Abdominal: Soft. Bowel sounds are normal. She exhibits no distension and no mass. There is no abdominal tenderness. There is no rebound and no guarding.   Musculoskeletal: Normal range of motion.         General: No tenderness or edema.   Neurological: She is alert and oriented to person, place, and time.   Skin: Skin is warm and dry. She is not diaphoretic.   Psychiatric: Mood and affect normal.   Vitals reviewed.         Laboratory   Recent Labs     07/31/20 1458 08/01/20 0339 08/02/20 0419   WBC 0.2* 0.2* 0.1*   RBC 2.70* 2.24* 2.75*   HEMOGLOBIN 7.3* 6.1* 7.7*   HEMATOCRIT 22.0* 18.4* 23.8*   PLATELETCT 11* 25* 16*     Recent Labs     07/31/20 1458 08/01/20 0339 08/02/20 0419   SODIUM 130* 134* 131*   POTASSIUM 3.2* 3.2* 3.3*   CHLORIDE 95* 104 103   CO2 19* 19* 16*   GLUCOSE 157* 112* 103*   BUN 7* 7* 5*   CREATININE 0.55 0.41* 0.33*      Recent Labs     07/31/20 1458 08/01/20 0339 08/02/20 0419   ALTSGPT 35  --   --    ASTSGOT 22  --   --    ALKPHOSPHAT 216*  --   --    TBILIRUBIN 1.7*  --   --    DBILIRUBIN  --  1.1*  --    GLUCOSE 157* 112* 103*                Microbiology  Results     Procedure Component Value Units Date/Time    GRAM STAIN [[id number]] Collected:  08/01/20 0825    Order Status:  Completed Specimen:  Respirate Updated:  08/01/20 1606     Significant Indicator .     Source RESP     Site Sputum     Gram Stain Result Sputum Gram stain  quality score is <1, probable  oropharyngeal contamination. Culture not performed.  Recollect if clinically indicated.      Narrative:       Collected By:[id number] ROMEO ROSENBERG  Collected By:[id number] ROMEO SHOOK R    CULTURE RESPIRATORY W/ [facility] [[id number]] Collected:  08/01/20 0825    Order Status:  Completed Specimen:  Sputum Updated:  08/01/20 0952    Narrative:       Collected By:[id number] ROMEO SHOOK R    CULTURE RESP W/O GRAM STAIN [[id number]] Collected:  08/01/20 0825    Order Status:  Canceled Specimen:  Sputum     MRSA By PCR (Amp) [[id number]] Collected:  08/01/20 0315    Order Status:  Completed Specimen:  Respirate from Nares Updated:  08/01/20 0322    Narrative:       Collected By:[id number] SIDNEY PATEL    CULTURE RESPIRATORY W/ [facility] [[id number]]     Order Status:  Completed Specimen:  Respirate from Sputum     CULTURE RESPIRATORY W/ [facility] [[id number]]     Order Status:  Canceled Specimen:  Respirate from Sputum     Urine Culture (New) (Sensitivity) [[id number]] Collected:  07/29/20 1450    Order Status:  Completed Specimen:  Urine, Clean Catch Updated:  07/31/20 0749     Significant Indicator NEG     Source UR     Site URINE, CLEAN CATCH     Culture Result No growth at 48 hours.    Narrative:       Indication for culture:->Kidney transplant recipient,  Neutropenic patient, after urologic procedure/surgery or  Urinary tract obstruction with fever >100.4F  Indication for culture:->Kidney transplant recipient,    Blood Culture [[id number]] Collected:  07/29/20 2304    Order Status:  Completed Specimen:  Blood from Peripheral Updated:  07/31/20 0730     Significant Indicator NEG     Source BLD     Site PERIPHERAL     Culture Result No Growth  Note: Blood cultures are incubated for 5 days and  are monitored continuously.Positive blood cultures  are called to the RN and reported as soon as  they are identified.      Narrative:       From different peripheral sites, if not done within the  "last  24 hours (Per Hospital Policy: Only change specimen source to  \"Line\" if specified by physician order)  No site indicated    Blood Culture [[id number]] Collected:  07/29/20 1840    Order Status:  Completed Specimen:  Blood from Peripheral Updated:  07/30/20 0831     Significant Indicator NEG     Source BLD     Site PERIPHERAL     Culture Result No Growth  Note: Blood cultures are incubated for 5 days and  are monitored continuously.Positive blood cultures  are called to the RN and reported as soon as  they are identified.      Narrative:       From different peripheral sites, if not done within the last  24 hours (Per Hospital Policy: Only change specimen source to  \"Line\" if specified by physician order)  No site indicated    SARS-CoV-2, PCR (In-House) [[id number]] Collected:  07/29/20 1650    Order Status:  Completed Updated:  07/29/20 1800     SARS-CoV-2 Source NP Swab     SARS-CoV-2 by PCR NotDetected     Comment: [facility] providers: PLEASE REFER TO DE-ESCALATION AND RETESTING PROTOCOL  on [email or web address]  **The [employer] GeneXpert Xpress SARS-CoV-2 Test has been made available for  use under the Emergency Use Authorization (EUA) only.         Narrative:       Is patient being admitted?->Yes  Does this patient meet criteria for Rush/Cepheid per [facility]  Inpatient Workflow? (See workflow link below)->Yes  Expected turn around time?->Rush (Cepheid 2-4 hours)    Routine (COVID/SARS COV-2 In-House PCR up to 24 hours) [[id number]] Collected:  07/29/20 1650    Order Status:  Completed Specimen:  Respirate from Nasopharyngeal Updated:  07/29/20 1656     COVID Order Status Received     Comment: The order for SARS CoV-2 testing has been received by the  Laboratory. This result is neither positive nor negative.  Final results of testing will report in 24-48 hours, separately.         Narrative:       Is patient being admitted?->Yes  Does this patient meet criteria for Rush/Cepheid per [facility]  Inpatient Workflow? (See " workflow link below)->Yes  Expected turn around time?->Rush (Cepheid 2-4 hours)    Urinalysis [[id number]]     Order Status:  Canceled Specimen:  Urine     URINALYSIS [[id number]]  (Abnormal) Collected:  07/29/20 1450    Order Status:  Completed Specimen:  Urine, Clean Catch Updated:  07/29/20 1522     Color Yellow     Character Clear     Specific Gravity <=1.005     Ph 7.0     Glucose Negative mg/dL      Ketones Negative mg/dL      Protein Negative mg/dL      Bilirubin Negative     Urobilinogen, Urine 0.2     Nitrite Negative     Leukocyte Esterase Negative     Occult Blood Small     Micro Urine Req Microscopic             Labs reviewed by me and noted above.    Radiology  Echo with EF 70%           Assessment and Plan      * Nosocomial pneumonia  Assessment & Plan  Clinically improving on broad spec antibiotics - cefepime, vancomycin, doxy.  Afebrile for 24 hours.  If MRSA screen NEG, then de-escalate vanc to Unasyn and maintain GN and atypical coverage.  Sepsis protocol, IVF  Sputum culture x2 contaminated.  Repeat CXR today to assess progression  Robitussin 10ml q6 PRN for coughs  Scan blood on tissue with clear phlegm.      Pancytopenia (HCC)- (present on admission)  Assessment & Plan  Will transfuse if Hg <7.  Given that she has scan hemoptysis, will transfuse to keep plt around 50,000.  Etiology is due to chemotherapy for CLL.  Transfuse 1  Unit plt today.    Sepsis (HCC)  Assessment & Plan  Sepsis resolving on broad spec abx.    Sepsis source is lung; pneumonia  Sepsis protocol started on admission  Criteria are neutropenia, fever, CXR result, tachycardia despite normal lactic acid.    Improving on treatment of primary problem/source.     Hyponatremia- (present on admission)  Assessment & Plan  Improving with IVF.  aSx.    CLL (chronic lymphocytic leukemia) (HCC)- (present on admission)  Assessment & Plan  Follows with Dr. Mcelroy  That last time she presented clinically like this, chemo was held by Oncology  until her cell counts returned to normal.    Hold chemo.    Neutropenic fever (HCC)- (present on admission)  Assessment & Plan  Patient is well known to me.    Cont cefepime, fluconazole, acyclovir  Afebrile for 24 hours as of this AM.        DVT prophylaxis: SCD  Severely thrombocytopenic to be on heparin/lovenox.    CODE STATUS:  FULL CODE    Disposition: Anticipate home in 3-4 days.    Case was discussed with Primary RN in addition to individual(s) mentioned above.    I have performed a physical exam and reviewed and updated ROS as of today, 8/2/2020.  In review of yesterday's note dated, 8/1/2020, there are no changes except as documented above.      Paul Mario M.D.          Addendum  03:00 PM    Rapid response called due to significant tachypnea, tachycardia (sinus) and increase O2 requirement.  Spiking temperature again.  38.2 C then 38.9 C.  Concern for respiratory failure given tachypnea and worsening hypoxia.  Discussed case with Dr. Gonda via [employer] as I am concurrently managing another patient with ACS and another patient with an active GIB on T8.  Request Dr. Gonda evaluation.  I wrote order to transfer her to ICU given worsening clinical condition.      Afterward, came to T6 ICU to see patient.  She clinically appears the same as during AM round.  No worsening hemoptysis, though her O2 requirement has risen.  Discussed clinical history, Oncologic history, recent hospitalization prior to this admission for neutropenic fever, and this admission hospital course with Dr. Gonda.  He was at bedside evaluating the patient.  I defer clinical management to him and appreciate his assistance.  When patient stable to transfer back to [facility] and if I am still on duty, I prefer to resume her medical care as her Attending.      Repeat CXR today personally reviewed.  It appears unchanged compared to CXR done 2 days ago.  Perhaps a CT chest can be considered at this point and further diagnostic/therapeutic testing  depending on result.  I defer decision to the CC ICU Attending.

## 2020-08-02 NOTE — ASSESSMENT & PLAN NOTE
RVSP 40 on echo 8/1  Presumably secondary to pneumonic process, not insignificant right heart failure at this time  LV function excellent with EF 70%  Monitor

## 2020-08-02 NOTE — CONSULTS
Critical Care Consultation    Date of consult: 8/2/2020    Referring Physician  Paul Mario M.D.    Reason for Consultation  SVT, sepsis and CLL with pancytopenia    History of Presenting Illness  54 y.o. female with a history of CLL on chemotherapy followed by Dr. Summers with a history of SVT and admission last month for sepsis versus transfusion reaction or both during which she required pressors and IV antibiotics but ended up culture negative who presented 7/29/2020 with fever at the infusion center was admitted with possible UTI but subsequently has blossomed a juicy left-sided pneumonia.  Today she developed worsening fever and tachycardia felt to possibly have SVT again she appeared in distress for this and was transferred to the CICU rapidly.  On arrival her heart rate improved and she is in sinus tach in the 130s but her temperature is 102.  Blood pressures been acceptable throughout.  Bicarb level this a.m. down a little bit and lactic acid levels not been obtained.  Cultures are negative so far and MRSA nares came back negative.  She has been on broad-spectrum antibiotics including doxycycline, cefepime, Diflucan and acyclovir with only cefepime IV and vancomycin had been on board.  He has no history of pneumonia.  She has history of asthma when she was young and lived in the [facility] but she is not had any symptoms or required any treatment in recent years in the .  She has no exposures to anyone sick lives only with her  and they are actually lately been sleeping in separate bedrooms because of her illness.  She is a non-smoker and since her illness she is not received any vaccines.  Echocardiogram done late last night did not reveal a pericardial effusion, there was mild/moderate pulmonary pretension with RVSP 40, no significant valvular heart disease was identified, LV function was normal with an estimated EF of 70%.    Code Status  Full Code    Review of Systems  Review of Systems    Constitutional: Positive for chills, fever and malaise/fatigue. Negative for diaphoresis.   HENT: Negative for nosebleeds, sinus pain and sore throat.    Eyes: Negative for blurred vision, double vision and discharge.   Respiratory: Positive for cough, hemoptysis (Trace blood in her sputum) and shortness of breath. Negative for sputum production (Mostly nonproductive) and wheezing.    Cardiovascular: Negative for chest pain, palpitations, orthopnea and PND.   Gastrointestinal: Positive for nausea. Negative for abdominal pain, blood in stool, diarrhea and vomiting.   Genitourinary: Negative for dysuria, flank pain, frequency and hematuria.   Musculoskeletal: Negative for back pain and joint pain.   Skin: Negative for rash.        Bruising from thrombocytopenia   Neurological: Negative for sensory change, speech change, focal weakness and headaches.   Endo/Heme/Allergies: Bruises/bleeds easily.   Psychiatric/Behavioral: The patient is not nervous/anxious.        Past Medical History   has a past medical history of Anxiety disorder, CLL (chronic lymphocytic leukemia) (HCC), and Hypertension.    Surgical History   has a past surgical history that includes cholecystectomy (2005).    Family History  family history is not on file.    Social History   reports that she has never smoked. She has never used smokeless tobacco. She reports that she does not drink alcohol or use drugs.    Medications  Home Medications     Reviewed by Genaro Cárdenas (Pharmacy Tech) on 07/29/20 at 1614  Med List Status: Complete   Medication Last Dose Status   acyclovir (ZOVIRAX) 400 MG tablet 7/29/2020 Active   amLODIPine (NORVASC) 10 MG Tab 7/29/2020 Active   fluconazole (DIFLUCAN) 100 MG Tab 7/29/2020 Active   folic acid (FOLVITE) 1 MG Tab 7/29/2020 Active   levoFLOXacin (LEVAQUIN) 500 MG tablet 7/29/2020 Active              Current Facility-Administered Medications   Medication Dose Route Frequency Provider Last Rate Last Dose   • SODIUM  CHLORIDE 0.9 % IV SOLN            • guaiFENesin (ROBITUSSIN) 100 MG/5ML solution 200 mg  10 mL Oral Q4HRS PRN Paul Mario M.D.   200 mg at 08/02/20 1101   • potassium chloride SA (Kdur) tablet 40 mEq  40 mEq Oral Once Paul Mairo M.D.       • magnesium oxide (MAG-OX) tablet 400 mg  400 mg Oral DAILY Paul Mario M.D.       • ipratropium (ATROVENT) 0.02 % nebulizer solution 0.5 mg  0.5 mg Nebulization BID (RT) Paul Mario M.D.   0.5 mg at 08/02/20 0627   • 0.9 % NaCl with KCl 40 mEq 1,000 mL   Intravenous Continuous Paul Mario M.D. 100 mL/hr at 08/02/20 1134     • omeprazole (PRILOSEC) capsule 20 mg  20 mg Oral BID Paul Mario M.D.   20 mg at 08/02/20 0604   • doxycycline monohydrate (ADOXA) tablet 100 mg  100 mg Oral Q12HRS Paul Mario M.D.   100 mg at 08/02/20 0604   • acyclovir (ZOVIRAX) tablet 400 mg  400 mg Oral BID ELMER MontesOMarley   400 mg at 08/02/20 0604   • amLODIPine (NORVASC) tablet 10 mg  10 mg Oral DAILY ELMER MontesOMarley   10 mg at 08/02/20 0604   • fluconazole (DIFLUCAN) tablet 100 mg  100 mg Oral DAILY Paul Mario M.D.   100 mg at 08/02/20 0604   • folic acid (FOLVITE) tablet 1 mg  1 mg Oral DAILY ELMER MontesO.   1 mg at 08/02/20 0604   • senna-docusate (PERICOLACE or SENOKOT S) 8.6-50 MG per tablet 2 Tab  2 Tab Oral BID ELMER MontesOMarley   2 Tab at 08/01/20 0651    And   • polyethylene glycol/lytes (MIRALAX) PACKET 1 Packet  1 Packet Oral QDAY PRN Seun Shetty D.O.        And   • magnesium hydroxide (MILK OF MAGNESIA) suspension 30 mL  30 mL Oral QDAY PRN Seun Shetty D.O.        And   • bisacodyl (DULCOLAX) suppository 10 mg  10 mg Rectal QDAY PRN Seun Shetty D.O.       • lactated ringers infusion (BOLUS): BMI less than or equal to 30  30 mL/kg Intravenous Once PRN Seun Shetty D.O.       • acetaminophen (TYLENOL) tablet 650 mg  650 mg Oral Q6HRS PRN ELMER MontesO.   650 mg at 08/02/20 1356   • Pharmacy Consult Request ...Pain Management Review 1  Each  1 Each Other PHARMACY TO DOSE Seun Shetty D.O.        And   • oxyCODONE immediate-release (ROXICODONE) tablet 2.5 mg  2.5 mg Oral Q3HRS PRN Seun Shetty D.O.   2.5 mg at 08/02/20 1102    And   • oxyCODONE immediate-release (ROXICODONE) tablet 5 mg  5 mg Oral Q3HRS PRN Seun Shetty D.O.        And   • morphine (pf) 4 MG/ML injection 2 mg  2 mg Intravenous Q3HRS PRN ELMER MontesO.       • cefepime (MAXIPIME) 2 g in  mL IVPB  2 g Intravenous Q8HRS Seun Shetty D.O.   Stopped at 08/02/20 0901   • enalaprilat (VASOTEC) injection 1.25 mg  1.25 mg Intravenous Q6HRS PRN Seun Shetty D.O.       • ondansetron (ZOFRAN) syringe/vial injection 4 mg  4 mg Intravenous Q4HRS PRN Seun Shetty D.O.       • ondansetron (ZOFRAN ODT) dispertab 4 mg  4 mg Oral Q4HRS PRN Seun Shetty D.O.       • promethazine (PHENERGAN) suppository 12.5-25 mg  12.5-25 mg Rectal Q4HRS PRN Seun Shetty D.O.       • prochlorperazine (COMPAZINE) injection 5-10 mg  5-10 mg Intravenous Q4HRS PRN Seun Shetty D.O.           Allergies  Allergies   Allergen Reactions   • Amoxicillin Hives     Tolerates cephalosporins   • Benadryl Allergy Hives   • Excedrin Migraine Swelling   • Sulfamethoxazole Hives   • Famotidine Itching       Vital Signs last 24 hours  Temp:  [36.7 °C (98.1 °F)-38.9 °C (102 °F)] 38.9 °C (102 °F)  Pulse:  [] 160  Resp:  [16-48] 22  BP: (104-161)/() 161/66  SpO2:  [89 %-98 %] 98 %    Physical Exam  Physical Exam  Vitals signs reviewed.   Constitutional:       Appearance: She is underweight. She is ill-appearing. She is not toxic-appearing.      Interventions: Face mask in place.   HENT:      Head: Atraumatic.      Nose: No congestion.      Mouth/Throat:      Mouth: Mucous membranes are dry.      Pharynx: No oropharyngeal exudate or posterior oropharyngeal erythema.   Eyes:      General: No scleral icterus.     Extraocular Movements: Extraocular movements intact.      Pupils: Pupils are  equal, round, and reactive to light.   Neck:      Musculoskeletal: Neck supple. No neck rigidity.   Cardiovascular:      Rate and Rhythm: Regular rhythm. Tachycardia present.  No extrasystoles are present.     Pulses: Normal pulses.      Heart sounds: No murmur. No friction rub. Gallop present.       Comments: Sinus tachycardia  Pulmonary:      Effort: No respiratory distress.      Breath sounds: No stridor. Rhonchi and rales present. No wheezing.   Abdominal:      General: Abdomen is flat. Bowel sounds are normal. There is no distension.      Palpations: Abdomen is soft. There is no mass.      Tenderness: There is no abdominal tenderness. There is no left CVA tenderness or guarding.   Musculoskeletal:      Right lower leg: Pitting Edema (trace) present.      Left lower leg: Pitting Edema (trace) present.   Lymphadenopathy:      Cervical: No cervical adenopathy.   Skin:     General: Skin is warm and dry.      Capillary Refill: Capillary refill takes less than 2 seconds.   Neurological:      General: No focal deficit present.      Mental Status: She is alert and oriented to person, place, and time. Mental status is at baseline.   Psychiatric:         Mood and Affect: Mood normal.         Behavior: Behavior normal. Behavior is cooperative.         Thought Content: Thought content normal.       Fluids    Intake/Output Summary (Last 24 hours) at 8/2/2020 1605  Last data filed at 8/2/2020 1245  Gross per 24 hour   Intake 620 ml   Output --   Net 620 ml       Laboratory  Recent Results (from the past 48 hour(s))   PLATELETS REQUEST    Collection Time: 07/31/20  6:13 PM   Result Value Ref Range    Component P       PII                 Plts,PheresisIRR    [id number]   transfused   07/31/20   18:34      Product Type Platelets  Pheresis IRR LR     Dispense Status transfused     Unit Number (Barcoded) [id number]     Product Code (Barcoded) C0846I92     Blood Type (Barcoded) 7300     Component P       P0I                  Plts,PheresisIRR    [id number]   issued       08/02/20   10:46      Product Type Platelets Pheresis IRR LR     Dispense Status issued     Unit Number (Barcoded) [id number]     Product Code (Barcoded) F1755L61     Blood Type (Barcoded) 5100    LACTIC ACID    Collection Time: 07/31/20  7:48 PM   Result Value Ref Range    Lactic Acid 1.3 0.5 - 2.0 mmol/L   LACTIC ACID    Collection Time: 07/31/20 11:48 PM   Result Value Ref Range    Lactic Acid 2.7 (H) 0.5 - 2.0 mmol/L   MRSA By PCR (Amp)    Collection Time: 08/01/20  3:15 AM    Specimen: Nares; Respirate   Result Value Ref Range    Significant Indicator NEG     Source RESP     Site NARES     MRSA PCR Negative for MRSA by PCR.    CBC WITH DIFFERENTIAL    Collection Time: 08/01/20  3:39 AM   Result Value Ref Range    WBC 0.2 (LL) 4.8 - 10.8 K/uL    RBC 2.24 (L) 4.20 - 5.40 M/uL    Hemoglobin 6.1 (L) 12.0 - 16.0 g/dL    Hematocrit 18.4 (L) 37.0 - 47.0 %    MCV 82.1 81.4 - 97.8 fL    MCH 27.2 27.0 - 33.0 pg    MCHC 33.2 (L) 33.6 - 35.0 g/dL    RDW 41.3 35.9 - 50.0 fL    Platelet Count 25 (LL) 164 - 446 K/uL    MPV 10.4 9.0 - 12.9 fL    Nucleated RBC 0.00 /100 WBC    NRBC (Absolute) 0.00 K/uL   Basic Metabolic Panel    Collection Time: 08/01/20  3:39 AM   Result Value Ref Range    Sodium 134 (L) 135 - 145 mmol/L    Potassium 3.2 (L) 3.6 - 5.5 mmol/L    Chloride 104 96 - 112 mmol/L    Co2 19 (L) 20 - 33 mmol/L    Glucose 112 (H) 65 - 99 mg/dL    Bun 7 (L) 8 - 22 mg/dL    Creatinine 0.41 (L) 0.50 - 1.40 mg/dL    Calcium 8.1 (L) 8.5 - 10.5 mg/dL    Anion Gap 11.0 7.0 - 16.0   PROCALCITONIN    Collection Time: 08/01/20  3:39 AM   Result Value Ref Range    Procalcitonin 6.99 (H) <0.25 ng/mL   BILIRUBIN DIRECT    Collection Time: 08/01/20  3:39 AM   Result Value Ref Range    Direct Bilirubin 1.1 (H) 0.1 - 0.5 mg/dL   IRON/TOTAL IRON BIND    Collection Time: 08/01/20  3:39 AM   Result Value Ref Range    Iron 95 40 - 170 ug/dL    Total Iron Binding 112 (L) 250 - 450 ug/dL     Unsat Iron Binding <17 (L) 110 - 370 ug/dL    % Saturation 85 (H) 15 - 55 %   TRANSFERRIN    Collection Time: 08/01/20  3:39 AM   Result Value Ref Range    Transferrin 72 (L) 200 - 370 mg/dL   LDH    Collection Time: 08/01/20  3:39 AM   Result Value Ref Range    LDH Total 110 107 - 266 U/L   LACTIC ACID    Collection Time: 08/01/20  3:39 AM   Result Value Ref Range    Lactic Acid 0.8 0.5 - 2.0 mmol/L   ESTIMATED GFR    Collection Time: 08/01/20  3:39 AM   Result Value Ref Range    GFR If African American >60 >60 mL/min/1.73 m 2    GFR If Non African American >60 >60 mL/min/1.73 m 2   PERIPHERAL SMEAR REVIEW    Collection Time: 08/01/20  3:39 AM   Result Value Ref Range    Peripheral Smear Review see below    IMMATURE PLT FRACTION    Collection Time: 08/01/20  3:39 AM   Result Value Ref Range    Imm. Plt Fraction 0.3 (L) 0.6 - 13.1 K/uL   GRAM STAIN    Collection Time: 08/01/20  8:25 AM    Specimen: Respirate   Result Value Ref Range    Significant Indicator .     Source RESP     Site Sputum     Gram Stain Result       Sputum Gram stain quality score is <1, probable  oropharyngeal contamination. Culture not performed.  Recollect if clinically indicated.     VANCOMYCIN TROUGH LEVEL    Collection Time: 08/01/20  9:24 AM   Result Value Ref Range    Vancomycin Trough 9.1 (L) 10.0 - 20.0 ug/mL   EC-ECHOCARDIOGRAM COMPLETE W/O CONT    Collection Time: 08/01/20  8:00 PM   Result Value Ref Range    Eject.Frac. MOD BP 69.18     Eject.Frac. MOD 4C 69.66     Eject.Frac. MOD 2C 65.4     Left Ventrical Ejection Fraction 70    CBC WITH DIFFERENTIAL    Collection Time: 08/02/20  4:19 AM   Result Value Ref Range    WBC 0.1 (LL) 4.8 - 10.8 K/uL    RBC 2.75 (L) 4.20 - 5.40 M/uL    Hemoglobin 7.7 (L) 12.0 - 16.0 g/dL    Hematocrit 23.8 (L) 37.0 - 47.0 %    MCV 86.5 81.4 - 97.8 fL    MCH 28.0 27.0 - 33.0 pg    MCHC 32.4 (L) 33.6 - 35.0 g/dL    RDW 44.8 35.9 - 50.0 fL    Platelet Count 16 (LL) 164 - 446 K/uL    MPV 10.2 9.0 - 12.9 fL     Nucleated RBC 0.00 /100 WBC    NRBC (Absolute) 0.00 K/uL   Basic Metabolic Panel    Collection Time: 20  4:19 AM   Result Value Ref Range    Sodium 131 (L) 135 - 145 mmol/L    Potassium 3.3 (L) 3.6 - 5.5 mmol/L    Chloride 103 96 - 112 mmol/L    Co2 16 (L) 20 - 33 mmol/L    Glucose 103 (H) 65 - 99 mg/dL    Bun 5 (L) 8 - 22 mg/dL    Creatinine 0.33 (L) 0.50 - 1.40 mg/dL    Calcium 8.0 (L) 8.5 - 10.5 mg/dL    Anion Gap 12.0 7.0 - 16.0   ESTIMATED GFR    Collection Time: 20  4:19 AM   Result Value Ref Range    GFR If African American >60 >60 mL/min/1.73 m 2    GFR If Non African American >60 >60 mL/min/1.73 m 2   PERIPHERAL SMEAR REVIEW    Collection Time: 20  4:19 AM   Result Value Ref Range    Peripheral Smear Review see below    IMMATURE PLT FRACTION    Collection Time: 20  4:19 AM   Result Value Ref Range    Imm. Plt Fraction 1.2 0.6 - 13.1 K/uL   MAGNESIUM    Collection Time: 20  4:19 AM   Result Value Ref Range    Magnesium 1.7 1.5 - 2.5 mg/dL   VANCOMYCIN TROUGH LEVEL    Collection Time: 20  9:27 AM   Result Value Ref Range    Vancomycin Trough 9.3 (L) 10.0 - 20.0 ug/mL   EKG    Collection Time: 20  3:04 PM   Result Value Ref Range    Report       [facility]    Test Date:  2020  Pt Name:    CHIDI MOLINA               Department: 161  MRN:        [id number]                      Room:       11  Gender:     Female                       Technician: MRB  :        1965                   Requested By:JORGE ANAYA  Order #:    [id number]                    Reading MD:    Measurements  Intervals                                Axis  Rate:       137                          P:          76  RI:         139                          QRS:        47  QRSD:       58                           T:  QT:         278  QTc:        420    Interpretive Statements  SINUS TACHYCARDIA  LOW VOLTAGE, EXTREMITY AND PRECORDIAL LEADS  Compared to ECG 2020 18:34:15  Low  QRS voltage now present  Early repolarization no longer present  Possible ischemia no longer present         Imaging  DX-CHEST-PORTABLE (1 VIEW)   Final Result      Unchanged left upper lobe and lingular pneumonia.      EC-ECHOCARDIOGRAM COMPLETE W/O CONT   Final Result      US-RUQ   Final Result      Status post cholecystectomy.      No biliary ductal dilatation is seen.            DX-CHEST-PORTABLE (1 VIEW)   Final Result         New airspace opacity in the left lower lobe and lingula, likely pneumonia.          Assessment/Plan  * Sepsis (HCC)  Assessment & Plan  This is Sepsis Present on admission  SIRS criteria identified on my evaluation include: Fever, with temperature greater than 101 deg F, Tachycardia, with heart rate greater than 90 BPM, Tachypnea, with respirations greater than 20 per minute and Leukopenia, with WBC less than 4,000  Source is lung  Sepsis protocol initiated on admit, ongoing  Fluid resuscitation ordered per protocol, may need further fluid bolus  IV antibiotics as appropriate for source of sepsis  While organ dysfunction may be noted elsewhere in this problem list or in the chart, degree of organ dysfunction does not meet CMS criteria for severe sepsis    Cultures pending, will repeat blood cultures now that she arrived in the ICU  Broad-spectrum antibiotics and going, consult ID for further reassessment  Repeat lactic acid level, may need fluid bolus, lab pending  Monitor for the need for CVC and intravenous pressors  Volume status assessed, appears on the dry side will administer fluids  Reassess with bedside ultrasound as needed, echocardiogram from late last night noted      Nosocomial pneumonia  Assessment & Plan  Recently hospitalized for possible sepsis/transfusion reaction  Appropriate broad-spectrum antibiotics initiated admitted to the medical floor for possible UTI initially  Dense large left-sided pneumonia with slight worsening by x-ray and oxygen requirements  Monitor for  complication with daily x-ray, no significant pleural effusion present yet  Low threshold to obtain CT scan chest to evaluate for complicated pneumonia  ID consultation to help manage antibiotics long-term, she may need a prolonged course with her pancytopenia  If patient fails and requires intubation mechanical elation diagnostic bronchoscopy with BAL can be obtained    Acute hypoxemic respiratory failure (HCC)  Assessment & Plan  RT protocols  Fortunately oxygenating on 4 L nasal cannula oxygen mask despite dense left-sided pneumonia  Never smoker  History of asthma when she was young but nothing as an adult  Xopenex treatments as needed, this agent over albuterol secondary to her tachycardia  Monitor for the need to transition to high flow nasal cannula or intubation mechanical ventilation  Alternatively with immune compromised state and pancytopenia consider noninvasive ventilation to keep her off the ventilator and the associated risk with an indwelling ET tube  Serial chest x-ray  Daily reassessment of fluid status    SVT (supraventricular tachycardia) ([facility])- (present on admission)  Assessment & Plan  History of SVT  Recurrent SVT today probably related to fever/sepsis with heart rate in the 160s per rapid response nurse  Sinus tach in 130s on arrival to [facility]  Cooling measures  Optimize electrolytes  Hemodynamically acceptable at this time, trial adenosine/vagal maneuvers/amiodarone bolus initially  Cardioversion for unstable SVT    Pancytopenia (HCC)- (present on admission)  Assessment & Plan  Secondary to CLL and chemotherapy  Hold chemotherapy  Transfuse per protocols  Serial CBC  Consult heme-onc in a.m.    Pulmonary hypertension (HCC)  Assessment & Plan  RVSP 40 on echo 8/1  Presumably secondary to pneumonic process, not insignificant right heart failure at this time  LV function excellent with EF 70%  Monitor    CLL (chronic lymphocytic leukemia) (HCC)- (present on admission)  Assessment & Plan  Diagnosed  approximately 3 months ago per patient  Hold chemotherapy  Consult heme-onc in a.m.    Neutropenic fever (HCC)- (present on admission)  Assessment & Plan  Pancytopenia secondary to CLL and chemotherapy  Protective isolation for severe neutropenia  Query oncology with her consultation about history and G-CSF agent  Cooling measures    Hypokalemia- (present on admission)  Assessment & Plan  Replete, goal greater than 4.0, ERP    Hyponatremia- (present on admission)  Assessment & Plan  Secondary to above, serial BMP, avoid hypotonic fluids for now      Discussed patient condition and risk of morbidity and/or mortality with Hospitalist, RN, RT, Pharmacy, Patient, infectious disease and Charge RN.      The patient remains critically ill.  Critical care time = 60 minutes in directly providing and coordinating critical care and extensive data review.  No time overlap and excludes procedures.    Addendum:  I spoke with Dr. Boone, ID will sign on a participate in her care  I spoke with Dr. Laws, leatha Cedillo will sign on participate in her care

## 2020-08-02 NOTE — ASSESSMENT & PLAN NOTE
High flow nasal cannula with titrated oxygen as needed  High risk for further deterioration and may require intubation  Continue RT protocols and close monitoring in ICU

## 2020-08-03 NOTE — PROGRESS NOTES
"Pt's HR sustaining 170-178bpm,  /63, Pt appears to be in no distress, remains alert and oriented x 4, shaking \"because I am cold,\" patient states.  Dr. Tang notified.  ECG ordered.    "

## 2020-08-03 NOTE — CARE PLAN
Problem: Communication  Goal: The ability to communicate needs accurately and effectively will improve  Outcome: PROGRESSING AS EXPECTED  Intervention: [facility] patient and significant other/support system to call light to alert staff of needs  Flowsheets (Taken 8/3/2020 4590)  Oriented to:: All of the Following : Location of Bathroom, Visiting Policy, Unit Routine, Call Light and Bedside Controls, Bedside Rail Policy, Smoking Policy, Rights and Responsibilities, Bedside Report, and Patient Education Notebook  Note: Pt utilizes call light appropriately; oriented to all of the above and denies questions/concerns.     Problem: Infection  Goal: Will remain free from infection  Outcome: PROGRESSING SLOWER THAN EXPECTED  Intervention: Assess signs and symptoms of infection  Note: Pt's laboratory values do not rule out infection.  Collaboration with ID MD to evaluate Pt.       Problem: Infection  Goal: Will remain free from infection  Outcome: PROGRESSING SLOWER THAN EXPECTED  Intervention: Assess signs and symptoms of infection  Note: Pt's laboratory values do not rule out infection.  Collaboration with ID MD to evaluate Pt.

## 2020-08-03 NOTE — PROGRESS NOTES
Critical Care Progress Note    Date of admission  7/29/2020    Chief Complaint  54 y.o. female admitted 7/29/2020 with SVT, CLL, pancytopenia     Hospital Course    54 y.o. female with a history of CLL on chemotherapy followed by Dr. Summers with a history of SVT and admission last month for sepsis versus transfusion reaction or both during which she required pressors and IV antibiotics but ended up culture negative who presented 7/29/2020 with fever at the infusion center was admitted with possible UTI but subsequently has blossomed a juicy left-sided pneumonia.  Today she developed worsening fever and tachycardia felt to possibly have SVT again she appeared in distress for this and was transferred to the CICU rapidly.  On arrival her heart rate improved and she is in sinus tach in the 130s but her temperature is 102.  Blood pressures been acceptable throughout.  Bicarb level this a.m. down a little bit and lactic acid levels not been obtained.  Cultures are negative so far and MRSA nares came back negative.  She has been on broad-spectrum antibiotics including doxycycline, cefepime, Diflucan and acyclovir with only cefepime IV and vancomycin had been on board.  He has no history of pneumonia.  She has history of asthma when she was young and lived in the [facility] but she is not had any symptoms or required any treatment in recent years in the [place].  She has no exposures to anyone sick lives only with her  and they are actually lately been sleeping in separate bedrooms because of her illness.  She is a non-smoker and since her illness she is not received any vaccines.  Echocardiogram done late last night did not reveal a pericardial effusion, there was mild/moderate pulmonary pretension with RVSP 40, no significant valvular heart disease was identified, LV function was normal with an estimated EF of 70%.      Interval Problem Update  Reviewed last 24 hour events:  Fever at infusion center; HLOC for tachy/?  SVT  ST  Tm 100.5  dustin PO  PIVs  atrovent bid  5 LPM  CXR L inf  Pancytopenia, Plts 21  Cefepime/vanco/fluconazole -> changed to ampho  MRSA PCR neg    Review of Systems  Review of Systems   Unable to perform ROS: Acuity of condition        Vital Signs for last 24 hours   Temp:  [36.1 °C (97 °F)-38.9 °C (102 °F)] 36.5 °C (97.7 °F)  Pulse:  [] 74  Resp:  [15-48] 22  BP: (104-161)/() 129/62  SpO2:  [89 %-100 %] 100 %    Hemodynamic parameters for last 24 hours       Respiratory Information for the last 24 hours       Physical Exam   Physical Exam  Vitals signs and nursing note reviewed.   Constitutional:       Appearance: She is underweight. She is ill-appearing. She is not toxic-appearing.      Interventions: Face mask in place.   HENT:      Head: Atraumatic.      Nose: No congestion.      Mouth/Throat:      Mouth: Mucous membranes are dry.      Pharynx: No oropharyngeal exudate or posterior oropharyngeal erythema.   Eyes:      General: No scleral icterus.     Extraocular Movements: Extraocular movements intact.      Pupils: Pupils are equal, round, and reactive to light.   Neck:      Musculoskeletal: Neck supple. No neck rigidity.   Cardiovascular:      Rate and Rhythm: Regular rhythm. Tachycardia present.  No extrasystoles are present.     Pulses: Normal pulses.      Heart sounds: No murmur. No friction rub. Gallop present.       Comments: Sinus tachycardia  Pulmonary:      Effort: No respiratory distress.      Breath sounds: No stridor. Rhonchi and rales present. No wheezing.   Abdominal:      General: Abdomen is flat. Bowel sounds are normal. There is no distension.      Palpations: Abdomen is soft. There is no mass.      Tenderness: There is no abdominal tenderness. There is no left CVA tenderness or guarding.   Musculoskeletal:      Right lower leg: Pitting Edema (trace) present.      Left lower leg: Pitting Edema (trace) present.   Lymphadenopathy:      Cervical: No cervical adenopathy.   Skin:      General: Skin is warm and dry.      Capillary Refill: Capillary refill takes less than 2 seconds.   Neurological:      General: No focal deficit present.      Mental Status: She is alert and oriented to person, place, and time. Mental status is at baseline.   Psychiatric:         Mood and Affect: Mood normal.         Behavior: Behavior normal. Behavior is cooperative.         Thought Content: Thought content normal.         Medications  Current Facility-Administered Medications   Medication Dose Route Frequency Provider Last Rate Last Dose   • guaiFENesin (ROBITUSSIN) 100 MG/5ML solution 200 mg  10 mL Oral Q4HRS PRN Paul Mario M.D.   200 mg at 08/03/20 0326   • magnesium oxide (MAG-OX) tablet 400 mg  400 mg Oral DAILY Paul Mario M.D.   400 mg at 08/03/20 0623   • MD Alert...Vancomycin per Pharmacy   Other PHARMACY TO DOSE Lori Boone M.D.       • Pharmacy Consult Request for Amphotericin B monitoring  1 Each Other PRN Lori Boone M.D.       • NS infusion 500 mL  500 mL Intravenous Q24HR Lori Boone M.D. 500 mL/hr at 08/02/20 2035 500 mL at 08/02/20 2035    And   • acetaminophen (TYLENOL) tablet 650 mg  650 mg Oral Q24HR Lori Boone M.D.   650 mg at 08/02/20 2105    And   • D5W infusion 30 mL  30 mL Intravenous Q24HR Lori Boone M.D.   30 mL at 08/02/20 2146    And   • amphotericin B liposome (AMBISOME) 170.8 mg in D5W 100 mL IVPB  4 mg/kg Intravenous Q24HRS Lori Boone M.D.   Stopped at 08/02/20 2346    And   • D5W infusion 30 mL  30 mL Intravenous Q24HR Lori Boone M.D.   30 mL at 08/03/20 0009    And   • NS infusion 250 mL  250 mL Intravenous Q24HR Lori Boone M.D. 250 mL/hr at 08/03/20 0007 250 mL at 08/03/20 0007   • vancomycin (VANCOCIN) 750 mg in  mL IVPB  15 mg/kg Intravenous Q8HR Lori Boone M.D.   Stopped at 08/03/20 0527   • ipratropium (ATROVENT) 0.02 % nebulizer solution 0.5 mg  0.5 mg Nebulization BID (RT) Paul Mario,  M.D.   0.5 mg at 08/03/20 0721   • 0.9 % NaCl with KCl 40 mEq 1,000 mL   Intravenous Continuous Paul Mario M.D. 100 mL/hr at 08/03/20 0704     • omeprazole (PRILOSEC) capsule 20 mg  20 mg Oral BID Paul Mario M.D.   20 mg at 08/03/20 0623   • acyclovir (ZOVIRAX) tablet 400 mg  400 mg Oral BID ELMER MontesOMarley   400 mg at 08/03/20 0623   • amLODIPine (NORVASC) tablet 10 mg  10 mg Oral DAILY ELMER MontesOMarley   10 mg at 08/03/20 0600   • folic acid (FOLVITE) tablet 1 mg  1 mg Oral DAILY ELMER MontesOMarley   1 mg at 08/03/20 0623   • senna-docusate (PERICOLACE or SENOKOT S) 8.6-50 MG per tablet 2 Tab  2 Tab Oral BID Seun Shetty D.O.   1 Tab at 08/03/20 0623    And   • polyethylene glycol/lytes (MIRALAX) PACKET 1 Packet  1 Packet Oral QDAY PRN Seun Shetty D.O.        And   • magnesium hydroxide (MILK OF MAGNESIA) suspension 30 mL  30 mL Oral QDAY PRN Seun Shetty D.O.        And   • bisacodyl (DULCOLAX) suppository 10 mg  10 mg Rectal QDAY PRN Seun Shetty D.O.       • lactated ringers infusion (BOLUS): BMI less than or equal to 30  30 mL/kg Intravenous Once PRN Seun Shetty D.O.       • acetaminophen (TYLENOL) tablet 650 mg  650 mg Oral Q6HRS PRN Seun Shetty D.O.   650 mg at 08/03/20 0326   • Pharmacy Consult Request ...Pain Management Review 1 Each  1 Each Other PHARMACY TO DOSE Seun Shetty D.O.        And   • oxyCODONE immediate-release (ROXICODONE) tablet 2.5 mg  2.5 mg Oral Q3HRS PRN Seun Shetty D.O.   2.5 mg at 08/02/20 2344    And   • oxyCODONE immediate-release (ROXICODONE) tablet 5 mg  5 mg Oral Q3HRS PRN Seun Shetty D.O.        And   • morphine (pf) 4 MG/ML injection 2 mg  2 mg Intravenous Q3HRS PRN ELMER MontesO.       • cefepime (MAXIPIME) 2 g in  mL IVPB  2 g Intravenous Q8HRS ELMER MontesO.   Stopped at 08/03/20 0121   • enalaprilat (VASOTEC) injection 1.25 mg  1.25 mg Intravenous Q6HRS PRN ELMER MontesO.       • ondansetron (ZOFRAN)  syringe/vial injection 4 mg  4 mg Intravenous Q4HRS PRN ELMER MontesO.       • ondansetron (ZOFRAN ODT) dispertab 4 mg  4 mg Oral Q4HRS PRN ELMER MontesO.       • promethazine (PHENERGAN) suppository 12.5-25 mg  12.5-25 mg Rectal Q4HRS PRN ELMER MontesO.       • prochlorperazine (COMPAZINE) injection 5-10 mg  5-10 mg Intravenous Q4HRS PRN ELMER MontesO.           Fluids    Intake/Output Summary (Last 24 hours) at 8/3/2020 0902  Last data filed at 8/3/2020 0800  Gross per 24 hour   Intake 90494 ml   Output 1500 ml   Net 32099 ml       Laboratory          Recent Labs     08/02/20 0419 08/02/20  1545 08/03/20  0545   SODIUM 131* 133* 136   POTASSIUM 3.3* 3.8 3.6   CHLORIDE 103 104 105   CO2 16* 17* 17*   BUN 5* 6* 7*   CREATININE 0.33* 0.49* 0.36*   MAGNESIUM 1.7 1.6 2.1   PHOSPHORUS  --   --  2.7   CALCIUM 8.0* 8.1* 7.9*     Recent Labs     07/31/20 1458 08/01/20 0339 08/02/20 0419 08/02/20  1545 08/03/20  0545   ALTSGPT 35  --   --   --  25   ASTSGOT 22  --   --   --  10*   ALKPHOSPHAT 216*  --   --   --  180*   TBILIRUBIN 1.7*  --   --   --  1.2   DBILIRUBIN  --  1.1*  --   --   --    GLUCOSE 157* 112* 103* 122* 169*     Recent Labs     07/31/20 1458 08/01/20 0339 08/02/20 0419 08/03/20  0545   WBC 0.2* 0.2* 0.1* 0.2*   NEUTSPOLYS CANCEL  --   --   --    LYMPHOCYTES CANCEL  --   --   --    MONOCYTES CANCEL  --   --   --    EOSINOPHILS CANCEL  --   --   --    BASOPHILS CANCEL  --   --   --    ASTSGOT 22  --   --  10*   ALTSGPT 35  --   --  25   ALKPHOSPHAT 216*  --   --  180*   TBILIRUBIN 1.7*  --   --  1.2     Recent Labs     08/01/20  0339 08/02/20  0419 08/03/20  0545   RBC 2.24* 2.75* 2.83*   HEMOGLOBIN 6.1* 7.7* 7.7*   HEMATOCRIT 18.4* 23.8* 23.1*   PLATELETCT 25* 16* 21*   IRON 95  --   --    TOTIRONBC 112*  --   --        Imaging  X-Ray:  I have personally reviewed the images and compared with prior images.    Assessment/Plan  * Sepsis (HCC)  Assessment & Plan  This is Sepsis  Present on admission  SIRS criteria identified on my evaluation include: Fever, with temperature greater than 101 deg F, Tachycardia, with heart rate greater than 90 BPM, Tachypnea, with respirations greater than 20 per minute and Leukopenia, with WBC less than 4,000  Source is lung  Sepsis protocol initiated on admit, ongoing  Fluid resuscitation ordered per protocol, may need further fluid bolus  IV antibiotics as appropriate for source of sepsis  While organ dysfunction may be noted elsewhere in this problem list or in the chart, degree of organ dysfunction does not meet CMS criteria for severe sepsis    Cultures pending, will repeat blood cultures now that she arrived in the ICU  Broad-spectrum antibiotics and going, consult ID for further reassessment  Repeat lactic acid level, may need fluid bolus, lab pending  Monitor for the need for CVC and intravenous pressors  Volume status assessed, appears on the dry side will administer fluids  Reassess with bedside ultrasound as needed, echocardiogram from late last night noted      Nosocomial pneumonia  Assessment & Plan  Recently hospitalized for possible sepsis/transfusion reaction  Appropriate broad-spectrum antibiotics initiated admitted to the medical floor for possible UTI initially  Dense large left-sided pneumonia with slight worsening by x-ray and oxygen requirements  Monitor for complication with daily x-ray, no significant pleural effusion present yet  Low threshold to obtain CT scan chest to evaluate for complicated pneumonia  ID consultation to help manage antibiotics long-term, she may need a prolonged course with her pancytopenia  If patient fails and requires intubation mechanical elation diagnostic bronchoscopy with BAL can be obtained, probably would need intubation to perform FOB now  Prolonged neutropenia, query fungal infection  Obtain serum Beta-d-glucan and galactomannan levels, fungal cultures and markers can be sent from BAL if FOB  performed    Acute hypoxemic respiratory failure (HCC)  Assessment & Plan  RT protocols  Fortunately oxygenating on 4 L nasal cannula oxygen mask despite dense left-sided pneumonia  ABG PRN, bicarb down, suspect met acidosis  Never smoker  History of asthma when she was young but nothing as an adult  Xopenex treatments as needed, this agent over albuterol secondary to her tachycardia  Monitor for the need to transition to high flow nasal cannula or intubation mechanical ventilation  Alternatively with immune compromised state and pancytopenia consider noninvasive ventilation to keep her off the ventilator and the associated risk with an indwelling ET tube  Serial chest x-ray  Daily reassessment of fluid status    SVT (supraventricular tachycardia) ([facility])- (present on admission)  Assessment & Plan  History of SVT  Recurrent SVT today probably related to fever/sepsis with heart rate in the 160s per rapid response nurse  Sinus tach in 130s on arrival to [facility]  Cooling measures  Optimize electrolytes  Hemodynamically acceptable at this time, trial adenosine/vagal maneuvers/amiodarone bolus initially  Cardioversion for unstable SVT    Pancytopenia (HCC)- (present on admission)  Assessment & Plan  Secondary to CLL and chemotherapy  Hold chemotherapy  Transfuse per protocols  Serial CBC  Consult heme-onc in a.m.    Pulmonary hypertension (HCC)  Assessment & Plan  RVSP 40 on echo 8/1  Presumably secondary to pneumonic process, not insignificant right heart failure at this time  LV function excellent with EF 70%  Monitor    CLL (chronic lymphocytic leukemia) (HCC)- (present on admission)  Assessment & Plan  Diagnosed approximately 3 months ago per patient  Hold chemotherapy  Consult heme-onc in a.m.    Neutropenic fever (HCC)- (present on admission)  Assessment & Plan  Pancytopenia secondary to CLL and chemotherapy  Protective isolation for severe neutropenia  Query oncology with her consultation about history and G-CSF  agent  Cooling measures    Hypokalemia- (present on admission)  Assessment & Plan  Replete, goal greater than 4.0, ERP    Hyponatremia- (present on admission)  Assessment & Plan  Secondary to above, serial BMP, avoid hypotonic fluids for now       Updated plan:  Reviewed in detail with infectious disease.  Antibiotics adjusted: Amphotericin discontinued, started posaconazole, continue cefepime, stopped vancomycin.  We will maintain n.p.o. after midnight.  Obtain CT chest for further evaluation of left-sided infiltrate and evaluate for effusion.  We will attempt to obtain a good quality induced sputum for culture.  If not easily obtainable will plan for bronchoscopy in a.m., will send for routine cultures, fungal cultures, pneumocystis DFA/PCR and galactomannan  Follow closely in ICU, neutropenic precautions    VTE:  Contraindicated  Ulcer: PPI  Lines: None    I have performed a physical exam and reviewed and updated ROS and Plan today (8/3/2020). In review of yesterday's note (8/2/2020), there are no changes except as documented above.     Discussed patient condition and risk of morbidity and/or mortality with RN, RT, Pharmacy and QA team  The patient remains critically ill.  Critical care time = 35 minutes in directly providing and coordinating critical care and extensive data review.  No time overlap and excludes procedures.

## 2020-08-03 NOTE — CONSULTS
ID consult from Dr Pierce RE: CLL/PNA  Briefly 53 y/o [place] female with CLL and autoimmune hemolytic anemia admitted 7/29/2020 with neutropenic fever. Started on cefepime and doxy. Cultures neg so far. Has remained febrile. Transferred to ICU for SVT, possible transfusion reaction  CXR remarkable for new dense left-sided pneumonia since last admission. No travel or sick contacts  Profoundly neutropenic for months  COVID neg  Bcxs neg 7/2 and 7/3 on last admit and 7/29  Ucx neg  Sputum pending  QuantiGold and cocci neg 5/2020. 1,3 beta D glucan+  Continue cefepime   Change doxy to vancomycin. Avoiding Zyvox due to severe pancytopenia  DC fluconazole and start AmphoB as high risk for invasive fungal disease   If aspergillosis, can switch to voriconazole  Continue acyclovir and antifungal prophylaxis  Monitor renal function closely  Prognosis poor  DW Pulm CC and Pharm

## 2020-08-03 NOTE — CONSULTS
DATE OF SERVICE:  08/03/2020    Consultation was called by Dr. Adam Pierce.    REASON FOR CONSULTATION:  1.  History of CLL, most recently on ibrutinib therapy.  2.  History of likely alpha thalassemia.  3.  Pancytopenia.  4.  Sepsis ? fungal pneumonia.  5.  Supraventricular tachycardia.    HISTORY OF PRESENT ILLNESS:  The patient is a very pleasant 54-year-old lady   who is under the care of Dr. Summers in our practice for her CLL and her alpha   thalassemia disease.  She has most recently been on ibrutinib therapy.  The   patient's history goes back to 2003 when she was found to have mild anemia   along with an elevated bilirubin level.  Bone marrow biopsy was unremarkable.    Workup for PNH was also unremarkable.  Ceruloplasmin and copper levels were   within normal limits.  Osmotic fragility revealed a dimorphic population of   red cells and hemoglobin electrophoresis was normal and the patient was   thought to have alpha thalassemia disease.  However, in 04/2020, she was   admitted to the hospital with severe anemia with a hemoglobin of 4.3.  Bone   marrow biopsy was consistent with CLL and she was started on ibrutinib, which   was started on 05/15/2020.  She has had multiple hospitalizations for febrile   neutropenia.  Most recently, she was admitted to the hospital in late July   with a fever.  Initially, she was thought to have possible UTI, but   subsequently developed full-fledged left-sided pneumonia.  She was treated on   antibiotics.  ID was on the case.  The patient was started on amphotericin as   of 08/02/2020 and her doxycycline was discontinued and vancomycin was started,   also cefepime was continued.  Acyclovir was also continued.  Hem/onc   consultation was called for further management of her condition.  Patient is   complaining of some shortness of breath.  She denies any chest pain or   palpitations.  There is no orthopnea or any PND.  She denies any hemoptysis.    She is off ibrutinib at  this time.    PAST MEDICAL HISTORY:  Hypertension, non-autoimmune hemolytic anemia likely   related to thalassemia, alpha thalassemia, elevated bilirubin, anxiety,   migraine, CLL.    PAST SURGICAL HISTORY:  Cholecystectomy, .    PERSONAL AND SOCIAL HISTORY:  , lives in [place].  No children.    Nonsmoker.    FAMILY HISTORY:  Mother  of pancreatic cancer.  No children.    REVIEW OF SYSTEMS:  GENERAL AND CONSTITUTIONAL:  Complaining of fatigue.  Has had fevers off and   on.  Denies any hemoptysis.  T-max in the last 24 hours was 102 degrees   Fahrenheit.  HEENT:  Pupils are equal.  There is no subconjunctival hemorrhages.  Oral   mucosa reveals mucosa is pale.  Otherwise, she appears to be well hydrated.    No thrush.  NECK:  Supple with JVD or lymphadenopathy.  LUNGS:  Clear to auscultation with minimal rales on the left hand side.  No   wheezing.  HEART:  Reveals tachycardia, but no S3 or S4.  ABDOMEN:  Reveals no hepatosplenomegaly.  EXTREMITIES:  There is trace bilateral lower extremity edema.  There is no   calf tenderness.  NEUROLOGIC:  Power is equal bilaterally, but there is global weakness.    Cranial nerves appear intact.  PSYCHIATRIC:  Anxiety, but no depression.    LABORATORY DATA:  , hemoglobin 7.7, hematocrit 21,000.  Sodium 136,   creatinine 0.36, alkaline phosphatase is 180, AST 10, ALT 25, albumin 2.3.    Chest x-ray from 2020 unchanged left upper lobe and lingular pneumonia.    ASSESSMENT:  1.  Chronic lymphocytic leukemia.  The patient was on ibrutinib, which is on   hold because of ongoing issues with likely fungal pneumonia.  ID consultation   has been obtained.  We will continue to hold her ibrutinib at this time.  2.  Anemia.  This is of longstanding duration.  It has not changed recently.    We will get some workup done to rule out any ongoing hemolysis at this time.  3.  Thrombocytopenia.  This is stable.  There is no evidence of any active   bleeding or bruising and  is of longstanding duration.  I will check out her   for disseminated intravascular coagulation.  4.  Neutropenia.  This is likely related to her ibrutinib therapy and her   underlying chronic lymphocytic leukemia.  She is on neutropenic precautions.    ID is on the case.  5.  Pneumonia.  She is on multiple antibiotics and antifungal drugs.  ID is on   the case.  Plan is to continue to observe her.    Thank you for allowing me to see this very interesting patient.       ____________________________________     MD YUVAL Gutierrez / NTS    DD:  08/03/2020 10:19:32  DT:  08/03/2020 11:13:59    D#:  [id number]  Job#:  [id number]

## 2020-08-03 NOTE — PROGRESS NOTES
Pharmacy Kinetics 54 y.o. female on vancomycin day #4 8/3/2020    Currently on Vancomycin 500 mg iv q8hr (stopped and restarted on 8/3, minimal delay in vancomycin dosing)    Indication for Treatment: pneumonia, neutropenic fever    Pertinent history per medical record: Admitted on 2020 for fever. History of CLL on chemotherapy presented from the infusion center with recurrent fever. Transferred to ICU for pneumonia. ID consutled    Other antibiotics: cefepime, amphotericin, acyclovir    Allergies: Amoxicillin; Benadryl allergy; Excedrin migraine; Sulfamethoxazole; and Famotidine     List concerns for renal function: malnutrition, elevated Tbill, concomitant nephrotoxic drugs    Pertinent cultures to date:    blood x 2 - NGTD   urine - negative  MRSA nares negative   sputum - negative  Hx of + beta D glucan    Recent Labs     20  0400 20  1458 20  0339 20  0419   WBC 0.3* 0.2* 0.2* 0.1*   NEUTSPOLYS CANCEL CANCEL  --   --      Recent Labs     20  0400 20  1458 20  0339 20  0419 20  1545   BUN 8 7* 7* 5* 6*   CREATININE 0.51 0.55 0.41* 0.33* 0.49*   ALBUMIN  --  2.7*  --   --   --      Recent Labs     20  0923 20  0924 20  0927   VANCOTROUGH 4.4* 9.1* 9.3*       Intake/Output Summary (Last 24 hours) at 8/3/2020 0024  Last data filed at 2020 1800  Gross per 24 hour   Intake 710 ml   Output --   Net 710 ml      /45   Pulse (!) 103   Temp (!) 38.9 °C (102 °F) (Temporal)   Resp (!) 23   Ht 1.524 m (5')   Wt 42.7 kg (94 lb 2.2 oz)   SpO2 95%  Temp (24hrs), Av.6 °C (99.6 °F), Min:36.7 °C (98.1 °F), Max:38.9 °C (102 °F)      A/P   1. Vancomycin dose change: Increase to 750 mg q8hr (~15 mg/kg)  2. Next vancomycin level: 8/3 @1000 - prior to 3rd dose on new regimen (ordered)  3. Goal trough: 15-20 mcg/ml  4. Comments: Had two troughs of ~9 on 500mg q8hr, this admission. Had low trough on q12hr dosing previously. Will  increase to above dose, monitor renal closely due to amphoteracin. Pt without jose, discussed with RN, UOP is adequate at this time. Pharmacy will follow. Narrow therapy as able.    Mg Toussaint, PharmD, BCPS, BCCCP

## 2020-08-03 NOTE — CARE PLAN
Problem: Nutritional:  Goal: Achieve adequate nutritional intake  Description: Patient will consume >50% of meals and supplements  Outcome: MET. Per ADL, pt eating 50% or greater of six out of past seven documented meals. PO intake appears adequate. RD to follow weekly for adequate intake per department policy.

## 2020-08-03 NOTE — CONSULTS
Consults   INFECTIOUS DISEASES INPATIENT CONSULT NOTE     Date of Service: 8/3/2020    Consult Requested By: Vikas Tang M.D.    Reason for Consultation: Neutropenic fever, pneumonia    History of Present Illness:   Lana Johnston is a 54 y.o.  admitted 7/29/2020. Pt has a past medical history of CLL on chemotherapy with ibrutinib.  She has been profoundly neutropenic since at least May 2020.  She was recently admitted for neutropenic fever as well as SVT and required pressors and IV antibiotics.  Cultures at the time were negative.      She is now re-admitted with fevers and possible UTI and then developed a left-sided pneumonia.  Per patient she had a cough for approximately 3 days prior to admission which was productive with blood-tinged sputum.  She reports the cough is ongoing but somewhat improved.  She also reports some history of sinus pain as well as headaches which have been increased of late.  Due to worsening fever and tachycardia she was transferred to the ICU on 8/2.  She was febrile on admission until 7/31 and then improvement with recurrence of fevers on 8/2 to 102.  She is initially on room air and then increased to 5 L oxygen mask  is now on 3 L nasal cannula.  She been treated with cefepime, vancomycin and doxycycline since arrival.  She was continued on her prophylactic acyclovir and fluconazole.  ID transitioned fluconazole to amphotericin on 8/2.     Review Of Systems:  Review of Systems   Constitutional: Positive for chills, fever and malaise/fatigue.   HENT: Negative for hearing loss.    Eyes: Negative for blurred vision, double vision, pain and discharge.   Respiratory: Positive for cough, hemoptysis, sputum production and shortness of breath.    Cardiovascular: Negative for chest pain and leg swelling.   Gastrointestinal: Negative for abdominal pain, blood in stool, constipation, diarrhea, nausea and vomiting.   Genitourinary: Negative for dysuria, flank pain and hematuria.    Musculoskeletal: Positive for back pain. Negative for joint pain and myalgias.   Skin: Negative for rash.   Neurological: Positive for headaches. Negative for focal weakness.   Psychiatric/Behavioral: The patient is not nervous/anxious.          PMH:   Past Medical History:   Diagnosis Date   • Anxiety disorder    • CLL (chronic lymphocytic leukemia) (HCC)    • Hypertension        PSH:  Past Surgical History:   Procedure Laterality Date   • CHOLECYSTECTOMY  2005       FAMILY HX:  History reviewed. No pertinent family history.    SOCIAL HX:  Social History     Socioeconomic History   • Marital status:      Spouse name: Not on file   • Number of children: Not on file   • Years of education: Not on file   • Highest education level: Not on file   Occupational History   • Not on file   Social Needs   • Financial resource strain: Not on file   • Food insecurity     Worry: Not on file     Inability: Not on file   • Transportation needs     Medical: Not on file     Non-medical: Not on file   Tobacco Use   • Smoking status: Never Smoker   • Smokeless tobacco: Never Used   Substance and Sexual Activity   • Alcohol use: Never     Frequency: Never   • Drug use: Never   • Sexual activity: Not on file   Lifestyle   • Physical activity     Days per week: Not on file     Minutes per session: Not on file   • Stress: Not on file   Relationships   • Social connections     Talks on phone: Not on file     Gets together: Not on file     Attends Yazidi service: Not on file     Active member of club or organization: Not on file     Attends meetings of clubs or organizations: Not on file     Relationship status: Not on file   • Intimate partner violence     Fear of current or ex partner: Not on file     Emotionally abused: Not on file     Physically abused: Not on file     Forced sexual activity: Not on file   Other Topics Concern   • Not on file   Social History Narrative   • Not on file     Social History     Tobacco Use    Smoking Status Never Smoker   Smokeless Tobacco Never Used     Social History     Substance and Sexual Activity   Alcohol Use Never   • Frequency: Never       Allergies/Intolerances:  Allergies   Allergen Reactions   • Amoxicillin Hives     Tolerates cephalosporins   • Benadryl Allergy Hives   • Excedrin Migraine Swelling   • Sulfamethoxazole Hives   • Famotidine Itching       History reviewed with the patient     Other Current Medications:    Current Facility-Administered Medications:   •  MD Alert...ICU Electrolyte Replacement per Pharmacy, , Other, PHARMACY TO DOSE, Vikas Tang M.D.  •  guaiFENesin (ROBITUSSIN) 100 MG/5ML solution 200 mg, 10 mL, Oral, Q4HRS PRN, Paul Mario M.D., 200 mg at 08/03/20 0945  •  magnesium oxide (MAG-OX) tablet 400 mg, 400 mg, Oral, DAILY, Paul Mario M.D., 400 mg at 08/03/20 0623  •  MD Alert...Vancomycin per Pharmacy, , Other, PHARMACY TO DOSE, Lori Boone M.D.  •  Pharmacy Consult Request for Amphotericin B monitoring, 1 Each, Other, PRN, Lori Boone M.D.  •  NS infusion 500 mL, 500 mL, Intravenous, Q24HR, Last Rate: 500 mL/hr at 08/02/20 2035, 500 mL at 08/02/20 2035 **AND** acetaminophen (TYLENOL) tablet 650 mg, 650 mg, Oral, Q24HR, 650 mg at 08/02/20 2105 **AND** [DISCONTINUED] diphenhydrAMINE (BENADRYL) tablet/capsule 25 mg, 25 mg, Oral, Q24HR **AND** D5W infusion 30 mL, 30 mL, Intravenous, Q24HR, 30 mL at 08/02/20 2146 **AND** amphotericin B liposome (AMBISOME) 170.8 mg in D5W 100 mL IVPB, 4 mg/kg, Intravenous, Q24HRS, Stopped at 08/02/20 2346 **AND** D5W infusion 30 mL, 30 mL, Intravenous, Q24HR, 30 mL at 08/03/20 0009 **AND** NS infusion 250 mL, 250 mL, Intravenous, Q24HR, Lori Boone M.D., Last Rate: 250 mL/hr at 08/03/20 0007, 250 mL at 08/03/20 0007  •  vancomycin (VANCOCIN) 750 mg in  mL IVPB, 15 mg/kg, Intravenous, Q8HR, Lori Boone M.D., Stopped at 08/03/20 0527  •  ipratropium (ATROVENT) 0.02 % nebulizer solution 0.5 mg,  0.5 mg, Nebulization, BID (RT), Paul Mario M.D., 0.5 mg at 08/03/20 0721  •  0.9 % NaCl with KCl 40 mEq 1,000 mL, , Intravenous, Continuous, Paul Mario M.D., Last Rate: 100 mL/hr at 08/03/20 0704  •  omeprazole (PRILOSEC) capsule 20 mg, 20 mg, Oral, BID, Paul Mario M.D., 20 mg at 08/03/20 0623  •  acyclovir (ZOVIRAX) tablet 400 mg, 400 mg, Oral, BID, ELMER MontesOMarley, 400 mg at 08/03/20 0623  •  amLODIPine (NORVASC) tablet 10 mg, 10 mg, Oral, DAILY, ELMER MontesOMarley, 10 mg at 08/03/20 0600  •  folic acid (FOLVITE) tablet 1 mg, 1 mg, Oral, DAILY, ELMER MontesOMarley, 1 mg at 08/03/20 0623  •  senna-docusate (PERICOLACE or SENOKOT S) 8.6-50 MG per tablet 2 Tab, 2 Tab, Oral, BID, 1 Tab at 08/03/20 0623 **AND** polyethylene glycol/lytes (MIRALAX) PACKET 1 Packet, 1 Packet, Oral, QDAY PRN **AND** magnesium hydroxide (MILK OF MAGNESIA) suspension 30 mL, 30 mL, Oral, QDAY PRN **AND** bisacodyl (DULCOLAX) suppository 10 mg, 10 mg, Rectal, QDAY PRN, ELMER MontesO.  •  lactated ringers infusion (BOLUS): BMI less than or equal to 30, 30 mL/kg, Intravenous, Once PRN, Seun Shetty D.O.  •  acetaminophen (TYLENOL) tablet 650 mg, 650 mg, Oral, Q6HRS PRN, ELMER MontesOMarley, 650 mg at 08/03/20 0326  •  Notify provider if pain remains uncontrolled, , , CONTINUOUS **AND** Use the numeric rating scale (NRS-11) on regular floors and Critical-Care Pain Observation Tool (CPOT) on ICUs/Trauma to assess pain, , , CONTINUOUS **AND** Pulse Ox (Oximetry), , , CONTINUOUS **AND** Pharmacy Consult Request ...Pain Management Review 1 Each, 1 Each, Other, PHARMACY TO DOSE **AND** If patient difficult to arouse and/or has respiratory depression, stop any opiates that are currently infusing and call a Rapid Response., , , CONTINUOUS **AND** oxyCODONE immediate-release (ROXICODONE) tablet 2.5 mg, 2.5 mg, Oral, Q3HRS PRN, 2.5 mg at 08/03/20 0945 **AND** oxyCODONE immediate-release (ROXICODONE) tablet 5 mg, 5 mg, Oral,  Q3HRS PRN **AND** morphine (pf) 4 MG/ML injection 2 mg, 2 mg, Intravenous, Q3HRS PRN, Seun Shetty D.O.  •  cefepime (MAXIPIME) 2 g in  mL IVPB, 2 g, Intravenous, Q8HRS, Seun Shetty D.O., Last Rate: 200 mL/hr at 20 0954, 2 g at 20 0954  •  enalaprilat (VASOTEC) injection 1.25 mg, 1.25 mg, Intravenous, Q6HRS PRN, Seun Shetty D.O.  •  ondansetron (ZOFRAN) syringe/vial injection 4 mg, 4 mg, Intravenous, Q4HRS PRN, Seun Shetty D.O.  •  ondansetron (ZOFRAN ODT) dispertab 4 mg, 4 mg, Oral, Q4HRS PRN, Seun Shetty D.O.  •  promethazine (PHENERGAN) suppository 12.5-25 mg, 12.5-25 mg, Rectal, Q4HRS PRN, Seun Shetty D.O.  •  prochlorperazine (COMPAZINE) injection 5-10 mg, 5-10 mg, Intravenous, Q4HRS PRN, Seun Shetty D.O.  [unfilled]    Most Recent Vital Signs:  /50   Pulse (!) 109   Temp 36.6 °C (97.8 °F) (Temporal)   Resp (!) 33   Ht 1.524 m (5')   Wt 47.9 kg (105 lb 9.6 oz)   SpO2 97%   BMI 20.62 kg/m²   Temp  Av.4 °C (99.3 °F)  Min: 35.6 °C (96.1 °F)  Max: 41.9 °C (107.4 °F)    Physical Exam:  Physical Exam   Constitutional: She is oriented to person, place, and time and well-developed, well-nourished, and in no distress.   HENT:   Head: Normocephalic and atraumatic.   Left Ear: External ear normal.   Nose: Nose normal.   Mouth/Throat: Oropharynx is clear and moist.   No sinus tenderness   Eyes: Pupils are equal, round, and reactive to light. Conjunctivae and EOM are normal.   Neck: Normal range of motion. Neck supple.   Cardiovascular: Normal rate, regular rhythm and normal heart sounds.   Pulmonary/Chest: Effort normal.   Decreased breath sounds bilaterally, crackles on left   Abdominal: Soft. Bowel sounds are normal.   Musculoskeletal: Normal range of motion.         General: No edema.   Neurological: She is alert and oriented to person, place, and time.   Skin: Skin is warm and dry.   Psychiatric: Affect and judgment normal.           Pertinent Lab  "Results:  Recent Labs     08/01/20  0339 08/02/20  0419 08/03/20  0545   WBC 0.2* 0.1* 0.2*      Recent Labs     08/01/20  0339 08/02/20  0419 08/03/20  0545   HEMOGLOBIN 6.1* 7.7* 7.7*   HEMATOCRIT 18.4* 23.8* 23.1*   MCV 82.1 86.5 81.6   MCH 27.2 28.0 27.2   PLATELETCT 25* 16* 21*         Recent Labs     08/02/20  0419 08/02/20  1545 08/03/20  0545   SODIUM 131* 133* 136   POTASSIUM 3.3* 3.8 3.6   CHLORIDE 103 104 105   CO2 16* 17* 17*   CREATININE 0.33* 0.49* 0.36*        Recent Labs     07/31/20  1458 08/03/20  0545   ALBUMIN 2.7* 2.3*        Pertinent Micro:  Results     Procedure Component Value Units Date/Time    BLOOD CULTURE [[id number]] Collected:  08/02/20 1610    Order Status:  Completed Specimen:  Blood from Peripheral Updated:  08/03/20 0838     Significant Indicator NEG     Source BLD     Site PERIPHERAL     Culture Result No Growth  Note: Blood cultures are incubated for 5 days and  are monitored continuously.Positive blood cultures  are called to the RN and reported as soon as  they are identified.      Narrative:       [id number] SUELLEN LOZANO  Per Hospital Policy: Only change Specimen Src: to \"Line\" if  specified by physician order.  Left Forearm/Arm    BLOOD CULTURE [[id number]] Collected:  08/02/20 1545    Order Status:  Completed Specimen:  Blood from Peripheral Updated:  08/03/20 0838     Significant Indicator NEG     Source BLD     Site PERIPHERAL     Culture Result No Growth  Note: Blood cultures are incubated for 5 days and  are monitored continuously.Positive blood cultures  are called to the RN and reported as soon as  they are identified.      Narrative:       [id number] SUELLEN LOZANO  Per Hospital Policy: Only change Specimen Src: to \"Line\" if  specified by physician order.  Right Forearm/Arm    BLOOD CULTURE [[id number]]     Order Status:  Canceled Specimen:  Blood from Peripheral     BLOOD CULTURE [[id number]]     Order Status:  Canceled Specimen:  Blood from Peripheral  "    MRSA By PCR (Amp) [[id number]] Collected:  08/01/20 0315    Order Status:  Completed Specimen:  Respirate from Nares Updated:  08/02/20 1026     Significant Indicator NEG     Source RESP     Site NARES     MRSA PCR Negative for MRSA by PCR.    Narrative:       Collected By:[id number] SIDNEY PATEL  Collected By:[id number] SIDNEY PATEL    GRAM STAIN [[id number]] Collected:  08/01/20 0825    Order Status:  Completed Specimen:  Respirate Updated:  08/01/20 1606     Significant Indicator .     Source RESP     Site Sputum     Gram Stain Result Sputum Gram stain quality score is <1, probable  oropharyngeal contamination. Culture not performed.  Recollect if clinically indicated.      Narrative:       Collected By:[id number] MYFLYMARIA G SHOOK R  Collected By:[id number] [employer] BOONE R    CULTURE RESPIRATORY W/ GRM STN [[id number]] Collected:  08/01/20 0825    Order Status:  Completed Specimen:  Sputum Updated:  08/01/20 0952    Narrative:       Collected By:[id number] GEOVANYiMedicareJERMAINE BOONE R    CULTURE RESP W/O GRAM STAIN [[id number]] Collected:  08/01/20 0825    Order Status:  Canceled Specimen:  Sputum     CULTURE RESPIRATORY W/ GRM STN [[id number]]     Order Status:  Completed Specimen:  Respirate from Sputum     CULTURE RESPIRATORY W/ GRM STN [[id number]]     Order Status:  Canceled Specimen:  Respirate from Sputum     Urine Culture (New) (Sensitivity) [[id number]] Collected:  07/29/20 1450    Order Status:  Completed Specimen:  Urine, Clean Catch Updated:  07/31/20 0749     Significant Indicator NEG     Source UR     Site URINE, CLEAN CATCH     Culture Result No growth at 48 hours.    Narrative:       Indication for culture:->Kidney transplant recipient,  Neutropenic patient, after urologic procedure/surgery or  Urinary tract obstruction with fever >100.4F  Indication for culture:->Kidney transplant recipient,    Blood Culture [[id number]] Collected:  07/29/20 2304    Order Status:  Completed Specimen:  Blood from Peripheral  "Updated:  07/31/20 0730     Significant Indicator NEG     Source BLD     Site PERIPHERAL     Culture Result No Growth  Note: Blood cultures are incubated for 5 days and  are monitored continuously.Positive blood cultures  are called to the RN and reported as soon as  they are identified.      Narrative:       From different peripheral sites, if not done within the last  24 hours (Per Hospital Policy: Only change specimen source to  \"Line\" if specified by physician order)  No site indicated    Blood Culture [[id number]] Collected:  07/29/20 1840    Order Status:  Completed Specimen:  Blood from Peripheral Updated:  07/30/20 0831     Significant Indicator NEG     Source BLD     Site PERIPHERAL     Culture Result No Growth  Note: Blood cultures are incubated for 5 days and  are monitored continuously.Positive blood cultures  are called to the RN and reported as soon as  they are identified.      Narrative:       From different peripheral sites, if not done within the last  24 hours (Per Hospital Policy: Only change specimen source to  \"Line\" if specified by physician order)  No site indicated    SARS-CoV-2, PCR (In-House) [[id number]] Collected:  07/29/20 1650    Order Status:  Completed Updated:  07/29/20 1800     SARS-CoV-2 Source NP Swab     SARS-CoV-2 by PCR NotDetected     Comment: [facility] providers: PLEASE REFER TO DE-ESCALATION AND RETESTING PROTOCOL  on [email or web address]  **The CepTraktoPRO GeneXpert Xpress SARS-CoV-2 Test has been made available for  use under the Emergency Use Authorization (EUA) only.         Narrative:       Is patient being admitted?->Yes  Does this patient meet criteria for Rush/Cepheid per [facility]  Inpatient Workflow? (See workflow link below)->Yes  Expected turn around time?->Rush (Cepheid 2-4 hours)    Routine (COVID/SARS COV-2 In-House PCR up to 24 hours) [[id number]] Collected:  07/29/20 1650    Order Status:  Completed Specimen:  Respirate from Nasopharyngeal Updated:  07/29/20 1656     COVID " Order Status Received     Comment: The order for SARS CoV-2 testing has been received by the  Laboratory. This result is neither positive nor negative.  Final results of testing will report in 24-48 hours, separately.         Narrative:       Is patient being admitted?->Yes  Does this patient meet criteria for Rush[facility]Cepheid [facility]  [facility]? (See workflow link below)->Yes  Expected turn around time?->Rush (Cepheid 2-4 hours)    Urinalysis [[id number]]     Order Status:  Canceled Specimen:  Urine     URINALYSIS [[id number]]  (Abnormal) Collected:  07/29/20 1450    Order Status:  Completed Specimen:  Urine, Clean Catch Updated:  07/29/20 1522     Color Yellow     Character Clear     Specific Gravity <=1.005     Ph 7.0     Glucose Negative mg/dL      Ketones Negative mg/dL      Protein Negative mg/dL      Bilirubin Negative     Urobilinogen, Urine 0.2     Nitrite Negative     Leukocyte Esterase Negative     Occult Blood Small     Micro Urine Req Microscopic        No results found for: BLOODCULTU, BLDCULT, BCHOLD     Studies:  Dx-chest-portable (1 View)    Result Date: 8/2/2020 8/2/2020 11:08 AM HISTORY/REASON FOR EXAM:  Cough. Chest pain and fever. History of pneumonia. TECHNIQUE/EXAM DESCRIPTION AND NUMBER OF VIEWS: Single portable view of the chest. COMPARISON:  7/31/2020 FINDINGS: The cardiac silhouette is within normal limits. There is persistent alveolar opacity in the left upper lobe and lingula consistent with areas of pneumonia. No new opacity has developed in the left lung. No right lung consolidation is present. There is mild prominence of pulmonary interstitium in the right lung which may represent minor interstitial edema. This could represent redistribution edema. No pleural effusion is noted.     Unchanged left upper lobe and lingular pneumonia.    Dx-chest-portable (1 View)    Result Date: 7/31/2020 7/31/2020 3:13 PM HISTORY/REASON FOR EXAM:  Cough TECHNIQUE/EXAM DESCRIPTION AND NUMBER OF  VIEWS: Single portable view of the chest. COMPARISON: None FINDINGS: New airspace opacity in the left lower lobe and lingula No pleural effusion. No pneumothorax. Normal cardiopericardial silhouette.     New airspace opacity in the left lower lobe and lingula, likely pneumonia.    Ir-us Guided Piv    Result Date: 7/27/2020  EXAMINATION:                                                                    HISTORY/REASON FOR EXAM:  Ultrasound Guided PIV.  TECHNIQUE/EXAM DESCRIPTION AND NUMBER OF VIEWS:  Peripheral IV insertion with ultrasound guidance.  The procedure was prepared using maximal sterile barrier technique including sterile gown, mask, cap, and donning of sterile gloves following appropriate hand hygiene and/or sterile scrub. Patient skin site was prepped with 2% Chlorhexidine solution.   FINDINGS: Peripheral IV insertion with Ultrasound Guidance was performed by qualified imaging nursing staff without the assistance of a Radiologist.      Ultrasound-guided PERIPHERAL IV INSERTION performed by qualified nursing staff as above.    Us-ruq    Result Date: 8/1/2020 8/1/2020 6:50 AM HISTORY/REASON FOR EXAM:  Abnormal Labs Abdominal pain TECHNIQUE/EXAM DESCRIPTION AND NUMBER OF VIEWS:  Real-time sonography of the liver and biliary tree. COMPARISON: None FINDINGS: The liver is normal in contour. There is no evidence of solid mass lesion. The liver measures 11.04 cm. The gallbladder is surgically absent. The common duct measures 5.30 mm. The visualized pancreas is unremarkable. The visualized aorta is normal in caliber. Intrahepatic IVC is patent. The portal vein is patent with hepatopetal flow. The MPV measures 1.12 cm. The right kidney measures 11.05 cm. There is no hydronephrosis. There is no ascites.     Status post cholecystectomy. No biliary ductal dilatation is seen.     Ec-echocardiogram Complete W/o Cont    Result Date: 8/1/2020  Transthoracic Echo Report Echocardiography Laboratory CONCLUSIONS No prior  study is available for comparison. Normal left ventricular chamber size. Normal left ventricular systolic function. Left ventricular ejection fraction is visually estimated to be 70%. Trace tricuspid regurgitation. Estimated right ventricular systolic pressure  is 40 mmHg. CHIDI MOLINA Exam Date:         2020                    18:43 Exam Location:     Inpatient Priority:          Routine Ordering Physician:        UNIQUE PETERSON Referring Physician:       [id number] Sonographer:               Yue Goodman [facility] Age:    54     Gender:    F MRN:    [id number] :    1965 BSA:    1.32   Ht (in):    60     Wt (lb):    89 Exam Type:     Complete Indications:     pre-chemo, Shortness of breath ICD Codes:       V581  R06.02 CPT Codes:       65971 BP:   124    /   53     HR:   100 Technical Quality:       Fair MEASUREMENTS  (Male / Female) Normal Values 2D ECHO LV Diastolic Diameter PLAX        4.1 cm                4.2 - 5.9 / 3.9 - 5.3 cm LV Systolic Diameter PLAX         2.3 cm                2.1 - 4.0 cm IVS Diastolic Thickness           0.84 cm               LVPW Diastolic Thickness          1 cm                  LVOT Diameter                     1.5 cm                Estimated LV Ejection Fraction    70 %                  LV Ejection Fraction MOD BP       69.2 %                >= 55  % LV Ejection Fraction MOD 4C       69.7 %                LV Ejection Fraction MOD 2C       65.4 %                IVC Diameter                      1.3 cm                DOPPLER AV Peak Velocity                  2 m/s                 AV Peak Gradient                  15.3 mmHg             AV Mean Gradient                  7.6 mmHg              LVOT Peak Velocity                1.6 m/s               AV Area Cont Eq vti               1.4 cm2               Mitral E Point Velocity           1.3 m/s               Mitral E to A Ratio               1.1                   MV Pressure Half Time             47.3 ms                MV Area PHT                       4.6 cm2               MV Deceleration Time              163 ms                TV Peak E Velocity                0.55 m/s              TR Peak Velocity                  312 cm/s              * Indicates values subject to auto-interpretation LV EF:  70    % FINDINGS Left Ventricle Normal left ventricular chamber size. Normal left ventricular wall thickness. Normal left ventricular systolic function. Left ventricular ejection fraction is visually estimated to be 70%. Normal regional wall motion. Normal diastolic function. Right Ventricle The right ventricle was normal in size and function. Right Atrium The right atrium is normal in size. Normal inferior vena cava size and inspiratory collapse. Left Atrium The left atrium is normal in size. Left atrial volume index is 30 mL/sq m. Mitral Valve Mitral annular calcification. No mitral stenosis. Trace mitral regurgitation. Aortic Valve Structurally normal aortic valve without significant stenosis or regurgitation. Tricuspid Valve No tricuspid stenosis. Trace tricuspid regurgitation. Estimated right ventricular systolic pressure  is 40 mmHg. Right atrial pressure is estimated to be 3 mmHg. Pulmonic Valve Structurally normal pulmonic valve without significant stenosis or regurgitation. Pericardium Normal pericardium without effusion. Aorta The aortic root is normal. Ascending aorta diameter is 2.7 cm. Seun Baires M.D. (Electronically Signed) Final Date:     01 August 2020                 20:19      ASSESSMENT/PLAN:     54 y.o.  admitted 7/29/2020. Pt has a past medical history of CLL on chemotherapy with ibrutinib.  She has been profoundly neutropenic since at least May 2020.  She was recently admitted for neutropenic fever as well as SVT and required pressors and IV antibiotics.  Cultures at the time were negative.      She is now re-admitted with fevers and left-sided pneumonia.  Per patient she had a cough for approximately 3 days prior  to admission which was productive with blood-tinged sputum.   She also reports some history of sinus pain as well as headaches which have been increased of late.  Due to worsening fever and tachycardia she was transferred to the ICU on 8/2.  She was febrile on admission until 7/31 and then improvement with recurrence of fevers on 8/2 to 102.  She is initially on room air and then increased to 5 L oxygen mask  is now on 3 L nasal cannula.  She been treated with cefepime, vancomycin and doxycycline since arrival.  She was continued on her prophylactic acyclovir and fluconazole.  ID transitioned fluconazole to amphotericin on 8/2.     Problem List   Sepsis, secondary to pneumonia  Pneumonia, in setting of immunosuppression, neutropenia and prior hospitalization-new left upper lobe consolidation  -Procalcitonin 6.99 on 8/1  -Quant gold and cocci both negative and 5/2020  -Due to her prolonged neutropenia she is at risk for viral infections, common and atypical bacterial infection and invasive fungal infections with aspergillosis would be the most common.  She is also at risk for pneumocystis.  -COVID tested and negative on 7/31  -Will not pursue histo or blasto testing as no exposure risk  -Beta D glucan > 500 5/2020    Headaches, frontal and sinus pain-ongoing    Neutropenic fever   -Profoundly neutropenic for several months-she has been on prophylaxis with acyclovir, 500 mg levofloxacin daily and low dose (100 mg)of fluconazole daily   -Blood cultures negative on 7/29 & 8/2   -Urine culture negative on 7/29  -MRSA nares negative    CLL  Pancytopenia secondary to above  Immunosuppressed, on chemotherapy with ibrutinib  Antibiotic allergies: Amoxicillin reported as hives, tolerate cephalosporins, sulfa reported as hives    Plan     --- Stop amphotericin and start posaconazole-this will provide broad spectrum antifungal coverage including for aspergillosis with less risk for side effects  --- The patient has been on  vancomycin since admit and recurrence of fevers on vancomycin so not thought to be due to lack of MRSA coverage, nares are negative so will stop vancomycin   --- Continue cefepime for now  --- Continue prophylactic acyclovir  --- Prefer bronchoscopy for cultures-if performed please obtain bacterial culture with Gram stain, fungal culture and pneumocystis DFA  --- Prior sputum culture is inadequate and has been canceled by lab, ordered repeat respiratory culture with induction-bacterial, fungal and pneumocystis  --- Repeat pro calcitonin for a.m.  --- Follow-up blood cultures from 8/2  --- Repeat procalcitonin- am - ordered   --- Follow-up beta D glucan and aspergillosis galactomannan-in process  --- Respiratory viral panel-ordered  --- Recommend CT head with sinus imaging due to reported ongoing headaches and history of sinus pain  --- Will repeat cocci screen -no recent travel the patient has lived in [place] and [place]  --- Repeat TB quant, patient is from the [facility]  --- Cryptococcus screen-ordered        Plan of care discussed with IM Vikas Tang M.D and  ID pharmacy. Will continue to follow    Ingris Corral M.D.

## 2020-08-04 NOTE — PROGRESS NOTES
Oncology/Hematology Progress Note               Author: Tyler Mcelroy M.D. Date & Time created: 8/4/2020  10:53 AM   Diagnosis-CLL  Pneumonia/respiratory failure  Pancytopenia  History of thalassemia  Interval History:  Remains short of breath.  Infectious disease consultation appreciated.  Patient started on posaconazole.  Vancomycin was discontinued.  Continues to be on acyclovir.  Has a slight cough.  No hemoptysis.  T-max 102.7    Review of Systems:  Review of Systems   Constitutional: Positive for fever and malaise/fatigue.   Respiratory: Positive for cough, sputum production and shortness of breath.    Cardiovascular: Negative for chest pain, palpitations and leg swelling.   Genitourinary: Negative for dysuria and hematuria.   Musculoskeletal: Negative for back pain, myalgias and neck pain.   Skin: Negative for itching and rash.   Neurological: Negative for dizziness, tingling, tremors and headaches.   Psychiatric/Behavioral: Negative for depression. The patient is nervous/anxious.        Physical Exam:  Physical Exam  Constitutional:       Appearance: Normal appearance. She is toxic-appearing.   HENT:      Mouth/Throat:      Mouth: Mucous membranes are moist.      Pharynx: No oropharyngeal exudate or posterior oropharyngeal erythema.   Pulmonary:      Effort: Respiratory distress present.      Breath sounds: No wheezing or rhonchi.   Abdominal:      General: Abdomen is flat. Bowel sounds are normal. There is no distension.      Palpations: Abdomen is soft. There is no mass.      Tenderness: There is no abdominal tenderness. There is no guarding.   Skin:     Coloration: Skin is not jaundiced.      Findings: Bruising present. No lesion.   Neurological:      General: No focal deficit present.      Mental Status: She is alert and oriented to person, place, and time.         Labs:          Recent Labs     08/02/20  0419 08/02/20  1545 08/03/20  0545 08/04/20  0550   SODIUM 131* 133* 136 128*   POTASSIUM 3.3* 3.8  3.6 3.7   CHLORIDE 103 104 105 100   CO2 16* 17* 17* 19*   BUN 5* 6* 7* 6*   CREATININE 0.33* 0.49* 0.36* 0.32*   MAGNESIUM 1.7 1.6 2.1  --    PHOSPHORUS  --   --  2.7  --    CALCIUM 8.0* 8.1* 7.9* 8.2*     Recent Labs     20  15420  0545 20  0550   ALTSGPT  --  25  --    ASTSGOT  --  10*  --    ALKPHOSPHAT  --  180*  --    TBILIRUBIN  --  1.2  --    GLUCOSE 122* 169* 107*     Recent Labs     20  1100 20  2149 20  0550   RBC 2.75* 2.83*  --   --  2.65*   HEMOGLOBIN 7.7* 7.7*  --   --  7.3*   HEMATOCRIT 23.8* 23.1*  --   --  21.3*   PLATELETCT 16* 21*  --   --  21*   PROTHROMBTM  --   --   --  21.0*  --    APTT  --   --   --  45.9*  --    INR  --   --   --  1.76*  --    FERRITIN  --   --  84309.0*  --   --       Labs     20  0545 20  0550   WBC 0.1* 0.2* 0.3*   NEUTSPOLYS  --   --  CANCEL   LYMPHOCYTES  --   --  CANCEL   MONOCYTES  --   --  CANCEL   EOSINOPHILS  --   --  CANCEL   BASOPHILS  --   --  CANCEL   ASTSGOT  --  10*  --    ALTSGPT  --  25  --    ALKPHOSPHAT  --  180*  --    TBILIRUBIN  --  1.2  --      Recent Labs     20  0545 20  0550   SODIUM 133* 136 128*   POTASSIUM 3.8 3.6 3.7   CHLORIDE 104 105 100   CO2 17* 17* 19*   GLUCOSE 122* 169* 107*   BUN 6* 7* 6*   CREATININE 0.49* 0.36* 0.32*   CALCIUM 8.1* 7.9* 8.2*     Hemodynamics:  Temp (24hrs), Av.7 °C (99.8 °F), Min:37.1 °C (98.7 °F), Max:39.3 °C (102.7 °F)  Temperature: 37.9 °C (100.2 °F)  Pulse  Av  Min: 64  Max: 184   Blood Pressure: 141/54     Respiratory:    Respiration: (!) 29, Pulse Oximetry: 92 %     Given By:: Mask, Work Of Breathing / Effort: Mild  RUL Breath Sounds: Clear, RML Breath Sounds: Clear, RLL Breath Sounds: Diminished;Crackles, LEON Breath Sounds: Diminished, LLL Breath Sounds: Diminished;Crackles  Fluids:    Intake/Output Summary (Last 24 hours) at 2020 1053  Last data filed at 2020  0800  Gross per 24 hour   Intake 5065 ml   Output 2450 ml   Net 2615 ml        GI/Nutrition:  Orders Placed This Encounter   Procedures   • Diet NPO     Standing Status:   Standing     Number of Occurrences:   8     Order Specific Question:   Restrict to:     Answer:   Sips with Medications [3]     Medical Decision Making, by Problem:  Active Hospital Problems    Diagnosis   • *Sepsis (HCC) [A41.9]   • Nosocomial pneumonia [J18.9, Y95]   • Acute hypoxemic respiratory failure (HCC) [J96.01]   • SVT (supraventricular tachycardia) (HCC) [I47.1]   • Pancytopenia (HCC) [D61.818]   • Pulmonary hypertension (HCC) [I27.20]   • Neutropenic fever (HCC) [D70.9, R50.81]   • CLL (chronic lymphocytic leukemia) (HCC) [C91.10]   • Hyponatremia [E87.1]   • Hypokalemia [E87.6]       Plan:  CLL-ibrutinib is on hold.  Patient is pancytopenic.  No rash  Pneumonia-ID consultation appreciated.  For bronchoscopy today 8/4/2020.  On posaconazole.  Vancomycin was discontinued.  Continue acyclovir.  Pancytopenia-transfuse as needed.  Overall the patient is critically sick.  Prognosis is very guarded.    Quality-Core Measures

## 2020-08-04 NOTE — PROGRESS NOTES
12 hour chart check     Monitor Summary:     Sinus rhythm to sinus tachycardia, rate of 98-178bpm:     .16/.08/.38

## 2020-08-04 NOTE — CARE PLAN
Problem: Communication  Goal: The ability to communicate needs accurately and effectively will improve  Outcome: PROGRESSING AS EXPECTED     Problem: Safety  Goal: Will remain free from injury  Outcome: PROGRESSING AS EXPECTED  Goal: Will remain free from falls  Outcome: PROGRESSING AS EXPECTED     Problem: Bowel/Gastric:  Goal: Normal bowel function is maintained or improved  Outcome: PROGRESSING AS EXPECTED  Goal: Will not experience complications related to bowel motility  Outcome: PROGRESSING AS EXPECTED  Note: Patient's diarrhea decreased throughout shift.      Problem: Knowledge Deficit  Goal: Knowledge of disease process/condition, treatment plan, diagnostic tests, and medications will improve  Outcome: PROGRESSING AS EXPECTED  Goal: Knowledge of the prescribed therapeutic regimen will improve  Outcome: PROGRESSING AS EXPECTED

## 2020-08-04 NOTE — PROGRESS NOTES
Infectious Disease Progress Note    Author: Ingris Corral M.D. Date & Time of service: 2020  9:07 AM    Chief Complaint:  Neutropenic fever, pneumonia    Interval History:    Review of Systems:  Review of Systems   Unable to perform ROS: Medical condition       Hemodynamics:  Temp (24hrs), Av.6 °C (99.6 °F), Min:36.6 °C (97.8 °F), Max:39.3 °C (102.7 °F)  Temperature: 37.9 °C (100.2 °F)  Pulse  Av.9  Min: 64  Max: 184   Blood Pressure: 146/68       Physical Exam:  Physical Exam  Constitutional:       Appearance: Normal appearance.   Cardiovascular:      Rate and Rhythm: Normal rate and regular rhythm.   Pulmonary:      Effort: Pulmonary effort is normal.      Comments: Crackles and decreased breath sounds  Abdominal:      General: Abdomen is flat. Bowel sounds are normal.      Palpations: Abdomen is soft.   Musculoskeletal:      Right lower leg: No edema.      Left lower leg: No edema.   Skin:     General: Skin is warm and dry.   Neurological:      Mental Status: She is alert.      Comments: Somnolent/sedated after procedure         Meds:    Current Facility-Administered Medications:   •  potassium chloride SA  •  MD Alert...Adult ICU Electrolyte Replacement per Pharmacy  •  posaconazole  •  ipratropium-albuterol  •  guaiFENesin  •  magnesium oxide  •  ipratropium  •  0.9 % NaCl with KCl 40 mEq 1,000 mL  •  omeprazole  •  acyclovir  •  amLODIPine  •  folic acid  •  senna-docusate **AND** polyethylene glycol/lytes **AND** magnesium hydroxide **AND** bisacodyl  •  LR  •  acetaminophen  •  Notify provider if pain remains uncontrolled **AND** Use the numeric rating scale (NRS-11) on regular floors and Critical-Care Pain Observation Tool (CPOT) on ICUs/Trauma to assess pain **AND** Pulse Ox (Oximetry) **AND** Pharmacy Consult Request **AND** If patient difficult to arouse and/or has respiratory depression, stop any opiates that are currently infusing and call a Rapid Response. **AND** oxyCODONE  immediate-release **AND** oxyCODONE immediate-release **AND** morphine injection  •  cefepime  •  enalaprilat  •  ondansetron  •  ondansetron  •  promethazine  •  prochlorperazine    Labs:  Recent Labs     08/02/20  0419 08/03/20  0545 08/04/20  0550   WBC 0.1* 0.2* 0.3*   RBC 2.75* 2.83* 2.65*   HEMOGLOBIN 7.7* 7.7* 7.3*   HEMATOCRIT 23.8* 23.1* 21.3*   MCV 86.5 81.6 80.4*   MCH 28.0 27.2 27.5   RDW 44.8 41.5 41.5   PLATELETCT 16* 21* 21*   MPV 10.2 9.7 11.5   NEUTSPOLYS  --   --  CANCEL   LYMPHOCYTES  --   --  CANCEL   MONOCYTES  --   --  CANCEL   EOSINOPHILS  --   --  CANCEL   BASOPHILS  --   --  CANCEL     Recent Labs     08/02/20  1545 08/03/20  0545 08/04/20  0550   SODIUM 133* 136 128*   POTASSIUM 3.8 3.6 3.7   CHLORIDE 104 105 100   CO2 17* 17* 19*   GLUCOSE 122* 169* 107*   BUN 6* 7* 6*     Recent Labs     08/02/20  1545 08/03/20  0545 08/04/20  0550   ALBUMIN  --  2.3*  --    TBILIRUBIN  --  1.2  --    ALKPHOSPHAT  --  180*  --    TOTPROTEIN  --  5.6*  --    ALTSGPT  --  25  --    ASTSGOT  --  10*  --    CREATININE 0.49* 0.36* 0.32*       Imaging:  Ct-chest (thorax) With    Result Date: 8/4/2020  8/3/2020 8:26 PM HISTORY/REASON FOR EXAM:  Chest pain or SOB, pleurisy or effusion suspected; Pneumonia. TECHNIQUE/EXAM DESCRIPTION:  CT scan of the chest with contrast. Thin-section helical images were obtained from the lung apices through the adrenal glands following the bolus administration of contrast. 50 mL of Omnipaque 350 nonionic contrast was utilized. Low dose optimization technique was utilized for this CT exam including automated exposure control and adjustment of the mA and/or kV according to patient size. COMPARISON:  None. FINDINGS: There is right paratracheal consolidation measuring 2.7 x 2.5 cm with peripheral groundglass opacity and central low-density. There is left lower lobe consolidation measuring 9.1 x 6.0 cm with central low density. Right lower lobe hazy consolidations are  seen. There is  moderate left pleural effusion. There is no mediastinal, hilar, or axillary adenopathy. Cardiomegaly is seen, otherwise the cardiac chambers, great vessels, and aorta are normal in CT appearance. There are no pleural effusions or other abnormality of the pleura. The extrathoracic soft tissues and thoracic cage are normal in appearance. The adrenal glands are normal.  The visualized portions of the spleen, pancreas, and kidneys are normal.  Hepatomegaly is observed. Postsurgical changes of cholecystectomy are seen. There is no upper abdominal adenopathy. There is no abnormal contrast enhancement.     1.  Left lower lobe consolidation with central low density, could represent necrotic pneumonia versus pulmonary mass with necrosis. 2.  Right paratracheal area of consolidation with central low-density, could represent necrotic pneumonia versus pulmonary mass with necrosis. 3.  Small left pleural effusion. 4.  Scattered groundglass bilateral pulmonary edema or infiltrates. 5.  Cardiomegaly These findings were discussed with the patient's clinician, Vikas Tang, on 8/4/2020 7:14 AM.    Dx-chest-portable (1 View)    Result Date: 8/4/2020 8/4/2020 3:18 AM HISTORY/REASON FOR EXAM: Abnormal finding of lung field TECHNIQUE/EXAM DESCRIPTION:  Single AP view of the chest. COMPARISON: August 2, 2020 FINDINGS: Overlying cardiac leads are present. Cardiomegaly is observed. The mediastinal contour appears within normal limits.  The central pulmonary vasculature appears normal. Bilateral lung volumes are diminished.  Hazy pulmonary opacities are seen, greatest in the left midlung. No significant pleural effusions are identified. The bony structures appear age-appropriate.     1.  Diffuse pulmonary edema and/or infiltrates, stable since prior study. 2.  Focal left midlung infiltrates, stable.    Dx-chest-portable (1 View)    Result Date: 8/2/2020 8/2/2020 11:08 AM HISTORY/REASON FOR EXAM:  Cough. Chest pain and fever. History  of pneumonia. TECHNIQUE/EXAM DESCRIPTION AND NUMBER OF VIEWS: Single portable view of the chest. COMPARISON:  7/31/2020 FINDINGS: The cardiac silhouette is within normal limits. There is persistent alveolar opacity in the left upper lobe and lingula consistent with areas of pneumonia. No new opacity has developed in the left lung. No right lung consolidation is present. There is mild prominence of pulmonary interstitium in the right lung which may represent minor interstitial edema. This could represent redistribution edema. No pleural effusion is noted.     Unchanged left upper lobe and lingular pneumonia.    Dx-chest-portable (1 View)    Result Date: 7/31/2020 7/31/2020 3:13 PM HISTORY/REASON FOR EXAM:  Cough TECHNIQUE/EXAM DESCRIPTION AND NUMBER OF VIEWS: Single portable view of the chest. COMPARISON: None FINDINGS: New airspace opacity in the left lower lobe and lingula No pleural effusion. No pneumothorax. Normal cardiopericardial silhouette.     New airspace opacity in the left lower lobe and lingula, likely pneumonia.    Ir-us Guided Piv    Result Date: 7/27/2020  EXAMINATION:                                                                    HISTORY/REASON FOR EXAM:  Ultrasound Guided PIV.  TECHNIQUE/EXAM DESCRIPTION AND NUMBER OF VIEWS:  Peripheral IV insertion with ultrasound guidance.  The procedure was prepared using maximal sterile barrier technique including sterile gown, mask, cap, and donning of sterile gloves following appropriate hand hygiene and/or sterile scrub. Patient skin site was prepped with 2% Chlorhexidine solution.   FINDINGS: Peripheral IV insertion with Ultrasound Guidance was performed by qualified imaging nursing staff without the assistance of a Radiologist.      Ultrasound-guided PERIPHERAL IV INSERTION performed by qualified nursing staff as above.    Us-ruq    Result Date: 8/1/2020 8/1/2020 6:50 AM HISTORY/REASON FOR EXAM:  Abnormal Labs Abdominal pain TECHNIQUE/EXAM DESCRIPTION  AND NUMBER OF VIEWS:  Real-time sonography of the liver and biliary tree. COMPARISON: None FINDINGS: The liver is normal in contour. There is no evidence of solid mass lesion. The liver measures 11.04 cm. The gallbladder is surgically absent. The common duct measures 5.30 mm. The visualized pancreas is unremarkable. The visualized aorta is normal in caliber. Intrahepatic IVC is patent. The portal vein is patent with hepatopetal flow. The MPV measures 1.12 cm. The right kidney measures 11.05 cm. There is no hydronephrosis. There is no ascites.     Status post cholecystectomy. No biliary ductal dilatation is seen.     Ec-echocardiogram Complete W/o Cont    Result Date: 2020  Transthoracic Echo Report Echocardiography Laboratory CONCLUSIONS No prior study is available for comparison. Normal left ventricular chamber size. Normal left ventricular systolic function. Left ventricular ejection fraction is visually estimated to be 70%. Trace tricuspid regurgitation. Estimated right ventricular systolic pressure  is 40 mmHg. JESSECASENA Exam Date:         2020                    18:43 Exam Location:     Inpatient Priority:          Routine Ordering Physician:        UNIQUE PETERSON Referring Physician:       [id number] Sonographer:               Yue Goodman [facility] Age:    54     Gender:    F MRN:    [id number] :    [id number] BSA:    1.32   Ht (in):    60     Wt (lb):    89 Exam Type:     Complete Indications:     pre-chemo, Shortness of breath ICD Codes:       V581  R06.02 CPT Codes:       07395 BP:   124    /   53     HR:   100 Technical Quality:       Fair MEASUREMENTS  (Male / Female) Normal Values 2D ECHO LV Diastolic Diameter PLAX        4.1 cm                4.2 - 5.9 / 3.9 - 5.3 cm LV Systolic Diameter PLAX         2.3 cm                2.1 - 4.0 cm IVS Diastolic Thickness           0.84 cm               LVPW Diastolic Thickness          1 cm                  LVOT Diameter                     1.5 cm                 Estimated LV Ejection Fraction    70 %                  LV Ejection Fraction MOD BP       69.2 %                >= 55  % LV Ejection Fraction MOD 4C       69.7 %                LV Ejection Fraction MOD 2C       65.4 %                IVC Diameter                      1.3 cm                DOPPLER AV Peak Velocity                  2 m/s                 AV Peak Gradient                  15.3 mmHg             AV Mean Gradient                  7.6 mmHg              LVOT Peak Velocity                1.6 m/s               AV Area Cont Eq vti               1.4 cm2               Mitral E Point Velocity           1.3 m/s               Mitral E to A Ratio               1.1                   MV Pressure Half Time             47.3 ms               MV Area PHT                       4.6 cm2               MV Deceleration Time              163 ms                TV Peak E Velocity                0.55 m/s              TR Peak Velocity                  312 cm/s              * Indicates values subject to auto-interpretation LV EF:  70    % FINDINGS Left Ventricle Normal left ventricular chamber size. Normal left ventricular wall thickness. Normal left ventricular systolic function. Left ventricular ejection fraction is visually estimated to be 70%. Normal regional wall motion. Normal diastolic function. Right Ventricle The right ventricle was normal in size and function. Right Atrium The right atrium is normal in size. Normal inferior vena cava size and inspiratory collapse. Left Atrium The left atrium is normal in size. Left atrial volume index is 30 mL/sq m. Mitral Valve Mitral annular calcification. No mitral stenosis. Trace mitral regurgitation. Aortic Valve Structurally normal aortic valve without significant stenosis or regurgitation. Tricuspid Valve No tricuspid stenosis. Trace tricuspid regurgitation. Estimated right ventricular systolic pressure  is 40 mmHg. Right atrial pressure is estimated to be 3 mmHg.  "Pulmonic Valve Structurally normal pulmonic valve without significant stenosis or regurgitation. Pericardium Normal pericardium without effusion. Aorta The aortic root is normal. Ascending aorta diameter is 2.7 cm. Seun Baires M.D. (Electronically Signed) Final Date:     01 August 2020                 20:19      Micro:  Results     Procedure Component Value Units Date/Time    GRAM STAIN [[id number]] Collected:  08/03/20 1713    Order Status:  Completed Specimen:  Respirate Updated:  08/04/20 0722     Significant Indicator .     Source RESP     Site SPUTUM     Gram Stain Result Few WBCs.  Moderate Yeast.  Few Gram positive rods.  Specimen Quality Score: 1+      Narrative:       [id number] GANSBERG CARMELINA L  [id number] GANSBERG CARMELINA L    Blood Culture [[id number]] Collected:  07/29/20 2304    Order Status:  Completed Specimen:  Blood from Peripheral Updated:  08/04/20 0500     Significant Indicator NEG     Source BLD     Site PERIPHERAL     Culture Result No growth after 5 days of incubation.    Narrative:       From different peripheral sites, if not done within the last  24 hours (Per Hospital Policy: Only change specimen source to  \"Line\" if specified by physician order)  No site indicated    CULTURE RESPIRATORY W/ GRM STN [[id number]] Collected:  08/03/20 1713    Order Status:  Completed Specimen:  Respirate from Sputum Updated:  08/04/20 0344    Narrative:       [id number] GANSBERG CARMELINA L    Fungal Culture [[id number]] Collected:  08/03/20 1713    Order Status:  Completed Specimen:  Respirate from Sputum Updated:  08/04/20 0344    Narrative:       [id number] GANSBERG CARMELINA L    Blood Culture [[id number]] Collected:  07/29/20 1840    Order Status:  Completed Specimen:  Blood from Peripheral Updated:  08/03/20 2100     Significant Indicator NEG     Source BLD     Site PERIPHERAL     Culture Result No growth after 5 days of incubation.    Narrative:       From different " "peripheral sites, if not done within the last  24 hours (Per Hospital Policy: Only change specimen source to  \"Line\" if specified by physician order)  No site indicated    Pneumocystis DFA [[id number]] Collected:  08/03/20 1713    Order Status:  Canceled Specimen:  Sputum     Cryptococcal Antigen, Serum [[id number]]     Order Status:  No result Specimen:  Blood     BLOOD CULTURE [[id number]] Collected:  08/02/20 1610    Order Status:  Completed Specimen:  Blood from Peripheral Updated:  08/03/20 0838     Significant Indicator NEG     Source BLD     Site PERIPHERAL     Culture Result No Growth  Note: Blood cultures are incubated for 5 days and  are monitored continuously.Positive blood cultures  are called to the RN and reported as soon as  they are identified.      Narrative:       [id number] SUELLEN LOZANO  Per Hospital Policy: Only change Specimen Src: to \"Line\" if  specified by physician order.  Left Forearm/Arm    BLOOD CULTURE [[id number]] Collected:  08/02/20 1545    Order Status:  Completed Specimen:  Blood from Peripheral Updated:  08/03/20 0838     Significant Indicator NEG     Source BLD     Site PERIPHERAL     Culture Result No Growth  Note: Blood cultures are incubated for 5 days and  are monitored continuously.Positive blood cultures  are called to the RN and reported as soon as  they are identified.      Narrative:       [id number] SUELLEN LOZANO  Per Hospital Policy: Only change Specimen Src: to \"Line\" if  specified by physician order.  Right Forearm/Arm    BLOOD CULTURE [[id number]]     Order Status:  Canceled Specimen:  Blood from Peripheral     BLOOD CULTURE [[id number]]     Order Status:  Canceled Specimen:  Blood from Peripheral     MRSA By PCR (Amp) [[id number]] Collected:  08/01/20 0315    Order Status:  Completed Specimen:  Respirate from Nares Updated:  08/02/20 1026     Significant Indicator NEG     Source RESP     Site NARES     MRSA PCR Negative for MRSA by PCR.    Narrative:       " Collected By:[id number] SIDNEY PATEL  Collected By:[id number] SIDNEY PATEL    GRAM STAIN [[id number]] Collected:  08/01/20 0825    Order Status:  Completed Specimen:  Respirate Updated:  08/01/20 1606     Significant Indicator .     Source RESP     Site Sputum     Gram Stain Result Sputum Gram stain quality score is <1, probable  oropharyngeal contamination. Culture not performed.  Recollect if clinically indicated.      Narrative:       Collected By:[id number] GEOVANYVANCLJERMAINE SHOOK R  Collected By:[id number] GEOVANYVANCLJERMAINE SHOOK R    CULTURE RESPIRATORY W/ GRM STN [[id number]] Collected:  08/01/20 0825    Order Status:  Completed Specimen:  Sputum Updated:  08/01/20 0952    Narrative:       Collected By:[id number] GEOVANYVANCLJERMAINE SHOOK R    CULTURE RESP W/O GRAM STAIN [[id number]] Collected:  08/01/20 0825    Order Status:  Canceled Specimen:  Sputum     CULTURE RESPIRATORY W/ GRM STN [[id number]]     Order Status:  Canceled Specimen:  Respirate from Sputum     Urine Culture (New) (Sensitivity) [[id number]] Collected:  07/29/20 1450    Order Status:  Completed Specimen:  Urine, Clean Catch Updated:  07/31/20 0749     Significant Indicator NEG     Source UR     Site URINE, CLEAN CATCH     Culture Result No growth at 48 hours.    Narrative:       Indication for culture:->Kidney transplant recipient,  Neutropenic patient, after urologic procedure/surgery or  Urinary tract obstruction with fever >100.4F  Indication for culture:->Kidney transplant recipient,    CULTURE RESPIRATORY W/ GRM STN [[id number]] Collected:  07/31/20 0000    Order Status:  Canceled Specimen:  Sputum     SARS-CoV-2, PCR (In-House) [[id number]] Collected:  07/29/20 1650    Order Status:  Completed Updated:  07/29/20 1800     SARS-CoV-2 Source NP Swab     SARS-CoV-2 by PCR NotDetected     Comment: [facility] providers: PLEASE REFER TO DE-ESCALATION AND RETESTING PROTOCOL  on [email or web address]  **The [employer] GeneXpert Xpress SARS-CoV-2 Test has been made available for  use under  the Emergency Use Authorization (EUA) only.         Narrative:       Is patient being admitted?->Yes  Does this patient meet criteria for Rush/Cepheid per [facility]  Inpatient Workflow? (See workflow link below)->Yes  Expected turn around time?->Rush (Cepheid 2-4 hours)    Routine (COVID/SARS COV-2 In-House PCR up to 24 hours) [[id number]] Collected:  07/29/20 1650    Order Status:  Completed Specimen:  Respirate from Nasopharyngeal Updated:  07/29/20 1656     COVID Order Status Received     Comment: The order for SARS CoV-2 testing has been received by the  Laboratory. This result is neither positive nor negative.  Final results of testing will report in 24-48 hours, separately.         Narrative:       Is patient being admitted?->Yes  Does this patient meet criteria for Rush/Cepheid per [facility] Workflow? (See workflow link below)->Yes  Expected turn around time?->Rush (Cepheid 2-4 hours)    Urinalysis [[id number]]     Order Status:  Canceled Specimen:  Urine     URINALYSIS [[id number]]  (Abnormal) Collected:  07/29/20 1450    Order Status:  Completed Specimen:  Urine, Clean Catch Updated:  07/29/20 1522     Color Yellow     Character Clear     Specific Gravity <=1.005     Ph 7.0     Glucose Negative mg/dL      Ketones Negative mg/dL      Protein Negative mg/dL      Bilirubin Negative     Urobilinogen, Urine 0.2     Nitrite Negative     Leukocyte Esterase Negative     Occult Blood Small     Micro Urine Req Microscopic          Assessment:  Active Hospital Problems    Diagnosis   • *Sepsis ([facility]) [A41.9]   • Nosocomial pneumonia [J18.9, Y95]   • Acute hypoxemic respiratory failure ([facility]) [J96.01]   • SVT (supraventricular tachycardia) ([facility]) [I47.1]   • Pancytopenia ([facility]) [D61.818]   • Pulmonary hypertension ([facility]) [I27.20]   • Neutropenic fever ([facility]) [D70.9, R50.81]   • CLL (chronic lymphocytic leukemia) ([facility]) [C91.10]   • Hyponatremia [E87.1]   • Hypokalemia [E87.6]     Interval 24 hours:      102.7, O2 4 L NC    Imaging reviewed  Labs reviewed  Micro reviewed    She underwent bronchoscopy today.  Patient continued on broad-spectrum antibiotics as below.        ASSESSMENT/PLAN:      54 y.o.  admitted 7/29/2020. Pt has a past medical history of CLL on chemotherapy with ibrutinib.  She has been profoundly neutropenic since at least May 2020.  She was recently admitted for neutropenic fever as well as SVT and required pressors and IV antibiotics.  Cultures at the time were negative.       She is now re-admitted with fevers and left-sided pneumonia.  Per patient she had a cough for approximately 3 days prior to admission which was productive with blood-tinged sputum.   She also reports some history of sinus pain as well as headaches which have been increased of late.  Due to worsening fever and tachycardia she was transferred to the ICU on 8/2.  She was febrile on admission until 7/31 and then improvement with recurrence of fevers on 8/2 to 102.  She is initially on room air and then increased to 5 L oxygen mask  is now on 3 L nasal cannula.  She been treated with cefepime, vancomycin and doxycycline since arrival.  She was continued on her prophylactic acyclovir and fluconazole.  ID transitioned fluconazole to amphotericin on 8/2.      Problem List   Sepsis, secondary to pneumonia  Left lower lobe and right peritracheal area consolidation, necrotic pneumonia versus pulmonary mass with necrosis   -Procalcitonin 6.99 on 8/1  -Quant gold and cocci both negative and 5/2020  -Due to her prolonged neutropenia she is at risk for viral infections, common and atypical bacterial infection and invasive fungal infections with aspergillosis would be the most common.  She is also at risk for pneumocystis.  -COVID tested and negative on 7/31  -Will not pursue histo or blasto testing as no exposure risk  -Beta D glucan > 500 5/2020  -CT chest on 8/3 with left lower lobe consolidation thought to represent necrotic pneumonia versus pulmonary  mass with necrosis.  Right peritracheal area of consolidation with central low-density also could represent necrotic pneumonia versus pulmonary mass with necrosis, scattered GGO bilateral pulmonary edema or infiltrates    Headaches, frontal and sinus pain-ongoing     Neutropenic fever   -Profoundly neutropenic for several months-she has been on prophylaxis with acyclovir, 500 mg levofloxacin daily and low dose (100 mg)of fluconazole daily   -Blood cultures negative on 7/29 & 8/2   -Urine culture negative on 7/29  -MRSA nares negative     CLL  Pancytopenia secondary to above  Immunosuppressed, on chemotherapy with ibrutinib  Antibiotic allergies: Amoxicillin reported as hives, tolerate cephalosporins, sulfa reported as hives     Plan      ---  Continue posaconazole-this will provide broad spectrum antifungal coverage including for aspergillosis with less risk for side effects, patient is stable from respiratory standpoint  --- The patient has been on vancomycin since admit and recurrence of fevers on vancomycin so not thought to be due to lack of MRSA coverage, nares are negative so vancomycin was stopped.  Will follow cultures.  --- Continue cefepime for now and will add Flagyl for anaerobic coverage due to CT revealing considering the necrotic mass  --- Continue prophylactic acyclovir  ---  Follow-up sputum cultures from 8/3 and bronchoscopy -bacterial, fungal, AFB and pneumocystis DFA as well as cytology-due to thrombocytopenia unable to get biopsy of the mass but if cultures/BAL are unrevealing this would be helpful in the future  --- Repeat pro calcitonin - pending   --- Follow-up blood cultures from 8/2  --- Follow-up beta D glucan and aspergillosis galactomannan-in process  --- Recommend CT head with sinus imaging  if she continues to report ongoing headaches and history of sinus pain  --- Will repeat cocci screen -no recent travel the patient has lived in [place] and [place] -pending  --- Repeat TB  quant, patient is from the [facility]-pending  --- Cryptococcus screen-ordered-not yet collected         Plan of care discussed with Dr. Tang. Will continue to follow.

## 2020-08-04 NOTE — PROGRESS NOTES
Late entry:     1100: Attempted to draw blood from patient, only able to obtain enough blood for few labs, which were sent as ordered.  More lab orders from MD obtained.  Phlebotomy called to draw labs, state they will come as soon as possible.     1500: Phlebotomy still has not seen patient.  Ultrasound guided IV obtained but not able to draw blood from original IV.  Phlebotomy paged again to draw Patient's labs, including quantaferon gold test, only able to be drawn by lab.

## 2020-08-04 NOTE — PROGRESS NOTES
Dr Tang and RT bedside for bronchoscopy at 1048. Patient consented, timeout completed at 1048. All agree. Patient medicated per MAR. VSS throughout procedure. Patient tolerated well.

## 2020-08-04 NOTE — PROGRESS NOTES
Critical Care Progress Note    Date of admission  7/29/2020    Chief Complaint  54 y.o. female admitted 7/29/2020 with SVT, CLL, pancytopenia     Hospital Course    54 y.o. female with a history of CLL on chemotherapy followed by Dr. Summers with a history of SVT and admission last month for sepsis versus transfusion reaction or both during which she required pressors and IV antibiotics but ended up culture negative who presented 7/29/2020 with fever at the infusion center was admitted with possible UTI but subsequently has blossomed a juicy left-sided pneumonia.  Today she developed worsening fever and tachycardia felt to possibly have SVT again she appeared in distress for this and was transferred to the CICU rapidly.  On arrival her heart rate improved and she is in sinus tach in the 130s but her temperature is 102.  Blood pressures been acceptable throughout.  Bicarb level this a.m. down a little bit and lactic acid levels not been obtained.  Cultures are negative so far and MRSA nares came back negative.  She has been on broad-spectrum antibiotics including doxycycline, cefepime, Diflucan and acyclovir with only cefepime IV and vancomycin had been on board.  He has no history of pneumonia.  She has history of asthma when she was young and lived in the [facility] but she is not had any symptoms or required any treatment in recent years in the [place].  She has no exposures to anyone sick lives only with her  and they are actually lately been sleeping in separate bedrooms because of her illness.  She is a non-smoker and since her illness she is not received any vaccines.  Echocardiogram done late last night did not reveal a pericardial effusion, there was mild/moderate pulmonary pretension with RVSP 40, no significant valvular heart disease was identified, LV function was normal with an estimated EF of 70%.    8/4 -bronchoscopy showed some extrinsic compression of left lower lobe but no clear endobronchial  lesion.  BAL sent for routine culture, AFB, fungal, PCP and galactomannan Ag.  Remains febrile, reviewed with ID, continue broad-spectrum antimicrobial therapy with cefepime and posaconazole with prophylactic acyclovir.  WBC 0.3, hemoglobin 7.3, platelets 21.  Replacing potassium.  4 L nasal cannula and not in distress.          Interval Problem Update  Reviewed last 24 hour events:  A/o x 4  SR/ST   - 150  Tm = 39.3  NPO for bronch today  I/O = 5.5/2.7  PIVs  No jose; SBA to bathroom  4 lpm Nc  CT chest with left lower lobe necrotic pneumonia versus mass lesion and right upper lobe paratracheal mass.  Ppi,   ACV  Cefepime day 6, Posaconazole  Replace K  Stop IVF     Bronchoscopy today with extrinsic compression of lateral basal segment of the left lower lobe.  No clear endobronchial lesion.  BAL left lower lobe sent for culture, cytology      Review of Systems  Review of Systems   Unable to perform ROS: Acuity of condition        Vital Signs for last 24 hours   Temp:  [36.6 °C (97.8 °F)-39.3 °C (102.7 °F)] 37.9 °C (100.2 °F)  Pulse:  [] 136  Resp:  [19-47] 20  BP: (116-153)/(49-96) 146/68  SpO2:  [89 %-98 %] 93 %    Hemodynamic parameters for last 24 hours       Respiratory Information for the last 24 hours       Physical Exam   Physical Exam  Vitals signs and nursing note reviewed.   Constitutional:       Appearance: She is underweight. She is ill-appearing. She is not toxic-appearing.      Interventions: Face mask in place.   HENT:      Head: Atraumatic.      Nose: No congestion.      Mouth/Throat:      Mouth: Mucous membranes are dry.      Pharynx: No oropharyngeal exudate or posterior oropharyngeal erythema.   Eyes:      General: No scleral icterus.     Extraocular Movements: Extraocular movements intact.      Pupils: Pupils are equal, round, and reactive to light.   Neck:      Musculoskeletal: Neck supple. No neck rigidity.   Cardiovascular:      Rate and Rhythm: Regular rhythm. Tachycardia  present.  No extrasystoles are present.     Pulses: Normal pulses.      Heart sounds: No murmur. No friction rub. Gallop present.       Comments: Sinus tachycardia  Pulmonary:      Effort: No respiratory distress.      Breath sounds: No stridor. Rhonchi and rales present. No wheezing.   Abdominal:      General: Abdomen is flat. Bowel sounds are normal. There is no distension.      Palpations: Abdomen is soft. There is no mass.      Tenderness: There is no abdominal tenderness. There is no left CVA tenderness or guarding.   Musculoskeletal:      Right lower leg: Pitting Edema (trace) present.      Left lower leg: Pitting Edema (trace) present.   Lymphadenopathy:      Cervical: No cervical adenopathy.   Skin:     General: Skin is warm and dry.      Capillary Refill: Capillary refill takes less than 2 seconds.   Neurological:      General: No focal deficit present.      Mental Status: She is alert and oriented to person, place, and time. Mental status is at baseline.   Psychiatric:         Mood and Affect: Mood normal.         Behavior: Behavior normal. Behavior is cooperative.         Thought Content: Thought content normal.         Medications  Current Facility-Administered Medications   Medication Dose Route Frequency Provider Last Rate Last Dose   • potassium chloride SA (Kdur) tablet 40 mEq  40 mEq Oral Once Vikas Tang M.D.       • MD Alert...ICU Electrolyte Replacement per Pharmacy   Other PHARMACY TO DOSE Vikas Tang M.D.       • posaconazole (NOXAFIL) suspension 400 mg  400 mg Oral BID WITH MEALS Ingris Corral M.D.   400 mg at 08/04/20 0759   • ipratropium-albuterol (DUONEB) nebulizer solution  3 mL Nebulization Q2HRS PRN (RT) Vikas Tang M.D.       • guaiFENesin (ROBITUSSIN) 100 MG/5ML solution 200 mg  10 mL Oral Q4HRS PRN Paul Mario M.D.   200 mg at 08/04/20 0540   • magnesium oxide (MAG-OX) tablet 400 mg  400 mg Oral DAILY Paul Mario M.D.   400 mg at 08/04/20 0540   • ipratropium  (ATROVENT) 0.02 % nebulizer solution 0.5 mg  0.5 mg Nebulization BID (RT) Paul Mario M.D.   0.5 mg at 08/03/20 1908   • 0.9 % NaCl with KCl 40 mEq 1,000 mL   Intravenous Continuous Paul Mario M.D. 100 mL/hr at 08/04/20 0035     • omeprazole (PRILOSEC) capsule 20 mg  20 mg Oral BID Paul Mario M.D.   20 mg at 08/04/20 0541   • acyclovir (ZOVIRAX) tablet 400 mg  400 mg Oral BID ELMER MontesOMarley   400 mg at 08/04/20 0541   • amLODIPine (NORVASC) tablet 10 mg  10 mg Oral DAILY ELMER MontesOMarley   10 mg at 08/04/20 0541   • folic acid (FOLVITE) tablet 1 mg  1 mg Oral DAILY ELMER MontesOMarley   1 mg at 08/04/20 0540   • senna-docusate (PERICOLACE or SENOKOT S) 8.6-50 MG per tablet 2 Tab  2 Tab Oral BID Seun Shetty D.O.   Stopped at 08/04/20 0600    And   • polyethylene glycol/lytes (MIRALAX) PACKET 1 Packet  1 Packet Oral QDAY PRN Seun Shetty D.O.        And   • magnesium hydroxide (MILK OF MAGNESIA) suspension 30 mL  30 mL Oral QDAY PRN Seun Shetty D.O.        And   • bisacodyl (DULCOLAX) suppository 10 mg  10 mg Rectal QDAY PRN Seun Shetty D.O.       • lactated ringers infusion (BOLUS): BMI less than or equal to 30  30 mL/kg Intravenous Once PRN Seun Shetty D.O.       • acetaminophen (TYLENOL) tablet 650 mg  650 mg Oral Q6HRS PRN Seun Shetty D.O.   650 mg at 08/04/20 0758   • Pharmacy Consult Request ...Pain Management Review 1 Each  1 Each Other PHARMACY TO DOSE Seun Shetty D.O.        And   • oxyCODONE immediate-release (ROXICODONE) tablet 2.5 mg  2.5 mg Oral Q3HRS PRN ELMER MontesOMarley   2.5 mg at 08/03/20 1659    And   • oxyCODONE immediate-release (ROXICODONE) tablet 5 mg  5 mg Oral Q3HRS PRN Seun Shetty D.O.        And   • morphine (pf) 4 MG/ML injection 2 mg  2 mg Intravenous Q3HRS PRN Seun Shetty D.O.       • cefepime (MAXIPIME) 2 g in  mL IVPB  2 g Intravenous Q8HRS Seun Shetty D.O.   Stopped at 08/04/20 0104   • enalaprilat (VASOTEC) injection  1.25 mg  1.25 mg Intravenous Q6HRS PRN ELMER MontesO.       • ondansetron (ZOFRAN) syringe/vial injection 4 mg  4 mg Intravenous Q4HRS PRN ELMER MontesO.       • ondansetron (ZOFRAN ODT) dispertab 4 mg  4 mg Oral Q4HRS PRN ELMER MontesO.       • promethazine (PHENERGAN) suppository 12.5-25 mg  12.5-25 mg Rectal Q4HRS PRN ELMER MontesO.       • prochlorperazine (COMPAZINE) injection 5-10 mg  5-10 mg Intravenous Q4HRS PRN Seun Shetty D.O.           Fluids    Intake/Output Summary (Last 24 hours) at 8/4/2020 0900  Last data filed at 8/4/2020 0800  Gross per 24 hour   Intake 5065 ml   Output 2450 ml   Net 2615 ml       Laboratory          Recent Labs     08/02/20 0419 08/02/20  1545 08/03/20  0545 08/04/20  0550   SODIUM 131* 133* 136 128*   POTASSIUM 3.3* 3.8 3.6 3.7   CHLORIDE 103 104 105 100   CO2 16* 17* 17* 19*   BUN 5* 6* 7* 6*   CREATININE 0.33* 0.49* 0.36* 0.32*   MAGNESIUM 1.7 1.6 2.1  --    PHOSPHORUS  --   --  2.7  --    CALCIUM 8.0* 8.1* 7.9* 8.2*     Recent Labs     08/02/20  1545 08/03/20  0545 08/04/20  0550   ALTSGPT  --  25  --    ASTSGOT  --  10*  --    ALKPHOSPHAT  --  180*  --    TBILIRUBIN  --  1.2  --    GLUCOSE 122* 169* 107*     Recent Labs     08/02/20  0419 08/03/20  0545 08/04/20  0550   WBC 0.1* 0.2* 0.3*   NEUTSPOLYS  --   --  CANCEL   LYMPHOCYTES  --   --  CANCEL   MONOCYTES  --   --  CANCEL   EOSINOPHILS  --   --  CANCEL   BASOPHILS  --   --  CANCEL   ASTSGOT  --  10*  --    ALTSGPT  --  25  --    ALKPHOSPHAT  --  180*  --    TBILIRUBIN  --  1.2  --      Recent Labs     08/02/20  0419 08/03/20  0545 08/03/20  1100 08/03/20  2149 08/04/20  0550   RBC 2.75* 2.83*  --   --  2.65*   HEMOGLOBIN 7.7* 7.7*  --   --  7.3*   HEMATOCRIT 23.8* 23.1*  --   --  21.3*   PLATELETCT 16* 21*  --   --  21*   PROTHROMBTM  --   --   --  21.0*  --    APTT  --   --   --  45.9*  --    INR  --   --   --  1.76*  --    FERRITIN  --   --  12628.0*  --   --        Imaging  X-Ray:  I have  personally reviewed the images and compared with prior images.    Assessment/Plan  * Sepsis (HCC)  Assessment & Plan  This is Sepsis Present on admission  SIRS criteria identified on my evaluation include: Fever, with temperature greater than 101 deg F, Tachycardia, with heart rate greater than 90 BPM, Tachypnea, with respirations greater than 20 per minute and Leukopenia, with WBC less than 4,000  Source is lung  Sepsis protocol initiated on admit, ongoing  Fluid resuscitation ordered per protocol, may need further fluid bolus  IV antibiotics as appropriate for source of sepsis  While organ dysfunction may be noted elsewhere in this problem list or in the chart, degree of organ dysfunction does not meet CMS criteria for severe sepsis    Cultures pending, will repeat blood cultures now that she arrived in the ICU  Broad-spectrum antibiotics and going, consult ID for further reassessment  Repeat lactic acid level, may need fluid bolus, lab pending  Monitor for the need for CVC and intravenous pressors  Volume status assessed, appears on the dry side will administer fluids  Reassess with bedside ultrasound as needed, echocardiogram from late last night noted      Nosocomial pneumonia  Assessment & Plan  Recently hospitalized for possible sepsis/transfusion reaction  Appropriate broad-spectrum antibiotics initiated admitted to the medical floor for possible UTI initially  Dense large left-sided pneumonia with slight worsening by x-ray and oxygen requirements  Monitor for complication with daily x-ray, no significant pleural effusion present yet  Low threshold to obtain CT scan chest to evaluate for complicated pneumonia  ID consultation to help manage antibiotics long-term, she may need a prolonged course with her pancytopenia  If patient fails and requires intubation mechanical elation diagnostic bronchoscopy with BAL can be obtained, probably would need intubation to perform FOB now  Prolonged neutropenia, query  fungal infection  Obtain serum Beta-d-glucan and galactomannan levels, fungal cultures and markers can be sent from BAL if FOB performed    Acute hypoxemic respiratory failure (HCC)  Assessment & Plan  RT protocols  Fortunately oxygenating on 4 L nasal cannula oxygen mask despite dense left-sided pneumonia  ABG PRN, bicarb down, suspect met acidosis  Never smoker  History of asthma when she was young but nothing as an adult  Xopenex treatments as needed, this agent over albuterol secondary to her tachycardia  Monitor for the need to transition to high flow nasal cannula or intubation mechanical ventilation  Alternatively with immune compromised state and pancytopenia consider noninvasive ventilation to keep her off the ventilator and the associated risk with an indwelling ET tube  Serial chest x-ray  Daily reassessment of fluid status    SVT (supraventricular tachycardia) (HCC)- (present on admission)  Assessment & Plan  History of SVT  Recurrent SVT today probably related to fever/sepsis with heart rate in the 160s per rapid response nurse  Sinus tach in 130s on arrival to [facility]  Cooling measures  Optimize electrolytes  Hemodynamically acceptable at this time, trial adenosine/vagal maneuvers/amiodarone bolus initially  Cardioversion for unstable SVT    Pancytopenia (HCC)- (present on admission)  Assessment & Plan  Secondary to CLL and chemotherapy  Hold chemotherapy  Transfuse per protocols  Serial CBC  Consult heme-onc in a.m.    Pulmonary hypertension (HCC)  Assessment & Plan  RVSP 40 on echo 8/1  Presumably secondary to pneumonic process, not insignificant right heart failure at this time  LV function excellent with EF 70%  Monitor    CLL (chronic lymphocytic leukemia) (HCC)- (present on admission)  Assessment & Plan  Diagnosed approximately 3 months ago per patient  Hold chemotherapy  Consult heme-onc in a.m.    Neutropenic fever (HCC)- (present on admission)  Assessment & Plan  Pancytopenia secondary to CLL and  chemotherapy  Protective isolation for severe neutropenia  Query oncology with her consultation about history and G-CSF agent  Cooling measures    Hypokalemia- (present on admission)  Assessment & Plan  Replete, goal greater than 4.0, ERP    Hyponatremia- (present on admission)  Assessment & Plan  Secondary to above, serial BMP, avoid hypotonic fluids for now     Updated plan:  Elective bronchoscopy today performed with some extrinsic compression of lateral basal segment of left lower lobe but no clear endobronchial lesion.  BAL left lower lobe sent for culture and cytology as above  Continue broad-spectrum antimicrobial therapy, reviewed with ID  Respiratory status improved but monitor closely post moderate sedation as increased risk for deterioration and need for intubation.  Patient expressed to RN that she did not want to continue therapy.  She is tired of being poked for lab draws and IVs.  I will request PICC line and did discuss further with patient.  Will obtain palliative care consult as well.        VTE:  Contraindicated  Ulcer: PPI  Lines: None    I have performed a physical exam and reviewed and updated ROS and Plan today (8/4/2020). In review of yesterday's note (8/3/2020), there are no changes except as documented above.     Discussed patient condition and risk of morbidity and/or mortality with RN, RT, Pharmacy and QA team  The patient remains critically ill.  She is at high risk for further vital organ deterioration and may require intubation.  Monitor closely in ICU.  Critical care time = 33 minutes in directly providing and coordinating critical care and extensive data review.  No time overlap and excludes procedures.

## 2020-08-04 NOTE — PROCEDURES
"Date of Service: 8/4/2020    Procedure:  Diagnostic bronchoscopy with BAL    Indication: CLL, neutropenic fever, ? pneumonia    Physician:  Dr. Vikas Tang MD    Post Procedure Diagnosis:  1.  Mildly inflamed airways  2.  Extrinsic compression left lower lobe lateral basal airway segment  3.  No significant purulence noted  4.  BAL x60 cc left lower lobe sent for culture, AFB, fungal, PCP and galactomannan    Narrative:  Appropriate consent was obtained and \"time out\" was performed.  A flexible fiberoptic bronchoscope was then inserted through the ETT without difficulty.  All airways were evaluated to the sub-segmental level.  The airway mucosa was mildly inflamed.  Right endobronchial anatomy was fairly normal.  The left upper lobe and lingula are patent.  In the left lower lobe the lateral basal segment appeared extrinsically compressed with only minimal opening.  There was no significant purulence or evidence of active bleeding.  The airway mucosa was mildly friable and did have some bleeding after suctioning.  No clear endobronchial lesions identified. The bronchoscope was then wedged in a segment of the left lower lobe bronchus.  60 cc of saline was instilled with moderate return of mildly blood-tinged, then BAL fluid.  The BAL specimen will be sent for appropriate culture.  No immediate complications.  EBL = 0.      Vikas Tang M.D.        "

## 2020-08-05 NOTE — CARE PLAN
Problem: Communication  Goal: The ability to communicate needs accurately and effectively will improve  Outcome: PROGRESSING AS EXPECTED  Note: Patient very pleasant, A&Ox4. Educated on use of call light and patient was receptive of teaching. Encouraged to voice any questions/concerns and she had none at this time. Call light in reach, patient resting comfortably.      Problem: Infection  Goal: Will remain free from infection  Outcome: PROGRESSING AS EXPECTED  Note: ID following, IV antibiotics in MAR. Patient on reverse isolation for neutropenia. Febrile, PRN tylenol in MAR.

## 2020-08-05 NOTE — PROGRESS NOTES
Oncology/Hematology Progress Note               Author: Tyler Mcelroy M.D. Date & Time created: 8/5/2020  12:29 PM   Diagnosis-CLL  Pneumonia/respiratory failure  Pancytopenia  History of thalassemia  Interval History:  Remains short of breath.  Infectious disease fu appreciated.  Patient started on back on amphotericin.  Continues to be on acyclovir.  Has a slight cough.  No hemoptysis.  Still febrile    Review of Systems:  Review of Systems   Constitutional: Positive for diaphoresis, fever and malaise/fatigue.   HENT: Negative for hearing loss.    Respiratory: Positive for cough, sputum production and shortness of breath. Negative for stridor.    Cardiovascular: Negative for chest pain, palpitations and leg swelling.   Genitourinary: Negative for dysuria and hematuria.   Musculoskeletal: Negative for back pain, myalgias and neck pain.   Skin: Negative for itching and rash.   Neurological: Negative for dizziness, tingling, tremors and headaches.   Psychiatric/Behavioral: Negative for depression. The patient is nervous/anxious.        Physical Exam:  Physical Exam  Constitutional:       Appearance: Normal appearance. She is toxic-appearing.   HENT:      Mouth/Throat:      Mouth: Mucous membranes are moist.      Pharynx: No oropharyngeal exudate or posterior oropharyngeal erythema.   Pulmonary:      Effort: Respiratory distress present.      Breath sounds: No wheezing or rhonchi.   Abdominal:      General: Abdomen is flat. Bowel sounds are normal. There is no distension.      Palpations: Abdomen is soft. There is no mass.      Tenderness: There is no abdominal tenderness. There is no guarding.   Skin:     Coloration: Skin is not jaundiced.      Findings: Bruising present. No lesion.   Neurological:      General: No focal deficit present.      Mental Status: She is alert and oriented to person, place, and time.         Labs:          Recent Labs     08/02/20  1545 08/03/20  0545 08/04/20  0550 08/05/20  0230   SODIUM  133* 136 128* 132*   POTASSIUM 3.8 3.6 3.7 3.2*   CHLORIDE 104 105 100 100   CO2 17* 17* 19* 17*   BUN 6* 7* 6* 10   CREATININE 0.49* 0.36* 0.32* 0.51   MAGNESIUM 1.6 2.1  --   --    PHOSPHORUS  --  2.7  --  2.6   CALCIUM 8.1* 7.9* 8.2* 8.1*     Recent Labs     20  0550 20  0230   ALTSGPT -     ASTSGOT 10*  --  15   ALKPHOSPHAT 180*  --  245*   TBILIRUBIN 1.2  --  1.6*   DBILIRUBIN  --   --  0.9*   GLUCOSE 169* 107* 122*     Recent Labs     08/03/20  0545 08/03/20  1100 20  2149 20  0550 20  0845   RBC 2.83*  --   --  2.65* 2.89*   HEMOGLOBIN 7.7*  --   --  7.3* 7.8*   HEMATOCRIT 23.1*  --   --  21.3* 23.6*   PLATELETCT 21*  --   --  21* 9*   PROTHROMBTM  --   --  21.0*  --   --    APTT  --   --  45.9*  --   --    INR  --   --  1.76*  --   --    FERRITIN  --  78622.0*  --   --   --       Labs     20  0550 20  0230 20  0845   WBC 0.2* 0.3*  --  0.3*   NEUTSPOLYS  --  CANCEL  --  CANCEL   LYMPHOCYTES  --  CANCEL  --  CANCEL   MONOCYTES  --  CANCEL  --  CANCEL   EOSINOPHILS  --  CANCEL  --  CANCEL   BASOPHILS  --  CANCEL  --  CANCEL   ASTSGOT 10*  --  15  --    ALTSGPT   --    ALKPHOSPHAT 180*  --  245*  --    TBILIRUBIN 1.2  --  1.6*  --      Recent Labs     20  0550 20  0230   SODIUM 136 128* 132*   POTASSIUM 3.6 3.7 3.2*   CHLORIDE 105 100 100   CO2 17* 19* 17*   GLUCOSE 169* 107* 122*   BUN 7* 6* 10   CREATININE 0.36* 0.32* 0.51   CALCIUM 7.9* 8.2* 8.1*     Hemodynamics:  Temp (24hrs), Av.6 °C (101.5 °F), Min:38 °C (100.4 °F), Max:39.9 °C (103.9 °F)  Temperature: (!) 39.9 °C (103.9 °F)  Pulse  Av.7  Min: 64  Max: 184   Blood Pressure: 123/50     Respiratory:    Respiration: (!) 30, Pulse Oximetry: 94 %     Given By:: Mask, Work Of Breathing / Effort: Mild  RUL Breath Sounds: Clear, RML Breath Sounds: Diminished, RLL Breath Sounds: Diminished, LEON Breath Sounds: Clear, LLL Breath  Sounds: Diminished  Fluids:    Intake/Output Summary (Last 24 hours) at 8/4/2020 1053  Last data filed at 8/4/2020 0800  Gross per 24 hour   Intake 5065 ml   Output 2450 ml   Net 2615 ml     Weight: 47 kg (103 lb 9.9 oz)  GI/Nutrition:  Orders Placed This Encounter   Procedures   • Diet Order Regular     Standing Status:   Standing     Number of Occurrences:   1     Order Specific Question:   Diet:     Answer:   Regular [1]     Medical Decision Making, by Problem:  Active Hospital Problems    Diagnosis   • *Sepsis (HCC) [A41.9]   • Nosocomial pneumonia [J18.9, Y95]   • Acute hypoxemic respiratory failure (HCC) [J96.01]   • SVT (supraventricular tachycardia) (HCC) [I47.1]   • Pancytopenia (HCC) [D61.818]   • Pulmonary hypertension (HCC) [I27.20]   • Neutropenic fever (HCC) [D70.9, R50.81]   • CLL (chronic lymphocytic leukemia) (HCC) [C91.10]   • Hyponatremia [E87.1]   • Hypokalemia [E87.6]       Plan:  CLL-ibrutinib is on hold.  Patient is pancytopenic.  No rash  Pneumonia-ID fu appreciated.   On amphotericin and posaconazole was discontinued.  Vancomycin was discontinued.  Continue acyclovir.  Pancytopenia-transfuse as needed.  Overall the patient is critically sick.  Prognosis is very guarded.    Quality-Core Measures

## 2020-08-05 NOTE — PROGRESS NOTES
Infectious Disease Progress Note    Author: Ingris Corral M.D. Date & Time of service: 2020  7:57 AM    Chief Complaint:  Neutropenic fever, pneumonia     Interval History:   102.7, O2 4 L NC, underwent bronchoscopy today.      Review of Systems:  Review of Systems   Constitutional: Positive for fever and malaise/fatigue.   Respiratory: Positive for shortness of breath. Negative for cough and sputum production.    Gastrointestinal: Negative for abdominal pain, constipation, diarrhea, nausea and vomiting.       Hemodynamics:  Temp (24hrs), Av.1 °C (100.5 °F), Min:37.9 °C (100.2 °F), Max:38.4 °C (101.2 °F)  Temperature: 38 °C (100.4 °F)  Pulse  Av.4  Min: 64  Max: 184   Blood Pressure: 132/56       Physical Exam:  Physical Exam  Constitutional:       Appearance: Normal appearance.   Cardiovascular:      Rate and Rhythm: Regular rhythm. Tachycardia present.   Pulmonary:      Effort: Pulmonary effort is normal.      Comments: Crackles and decreased breath sounds, worse on left  Abdominal:      General: Abdomen is flat. Bowel sounds are normal.      Palpations: Abdomen is soft.   Musculoskeletal:      Right lower leg: No edema.      Left lower leg: No edema.   Skin:     General: Skin is warm and dry.   Neurological:      General: No focal deficit present.      Mental Status: She is alert and oriented to person, place, and time.         Meds:    Current Facility-Administered Medications:   •  metroNIDAZOLE  •  MD Alert...Adult ICU Electrolyte Replacement per Pharmacy  •  posaconazole  •  ipratropium-albuterol  •  guaiFENesin  •  magnesium oxide  •  ipratropium  •  omeprazole  •  acyclovir  •  amLODIPine  •  folic acid  •  senna-docusate **AND** polyethylene glycol/lytes **AND** magnesium hydroxide **AND** bisacodyl  •  LR  •  acetaminophen  •  Notify provider if pain remains uncontrolled **AND** Use the numeric rating scale (NRS-11) on regular floors and Critical-Care Pain Observation Tool (CPOT)  on ICUs/Trauma to assess pain **AND** Pulse Ox (Oximetry) **AND** Pharmacy Consult Request **AND** If patient difficult to arouse and/or has respiratory depression, stop any opiates that are currently infusing and call a Rapid Response. **AND** oxyCODONE immediate-release **AND** oxyCODONE immediate-release **AND** morphine injection  •  cefepime  •  enalaprilat  •  ondansetron  •  ondansetron  •  promethazine  •  prochlorperazine    Labs:  Recent Labs     08/03/20  0545 08/04/20  0550   WBC 0.2* 0.3*   RBC 2.83* 2.65*   HEMOGLOBIN 7.7* 7.3*   HEMATOCRIT 23.1* 21.3*   MCV 81.6 80.4*   MCH 27.2 27.5   RDW 41.5 41.5   PLATELETCT 21* 21*   MPV 9.7 11.5   NEUTSPOLYS  --  CANCEL   LYMPHOCYTES  --  CANCEL   MONOCYTES  --  CANCEL   EOSINOPHILS  --  CANCEL   BASOPHILS  --  CANCEL     Recent Labs     08/03/20  0545 08/04/20  0550 08/05/20  0230   SODIUM 136 128* 132*   POTASSIUM 3.6 3.7 3.2*   CHLORIDE 105 100 100   CO2 17* 19* 17*   GLUCOSE 169* 107* 122*   BUN 7* 6* 10     Recent Labs     08/03/20  0545 08/04/20  0550 08/05/20  0230   ALBUMIN 2.3*  --   --    TBILIRUBIN 1.2  --   --    ALKPHOSPHAT 180*  --   --    TOTPROTEIN 5.6*  --   --    ALTSGPT 25  --   --    ASTSGOT 10*  --   --    CREATININE 0.36* 0.32* 0.51       Imaging:  Ct-chest (thorax) With    Result Date: 8/4/2020  8/3/2020 8:26 PM HISTORY/REASON FOR EXAM:  Chest pain or SOB, pleurisy or effusion suspected; Pneumonia. TECHNIQUE/EXAM DESCRIPTION:  CT scan of the chest with contrast. Thin-section helical images were obtained from the lung apices through the adrenal glands following the bolus administration of contrast. 50 mL of Omnipaque 350 nonionic contrast was utilized. Low dose optimization technique was utilized for this CT exam including automated exposure control and adjustment of the mA and/or kV according to patient size. COMPARISON:  None. FINDINGS: There is right paratracheal consolidation measuring 2.7 x 2.5 cm with peripheral groundglass opacity  and central low-density. There is left lower lobe consolidation measuring 9.1 x 6.0 cm with central low density. Right lower lobe hazy consolidations are  seen. There is moderate left pleural effusion. There is no mediastinal, hilar, or axillary adenopathy. Cardiomegaly is seen, otherwise the cardiac chambers, great vessels, and aorta are normal in CT appearance. There are no pleural effusions or other abnormality of the pleura. The extrathoracic soft tissues and thoracic cage are normal in appearance. The adrenal glands are normal.  The visualized portions of the spleen, pancreas, and kidneys are normal.  Hepatomegaly is observed. Postsurgical changes of cholecystectomy are seen. There is no upper abdominal adenopathy. There is no abnormal contrast enhancement.     1.  Left lower lobe consolidation with central low density, could represent necrotic pneumonia versus pulmonary mass with necrosis. 2.  Right paratracheal area of consolidation with central low-density, could represent necrotic pneumonia versus pulmonary mass with necrosis. 3.  Small left pleural effusion. 4.  Scattered groundglass bilateral pulmonary edema or infiltrates. 5.  Cardiomegaly These findings were discussed with the patient's clinician, Vikas Tang, on 8/4/2020 7:14 AM.    Dx-chest-portable (1 View)    Result Date: 8/5/2020 8/5/2020 3:17 AM HISTORY/REASON FOR EXAM: Abnormal finding of lung field TECHNIQUE/EXAM DESCRIPTION:  Single AP view of the chest. COMPARISON: Yesterday FINDINGS: Overlying cardiac leads are present. Cardiomegaly is observed. The mediastinal contour appears within normal limits.  The central pulmonary vasculature appears normal. Bilateral lung volumes are diminished.  Hazy pulmonary opacities are seen, greatest in the left midlung. No significant pleural effusions are identified. The bony structures appear age-appropriate.     1.  Diffuse pulmonary edema and/or infiltrates, stable since prior study. 2.  Focal left  midlung infiltrates, stable.    Dx-chest-portable (1 View)    Result Date: 8/4/2020 8/4/2020 3:18 AM HISTORY/REASON FOR EXAM: Abnormal finding of lung field TECHNIQUE/EXAM DESCRIPTION:  Single AP view of the chest. COMPARISON: August 2, 2020 FINDINGS: Overlying cardiac leads are present. Cardiomegaly is observed. The mediastinal contour appears within normal limits.  The central pulmonary vasculature appears normal. Bilateral lung volumes are diminished.  Hazy pulmonary opacities are seen, greatest in the left midlung. No significant pleural effusions are identified. The bony structures appear age-appropriate.     1.  Diffuse pulmonary edema and/or infiltrates, stable since prior study. 2.  Focal left midlung infiltrates, stable.    Dx-chest-portable (1 View)    Result Date: 8/2/2020 8/2/2020 11:08 AM HISTORY/REASON FOR EXAM:  Cough. Chest pain and fever. History of pneumonia. TECHNIQUE/EXAM DESCRIPTION AND NUMBER OF VIEWS: Single portable view of the chest. COMPARISON:  7/31/2020 FINDINGS: The cardiac silhouette is within normal limits. There is persistent alveolar opacity in the left upper lobe and lingula consistent with areas of pneumonia. No new opacity has developed in the left lung. No right lung consolidation is present. There is mild prominence of pulmonary interstitium in the right lung which may represent minor interstitial edema. This could represent redistribution edema. No pleural effusion is noted.     Unchanged left upper lobe and lingular pneumonia.    Dx-chest-portable (1 View)    Result Date: 7/31/2020 7/31/2020 3:13 PM HISTORY/REASON FOR EXAM:  Cough TECHNIQUE/EXAM DESCRIPTION AND NUMBER OF VIEWS: Single portable view of the chest. COMPARISON: None FINDINGS: New airspace opacity in the left lower lobe and lingula No pleural effusion. No pneumothorax. Normal cardiopericardial silhouette.     New airspace opacity in the left lower lobe and lingula, likely pneumonia.    Ir-us Guided  Piv    Result Date: 7/27/2020  EXAMINATION:                                                                    HISTORY/REASON FOR EXAM:  Ultrasound Guided PIV.  TECHNIQUE/EXAM DESCRIPTION AND NUMBER OF VIEWS:  Peripheral IV insertion with ultrasound guidance.  The procedure was prepared using maximal sterile barrier technique including sterile gown, mask, cap, and donning of sterile gloves following appropriate hand hygiene and/or sterile scrub. Patient skin site was prepped with 2% Chlorhexidine solution.   FINDINGS: Peripheral IV insertion with Ultrasound Guidance was performed by qualified imaging nursing staff without the assistance of a Radiologist.      Ultrasound-guided PERIPHERAL IV INSERTION performed by qualified nursing staff as above.    Us-ruq    Result Date: 8/1/2020 8/1/2020 6:50 AM HISTORY/REASON FOR EXAM:  Abnormal Labs Abdominal pain TECHNIQUE/EXAM DESCRIPTION AND NUMBER OF VIEWS:  Real-time sonography of the liver and biliary tree. COMPARISON: None FINDINGS: The liver is normal in contour. There is no evidence of solid mass lesion. The liver measures 11.04 cm. The gallbladder is surgically absent. The common duct measures 5.30 mm. The visualized pancreas is unremarkable. The visualized aorta is normal in caliber. Intrahepatic IVC is patent. The portal vein is patent with hepatopetal flow. The MPV measures 1.12 cm. The right kidney measures 11.05 cm. There is no hydronephrosis. There is no ascites.     Status post cholecystectomy. No biliary ductal dilatation is seen.     Ec-echocardiogram Complete W/o Cont    Result Date: 8/1/2020  Transthoracic Echo Report Echocardiography Laboratory CONCLUSIONS No prior study is available for comparison. Normal left ventricular chamber size. Normal left ventricular systolic function. Left ventricular ejection fraction is visually estimated to be 70%. Trace tricuspid regurgitation. Estimated right ventricular systolic pressure  is 40 mmHg. CHIDI MOLINA Exam  Date:         2020                    18:43 Exam Location:     Inpatient Priority:          Routine Ordering Physician:        UNIQUE PETERSON Referring Physician:       [id number] Smith Sonographer:               Yue Goodman RDCS Age:    54     Gender:    F MRN:    [id number] :    1965 BSA:    1.32   Ht (in):    60     Wt (lb):    89 Exam Type:     Complete Indications:     pre-chemo, Shortness of breath ICD Codes:       V581  R06.02 CPT Codes:       06055 BP:   124    /   53     HR:   100 Technical Quality:       Fair MEASUREMENTS  (Male / Female) Normal Values 2D ECHO LV Diastolic Diameter PLAX        4.1 cm                4.2 - 5.9 / 3.9 - 5.3 cm LV Systolic Diameter PLAX         2.3 cm                2.1 - 4.0 cm IVS Diastolic Thickness           0.84 cm               LVPW Diastolic Thickness          1 cm                  LVOT Diameter                     1.5 cm                Estimated LV Ejection Fraction    70 %                  LV Ejection Fraction MOD BP       69.2 %                >= 55  % LV Ejection Fraction MOD 4C       69.7 %                LV Ejection Fraction MOD 2C       65.4 %                IVC Diameter                      1.3 cm                DOPPLER AV Peak Velocity                  2 m/s                 AV Peak Gradient                  15.3 mmHg             AV Mean Gradient                  7.6 mmHg              LVOT Peak Velocity                1.6 m/s               AV Area Cont Eq vti               1.4 cm2               Mitral E Point Velocity           1.3 m/s               Mitral E to A Ratio               1.1                   MV Pressure Half Time             47.3 ms               MV Area PHT                       4.6 cm2               MV Deceleration Time              163 ms                TV Peak E Velocity                0.55 m/s              TR Peak Velocity                  312 cm/s              * Indicates values subject to auto-interpretation LV EF:  70    %  FINDINGS Left Ventricle Normal left ventricular chamber size. Normal left ventricular wall thickness. Normal left ventricular systolic function. Left ventricular ejection fraction is visually estimated to be 70%. Normal regional wall motion. Normal diastolic function. Right Ventricle The right ventricle was normal in size and function. Right Atrium The right atrium is normal in size. Normal inferior vena cava size and inspiratory collapse. Left Atrium The left atrium is normal in size. Left atrial volume index is 30 mL/sq m. Mitral Valve Mitral annular calcification. No mitral stenosis. Trace mitral regurgitation. Aortic Valve Structurally normal aortic valve without significant stenosis or regurgitation. Tricuspid Valve No tricuspid stenosis. Trace tricuspid regurgitation. Estimated right ventricular systolic pressure  is 40 mmHg. Right atrial pressure is estimated to be 3 mmHg. Pulmonic Valve Structurally normal pulmonic valve without significant stenosis or regurgitation. Pericardium Normal pericardium without effusion. Aorta The aortic root is normal. Ascending aorta diameter is 2.7 cm. Seun Baires M.D. (Electronically Signed) Final Date:     01 August 2020                 20:19      Micro:  Results     Procedure Component Value Units Date/Time    GRAM STAIN [[id number]] Collected: 08/04/20 1101    Order Status: Completed Specimen: Respirate Updated: 08/04/20 2118     Significant Indicator .     Source RESP     Site BRONCHOALVEOLAR LAVAGE Mouth     Gram Stain Result No organisms seen.    Narrative:      [id number] FLORENCIA MCINTOSH  Which Lobe (Bronch Only):->LLL  [id number] FLORENCIA MCINTOSH    Acid Fast Stain [[id number]] Collected: 08/04/20 1101    Order Status: Completed Specimen: Respirate Updated: 08/04/20 2118     Significant Indicator NEG     Source RESP     Site BRONCHOALVEOLAR LAVAGE Mouth     AFB Smear Results No acid fast bacilli seen.    Narrative:      [id number] FLORENCIA MCINTOSH  Which  Lobe (Bronch Only):->LLL  [id number] FLORENCIA MCINTOSH    Fungal Culture - BAL [[id number]] Collected: 08/04/20 1101    Order Status: Completed Specimen: Respirate from Bronchoalveolar Lavage Updated: 08/04/20 2117     Significant Indicator NEG     Source RESP     Site BRONCHOALVEOLAR LAVAGE Mouth     Culture Result Culture in progress.    Narrative:      [id number] FLORENCIA MCINTOSH  Which Lobe (Bronch Only):->LLL  [id number] FLORENCIA SERRANO W    Fungal Smear - BAL [[id number]] Collected: 08/04/20 1101    Order Status: Completed Specimen: Respirate from Bronchoalveolar Lavage Updated: 08/04/20 2117     Significant Indicator NEG     Source RESP     Site BRONCHOALVEOLAR LAVAGE Mouth     Fungal Smear Results No fungal elements seen.    Narrative:      [id number] FLORENCIA SERRANO W  Which Lobe (Bronch Only):->LLL  [id number] FLORENCIA SERRANO W    AFB Culture [[id number]] Collected: 08/04/20 1101    Order Status: Completed Specimen: Respirate from Bronchoalveolar Lavage Updated: 08/04/20 2117     Significant Indicator NEG     Source RESP     Site BRONCHOALVEOLAR LAVAGE Mouth     Culture Result Culture in progress.     AFB Smear Results No acid fast bacilli seen.    Narrative:      [id number] FLORENCIA SERRANO W  Which Lobe (Bronch Only):->LLL  [id number] FLORENCIA SERRANO W    Fungal Culture [[id number]] Collected: 08/03/20 1713    Order Status: Completed Specimen: Respirate from Sputum Updated: 08/04/20 1333     Significant Indicator NEG     Source RESP     Site SPUTUM     Culture Result Culture in progress.    Narrative:      [id number] GANSBERG CARMELINA L  [id number] GANSBERG CARMELINA L    Culture Respiratory W/ Grm Stn - BAL [[id number]] Collected: 08/04/20 1101    Order Status: Completed Specimen: Respirate from Bronchoalveolar Lavage Updated: 08/04/20 1233    Narrative:      [id number] FLORENCIA SERRANO W  Which Lobe (Bronch Only):->LLL    AFB STAIN ONLY [[id number]] Collected: 08/04/20  "1206    Order Status: Sent Specimen: Sputum Induced     AFB Culture - BAL [[id number]] Collected: 08/04/20 1101    Order Status: Canceled Specimen: Other from Bronchoalveolar Lavage     GRAM STAIN [[id number]] Collected: 08/03/20 1713    Order Status: Completed Specimen: Respirate Updated: 08/04/20 0722     Significant Indicator .     Source RESP     Site SPUTUM     Gram Stain Result Few WBCs.  Moderate Yeast.  Few Gram positive rods.  Specimen Quality Score: 1+      Narrative:      [id number] GANSBERG CARMELINA L  [id number] GANSBERG CARMELINA L    Blood Culture [[id number]] Collected: 07/29/20 2304    Order Status: Completed Specimen: Blood from Peripheral Updated: 08/04/20 0500     Significant Indicator NEG     Source BLD     Site PERIPHERAL     Culture Result No growth after 5 days of incubation.    Narrative:      From different peripheral sites, if not done within the last  24 hours (Per Hospital Policy: Only change specimen source to  \"Line\" if specified by physician order)  No site indicated    CULTURE RESPIRATORY W/ GRM STN [[id number]] Collected: 08/03/20 1713    Order Status: Completed Specimen: Respirate from Sputum Updated: 08/04/20 0344    Narrative:      [id number] GANSBERG CARMELINA L    Blood Culture [[id number]] Collected: 07/29/20 1840    Order Status: Completed Specimen: Blood from Peripheral Updated: 08/03/20 2100     Significant Indicator NEG     Source BLD     Site PERIPHERAL     Culture Result No growth after 5 days of incubation.    Narrative:      From different peripheral sites, if not done within the last  24 hours (Per Hospital Policy: Only change specimen source to  \"Line\" if specified by physician order)  No site indicated    Pneumocystis DFA [[id number]] Collected: 08/03/20 1713    Order Status: Canceled Specimen: Sputum     Cryptococcal Antigen, Serum [[id number]]     Order Status: No result Specimen: Blood     BLOOD CULTURE [[id number]] Collected: 08/02/20 1610    Order " "Status: Completed Specimen: Blood from Peripheral Updated: 08/03/20 0838     Significant Indicator NEG     Source BLD     Site PERIPHERAL     Culture Result No Growth  Note: Blood cultures are incubated for 5 days and  are monitored continuously.Positive blood cultures  are called to the RN and reported as soon as  they are identified.      Narrative:      [id number] SUELLEN JACKMAN.  Per Hospital Policy: Only change Specimen Src: to \"Line\" if  specified by physician order.  Left Forearm/Arm    BLOOD CULTURE [[id number]] Collected: 08/02/20 1545    Order Status: Completed Specimen: Blood from Peripheral Updated: 08/03/20 0838     Significant Indicator NEG     Source BLD     Site PERIPHERAL     Culture Result No Growth  Note: Blood cultures are incubated for 5 days and  are monitored continuously.Positive blood cultures  are called to the RN and reported as soon as  they are identified.      Narrative:      [id number] SUELLEN JACKMAN.  Per Hospital Policy: Only change Specimen Src: to \"Line\" if  specified by physician order.  Right Forearm/Arm    BLOOD CULTURE [[id number]]     Order Status: Canceled Specimen: Blood from Peripheral     BLOOD CULTURE [[id number]]     Order Status: Canceled Specimen: Blood from Peripheral     MRSA By PCR (Amp) [[id number]] Collected: 08/01/20 0315    Order Status: Completed Specimen: Respirate from Nares Updated: 08/02/20 1026     Significant Indicator NEG     Source RESP     Site NARES     MRSA PCR Negative for MRSA by PCR.    Narrative:      Collected By:[id number] SIDNEY PATEL  Collected By:[id number] SIDNEY PATEL    GRAM STAIN [[id number]] Collected: 08/01/20 0825    Order Status: Completed Specimen: Respirate Updated: 08/01/20 1606     Significant Indicator .     Source RESP     Site Sputum     Gram Stain Result Sputum Gram stain quality score is <1, probable  oropharyngeal contamination. Culture not performed.  Recollect if clinically indicated.      Narrative:      Collected " By:[id number] ROMEO ROSENBERG  Collected By:[id number] ROMEO BOONE R    CULTURE RESPIRATORY W/ GRM STN [[id number]] Collected: 08/01/20 0825    Order Status: Completed Specimen: Sputum Updated: 08/01/20 0952    Narrative:      Collected By:[id number] ROMEO BOONE R    CULTURE RESP W/O GRAM STAIN [[id number]] Collected: 08/01/20 0825    Order Status: Canceled Specimen: Sputum     CULTURE RESPIRATORY W/ GRM STN [[id number]]     Order Status: Canceled Specimen: Respirate from Sputum     Urine Culture (New) (Sensitivity) [[id number]] Collected: 07/29/20 1450    Order Status: Completed Specimen: Urine, Clean Catch Updated: 07/31/20 0749     Significant Indicator NEG     Source UR     Site URINE, CLEAN CATCH     Culture Result No growth at 48 hours.    Narrative:      Indication for culture:->Kidney transplant recipient,  Neutropenic patient, after urologic procedure/surgery or  Urinary tract obstruction with fever >100.4F  Indication for culture:->Kidney transplant recipient,    CULTURE RESPIRATORY W/ GRM STN [[id number]] Collected: 07/31/20 0000    Order Status: Canceled Specimen: Sputum     SARS-CoV-2, PCR (In-House) [[id number]] Collected: 07/29/20 1650    Order Status: Completed Updated: 07/29/20 1800     SARS-CoV-2 Source NP Swab     SARS-CoV-2 by PCR NotDetected     Comment: [facility] providers: PLEASE REFER TO DE-ESCALATION AND RETESTING PROTOCOL  on [email or web address]  **The [employer] GeneXpert Xpress SARS-CoV-2 Test has been made available for  use under the Emergency Use Authorization (EUA) only.         Narrative:      Is patient being admitted?->Yes  Does this patient meet criteria for Rush/Cepheid per [facility]  Inpatient Workflow? (See workflow link below)->Yes  Expected turn around time?->Rush (Cepheid 2-4 hours)    Routine (COVID/SARS COV-2 In-House PCR up to 24 hours) [[id number]] Collected: 07/29/20 1650    Order Status: Completed Specimen: Respirate from Nasopharyngeal Updated: 07/29/20 1656     COVID Order  Status Received     Comment: The order for SARS CoV-2 testing has been received by the  Laboratory. This result is neither positive nor negative.  Final results of testing will report in 24-48 hours, separately.         Narrative:      Is patient being admitted?->Yes  Does this patient meet criteria for Rush/Cepheid per [facility]  Inpatient Workflow? (See workflow link below)->Yes  Expected turn around time?->Rush (Cepheid 2-4 hours)    Urinalysis [[id number]]     Order Status: Canceled Specimen: Urine     URINALYSIS [[id number]]  (Abnormal) Collected: 07/29/20 1450    Order Status: Completed Specimen: Urine, Clean Catch Updated: 07/29/20 1522     Color Yellow     Character Clear     Specific Gravity <=1.005     Ph 7.0     Glucose Negative mg/dL      Ketones Negative mg/dL      Protein Negative mg/dL      Bilirubin Negative     Urobilinogen, Urine 0.2     Nitrite Negative     Leukocyte Esterase Negative     Occult Blood Small     Micro Urine Req Microscopic          Assessment:  Active Hospital Problems    Diagnosis   • *Sepsis ([facility]) [A41.9]   • Nosocomial pneumonia [J18.9, Y95]   • Acute hypoxemic respiratory failure ([facility]) [J96.01]   • SVT (supraventricular tachycardia) ([facility]) [I47.1]   • Pancytopenia ([facility]) [D61.818]   • Pulmonary hypertension ([facility]) [I27.20]   • Neutropenic fever ([facility]) [D70.9, R50.81]   • CLL (chronic lymphocytic leukemia) ([facility]) [C91.10]   • Hyponatremia [E87.1]   • Hypokalemia [E87.6]     Interval 24 hours:      101.2 -> 103.9, O2 4 L NC -> 50 L high flow   Labs reviewed  Imaging personally reviewed both images and report. Stable infiltrates per my read.   Studies reviewed  Micro reviewed    Patient had been stable but worsening this morning with ongoing fevers and requiring increased oxygen support.  PICC line placed date of blood draws.  Will stop posaconazole and restart amphotericin, continue cefepime Flagyl and acyclovir.       ASSESSMENT/PLAN:      54 y.o.  admitted 7/29/2020. Pt has a past  medical history of CLL on chemotherapy with ibrutinib.  She has been profoundly neutropenic since at least May 2020.  She was recently admitted for neutropenic fever as well as SVT and required pressors and IV antibiotics.  Cultures at the time were negative.       She is now re-admitted with fevers and left-sided pneumonia.  Per patient she had a cough for approximately 3 days prior to admission which was productive with blood-tinged sputum.   She also reports some history of sinus pain as well as headaches which have been increased of late.  Due to worsening fever and tachycardia she was transferred to the ICU on 8/2.  She was febrile on admission until 7/31 and then improvement with recurrence of fevers on 8/2 to 102.  She is initially on room air and then increased to 5 L oxygen mask  is now on 3 L nasal cannula.  She been treated with cefepime, vancomycin and doxycycline since arrival.  She was continued on her prophylactic acyclovir and fluconazole.       Problem List   Sepsis, secondary to pneumonia  Left lower lobe and right peritracheal area consolidation, necrotic pneumonia versus pulmonary mass with necrosis -differential is broad given her profound immunosuppression but suspect an invasive fungal pneumonia, secondary malignancy is also on the differential  -Procalcitonin 6.99 on 8/1  -Quant gold and cocci both negative and 5/2020  -Due to her prolonged neutropenia she is at risk for viral infections, common and atypical bacterial infection and invasive fungal infections with aspergillosis would be the most common.  She is also at risk for pneumocystis.  -COVID tested and negative on 7/31  -Will not pursue histo or blasto testing as no exposure risk  -Beta D glucan > 500 5/2020,  376 on 8/2/20  - Aspergillus Glucomannan 8/2- negative    - CT chest on 8/3 with left lower lobe consolidation thought to represent necrotic pneumonia versus pulmonary mass with necrosis.  Right peritracheal area of consolidation  with central low-density also could represent necrotic pneumonia versus pulmonary mass with necrosis, scattered GGO bilateral pulmonary edema or infiltrates     Headaches, frontal and sinus pain     Neutropenic fever   -Profoundly neutropenic for several months-she has been on prophylaxis with acyclovir, 500 mg levofloxacin daily and low dose (100 mg)of fluconazole daily   -Blood cultures negative on 7/29 & 8/2   -Urine culture negative on 7/29  -MRSA nares negative     CLL  Pancytopenia secondary to above- ongoing   Immunosuppressed, on chemotherapy with ibrutinib  Antibiotic allergies: Amoxicillin reported as hives, tolerate cephalosporins, sulfa reported as hives     Plan      ---   Stop posaconazole and restart amphotericin  -unclear infectious etiology as all cultures remain negative, posaconazole has very broad spectrum coverage and high bioavailability but patient decompensated today so will make adjustment and see if any improvement   --- She had been on vancomycin since admit and recurrence of fevers on vancomycin so not thought to be due to lack of MRSA coverage, nares are negative so vancomycin was stopped.  Will follow cultures, and if no improvement with amphotericin could potentially restart vancomycin will give short trial course of linezolid.  --- Continue cefepime and Flagyl   --- Continue prophylactic acyclovir  ---  Follow-up sputum cultures from 8/3 and bronchoscopy -bacterial, fungal, AFB and pneumocystis DFA as well as cytology-due to thrombocytopenia unable to get biopsy of the mass but if cultures/BAL are unrevealing and she is not improving may need to consider fine-needle biopsy   --- Repeat pro calcitonin - pending   --- Follow-up blood cultures from 8/2  --- Follow-up galactomannan from BAL  --- Will repeat cocci screen -no recent travel the patient has lived in [place] and [place] -pending  --- Repeat TB quant, patient is from the [facility]-pending  --- Cryptococcus  screen-ordered-not yet collected         Plan of care discussed with Dr. Tang. Will continue to follow.

## 2020-08-05 NOTE — PROCEDURES
Vascular Access Team     Date of Insertion: 8/5/2020  Arm Circumference: 25  Internal length: 36  External Length: 0  Vein Occupancy %: 45   Reason for PICC: access, antibiotic therapy    Labs: 8/4/2020 WBC 0.3, PLT 21, on 8/5/2020 BUN 10, Cr 0.51, GFR >60, INR 1.76 on 8/3/2020     Consents confirmed, vessel patency confirmed with ultrasound. Risks and benefits of procedure explained to patient and education regarding central line associated bloodstream infections provided. Questions answered.      PICC placed in RUE per licensed provider order with ultrasound guidance.  5 Fr, double lumen PICC placed in brachial vein after 1 attempt(s). 2 mL of 1% lidocaine injected intradermally, 21 gauge microintroducer needle and modified Seldinger technique used. 36 cm catheter inserted with good blood return. Secured at 0 cm marker. Each lumen flushed without resistance with 10 mL 0.9% normal saline. PICC line secured with Biopatch and Tegaderm.     PICC tip placement location is confirmed by nurse to be in the Superior Vena Cava (SVC) utilizing 3CG technology. PICC line is appropriate for use at this time. Patient tolerated procedure well, without complications.  Patient condition relayed to unit RN or ordering physician via this post procedure note in the EMR.      Ultrasound images uploaded to PACS and viewable in the EMR - yes  Ultrasound imaged printed and placed in paper chart - no     [employer] Power PICC ref # [id number], Lot # [id number], Expiration Date 04/30/2021

## 2020-08-05 NOTE — PROGRESS NOTES
Critical Care Progress Note    Date of admission  7/29/2020    Chief Complaint  54 y.o. female admitted 7/29/2020 with SVT, CLL, pancytopenia     Hospital Course    54 y.o. female with a history of CLL on chemotherapy followed by Dr. Summers with a history of SVT and admission last month for sepsis versus transfusion reaction or both during which she required pressors and IV antibiotics but ended up culture negative who presented 7/29/2020 with fever at the infusion center was admitted with possible UTI but subsequently has blossomed a juicy left-sided pneumonia.  Today she developed worsening fever and tachycardia felt to possibly have SVT again she appeared in distress for this and was transferred to the CICU rapidly.  On arrival her heart rate improved and she is in sinus tach in the 130s but her temperature is 102.  Blood pressures been acceptable throughout.  Bicarb level this a.m. down a little bit and lactic acid levels not been obtained.  Cultures are negative so far and MRSA nares came back negative.  She has been on broad-spectrum antibiotics including doxycycline, cefepime, Diflucan and acyclovir with only cefepime IV and vancomycin had been on board.  He has no history of pneumonia.  She has history of asthma when she was young and lived in the [facility] but she is not had any symptoms or required any treatment in recent years in the [place].  She has no exposures to anyone sick lives only with her  and they are actually lately been sleeping in separate bedrooms because of her illness.  She is a non-smoker and since her illness she is not received any vaccines.  Echocardiogram done late last night did not reveal a pericardial effusion, there was mild/moderate pulmonary pretension with RVSP 40, no significant valvular heart disease was identified, LV function was normal with an estimated EF of 70%.    8/4 -bronchoscopy showed some extrinsic compression of left lower lobe but no clear endobronchial  lesion.  BAL sent for routine culture, AFB, fungal, PCP and galactomannan Ag.  Remains febrile, reviewed with ID, continue broad-spectrum antimicrobial therapy with cefepime and posaconazole with prophylactic acyclovir.  WBC 0.3, hemoglobin 7.3, platelets 21.  Replacing potassium.  4 L nasal cannula and not in distress.  8/5- - now on HFNC, remains febrile; bronch neg so far; abx: cefepime/flagyl/amphotericin; trial lasix; pancytopenic        Interval Problem Update  Reviewed last 24 hour events:  Inc FiO2 now HFNC; 50L/60%  A/o x 4  ST  Tm = 103.9  dustin PO diet  I/O = 740/1.2 (+16L net)  incont of urine  PICC in process  F/u labs  ppi  ACV ppx  Cefepime/posoconazole/flagyl  Bronch no orgs  BCs neg  Replace   Lasix today      Review of Systems  Review of Systems   Unable to perform ROS: Acuity of condition        Vital Signs for last 24 hours   Temp:  [38 °C (100.4 °F)-38.4 °C (101.2 °F)] 38 °C (100.4 °F)  Pulse:  [] 117  Resp:  [18-40] 24  BP: ()/(42-69) 132/56  SpO2:  [91 %-99 %] 92 %    Hemodynamic parameters for last 24 hours       Respiratory Information for the last 24 hours       Physical Exam   Physical Exam  Vitals signs and nursing note reviewed.   Constitutional:       Appearance: She is underweight. She is ill-appearing. She is not toxic-appearing.      Interventions: Face mask in place.   HENT:      Head: Atraumatic.      Nose: No congestion.      Mouth/Throat:      Mouth: Mucous membranes are dry.      Pharynx: No oropharyngeal exudate or posterior oropharyngeal erythema.   Eyes:      General: No scleral icterus.     Extraocular Movements: Extraocular movements intact.      Pupils: Pupils are equal, round, and reactive to light.   Neck:      Musculoskeletal: Neck supple. No neck rigidity.   Cardiovascular:      Rate and Rhythm: Regular rhythm. Tachycardia present.  No extrasystoles are present.     Pulses: Normal pulses.      Heart sounds: No murmur. No friction rub. Gallop present.        Comments: Sinus tachycardia  Pulmonary:      Effort: No respiratory distress.      Breath sounds: No stridor. Rhonchi and rales present. No wheezing.   Abdominal:      General: Abdomen is flat. Bowel sounds are normal. There is no distension.      Palpations: Abdomen is soft. There is no mass.      Tenderness: There is no abdominal tenderness. There is no left CVA tenderness or guarding.   Musculoskeletal:      Right lower leg: Pitting Edema (trace) present.      Left lower leg: Pitting Edema (trace) present.   Lymphadenopathy:      Cervical: No cervical adenopathy.   Skin:     General: Skin is warm and dry.      Capillary Refill: Capillary refill takes less than 2 seconds.   Neurological:      General: No focal deficit present.      Mental Status: She is alert and oriented to person, place, and time. Mental status is at baseline.   Psychiatric:         Mood and Affect: Mood normal.         Behavior: Behavior normal. Behavior is cooperative.         Thought Content: Thought content normal.         Medications  Current Facility-Administered Medications   Medication Dose Route Frequency Provider Last Rate Last Dose   • metroNIDAZOLE (FLAGYL) tablet 500 mg  500 mg Oral Q8HRS Ingris Corral M.D.   500 mg at 08/05/20 0534   • MD Alert...ICU Electrolyte Replacement per Pharmacy   Other PHARMACY TO DOSE Vikas Tang M.D.       • posaconazole (NOXAFIL) suspension 400 mg  400 mg Oral BID WITH MEALS Ingris Corral M.D.   400 mg at 08/04/20 1730   • ipratropium-albuterol (DUONEB) nebulizer solution  3 mL Nebulization Q2HRS PRN (RT) Vikas Tang M.D.       • guaiFENesin (ROBITUSSIN) 100 MG/5ML solution 200 mg  10 mL Oral Q4HRS PRN Paul Mario M.D.   200 mg at 08/04/20 0540   • magnesium oxide (MAG-OX) tablet 400 mg  400 mg Oral DAILY Paul Mario M.D.   400 mg at 08/05/20 0534   • ipratropium (ATROVENT) 0.02 % nebulizer solution 0.5 mg  0.5 mg Nebulization BID (RT) Paul Mario M.D.   0.5 mg at 08/04/20  2033   • omeprazole (PRILOSEC) capsule 20 mg  20 mg Oral BID Paul Mario M.D.   20 mg at 08/05/20 0534   • acyclovir (ZOVIRAX) tablet 400 mg  400 mg Oral BID Seun Shetty D.O.   400 mg at 08/05/20 0534   • amLODIPine (NORVASC) tablet 10 mg  10 mg Oral DAILY ELMER MontesOMarley   10 mg at 08/05/20 0534   • folic acid (FOLVITE) tablet 1 mg  1 mg Oral DAILY ELMER MontesO.   1 mg at 08/05/20 0534   • senna-docusate (PERICOLACE or SENOKOT S) 8.6-50 MG per tablet 2 Tab  2 Tab Oral BID Seun Shetty D.O.   Stopped at 08/04/20 0600    And   • polyethylene glycol/lytes (MIRALAX) PACKET 1 Packet  1 Packet Oral QDAY PRN Seun Shetty D.O.        And   • magnesium hydroxide (MILK OF MAGNESIA) suspension 30 mL  30 mL Oral QDAY PRN Seun Shetty D.O.        And   • bisacodyl (DULCOLAX) suppository 10 mg  10 mg Rectal QDAY PRN Seun Shetty D.O.       • lactated ringers infusion (BOLUS): BMI less than or equal to 30  30 mL/kg Intravenous Once PRN Seun Shetty D.O.       • acetaminophen (TYLENOL) tablet 650 mg  650 mg Oral Q6HRS PRN Seun Shetty D.O.   650 mg at 08/04/20 2212   • Pharmacy Consult Request ...Pain Management Review 1 Each  1 Each Other PHARMACY TO DOSE Seun Shetty D.O.        And   • oxyCODONE immediate-release (ROXICODONE) tablet 2.5 mg  2.5 mg Oral Q3HRS PRN Seun Shetty D.O.   2.5 mg at 08/03/20 1659    And   • oxyCODONE immediate-release (ROXICODONE) tablet 5 mg  5 mg Oral Q3HRS PRN Seun Shetty D.O.        And   • morphine (pf) 4 MG/ML injection 2 mg  2 mg Intravenous Q3HRS PRN Seun Shetty D.O.       • cefepime (MAXIPIME) 2 g in  mL IVPB  2 g Intravenous Q8HRS Seun Shetty D.O.   Stopped at 08/05/20 0200   • enalaprilat (VASOTEC) injection 1.25 mg  1.25 mg Intravenous Q6HRS PRN Seun Shetty D.O.       • ondansetron (ZOFRAN) syringe/vial injection 4 mg  4 mg Intravenous Q4HRS PRN Seun Shetty D.O.       • ondansetron (ZOFRAN ODT) dispertab 4 mg  4 mg Oral  Q4HRS PRN Seun Shetty D.O.       • promethazine (PHENERGAN) suppository 12.5-25 mg  12.5-25 mg Rectal Q4HRS PRN Seun Shetty D.O.       • prochlorperazine (COMPAZINE) injection 5-10 mg  5-10 mg Intravenous Q4HRS PRN Seun Shetty D.O.           Fluids    Intake/Output Summary (Last 24 hours) at 8/5/2020 0816  Last data filed at 8/5/2020 0400  Gross per 24 hour   Intake 540 ml   Output 1250 ml   Net -710 ml       Laboratory          Recent Labs     08/02/20  1545 08/03/20  0545 08/04/20  0550 08/05/20  0230   SODIUM 133* 136 128* 132*   POTASSIUM 3.8 3.6 3.7 3.2*   CHLORIDE 104 105 100 100   CO2 17* 17* 19* 17*   BUN 6* 7* 6* 10   CREATININE 0.49* 0.36* 0.32* 0.51   MAGNESIUM 1.6 2.1  --   --    PHOSPHORUS  --  2.7  --   --    CALCIUM 8.1* 7.9* 8.2* 8.1*     Recent Labs     08/03/20  0545 08/04/20  0550 08/05/20  0230   ALTSGPT 25  --   --    ASTSGOT 10*  --   --    ALKPHOSPHAT 180*  --   --    TBILIRUBIN 1.2  --   --    GLUCOSE 169* 107* 122*     Recent Labs     08/03/20  0545 08/04/20  0550   WBC 0.2* 0.3*   NEUTSPOLYS  --  CANCEL   LYMPHOCYTES  --  CANCEL   MONOCYTES  --  CANCEL   EOSINOPHILS  --  CANCEL   BASOPHILS  --  CANCEL   ASTSGOT 10*  --    ALTSGPT 25  --    ALKPHOSPHAT 180*  --    TBILIRUBIN 1.2  --      Recent Labs     08/03/20  0545 08/03/20  1100 08/03/20  2149 08/04/20  0550   RBC 2.83*  --   --  2.65*   HEMOGLOBIN 7.7*  --   --  7.3*   HEMATOCRIT 23.1*  --   --  21.3*   PLATELETCT 21*  --   --  21*   PROTHROMBTM  --   --  21.0*  --    APTT  --   --  45.9*  --    INR  --   --  1.76*  --    FERRITIN  --  82549.0*  --   --        Imaging  X-Ray:  I have personally reviewed the images and compared with prior images.    Assessment/Plan  * Neutropenic fever (HCC)- (present on admission)  Assessment & Plan  Pancytopenia secondary to CLL and chemotherapy  Protective isolation for severe neutropenia  Appreciate ID input, broad-spectrum antimicrobial therapy and antifungal therapy with  amphotericin  Bronchoscopy 8/4 negative so far    Nosocomial pneumonia  Assessment & Plan  Dense left-sided pneumonia with necrosis versus mass on CT  Bronchoscopy 8/4 without purulence but extrinsic compression of left lower lobe  BAL for culture, AFB, fungal, cytology and galactomannan -final results pending  Ongoing antibiotics as above    Sepsis ([facility])  Assessment & Plan        Acute hypoxemic respiratory failure ([facility])  Assessment & Plan  High flow nasal cannula with titrated oxygen as needed  High risk for further deterioration and may require intubation  Continue RT protocols and close monitoring in ICU    SVT (supraventricular tachycardia) ([facility])- (present on admission)  Assessment & Plan  History of SVT w/ recurrence 2/2 to fever/sepsis   Optimize electrolytes  Cardioversion for unstable SVT    Pancytopenia ([facility])- (present on admission)  Assessment & Plan  Secondary to CLL and chemotherapy  Hold chemotherapy  Transfuse per protocols  Serial CBC  Appreciate oncology input    Pulmonary hypertension ([facility])  Assessment & Plan  RVSP 40 on echo 8/1  Presumably secondary to pneumonic process, not insignificant right heart failure at this time  LV function excellent with EF 70%  Monitor    CLL (chronic lymphocytic leukemia) ([facility])- (present on admission)  Assessment & Plan  Oncology following, therapy on hold  Pancytopenic    Hypokalemia- (present on admission)  Assessment & Plan  Replete, goal greater than 4.0, ERP    Hyponatremia- (present on admission)  Assessment & Plan  Slowly correcting, follow     Updated plan:  Posaconazole change back to amphotericin today, discussed with ID  Increasing FiO2 requirement, now on high flow nasal cannula  Awaiting culture results from BAL 8/4  Overall prognosis guarded, monitor closely in ICU and may require intubation  Palliative care consulted we will try to set up a conference with consulting physicians and patient/ regarding goals of care given worsening prognosis  PICC  line placed for ongoing antibiotic therapy  Please Clancy catheter, strict I's and O's  Trial lasix today  Follow pancytopenia    VTE:  Contraindicated  Ulcer: PPI  Lines: None    I have performed a physical exam and reviewed and updated ROS and Plan today (8/5/2020). In review of yesterday's note (8/4/2020), there are no changes except as documented above.     Discussed patient condition and risk of morbidity and/or mortality with RN, RT, Pharmacy and QA team  The patient remains critically ill.  She is at high risk for further vital organ deterioration and may require intubation.  Monitor closely in ICU.  Critical care time = 45 minutes in directly providing and coordinating critical care and extensive data review.  No time overlap and excludes procedures.

## 2020-08-05 NOTE — RESPIRATORY CARE
COPD EDUCATION by COPD CLINICAL EDUCATOR  8/5/2020 at 12:42 PM by Viviana Murray, RRT     Patient reviewed by COPD education team. Patient does not have a history or diagnosis of COPD and is a non-smoker, therefore does not qualify for the COPD program.

## 2020-08-06 NOTE — DISCHARGE PLANNING
Anticipated Discharge Disposition: hospice inpatient vs home    Action: spoke w/ pt and  Isaac at bedside. Pt and  agreeable to hospice. Verified facesheet info w/ . Choice faxed to [facility].    Barriers to Discharge: hospice acceptance    Plan: TBD

## 2020-08-06 NOTE — DISCHARGE PLANNING
Received Choice form at 1610  Agency/Facility Name: [facility]  Referral sent per Choice form at 1612

## 2020-08-06 NOTE — PALLIATIVE CARE
Palliative Care follow-up  PC RN received updates from BS KENNETH Huddleston. Plan for GOC meeting with PC RN, pt, MD, and family tomorrow at 3pm.       Updated: BS RN    Plan: PC team will be present for meeting tomorrow at 3pm.    Thank you for allowing Palliative Care to support this patient and family. Contact [email or web address] for additional assistance, change in patient status, or with any questions/concerns.

## 2020-08-06 NOTE — PROGRESS NOTES
Critical Care Progress Note    Date of admission  7/29/2020    Chief Complaint  54 y.o. female admitted 7/29/2020 with SVT, CLL, pancytopenia     Hospital Course    54 y.o. female with a history of CLL on chemotherapy followed by Dr. Summers with a history of SVT and admission last month for sepsis versus transfusion reaction or both during which she required pressors and IV antibiotics but ended up culture negative who presented 7/29/2020 with fever at the infusion center was admitted with possible UTI but subsequently has blossomed a juicy left-sided pneumonia.  Today she developed worsening fever and tachycardia felt to possibly have SVT again she appeared in distress for this and was transferred to the CICU rapidly.  On arrival her heart rate improved and she is in sinus tach in the 130s but her temperature is 102.  Blood pressures been acceptable throughout.  Bicarb level this a.m. down a little bit and lactic acid levels not been obtained.  Cultures are negative so far and MRSA nares came back negative.  She has been on broad-spectrum antibiotics including doxycycline, cefepime, Diflucan and acyclovir with only cefepime IV and vancomycin had been on board.  He has no history of pneumonia.  She has history of asthma when she was young and lived in the [facility] but she is not had any symptoms or required any treatment in recent years in the [place].  She has no exposures to anyone sick lives only with her  and they are actually lately been sleeping in separate bedrooms because of her illness.  She is a non-smoker and since her illness she is not received any vaccines.  Echocardiogram done late last night did not reveal a pericardial effusion, there was mild/moderate pulmonary pretension with RVSP 40, no significant valvular heart disease was identified, LV function was normal with an estimated EF of 70%.    8/4 -bronchoscopy showed some extrinsic compression of left lower lobe but no clear endobronchial  lesion.  BAL sent for routine culture, AFB, fungal, PCP and galactomannan Ag.  Remains febrile, reviewed with ID, continue broad-spectrum antimicrobial therapy with cefepime and posaconazole with prophylactic acyclovir.  WBC 0.3, hemoglobin 7.3, platelets 21.  Replacing potassium.  4 L nasal cannula and not in distress.  8/5- - now on HFNC, remains febrile; bronch neg so far; abx: cefepime/flagyl/amphotericin; trial lasix; pancytopenic  8/6 -clinically worse on maximal high flow nasal cannula.  Ongoing diuresis but clinically worsening.  Long discussion regarding end-of-life decision-making.  Patient does not want any escalation in therapy, now DNR/DNI and considering comfort measures.          Interval Problem Update  Reviewed last 24 hour events:  Tm = 103.9  SBP 80's - 110's, SR/ST  's  HFNC 80%  WBC 0.3  Plts 4  Hgb 5.8  Transfusing Plts, PRBCs x 2  I/O = 1.2/3.5 (+14L)  BUN/Cr 11/0.52  K 2.9  Abx:  Ampho B  8/5 - P   Cefepime 7/29 - P   Flagyl8/4 - P   Vanco 7/3 - 8/3   Posaconazole 8/3 - 8/5   Doxy 7/31 - 8/2   Fluconazole 7/30 - 8/2  BAL 8/4 - neg  BC's neg  CXR inc edema/infx; L inf, ? R apical ptx  A/o x 4, lethargic, back pain        Review of Systems  Review of Systems   Unable to perform ROS: Acuity of condition        Vital Signs for last 24 hours   Temp:  [37.1 °C (98.8 °F)-39.9 °C (103.9 °F)] 38.2 °C (100.8 °F)  Pulse:  [] 111  Resp:  [20-31] 29  BP: ()/(36-53) 84/53  SpO2:  [78 %-98 %] 94 %    Hemodynamic parameters for last 24 hours       Respiratory Information for the last 24 hours       Physical Exam   Physical Exam  Vitals signs and nursing note reviewed.   Constitutional:       Appearance: She is underweight. She is ill-appearing. She is not toxic-appearing.      Interventions: Face mask in place.   HENT:      Head: Atraumatic.      Nose: No congestion.      Mouth/Throat:      Mouth: Mucous membranes are dry.      Pharynx: No oropharyngeal exudate or posterior oropharyngeal  erythema.   Eyes:      General: No scleral icterus.     Extraocular Movements: Extraocular movements intact.      Pupils: Pupils are equal, round, and reactive to light.   Neck:      Musculoskeletal: Neck supple. No neck rigidity.   Cardiovascular:      Rate and Rhythm: Regular rhythm. Tachycardia present.  No extrasystoles are present.     Pulses: Normal pulses.      Heart sounds: No murmur. No friction rub. Gallop present.       Comments: Sinus tachycardia  Pulmonary:      Effort: No respiratory distress.      Breath sounds: No stridor. Rhonchi and rales present. No wheezing.   Abdominal:      General: Abdomen is flat. Bowel sounds are normal. There is no distension.      Palpations: Abdomen is soft. There is no mass.      Tenderness: There is no abdominal tenderness. There is no left CVA tenderness or guarding.   Musculoskeletal:      Right lower leg: Pitting Edema (trace) present.      Left lower leg: Pitting Edema (trace) present.   Lymphadenopathy:      Cervical: No cervical adenopathy.   Skin:     General: Skin is warm and dry.      Capillary Refill: Capillary refill takes less than 2 seconds.   Neurological:      General: No focal deficit present.      Mental Status: She is alert and oriented to person, place, and time. Mental status is at baseline.   Psychiatric:         Mood and Affect: Mood normal.         Behavior: Behavior normal. Behavior is cooperative.         Thought Content: Thought content normal.         Medications  Current Facility-Administered Medications   Medication Dose Route Frequency Provider Last Rate Last Dose   • SODIUM CHLORIDE 0.9 % IV SOLN            • SODIUM CHLORIDE 0.9 % IV SOLN            • SODIUM CHLORIDE 0.9 % IV SOLN            • potassium chloride in water (KCL) ivpb **Administer in ICU only** 40 mEq  40 mEq Intravenous Once Vikas Tang M.D.        Followed by   • potassium chloride in water (KCL) ivpb **Administer in ICU only** 20 mEq  20 mEq Intravenous Once Vikas MCINTOSH  VIRAJ Tang       • Pharmacy Consult Request for Amphotericin B monitoring  1 Each Other PRN Ingris Corral M.D.       • NS infusion 500 mL  500 mL Intravenous Q24HR Ingris Corral M.D. 500 mL/hr at 08/06/20 0425 500 mL at 08/06/20 0425    And   • acetaminophen (TYLENOL) tablet 650 mg  650 mg Oral Q24HR Ingris Corral M.D.   650 mg at 08/05/20 1309    And   • D5W infusion 30 mL  30 mL Intravenous Q24HR Ingris Corral M.D.        And   • amphotericin B liposome (AMBISOME) 235 mg in D5W 150 mL IVPB  5 mg/kg Intravenous Q24HRS Ingris Corral M.D.   Stopped at 08/06/20 0736    And   • D5W infusion 30 mL  30 mL Intravenous Q24HR Ingris Corral M.D.   30 mL at 08/06/20 0520    And   • NS infusion 250 mL  250 mL Intravenous Q24HR Ingris Corral M.D. 250 mL/hr at 08/05/20 1500 250 mL at 08/05/20 1500   • metroNIDAZOLE (FLAGYL) tablet 500 mg  500 mg Oral Q8HRS Ingris Corral M.D.   500 mg at 08/06/20 0505   • MD Alert...ICU Electrolyte Replacement per Pharmacy   Other PHARMACY TO DOSE Vikas Tang M.D.       • ipratropium-albuterol (DUONEB) nebulizer solution  3 mL Nebulization Q2HRS PRN (RT) Vikas Tang M.D.   Stopped at 08/05/20 2006   • guaiFENesin (ROBITUSSIN) 100 MG/5ML solution 200 mg  10 mL Oral Q4HRS PRN Paul Mario M.D.   200 mg at 08/04/20 0540   • magnesium oxide (MAG-OX) tablet 400 mg  400 mg Oral DAILY Paul Mario M.D.   400 mg at 08/06/20 0505   • ipratropium (ATROVENT) 0.02 % nebulizer solution 0.5 mg  0.5 mg Nebulization BID (RT) Paul Mario M.D.   0.5 mg at 08/05/20 2056   • omeprazole (PRILOSEC) capsule 20 mg  20 mg Oral BID Paul Mario M.D.   20 mg at 08/06/20 0505   • acyclovir (ZOVIRAX) tablet 400 mg  400 mg Oral BID MEL Montes.O.   400 mg at 08/06/20 0505   • amLODIPine (NORVASC) tablet 10 mg  10 mg Oral DAILY ELMER MontesO.   10 mg at 08/06/20 0505   • folic acid (FOLVITE) tablet 1 mg  1 mg Oral DAILY MEL Montes.O.   1  mg at 08/06/20 0505   • senna-docusate (PERICOLACE or SENOKOT S) 8.6-50 MG per tablet 2 Tab  2 Tab Oral BID Seun Shetty D.O.   Stopped at 08/04/20 0600    And   • polyethylene glycol/lytes (MIRALAX) PACKET 1 Packet  1 Packet Oral QDAY PRN Seun Shetty D.O.        And   • magnesium hydroxide (MILK OF MAGNESIA) suspension 30 mL  30 mL Oral QDAY PRN Seun Shetty D.O.        And   • bisacodyl (DULCOLAX) suppository 10 mg  10 mg Rectal QDAY PRN Seun Shetty D.O.       • lactated ringers infusion (BOLUS): BMI less than or equal to 30  30 mL/kg Intravenous Once PRN Seun Shetty D.O.       • acetaminophen (TYLENOL) tablet 650 mg  650 mg Oral Q6HRS PRN Seun Shetty D.O.   650 mg at 08/06/20 0455   • Pharmacy Consult Request ...Pain Management Review 1 Each  1 Each Other PHARMACY TO DOSE Seun Shetty D.O.        And   • oxyCODONE immediate-release (ROXICODONE) tablet 2.5 mg  2.5 mg Oral Q3HRS PRN Seun Shetty D.O.   2.5 mg at 08/05/20 1945    And   • oxyCODONE immediate-release (ROXICODONE) tablet 5 mg  5 mg Oral Q3HRS PRN Seun Shetty D.O.        And   • morphine (pf) 4 MG/ML injection 2 mg  2 mg Intravenous Q3HRS PRN Seun Shetty D.O.       • cefepime (MAXIPIME) 2 g in  mL IVPB  2 g Intravenous Q8HRS Seun Shetty D.O.   Stopped at 08/06/20 0211   • enalaprilat (VASOTEC) injection 1.25 mg  1.25 mg Intravenous Q6HRS PRN Seun Shetty D.O.       • ondansetron (ZOFRAN) syringe/vial injection 4 mg  4 mg Intravenous Q4HRS PRN Seun Shetty D.O.       • ondansetron (ZOFRAN ODT) dispertab 4 mg  4 mg Oral Q4HRS PRN ELMER MontesO.       • promethazine (PHENERGAN) suppository 12.5-25 mg  12.5-25 mg Rectal Q4HRS PRN ELMER MontesO.       • prochlorperazine (COMPAZINE) injection 5-10 mg  5-10 mg Intravenous Q4HRS PRN ELMER MontesO.           Fluids    Intake/Output Summary (Last 24 hours) at 8/6/2020 0743  Last data filed at 8/6/2020 0600  Gross per 24 hour   Intake 1220 ml    Output 3350 ml   Net -2130 ml       Laboratory          Recent Labs     08/04/20  0550 08/05/20  0230 08/06/20  0440   SODIUM 128* 132* 133*   POTASSIUM 3.7 3.2* 2.9*   CHLORIDE 100 100 100   CO2 19* 17* 20   BUN 6* 10 11   CREATININE 0.32* 0.51 0.52   MAGNESIUM  --  2.0 2.0   PHOSPHORUS  --  2.6  --    CALCIUM 8.2* 8.1* 8.2*     Recent Labs     08/04/20  0550 08/05/20  0230 08/06/20  0440   ALTSGPT  --  23  --    ASTSGOT  --  15  --    ALKPHOSPHAT  --  245*  --    TBILIRUBIN  --  1.6*  --    DBILIRUBIN  --  0.9*  --    GLUCOSE 107* 122* 123*     Recent Labs     08/04/20  0550 08/05/20  0230 08/05/20  0845 08/06/20  0440   WBC 0.3*  --  0.3* 0.3*   NEUTSPOLYS CANCEL  --  CANCEL  --    LYMPHOCYTES CANCEL  --  CANCEL  --    MONOCYTES CANCEL  --  CANCEL  --    EOSINOPHILS CANCEL  --  CANCEL  --    BASOPHILS CANCEL  --  CANCEL  --    ASTSGOT  --  15  --   --    ALTSGPT  --  23  --   --    ALKPHOSPHAT  --  245*  --   --    TBILIRUBIN  --  1.6*  --   --      Recent Labs     08/03/20  1100 08/03/20  2149 08/04/20  0550 08/05/20  0845 08/06/20  0440   RBC  --   --  2.65* 2.89* 2.12*   HEMOGLOBIN  --   --  7.3* 7.8* 5.8*   HEMATOCRIT  --   --  21.3* 23.6* 17.2*   PLATELETCT  --   --  21* 9* 4*   PROTHROMBTM  --  21.0*  --   --   --    APTT  --  45.9*  --   --   --    INR  --  1.76*  --   --   --    FERRITIN 03631.0*  --   --   --   --        Imaging  X-Ray:  I have personally reviewed the images and compared with prior images.    Assessment/Plan  * Neutropenic fever (HCC)- (present on admission)  Assessment & Plan  Pancytopenia secondary to CLL and chemotherapy  Protective isolation for severe neutropenia  Appreciate ID input, broad-spectrum antimicrobial therapy and antifungal therapy with amphotericin  Bronchoscopy 8/4 negative so far    Nosocomial pneumonia  Assessment & Plan  Dense left-sided pneumonia with necrosis versus mass on CT  Bronchoscopy 8/4 without purulence but extrinsic compression of left lower lobe  BAL  for culture, AFB, fungal, cytology and galactomannan -final results pending  Ongoing antibiotics as above    Sepsis (HCC)  Assessment & Plan        Acute hypoxemic respiratory failure ([facility])  Assessment & Plan  High flow nasal cannula with titrated oxygen as needed  High risk for further deterioration and may require intubation  Continue RT protocols and close monitoring in ICU    SVT (supraventricular tachycardia) (HCC)- (present on admission)  Assessment & Plan  History of SVT w/ recurrence 2/2 to fever/sepsis   Optimize electrolytes  Cardioversion for unstable SVT    Pancytopenia (HCC)- (present on admission)  Assessment & Plan  Secondary to CLL and chemotherapy  Hold chemotherapy  Transfuse per protocols  Serial CBC  Appreciate oncology input    Pulmonary hypertension ([facility])  Assessment & Plan  RVSP 40 on echo 8/1  Presumably secondary to pneumonic process, not insignificant right heart failure at this time  LV function excellent with EF 70%  Monitor    CLL (chronic lymphocytic leukemia) (HCC)- (present on admission)  Assessment & Plan  Oncology following, therapy on hold  Pancytopenic    Hypokalemia- (present on admission)  Assessment & Plan  Replete, goal greater than 4.0, ERP    Hyponatremia- (present on admission)  Assessment & Plan  Slowly correcting, follow     Updated plan:  Continue broad-spectrum antibiotic therapy, reviewed with ID  Long discussion with patient,  Iasac and palliative care nurse as well as infectious disease and oncology.  Overall prognosis is guarded.  Patient elects to not escalate therapy at this time and does endorse DNR/DNI status.  She will further consider comfort measures.  In the meantime she would like ongoing therapy.  She received PRBCs today and Lasix.  She is not bleeding and we held off on platelet transfusion.  She was followed very closely in ICU and is at high risk for further deterioration and death.        VTE:  Contraindicated  Ulcer: PPI  Lines: None    I have  performed a physical exam and reviewed and updated ROS and Plan today (8/6/2020). In review of yesterday's note (8/5/2020), there are no changes except as documented above.     Discussed patient condition and risk of morbidity and/or mortality with RN, RT, Pharmacy and QA team  The patient remains critically ill.  She is at high risk for further vital organ deterioration.  Monitor closely in ICU.  Critical care time = 56 minutes in directly providing and coordinating critical care and extensive data review.  No time overlap and excludes procedures.

## 2020-08-06 NOTE — PROGRESS NOTES
Spiritual Care Note    Patient Information     Patient's Name: Lana Johnston   MRN: [id number]    YOB: 1965   Age and Gender: 54 y.o. female   Service Area: [facility]   Room (and Bed): [place] [address]   Ethnicity or Nationality:     Primary Language: English   Pentecostalism/Spiritual preference: Jehovah's witness   Place of Residence: [place]   Family/Friends/Others Present: No   Clinical Team Present: No   Medical Diagnosis(-es)/Procedure(s): Neutropenic fever/comfort care   Code Status: DNAR/DNI    Date of Admission: 7/29/2020   Length of Stay: 8 days        Spiritual Care Provider Information:  Name of Spiritual Care Provider: Maritza Simmons  Title of Spiritual Care Provider: Associate   Phone Number: [phone]  E-mail: [email or web address]  Total time : 20 minutes    Spiritual Screen Results:    Gen Nursing  Spiritual Screen  Is your spiritual health or inner well-being important to you as you cope with your medical condition?: No  Would you like to receive a visit from our Spiritual Care team or your own Samaritan or spiritual leader?: No  Was spiritual care education provided to the patient?: Declined     Palliative Care  PC Pentecostalism/Spiritual Screening  Was spiritual care education provided to the patient?: Declined      Encounter/Request Information  Encounter/Request Type   Visited With: Patient  Nature of the Visit: Initial, On shift  Crisis Visit: Patient actively dying/EOL  Referral From/ Origin of Request: [facility] nursing  Referral To: Community clergy((Fr Harris at [facility]))    Religous Needs/Values  Pentecostalism Needs Visit  Pentecostalism Needs: Prayer  Ritual Needs Visit  Ritual Needs: Anointing, [place], EOL ritual    Spiritual Assessment     Spiritual Care Encounters    Observations/Symptoms: Accepting, Thankfulness    Interaction/Conversation: KENNETH Carreon from [facility] to help facilitate EOL rituals for this pt. The  visited with the pt and offered  "blessing and prayer, and also called for a . (Fr. Steven from [facility] will be coming.) The pt was accepting of her death, but stated that she wants it to be peaceful and painless. The  reassured her that [facility] staff are very good at ensuring this.  The pt also asked, \"What happens to our spirits when we die?\" and the  said that while it is impossible to know for sure, many people who have had near-death experiences report great peace and love, as well as reunion with departed loved ones. The pt seemed comforted by this, and thanked the .    Assessment: Need    Need: Seeking Spiritual Assistance and Support    Interventions: Compassionate presence, active listening, prayer, blessing.    Outcomes: Connectedness with the Holy/with God, Spiritual Comfort, Value/Dignity/Respect    Plan: Visit Upon Request    Notes:            "

## 2020-08-06 NOTE — CARE PLAN
Problem: Communication  Goal: The ability to communicate needs accurately and effectively will improve  Outcome: PROGRESSING AS EXPECTED     Problem: Safety  Goal: Will remain free from injury  Outcome: PROGRESSING AS EXPECTED  Goal: Will remain free from falls  Outcome: PROGRESSING AS EXPECTED     Problem: Knowledge Deficit  Goal: Knowledge of disease process/condition, treatment plan, diagnostic tests, and medications will improve  Outcome: PROGRESSING AS EXPECTED     Problem: Discharge Barriers/Planning  Goal: Patient's continuum of care needs will be met  Outcome: PROGRESSING AS EXPECTED     Problem: Pain Management  Goal: Pain level will decrease to patient's comfort goal  Outcome: PROGRESSING AS EXPECTED     Problem: Respiratory:  Goal: Respiratory status will improve  Outcome: PROGRESSING SLOWER THAN EXPECTED

## 2020-08-06 NOTE — PROGRESS NOTES
Dr. Mcelroy, Dr. Corral and Dr. Tang met to discuss plan of care options for patient. Waiting for patient's  to be at bedside to discuss options with the team.

## 2020-08-06 NOTE — PROGRESS NOTES
MD Pérez updated on patients critical CBC results. Orders received for 1 unit of PRBCs and 1 unit of platelets. Orders placed for both and a COD as hers was .

## 2020-08-06 NOTE — CONSULTS
"Reason for PC Consult: Advance Care Planning    Consulted by: Vikas Tang MD    Assessment:  General: 53yo lady brought to ED by tech from [facility] and admitted  with neutropenic fever and pancytopenia, likely fungal pna,  bronch negative so far, seen by oncology and ID, now on HFNC. PMH: CLL diagnosed 2020, on chemo under care of Dr. Summers, likely alpha thalassemia, anxiety, HTN, SVT    Dyspnea: Yes- severe WOB with accessory muscle use  Last BM: 20-    Pain: Yes- pt states \"yes\" from her SOB  Depression: Mood appropriate for situation-      Spiritual:  Is Quaker or spirituality important for coping with this illness? Yes- Spiritual order placed,  contacted, met Maritza at bedside,  also to come to bedside  Has a  or spiritual provider visit been requested? Yes    Palliative Performance Scale: 30    Advanced Directive: None-    DPOA:  -    POLST:No-      Code Status: DNR- addressed by IDT, pt changed to DNR/DNI    Social:   Pt lives with her  of 18 years Isaac Johnston. Her sister, Maritza Aranda, lives in [place]. Pt' brother  from cancer. She does not have any children but numerous nieces/nephews. Pt works full time at [employer].    Outcome:  Introduced self and role of Palliative Care to pt's  in [facility] lobby.  Assessed Isaac's understanding of pt's current medical status, overall health picture, and options for future care. Isaac, tearful having been called in by MD at this time, expressed knowing that his wife is not doing well.    Explored pt's expressed values, beliefs, and preferences in order to identify GOC. Isaac stated that pt does not want to be intubated and he recognizes her suffering and supports this. Met with pt along with Isaac, she reiterated her suffering and that she felt this was the end. Joined by Dr. Tang who presented current situation and options for further care including aggressive with likely intubation vs maintaining " "current treatment and seeing how pt does vs comfort care/possible hospice at home. Pt stated her belief that this was her end of life and her desire for comfort, but she wanted to speak with her sister who was reached at her job by this PC RN and briefed on situation before having the sisters speak.    Pt again described her suffering and her brother's death from cancer. Pt shared that her sister plans to be driven by her spouse and son \"but maybe not until tomorrow.\" Pt acknowledged that she may not make it until then (and this was also told to the sister by me). Pt agreed to no escalation of care but maintaining current treatments until sister arrives. She did verbalize understanding that she can ask to be transitioned to comfort care at any time. Pt stated that dying at home is not a goal of hers - \"but I'd like to sit in the car and look at the [place].\" She thinks that attempting to get home with hospice will only add to her anxiety and suffering. Comfort care here in the hospital was detailed and pt verbalized understanding.    Pt has not been wanting to take morphine d/t fear of it making her too sleepy to complete the desired task of going through her jewelry with her  and designating it for family members. Encouraged pt to try the morphine to help relieve some of her symptoms (namely her feeling like she is struggling to take a breath) once she finishes this task. Pt verbalized understanding and plan to try the morphine.    Active listening, reflection, reminiscing, validation & normalization, empathic support and therapeutic touch utilized throughout this encounter.  All questions answered.  PC contact information given.     Discussed with/Updated: Dr. Tang, ICU RN Karo    Plan:  No escalation of care, continue current treatment plan until pt's sister arrives and then likely transition to comfort care at that time per pt's choice. Palliative care to continue to follow, provide support, and help " facilitate decision-making as needed.      Thank you for allowing Palliative Care to participate in this patient's care. Please feel free to call [phone] with any questions or concerns.

## 2020-08-06 NOTE — PROGRESS NOTES
Oncology/Hematology Progress Note               Author: Tyler Mcelroy M.D. Date & Time created: 8/6/2020  1:13 PM   Diagnosis-CLL  Pneumonia/respiratory failure  Pancytopenia  History of thalassemia  Interval History:  Remains short of breath.  Infectious disease fu appreciated.  Patient started on back on amphotericin.  Continues to be on acyclovir.  Clinically worse.  On high flow oxygen.  More lethargic.  Refuses intubation    Review of Systems:  Review of Systems   Constitutional: Positive for diaphoresis, fever and malaise/fatigue.   HENT: Negative for hearing loss.    Respiratory: Positive for cough, sputum production, shortness of breath and wheezing. Negative for stridor.    Cardiovascular: Negative for chest pain, palpitations and leg swelling.   Gastrointestinal: Positive for nausea. Negative for abdominal pain and vomiting.   Genitourinary: Negative for dysuria and hematuria.   Musculoskeletal: Negative for back pain, myalgias and neck pain.   Skin: Negative for itching and rash.   Neurological: Positive for weakness. Negative for dizziness, tingling, tremors and headaches.   Psychiatric/Behavioral: Negative for depression. The patient is nervous/anxious.        Physical Exam:  Physical Exam  Constitutional:       Appearance: Normal appearance. She is toxic-appearing.   HENT:      Mouth/Throat:      Mouth: Mucous membranes are moist.      Pharynx: No oropharyngeal exudate or posterior oropharyngeal erythema.   Cardiovascular:      Rate and Rhythm: Regular rhythm. Tachycardia present.      Heart sounds: Murmur present. No gallop.    Pulmonary:      Effort: Respiratory distress present.      Breath sounds: No wheezing or rhonchi.   Abdominal:      General: Abdomen is flat. Bowel sounds are normal. There is no distension.      Palpations: Abdomen is soft. There is no mass.      Tenderness: There is no abdominal tenderness. There is no guarding.   Skin:     Coloration: Skin is not jaundiced.      Findings:  Bruising present. No lesion.   Neurological:      General: No focal deficit present.      Mental Status: She is alert and oriented to person, place, and time.      Motor: Weakness present.   Psychiatric:      Comments: Anxious         Labs:          Recent Labs     20   SODIUM 128* 132* 133*   POTASSIUM 3.7 3.2* 2.9*   CHLORIDE 100 100 100   CO2 19* 17* 20   BUN 6* 10 11   CREATININE 0.32* 0.51 0.52   MAGNESIUM  --  2.0 2.0   PHOSPHORUS  --  2.6  --    CALCIUM 8.2* 8.1* 8.2*     Recent Labs     20   ALTSGPT  --  23  --    ASTSGOT  --  15  --    ALKPHOSPHAT  --  245*  --    TBILIRUBIN  --  1.6*  --    DBILIRUBIN  --  0.9*  --    GLUCOSE 107* 122* 123*     Recent Labs     20   RBC  --  2.65* 2.89* 2.12*   HEMOGLOBIN  --  7.3* 7.8* 5.8*   HEMATOCRIT  --  21.3* 23.6* 17.2*   PLATELETCT  --  21* 9* 4*   PROTHROMBTM 21.0*  --   --   --    APTT 45.9*  --   --   --    INR 1.76*  --   --   --      Recent Labs     20   WBC 0.3*  --  0.3* 0.3*   NEUTSPOLYS CANCEL  --  CANCEL  --    LYMPHOCYTES CANCEL  --  CANCEL  --    MONOCYTES CANCEL  --  CANCEL  --    EOSINOPHILS CANCEL  --  CANCEL  --    BASOPHILS CANCEL  --  CANCEL  --    ASTSGOT  --  15  --   --    ALTSGPT  --  23  --   --    ALKPHOSPHAT  --  245*  --   --    TBILIRUBIN  --  1.6*  --   --      Recent Labs     08/04/20  0550 08/05/20  0230 08/06/20  0440   SODIUM 128* 132* 133*   POTASSIUM 3.7 3.2* 2.9*   CHLORIDE 100 100 100   CO2 19* 17* 20   GLUCOSE 107* 122* 123*   BUN 6* 10 11   CREATININE 0.32* 0.51 0.52   CALCIUM 8.2* 8.1* 8.2*     Hemodynamics:  Temp (24hrs), Av.8 °C (100 °F), Min:37.1 °C (98.8 °F), Max:38.7 °C (101.7 °F)  Temperature: 37.6 °C (99.7 °F), Monitored Temp: 38.8 °C (101.8 °F)  Pulse  Av.5  Min: 64  Max: 184   Blood Pressure: 133/46      Respiratory:    Respiration: (!) 41, Pulse Oximetry: 93 %     Work Of Breathing / Effort: Moderate;Tachypnea  RUL Breath Sounds: Diminished, RML Breath Sounds: Diminished, RLL Breath Sounds: Diminished, LEON Breath Sounds: Diminished, LLL Breath Sounds: Diminished  Fluids:    Intake/Output Summary (Last 24 hours) at 8/4/2020 1053  Last data filed at 8/4/2020 0800  Gross per 24 hour   Intake 5065 ml   Output 2450 ml   Net 2615 ml        GI/Nutrition:  Orders Placed This Encounter   Procedures   • Diet Order Regular     Standing Status:   Standing     Number of Occurrences:   1     Order Specific Question:   Diet:     Answer:   Regular [1]     Medical Decision Making, by Problem:  Active Hospital Problems    Diagnosis   • *Sepsis (HCC) [A41.9]   • Nosocomial pneumonia [J18.9, Y95]   • Acute hypoxemic respiratory failure (HCC) [J96.01]   • SVT (supraventricular tachycardia) (HCC) [I47.1]   • Pancytopenia (HCC) [D61.818]   • Pulmonary hypertension (HCC) [I27.20]   • Neutropenic fever (HCC) [D70.9, R50.81]   • CLL (chronic lymphocytic leukemia) (HCC) [C91.10]   • Hyponatremia [E87.1]   • Hypokalemia [E87.6]       Plan:  CLL-ibrutinib is on hold.  Patient is pancytopenic.  No rash  Pneumonia-ID and pulmonary medicine fu appreciated.   On amphotericin and posaconazole was discontinued.  Vancomycin was discontinued.  Case discussed with Dr. Corral from ID and Dr. Vikas Cruz from pulmonary medicine.  Patient's prognosis is very grim.  Family conference with  and patient set up and a final plan would be made.  Patient had previously indicated that she may not want any further treatment.  If she decides to continue with treatment then would consider G-CSF injections.    Pancytopenia-transfuse as needed.  Severely anemic and thrombocytopenic today.  No active bleeding  Overall the patient is critically sick.  Prognosis is very grim.    Quality-Core Measures

## 2020-08-06 NOTE — PROGRESS NOTES
Dr. Tang updated on patient's heart rate in the 140-150's, SPO2 90% on 50L/100% HFNC, Temperature of 38, and RR in the 30-40's. Verbal orders to give 20 mg of lasix early.

## 2020-08-07 NOTE — PROGRESS NOTES
Critical Care Progress Note    Date of admission  7/29/2020    Chief Complaint  54 y.o. female admitted 7/29/2020 with SVT, CLL, pancytopenia     Hospital Course    54 y.o. female with a history of CLL on chemotherapy followed by Dr. Summers with a history of SVT and admission last month for sepsis versus transfusion reaction or both during which she required pressors and IV antibiotics but ended up culture negative who presented 7/29/2020 with fever at the infusion center was admitted with possible UTI but subsequently has blossomed a juicy left-sided pneumonia.  Today she developed worsening fever and tachycardia felt to possibly have SVT again she appeared in distress for this and was transferred to the CICU rapidly.  On arrival her heart rate improved and she is in sinus tach in the 130s but her temperature is 102.  Blood pressures been acceptable throughout.  Bicarb level this a.m. down a little bit and lactic acid levels not been obtained.  Cultures are negative so far and MRSA nares came back negative.  She has been on broad-spectrum antibiotics including doxycycline, cefepime, Diflucan and acyclovir with only cefepime IV and vancomycin had been on board.  He has no history of pneumonia.  She has history of asthma when she was young and lived in the [facility] but she is not had any symptoms or required any treatment in recent years in the [place].  She has no exposures to anyone sick lives only with her  and they are actually lately been sleeping in separate bedrooms because of her illness.  She is a non-smoker and since her illness she is not received any vaccines.  Echocardiogram done late last night did not reveal a pericardial effusion, there was mild/moderate pulmonary pretension with RVSP 40, no significant valvular heart disease was identified, LV function was normal with an estimated EF of 70%.    8/4 -bronchoscopy showed some extrinsic compression of left lower lobe but no clear endobronchial  lesion.  BAL sent for routine culture, AFB, fungal, PCP and galactomannan Ag.  Remains febrile, reviewed with ID, continue broad-spectrum antimicrobial therapy with cefepime and posaconazole with prophylactic acyclovir.  WBC 0.3, hemoglobin 7.3, platelets 21.  Replacing potassium.  4 L nasal cannula and not in distress.  8/5- - now on HFNC, remains febrile; bronch neg so far; abx: cefepime/flagyl/amphotericin; trial lasix; pancytopenic  8/6 -clinically worse on maximal high flow nasal cannula.  Ongoing diuresis but clinically worsening.  Long discussion regarding end-of-life decision-making.  Patient does not want any escalation in therapy, now DNR/DNI and considering comfort measures.  8/7 -         Interval Problem Update  Reviewed last 24 hour events:  Tm = 104.2  HFNC 60L, 100%  Plts 2    Review of Systems  Review of Systems   Unable to perform ROS: Acuity of condition        Vital Signs for last 24 hours   Temp:  [37.6 °C (99.7 °F)] 37.6 °C (99.7 °F)  Pulse:  [] 121  Resp:  [20-43] 34  BP: ()/(29-80) 95/39  SpO2:  [70 %-98 %] 79 %    Hemodynamic parameters for last 24 hours       Respiratory Information for the last 24 hours       Physical Exam   Physical Exam  Vitals signs and nursing note reviewed.   Constitutional:       General: She is in acute distress.      Appearance: She is underweight. She is ill-appearing. She is not toxic-appearing.      Interventions: Face mask in place.   HENT:      Head: Atraumatic.      Nose: No congestion.      Mouth/Throat:      Mouth: Mucous membranes are dry.      Pharynx: No oropharyngeal exudate or posterior oropharyngeal erythema.   Eyes:      General: No scleral icterus.     Extraocular Movements: Extraocular movements intact.      Pupils: Pupils are equal, round, and reactive to light.   Neck:      Musculoskeletal: Neck supple. No neck rigidity.   Cardiovascular:      Rate and Rhythm: Regular rhythm. Tachycardia present.  No extrasystoles are present.      Pulses: Normal pulses.      Heart sounds: No murmur. No friction rub. Gallop present.       Comments: Sinus tachycardia  Pulmonary:      Effort: Respiratory distress present.      Breath sounds: No stridor. Rhonchi and rales present. No wheezing.      Comments: High flow nasal cannula, 100% oxygen  Abdominal:      General: Abdomen is flat. Bowel sounds are normal. There is no distension.      Palpations: Abdomen is soft. There is no mass.      Tenderness: There is no abdominal tenderness. There is no left CVA tenderness or guarding.   Musculoskeletal:      Right lower leg: Pitting Edema (trace) present.      Left lower leg: Pitting Edema (trace) present.   Lymphadenopathy:      Cervical: No cervical adenopathy.   Skin:     General: Skin is warm and dry.      Capillary Refill: Capillary refill takes less than 2 seconds.   Neurological:      Mental Status: She is alert.      Comments: Now lethargic, difficult to arouse and in increasing respiratory distress   Psychiatric:         Mood and Affect: Mood normal.         Behavior: Behavior normal. Behavior is cooperative.         Thought Content: Thought content normal.         Medications  Current Facility-Administered Medications   Medication Dose Route Frequency Provider Last Rate Last Dose   • potassium chloride SA (Kdur) tablet 40 mEq  40 mEq Oral Once Vikas Tang M.D.       • amphotericin B liposome (AMBISOME) 235 mg in D5W 150 mL IVPB  5 mg/kg Intravenous Q24HR Vikas Tang M.D.        And   • NS infusion 500 mL  500 mL Intravenous Q24HR Vikas Tang M.D.        And   • acetaminophen (TYLENOL) tablet 650 mg  650 mg Oral Q24HR Vikas Tang M.D.        And   • D5W infusion 30 mL  30 mL Intravenous Q24HR Vikas Tang M.D.        And   • D5W infusion 30 mL  30 mL Intravenous Q24HR Vikas Tang M.D.        And   • NS infusion 250 mL  250 mL Intravenous Q24HR Vikas Tang M.D.       • LORazepam (ATIVAN) injection 1-2 mg  1-2 mg Intravenous Q4HRS PRN  Vinnie Schuler M.D.   1 mg at 08/07/20 0359   • norepinephrine (Levophed) infusion 8 mg/250 mL (premix)  0-30 mcg/min Intravenous Continuous Vinnie Scuhler M.D.   Stopped at 08/07/20 0140   • Pharmacy Consult Request for Amphotericin B monitoring  1 Each Other PRN Ingris Corral M.D.       • metroNIDAZOLE (FLAGYL) tablet 500 mg  500 mg Oral Q8HRS Ingris Corral M.D.   500 mg at 08/07/20 0500   • MD Alert...ICU Electrolyte Replacement per Pharmacy   Other PHARMACY TO DOSE Vikas Tang M.D.       • ipratropium-albuterol (DUONEB) nebulizer solution  3 mL Nebulization Q2HRS PRN (RT) Vikas Tang M.D.   Stopped at 08/05/20 2006   • guaiFENesin (ROBITUSSIN) 100 MG/5ML solution 200 mg  10 mL Oral Q4HRS PRN Paul Mario M.D.   200 mg at 08/04/20 0540   • magnesium oxide (MAG-OX) tablet 400 mg  400 mg Oral DAILY Paul Mario M.D.   Stopped at 08/07/20 0600   • ipratropium (ATROVENT) 0.02 % nebulizer solution 0.5 mg  0.5 mg Nebulization BID (RT) Paul Mario M.D.   0.5 mg at 08/07/20 0634   • omeprazole (PRILOSEC) capsule 20 mg  20 mg Oral BID Paul Mario M.D.   Stopped at 08/07/20 0600   • acyclovir (ZOVIRAX) tablet 400 mg  400 mg Oral BID ELMER MontesOMarley   400 mg at 08/07/20 0400   • amLODIPine (NORVASC) tablet 10 mg  10 mg Oral DAILY ELMER MontesOMarley   Stopped at 08/07/20 0600   • folic acid (FOLVITE) tablet 1 mg  1 mg Oral DAILY ELMER MontesOMarley   Stopped at 08/07/20 0600   • senna-docusate (PERICOLACE or SENOKOT S) 8.6-50 MG per tablet 2 Tab  2 Tab Oral BID ELMER MontesOMarley   Stopped at 08/04/20 0600    And   • polyethylene glycol/lytes (MIRALAX) PACKET 1 Packet  1 Packet Oral QDAY PRN Seun Shetty D.O.        And   • magnesium hydroxide (MILK OF MAGNESIA) suspension 30 mL  30 mL Oral QDAY PRN Seun Shetty D.O.        And   • bisacodyl (DULCOLAX) suppository 10 mg  10 mg Rectal QDAY PRN Seun Shetty D.O.       • lactated ringers infusion (BOLUS): BMI less than or equal to  30  30 mL/kg Intravenous Once PRN Seun Shetty D.O.       • acetaminophen (TYLENOL) tablet 650 mg  650 mg Oral Q6HRS PRN Seun Shetty D.O.   650 mg at 08/07/20 0354   • Pharmacy Consult Request ...Pain Management Review 1 Each  1 Each Other PHARMACY TO DOSE Seun Shetty D.O.        And   • oxyCODONE immediate-release (ROXICODONE) tablet 2.5 mg  2.5 mg Oral Q3HRS PRN ELMER MontesOMarley   2.5 mg at 08/05/20 1945    And   • oxyCODONE immediate-release (ROXICODONE) tablet 5 mg  5 mg Oral Q3HRS PRN ELMER MontesOMarley   5 mg at 08/06/20 1754    And   • morphine (pf) 4 MG/ML injection 2 mg  2 mg Intravenous Q3HRS PRN ELMER MontesOMarley   2 mg at 08/06/20 1752   • cefepime (MAXIPIME) 2 g in  mL IVPB  2 g Intravenous Q8HRS Seun Shetty D.O.   Stopped at 08/07/20 0159   • enalaprilat (VASOTEC) injection 1.25 mg  1.25 mg Intravenous Q6HRS PRN Seun Shetty D.O.       • ondansetron (ZOFRAN) syringe/vial injection 4 mg  4 mg Intravenous Q4HRS PRN Seun Shetty D.O.       • ondansetron (ZOFRAN ODT) dispertab 4 mg  4 mg Oral Q4HRS PRN Seun Shetty D.O.       • promethazine (PHENERGAN) suppository 12.5-25 mg  12.5-25 mg Rectal Q4HRS PRN Seun Shetty D.O.       • prochlorperazine (COMPAZINE) injection 5-10 mg  5-10 mg Intravenous Q4HRS PRN Seun Shetty D.O.           Fluids    Intake/Output Summary (Last 24 hours) at 8/7/2020 0832  Last data filed at 8/7/2020 0800  Gross per 24 hour   Intake 1785.54 ml   Output 2835 ml   Net -1049.46 ml       Laboratory          Recent Labs     08/05/20 0230 08/06/20 0440 08/06/20  1449 08/07/20  0355   SODIUM 132* 133* 134* 136   POTASSIUM 3.2* 2.9* 3.1* 3.0*   CHLORIDE 100 100 100 102   CO2 17* 20 20 20   BUN 10 11 13 14   CREATININE 0.51 0.52 0.52 0.53   MAGNESIUM 2.0 2.0  --   --    PHOSPHORUS 2.6  --   --   --    CALCIUM 8.1* 8.2* 7.9* 8.4*     Recent Labs     08/05/20 0230 08/06/20  0440 08/06/20  1449 08/07/20  0355   ALTSGPT 23  --   --   --    ASTSGOT  15  --   --   --    ALKPHOSPHAT 245*  --   --   --    TBILIRUBIN 1.6*  --   --   --    DBILIRUBIN 0.9*  --   --   --    GLUCOSE 122* 123* 124* 111*     Recent Labs     08/05/20  0230  08/05/20  0845 08/06/20  0440 08/06/20  1449 08/07/20  0355   WBC  --    < > 0.3* 0.3* 0.3* 0.5*   NEUTSPOLYS  --   --  CANCEL  --   --  0.00*   LYMPHOCYTES  --   --  CANCEL  --   --  100.00*   MONOCYTES  --   --  CANCEL  --   --  0.00   EOSINOPHILS  --   --  CANCEL  --   --  0.00   BASOPHILS  --   --  CANCEL  --   --  0.00   ASTSGOT 15  --   --   --   --   --    ALTSGPT 23  --   --   --   --   --    ALKPHOSPHAT 245*  --   --   --   --   --    TBILIRUBIN 1.6*  --   --   --   --   --     < > = values in this interval not displayed.     Recent Labs     08/06/20  0440 08/06/20  1449 08/07/20  0355   RBC 2.12* 2.58* 2.52*   HEMOGLOBIN 5.8* 7.4* 7.1*   HEMATOCRIT 17.2* 21.1* 20.4*   PLATELETCT 4* 3* 2*       Imaging  X-Ray:  I have personally reviewed the images and compared with prior images.    Assessment/Plan  * Neutropenic fever (HCC)- (present on admission)  Assessment & Plan  Pancytopenia secondary to CLL and chemotherapy  Protective isolation for severe neutropenia  Appreciate ID input, broad-spectrum antimicrobial therapy and antifungal therapy with amphotericin  Bronchoscopy 8/4 negative so far    Nosocomial pneumonia  Assessment & Plan  Dense left-sided pneumonia with necrosis versus mass on CT  Bronchoscopy 8/4 without purulence but extrinsic compression of left lower lobe  BAL for culture, AFB, fungal, cytology and galactomannan -final results pending  Ongoing antibiotics as above    Sepsis ([facility])  Assessment & Plan        Acute hypoxemic respiratory failure ([facility])  Assessment & Plan  High flow nasal cannula with titrated oxygen as needed  High risk for further deterioration and may require intubation  Continue RT protocols and close monitoring in ICU    SVT (supraventricular tachycardia) ([facility])- (present on admission)  Assessment  & Plan  History of SVT w/ recurrence 2/2 to fever/sepsis   Optimize electrolytes  Cardioversion for unstable SVT    Pancytopenia (HCC)- (present on admission)  Assessment & Plan  Secondary to CLL and chemotherapy  Hold chemotherapy  Transfuse per protocols  Serial CBC  Appreciate oncology input    Pulmonary hypertension (HCC)  Assessment & Plan  RVSP 40 on echo 8/1  Presumably secondary to pneumonic process, not insignificant right heart failure at this time  LV function excellent with EF 70%  Monitor    CLL (chronic lymphocytic leukemia) (HCC)- (present on admission)  Assessment & Plan  Oncology following, therapy on hold  Pancytopenic    Hypokalemia- (present on admission)  Assessment & Plan  Replete, goal greater than 4.0, ERP    Hyponatremia- (present on admission)  Assessment & Plan  Slowly correcting, follow     Updated plan:  Patient has continued to decline, now on max high flow nasal cannula, more lethargic and in increasing respiratory distress.  She remains on broad-spectrum antibiotic therapy and maximal high flow nasal cannula settings  Prognosis is very poor.  Family coming to discuss decision making given her declining status.      VTE:  Contraindicated  Ulcer: PPI  Lines: None    I have performed a physical exam and reviewed and updated ROS and Plan today (8/7/2020). In review of yesterday's note (8/6/2020), there are no changes except as documented above.     Discussed patient condition and risk of morbidity and/or mortality with RN, RT, Pharmacy and QA team  The patient remains critically ill.  She is at high risk for further vital organ deterioration.  Monitor closely in ICU.  Critical care time = 35 minutes in directly providing and coordinating critical care and extensive data review.  No time overlap and excludes procedures.    Addendum:  Family at bedside.  After prolonged discussion regarding overall very poor prognosis and consistent with the patient's wishes she is transition to comfort  measures.

## 2020-08-07 NOTE — PROGRESS NOTES
Patient  @ 1156. Family at bedside and notified. Family given packet with information for mortuaries etc. Dr. Tang notified of patient exipring. Post mortem care completed.

## 2020-08-07 NOTE — DISCHARGE PLANNING
Anticipated Discharge Disposition: hospice    Action: [facility] not contracted w/ [employer]. Faxed choice [facility]    Barriers to Discharge:   [employer] insurance    Plan: TBD

## 2020-08-07 NOTE — PROGRESS NOTES
Velma from Lab called with critical result of WBC=0.5; platelets=2; abs neutr 0.03 at 0510. Critical lab result read back to TEJAL Aguirre RN.   Dr. Pérez notified of critical lab result at 0510.  Critical lab result read back by Dr. Pérez.

## 2020-08-07 NOTE — PROGRESS NOTES
ID Brief Note    Discussed with Dr. Tang and patient has transition to comfort care.  ID will sign off.      Ingris Corral MD  Infectious Diseases

## 2020-08-09 NOTE — DISCHARGE SUMMARY
Death Summary    Cause of Death  Hypoxemic respiratory failure due to pneumonia due to neutropenia due to CLL.    Comorbid Conditions at the Time of Death  Principal Problem:    Neutropenic fever (HCC) POA: Yes  Active Problems:    Pancytopenia (HCC) POA: Yes    SVT (supraventricular tachycardia) (HCC) POA: Yes    Acute hypoxemic respiratory failure (HCC) POA: Unknown    Sepsis (HCC) POA: No    Nosocomial pneumonia POA: Clinically Undetermined    CLL (chronic lymphocytic leukemia) (HCC) (Chronic) POA: Yes    Pulmonary hypertension (HCC) POA: Unknown    Hyponatremia POA: Yes    Hypokalemia POA: Yes  Resolved Problems:    Acute cystitis without hematuria POA: Unknown      History of Presenting Illness and Hospital Course  This is a 54 y.o. female with a history of CLL receiving ongoing chemotherapy and followed by Dr. Summers from oncology and has been profoundly neutropenic since May 2020.  She was admitted 7/29/2020 with neutropenic fever and pneumonia.  There is a large left-sided infiltrate with central necrosis and possible UTI.  She developed progressive fever over 103 and was started on broad-spectrum antibiotic therapy.  She underwent bronchoscopy and multiple cultures which were all negative.  She was followed by oncology and infectious disease.  She remains neutropenic and had progressive decline with increasing respiratory failure.  She declined intubation and required progressive oxygen support on maximal high flow nasal cannula settings.  With her family at bedside she and they all decided to transition to comfort measures given her poor prognosis.  She passed peacefully with her family at bedside.    Death Date: 08/07/20   Death Time: 1156         Pronounced By (RN1): Aishwarya Parker  Pronounced By (RN2): Talia Flores

## 2020-08-10 LAB
P JIROVECII DNA SPEC QL NAA+PROBE: NOT DETECTED
SPECIMEN SOURCE: NORMAL

## 2020-08-18 NOTE — DOCUMENTATION QUERY
Pending sale to [facility]                                                                       Query Response Note      PATIENT:               CHIDI MOLINA  ACCT #:                  [id number]  MRN:                     [id number]  :                      1965  ADMIT DATE:       2020 1:01 PM  DISCH DATE:        2020 11:56 AM  RESPONDING  PROVIDER #:        [id number]           QUERY TEXT:    Severe malnutrition is noted in the Registered Dietitian evaluation. Based upon the clinical findings, risk factors, and treatment can a diagnosis be provided to support this finding?    NOTE:  If an appropriate response is not listed below, please respond with a new note.    The patient's Clinical Indicators include:  Per Registered Dietitian Consultation  -BMI  17.48 kg/m²., BMI classification: underweight  -severe malnutrition in the context of acute/chronic illness   -related to recent CLL diagnosis and chemotherapy treatment   -as evidenced by severe 8.9% wt loss in past 1 month & report of intake 50% of normal for the past month.    Treatment:   Dietitian Consultation, Boost supplements, snacks between meals, encourage intake, document intake, monitor weight, & comfort measures     Risk Factors:   Chronic lymphocytic leukemia, chemotherapy, pancytopenia, acute respiratory failure, pneumonia, hyponatremia, & hypokalemia  Options provided:   -- Agree with Registered Dietitian assessment of severe protein calorie malnutrition   -- Disagree with Registered Dietitian assessment of severe protein calorie malnutrition   -- Unable to determine      Query created by: Carly Franklin on 2020 3:52 PM    RESPONSE TEXT:    Agree with Registered Dietitian assessment of severe protein calorie malnutrition          Electronically signed by:  ZACH DON MD 2020 8:23 PM

## 2020-08-31 LAB
FUNGUS SPEC CULT: NORMAL
FUNGUS SPEC CULT: NORMAL
PATHOLOGIST INTERPRETATION PTRX: NORMAL
SIGNIFICANT IND 70042: NORMAL
SIGNIFICANT IND 70042: NORMAL
SITE SITE: NORMAL
SITE SITE: NORMAL
SOURCE SOURCE: NORMAL
SOURCE SOURCE: NORMAL

## 2020-09-29 LAB
MYCOBACTERIUM SPEC CULT: NORMAL
RHODAMINE-AURAMINE STN SPEC: NORMAL
SIGNIFICANT IND 70042: NORMAL
SITE SITE: NORMAL
SOURCE SOURCE: NORMAL

## 2020-09-29 NOTE — DOCUMENTATION QUERY
[facility]                                                                       Query Response Note      PATIENT:               CHIDI MOLINA  ACCT #:                  [id number]  MRN:                     [id number]  :                      1965  ADMIT DATE:       2020 1:01 PM  DISCH DATE:        2020 11:56 AM  RESPONDING  PROVIDER #:        [id number]           QUERY TEXT:    There is conflicting documentation regarding Sepsis POA status. Your help is needed.  Please clarify the following:    The patient's Clinical Indicators include:  Patient is a 53 y/o female presenting with neutropenic fever, patient has a history of CLL and chronic pancytopenia.  Patient was diagnosed with pneumonia and treated with IV antibiotics.  Documentation also indicates patient had sepsis, but whether it was present on admission is unclear.    H&P: sepsis  DS: sepsis POA N  PN , doubt sepsis     - WBC  0.1, lactic acid 1.3   - procal 6.99  Vitals on admit: Temp:  [36.7 °C (98 °F)] 36.7 °C (98 °F)  Pulse:  [73-97] 85  Resp:  [16-21] 20  BP: (109-126)/(51-58) 109/53  Options provided:   -- Sepsis was present on admission   -- Sepsis developed after admission   -- Unable to determine      Query created by: Armiad Ramirez on 2020 9:43 AM    RESPONSE TEXT:    Sepsis was present on admission          Electronically signed by:  ZACH DON MD 2020 5:06 AM

## 2023-06-04 NOTE — CARE PLAN
Problem: Safety  Goal: Will remain free from injury  Note: No falls this shift. Bed alarm active and audible. Hourly rounds maintained.      Problem: Respiratory:  Goal: Respiratory status will improve  Note: Pt on 60L/100% HFNC with good sats (90's) overnight until she woke up from sleep induced by ativan. Then pt became tachypneic and sats were in the 70's and increased work of breathing. Pt wanted more ativan.       complains of pain/discomfort

## 2024-04-04 NOTE — PROGRESS NOTES
Pt returns to [facility] for labs with possible blood products.  Pt reports fatigue and occasional SOB.  Attempted to place PIV x2.  2 other nurses attempted with no success in PIV placement.  VAT team called to place ultrasound guided PIV.  20g PIV placed to L-AC by Amy in VAT team.  CBC drawn.  Received call with critical WBC of 0.2, platelets of 9,000 and ANC 0.0.  Confirmed blood consents signed by pt.  Pre-medications given.  Platelets infusing.  Pt reports her arm is swollen near IV site around 30 minutes after Platelets were infusing.  Noted swollen are to upper left arm.  Platelets stopped and disconnected from pt.  PIV removed.  LUE elevated with pillows and heat pack applied.  New PIV site placed by KENNETH Sutton to R-FA.  Platelets completed without adverse reaction.  PIV removed and site wrapped with pressure dressing.  Swelling to LUE has decreased.  Informed pt to elevate LUE tonight and apply heat.  Pt agrees with plan.  Confirmed next appt with pt.  Pt dc home with spouse as transportation.     Justin Valdes MD